# Patient Record
Sex: FEMALE | Race: WHITE | NOT HISPANIC OR LATINO | Employment: OTHER | URBAN - METROPOLITAN AREA
[De-identification: names, ages, dates, MRNs, and addresses within clinical notes are randomized per-mention and may not be internally consistent; named-entity substitution may affect disease eponyms.]

---

## 2020-10-08 ENCOUNTER — TELEPHONE (OUTPATIENT)
Dept: ORTHOPEDICS | Facility: CLINIC | Age: 65
End: 2020-10-08

## 2020-10-08 ENCOUNTER — PATIENT MESSAGE (OUTPATIENT)
Dept: ADMINISTRATIVE | Facility: OTHER | Age: 65
End: 2020-10-08

## 2020-10-08 DIAGNOSIS — M25.561 RIGHT KNEE PAIN, UNSPECIFIED CHRONICITY: Primary | ICD-10-CM

## 2020-10-08 NOTE — TELEPHONE ENCOUNTER
Spoke with pt. Informed pt she would need xrays prior to appt. Images ordered and scheduled. Pt verbalized understanding.

## 2020-10-08 NOTE — TELEPHONE ENCOUNTER
----- Message from Flavia Henderson sent at 10/8/2020  3:53 PM CDT -----  Contact: [phone]  Caller :   Heidy   teL:   [phone]   /  Returing your call.     Would like to do the Imaging at the [place] location.    Pls call to discuss.

## 2020-10-12 ENCOUNTER — OFFICE VISIT (OUTPATIENT)
Dept: ORTHOPEDICS | Facility: CLINIC | Age: 65
End: 2020-10-12
Payer: COMMERCIAL

## 2020-10-12 VITALS
DIASTOLIC BLOOD PRESSURE: 92 MMHG | HEIGHT: 65 IN | WEIGHT: 193.88 LBS | BODY MASS INDEX: 32.3 KG/M2 | SYSTOLIC BLOOD PRESSURE: 149 MMHG | HEART RATE: 85 BPM

## 2020-10-12 DIAGNOSIS — M17.11 PRIMARY OSTEOARTHRITIS OF RIGHT KNEE: Primary | ICD-10-CM

## 2020-10-12 PROCEDURE — 3008F BODY MASS INDEX DOCD: CPT | Mod: CPTII,S$GLB,, | Performed by: ORTHOPAEDIC SURGERY

## 2020-10-12 PROCEDURE — 3008F PR BODY MASS INDEX (BMI) DOCUMENTED: ICD-10-PCS | Mod: CPTII,S$GLB,, | Performed by: ORTHOPAEDIC SURGERY

## 2020-10-12 PROCEDURE — 3077F PR MOST RECENT SYSTOLIC BLOOD PRESSURE >= 140 MM HG: ICD-10-PCS | Mod: CPTII,S$GLB,, | Performed by: ORTHOPAEDIC SURGERY

## 2020-10-12 PROCEDURE — 99203 OFFICE O/P NEW LOW 30 MIN: CPT | Mod: S$GLB,,, | Performed by: ORTHOPAEDIC SURGERY

## 2020-10-12 PROCEDURE — 3080F PR MOST RECENT DIASTOLIC BLOOD PRESSURE >= 90 MM HG: ICD-10-PCS | Mod: CPTII,S$GLB,, | Performed by: ORTHOPAEDIC SURGERY

## 2020-10-12 PROCEDURE — 99203 PR OFFICE/OUTPT VISIT, NEW, LEVL III, 30-44 MIN: ICD-10-PCS | Mod: S$GLB,,, | Performed by: ORTHOPAEDIC SURGERY

## 2020-10-12 PROCEDURE — 99999 PR PBB SHADOW E&M-EST. PATIENT-LVL III: CPT | Mod: PBBFAC,,, | Performed by: ORTHOPAEDIC SURGERY

## 2020-10-12 PROCEDURE — 99999 PR PBB SHADOW E&M-EST. PATIENT-LVL III: ICD-10-PCS | Mod: PBBFAC,,, | Performed by: ORTHOPAEDIC SURGERY

## 2020-10-12 PROCEDURE — 3080F DIAST BP >= 90 MM HG: CPT | Mod: CPTII,S$GLB,, | Performed by: ORTHOPAEDIC SURGERY

## 2020-10-12 PROCEDURE — 3077F SYST BP >= 140 MM HG: CPT | Mod: CPTII,S$GLB,, | Performed by: ORTHOPAEDIC SURGERY

## 2020-10-12 RX ORDER — IBUPROFEN 200 MG
200 TABLET ORAL EVERY 6 HOURS PRN
COMMUNITY
End: 2020-10-19

## 2020-10-12 RX ORDER — DICLOFENAC SODIUM 75 MG/1
75 TABLET, DELAYED RELEASE ORAL 2 TIMES DAILY
Qty: 60 TABLET | Refills: 1 | Status: SHIPPED | OUTPATIENT
Start: 2020-10-12 | End: 2020-12-11

## 2020-10-12 RX ORDER — ACETAMINOPHEN 325 MG/1
325 TABLET ORAL EVERY 6 HOURS PRN
COMMUNITY
End: 2021-01-18

## 2020-10-12 NOTE — PROGRESS NOTES
Subjective:    Patient ID:  Heidy Polo is a 65 y.o. y.o. female who presents for initial visit for Pain of the Right Knee      64 yo female reports one year h/o right knee pain, insidious onset. Pain gradual progressive, localized to medial knee, sharp and aggravated with walking, stair climbing and rising from a chair. Notes associated swelling and night pain. Denies locking, giving way or constitutional sxs. Has been taking Tylenol and Motrin with some relief.           Past Medical History:   Diagnosis Date    Cancer     breast in 2001 -right     Hyperlipidemia     Hypertension         Past Surgical History:   Procedure Laterality Date    CHOLECYSTECTOMY      gastric sleeve   9/11    HERNIA REPAIR      MODIFIED RADICAL MASTECTOMY W/ AXILLARY LYMPH NODE DISSECTION  2001    right breast       Review of patient's allergies indicates:  No Known Allergies       Current Outpatient Medications:     acetaminophen (TYLENOL) 325 MG tablet, Take 325 mg by mouth every 6 (six) hours as needed for Pain., Disp: , Rfl:     ibuprofen (ADVIL,MOTRIN) 200 MG tablet, Take 200 mg by mouth every 6 (six) hours as needed for Pain., Disp: , Rfl:     ergocalciferol (ERGOCALCIFEROL) 50,000 unit Cap, Take 1 capsule (50,000 Units total) by mouth every 30 days. (Patient not taking: Reported on 10/12/2020), Disp: 10 capsule, Rfl: 12    levothyroxine (SYNTHROID) 50 MCG tablet, Take 1 tablet (50 mcg total) by mouth before breakfast. (Patient not taking: Reported on 10/12/2020), Disp: 20 tablet, Rfl: 7    metoprolol succinate (TOPROL-XL) 100 MG 24 hr tablet, Take 1 tablet (100 mg total) by mouth once daily. (Patient not taking: Reported on 10/12/2020), Disp: 90 tablet, Rfl: 1    metoprolol tartrate (LOPRESSOR) 25 MG tablet, Take 1 tablet (25 mg total) by mouth 2 (two) times daily., Disp: 60 tablet, Rfl: 5    torsemide (DEMADEX) 10 MG Tab, Take 1 tablet (10 mg total) by mouth once daily. Prn fluid (Patient not taking: Reported on  10/12/2020), Disp: 90 tablet, Rfl: 1    venlafaxine (EFFEXOR-XR) 150 MG Cp24, Take 1 capsule (150 mg total) by mouth once daily. (Patient not taking: Reported on 10/12/2020), Disp: 90 capsule, Rfl: 1    Social History     Socioeconomic History    Marital status:      Spouse name: Not on file    Number of children: Not on file    Years of education: Not on file    Highest education level: Not on file   Occupational History    Not on file   Social Needs    Financial resource strain: Not on file    Food insecurity     Worry: Not on file     Inability: Not on file    Transportation needs     Medical: Not on file     Non-medical: Not on file   Tobacco Use    Smoking status: Never Smoker   Substance and Sexual Activity    Alcohol use: No     Comment: rare    Drug use: No    Sexual activity: Yes     Partners: Male     Birth control/protection: None   Lifestyle    Physical activity     Days per week: Not on file     Minutes per session: Not on file    Stress: Not on file   Relationships    Social connections     Talks on phone: Not on file     Gets together: Not on file     Attends [employer] service: Not on file     Active member of club or organization: Not on file     Attends meetings of clubs or organizations: Not on file     Relationship status: Not on file   Other Topics Concern    Not on file   Social History Narrative    Not on file        Family History   Problem Relation Age of Onset    Cancer Sister         breast ca    Diabetes Brother     Hypertension Brother     Cancer Maternal Aunt         breast ca    Cancer Maternal Grandmother         ovarien ca    Heart disease Maternal Grandfather     Hypertension Brother         Review of Systems   Constitutional: Negative for chills and fever.   HENT: Negative for hearing loss.    Eyes: Negative for blurred vision.   Respiratory: Negative for shortness of breath.    Cardiovascular: Negative for chest pain.   Gastrointestinal: Negative for  "nausea and vomiting.   Genitourinary: Negative for dysuria.   Musculoskeletal: Negative for myalgias.   Skin: Negative for rash.   Neurological: Negative for speech change and loss of consciousness.   Endo/Heme/Allergies: Does not bruise/bleed easily.   Psychiatric/Behavioral: Positive for depression.        Objective:     BP (!) 149/92 (BP Location: Left arm, Patient Position: Sitting, BP Method: Small (Automatic))   Pulse 85   Ht 5' 5" (1.651 m)   Wt 87.9 kg (193 lb 14.3 oz)   BMI 32.27 kg/m²     Ortho Exam     64 yo female in NAD; alert, oriented x 3    BLE: N/V intact; no edema    Right hip: NT; FROM; negative Stinchfield    Right knee: varus deformity; trace effusion; PF crepitus; tender MJL/pes; ROM: 0-110 flexion; trace varus laxity    Imaging:     X-rays standing AP bilateral knee, lateral/sunrise right knee taken today are independently reviewed by me and show Kellgren Yariel grade 4 tricompartmental degenerative right, grade 4 bicompartmental left with varus deformity.       Assessment & Plan:      1. Primary osteoarthritis of right knee       1.  Findings, diagnosis, treatment options/risks/benefits were reviewed  2.  Voltaren 75 mg po bid (discontinue Motrin)  3.  PT  4.  Cane support left hand prn  5.  Maintain judicious activity modification/limitation  6.  Follow-up in 2 months    "

## 2020-10-19 ENCOUNTER — TELEPHONE (OUTPATIENT)
Dept: SURGERY | Facility: CLINIC | Age: 65
End: 2020-10-19

## 2020-10-19 ENCOUNTER — OFFICE VISIT (OUTPATIENT)
Dept: PRIMARY CARE CLINIC | Facility: CLINIC | Age: 65
End: 2020-10-19
Payer: COMMERCIAL

## 2020-10-19 ENCOUNTER — PATIENT MESSAGE (OUTPATIENT)
Dept: PRIMARY CARE CLINIC | Facility: CLINIC | Age: 65
End: 2020-10-19

## 2020-10-19 VITALS
HEIGHT: 65 IN | WEIGHT: 196.19 LBS | RESPIRATION RATE: 18 BRPM | OXYGEN SATURATION: 98 % | HEART RATE: 78 BPM | SYSTOLIC BLOOD PRESSURE: 138 MMHG | TEMPERATURE: 98 F | DIASTOLIC BLOOD PRESSURE: 86 MMHG | BODY MASS INDEX: 32.69 KG/M2

## 2020-10-19 DIAGNOSIS — R73.03 PREDIABETES: ICD-10-CM

## 2020-10-19 DIAGNOSIS — Z85.3 HISTORY OF RIGHT BREAST CANCER: ICD-10-CM

## 2020-10-19 DIAGNOSIS — E03.9 HYPOTHYROIDISM, UNSPECIFIED TYPE: ICD-10-CM

## 2020-10-19 DIAGNOSIS — Z90.11 H/O RIGHT MASTECTOMY: ICD-10-CM

## 2020-10-19 DIAGNOSIS — Z11.4 SCREENING FOR HIV (HUMAN IMMUNODEFICIENCY VIRUS): ICD-10-CM

## 2020-10-19 DIAGNOSIS — Z12.31 ENCOUNTER FOR SCREENING MAMMOGRAM FOR MALIGNANT NEOPLASM OF BREAST: ICD-10-CM

## 2020-10-19 DIAGNOSIS — Z12.4 CERVICAL CANCER SCREENING: ICD-10-CM

## 2020-10-19 DIAGNOSIS — E78.5 HYPERLIPIDEMIA, UNSPECIFIED HYPERLIPIDEMIA TYPE: ICD-10-CM

## 2020-10-19 DIAGNOSIS — Z11.59 NEED FOR HEPATITIS C SCREENING TEST: ICD-10-CM

## 2020-10-19 DIAGNOSIS — I10 ESSENTIAL HYPERTENSION: ICD-10-CM

## 2020-10-19 DIAGNOSIS — Z12.11 COLON CANCER SCREENING: ICD-10-CM

## 2020-10-19 DIAGNOSIS — Z12.11 SCREENING FOR COLON CANCER: Primary | ICD-10-CM

## 2020-10-19 DIAGNOSIS — Z00.00 ANNUAL PHYSICAL EXAM: Primary | ICD-10-CM

## 2020-10-19 DIAGNOSIS — Z23 NEED FOR VACCINATION: ICD-10-CM

## 2020-10-19 PROCEDURE — 3008F PR BODY MASS INDEX (BMI) DOCUMENTED: ICD-10-PCS | Mod: CPTII,S$GLB,, | Performed by: FAMILY MEDICINE

## 2020-10-19 PROCEDURE — 90472 IMMUNIZATION ADMIN EACH ADD: CPT | Mod: S$GLB,,, | Performed by: FAMILY MEDICINE

## 2020-10-19 PROCEDURE — 3079F PR MOST RECENT DIASTOLIC BLOOD PRESSURE 80-89 MM HG: ICD-10-PCS | Mod: CPTII,S$GLB,, | Performed by: FAMILY MEDICINE

## 2020-10-19 PROCEDURE — 3075F PR MOST RECENT SYSTOLIC BLOOD PRESS GE 130-139MM HG: ICD-10-PCS | Mod: CPTII,S$GLB,, | Performed by: FAMILY MEDICINE

## 2020-10-19 PROCEDURE — 99999 PR PBB SHADOW E&M-EST. PATIENT-LVL V: CPT | Mod: PBBFAC,,, | Performed by: FAMILY MEDICINE

## 2020-10-19 PROCEDURE — 3008F BODY MASS INDEX DOCD: CPT | Mod: CPTII,S$GLB,, | Performed by: FAMILY MEDICINE

## 2020-10-19 PROCEDURE — 90472 TD VACCINE GREATER THAN OR EQUAL TO 7YO PRESERVATIVE FREE IM: ICD-10-PCS | Mod: S$GLB,,, | Performed by: FAMILY MEDICINE

## 2020-10-19 PROCEDURE — 99999 PR PBB SHADOW E&M-EST. PATIENT-LVL V: ICD-10-PCS | Mod: PBBFAC,,, | Performed by: FAMILY MEDICINE

## 2020-10-19 PROCEDURE — 93005 EKG 12-LEAD: ICD-10-PCS | Mod: S$GLB,,, | Performed by: FAMILY MEDICINE

## 2020-10-19 PROCEDURE — 90471 FLU VACCINE (QUAD) GREATER THAN OR EQUAL TO 3YO PRESERVATIVE FREE IM: ICD-10-PCS | Mod: S$GLB,,, | Performed by: FAMILY MEDICINE

## 2020-10-19 PROCEDURE — 90471 IMMUNIZATION ADMIN: CPT | Mod: S$GLB,,, | Performed by: FAMILY MEDICINE

## 2020-10-19 PROCEDURE — 90714 TD VACC NO PRESV 7 YRS+ IM: CPT | Mod: S$GLB,,, | Performed by: FAMILY MEDICINE

## 2020-10-19 PROCEDURE — 99386 PR PREVENTIVE VISIT,NEW,40-64: ICD-10-PCS | Mod: 25,S$GLB,, | Performed by: FAMILY MEDICINE

## 2020-10-19 PROCEDURE — 93010 ELECTROCARDIOGRAM REPORT: CPT | Mod: S$GLB,,, | Performed by: INTERNAL MEDICINE

## 2020-10-19 PROCEDURE — 93005 ELECTROCARDIOGRAM TRACING: CPT | Mod: S$GLB,,, | Performed by: FAMILY MEDICINE

## 2020-10-19 PROCEDURE — 90686 IIV4 VACC NO PRSV 0.5 ML IM: CPT | Mod: S$GLB,,, | Performed by: FAMILY MEDICINE

## 2020-10-19 PROCEDURE — 93010 EKG 12-LEAD: ICD-10-PCS | Mod: S$GLB,,, | Performed by: INTERNAL MEDICINE

## 2020-10-19 PROCEDURE — 99386 PREV VISIT NEW AGE 40-64: CPT | Mod: 25,S$GLB,, | Performed by: FAMILY MEDICINE

## 2020-10-19 PROCEDURE — 3075F SYST BP GE 130 - 139MM HG: CPT | Mod: CPTII,S$GLB,, | Performed by: FAMILY MEDICINE

## 2020-10-19 PROCEDURE — 90714 TD VACCINE GREATER THAN OR EQUAL TO 7YO PRESERVATIVE FREE IM: ICD-10-PCS | Mod: S$GLB,,, | Performed by: FAMILY MEDICINE

## 2020-10-19 PROCEDURE — 90686 FLU VACCINE (QUAD) GREATER THAN OR EQUAL TO 3YO PRESERVATIVE FREE IM: ICD-10-PCS | Mod: S$GLB,,, | Performed by: FAMILY MEDICINE

## 2020-10-19 PROCEDURE — 3079F DIAST BP 80-89 MM HG: CPT | Mod: CPTII,S$GLB,, | Performed by: FAMILY MEDICINE

## 2020-10-19 RX ORDER — SODIUM CHLORIDE 0.9 % (FLUSH) 0.9 %
3 SYRINGE (ML) INJECTION
Status: CANCELLED | OUTPATIENT
Start: 2020-10-19

## 2020-10-19 RX ORDER — ZOSTER VACCINE RECOMBINANT, ADJUVANTED 50 MCG/0.5
0.5 KIT INTRAMUSCULAR ONCE
Qty: 1 EACH | Refills: 0 | Status: SHIPPED | OUTPATIENT
Start: 2020-10-19 | End: 2020-10-19

## 2020-10-19 RX ORDER — SODIUM CHLORIDE, SODIUM LACTATE, POTASSIUM CHLORIDE, CALCIUM CHLORIDE 600; 310; 30; 20 MG/100ML; MG/100ML; MG/100ML; MG/100ML
INJECTION, SOLUTION INTRAVENOUS CONTINUOUS
Status: CANCELLED | OUTPATIENT
Start: 2020-10-19

## 2020-10-19 RX ORDER — POLYETHYLENE GLYCOL 3350, SODIUM SULFATE ANHYDROUS, SODIUM BICARBONATE, SODIUM CHLORIDE, POTASSIUM CHLORIDE 236; 22.74; 6.74; 5.86; 2.97 G/4L; G/4L; G/4L; G/4L; G/4L
4 POWDER, FOR SOLUTION ORAL ONCE
Qty: 4000 ML | Refills: 0 | Status: SHIPPED | OUTPATIENT
Start: 2020-10-19 | End: 2020-10-19

## 2020-10-19 NOTE — TELEPHONE ENCOUNTER
Called patient in reference to a referral to Colorectal Surgery for colon cancer screening. Patient verbally consented to a Colonoscopy and requested to be scheduled for a Colonoscopy on 11/09/2020. Patient was advised a designated  is required on the day of the Colonoscopy to drive the patient home and the  must be at least. 18 years old.Colonoscopy Prep instructions were thoroughly explained and discussed with the patient. Patient acknowledges understanding Prep instructions. Patient was advised the Colonoscopy Prep instructions discussed and explained on the phone , are being mailed out to the patient's address on file. Patient's address on file was verified with the patient for accuracy of mailing. Patient's medications on file was reviewed with the patient for accuracy of information. Patient denies taking  any other medications other than those listed and verified on medication profile.Patient was explained the Colonoscopy will be performed here at [facility]. Patient was further explained the Pre-Op will call one day prior to the procedure to discuss Pre-Op instructions and what time to report for the Colonoscopy. The patient was given the opportunity to ask any questions about the Colonoscopy. No further issues were discussed.

## 2020-10-19 NOTE — PROGRESS NOTES
Verified pt ID using name and . NKDA. Administered flu and TD in left deltoid per physician order using aseptic technique. Aspirated and no blood return noted. Pt tolerated well with no adverse reactions noted.

## 2020-10-19 NOTE — PROGRESS NOTES
"Subjective:       Patient ID: Heidy Polo is a 65 y.o. female.    Chief Complaint: Establish Care and Flu Vaccine    Here to establish care.  Has not had primary care physician or routine checkup in several years.  History of hypertension and hypothyroidism, off meds for about 5 years.  Has gained about 20 lb over the last few months, and says her blood pressure has been creeping up, though highest readings have been in the 120s over 80s at home.  Does endorse more frequent headaches since her blood pressure has been a little higher.  No chest pain or shortness of breath.  History of breast cancer and right mastectomy in 2001, due for follow-up mammography of left breast.    Review of Systems   Constitutional: Positive for malaise/fatigue. Negative for chills, fever and unexpected weight change.   HENT: Negative for trouble swallowing.    Eyes: Positive for blurred vision. Negative for visual disturbance.   Respiratory: Negative for shortness of breath.    Cardiovascular: Negative for chest pain, palpitations, orthopnea and PND.   Gastrointestinal: Negative for blood in stool.   Genitourinary: Negative for difficulty urinating.   Musculoskeletal: Positive for arthralgias. Negative for neck pain.   Skin: Negative for rash and wound.   Allergic/Immunologic: Negative for immunocompromised state.   Neurological: Positive for headaches.   Hematological: Does not bruise/bleed easily.   Psychiatric/Behavioral: Negative for dysphoric mood.       Objective:      Vitals:    10/19/20 0854   BP: 138/86   BP Location: Left arm   Patient Position: Sitting   BP Method: Large (Manual)   Pulse: 78   Resp: 18   Temp: 97.8 °F (36.6 °C)   TempSrc: Oral   SpO2: 98%   Weight: 89 kg (196 lb 3.4 oz)   Height: 5' 5" (1.651 m)     Physical Exam  Vitals signs and nursing note reviewed.   Constitutional:       Appearance: She is well-developed.   HENT:      Head: Normocephalic and atraumatic.   Eyes:      Pupils: Pupils are equal, round, " and reactive to light.   Neck:      Musculoskeletal: Neck supple.      Vascular: No carotid bruit or JVD.   Cardiovascular:      Rate and Rhythm: Normal rate and regular rhythm.      Pulses:           Radial pulses are 2+ on the right side and 2+ on the left side.      Heart sounds: Normal heart sounds.   Pulmonary:      Effort: Pulmonary effort is normal.      Breath sounds: Normal breath sounds.   Abdominal:      General: Bowel sounds are normal. There is no distension.      Palpations: Abdomen is soft.      Tenderness: There is no abdominal tenderness.   Skin:     General: Skin is warm and dry.   Neurological:      Mental Status: She is alert and oriented to person, place, and time.   Psychiatric:         Behavior: Behavior normal.         Lab Results   Component Value Date    WBC 11.59 (H) 03/12/2012    HGB 13.4 03/12/2012    HCT 41.5 03/12/2012     03/12/2012    CHOL 181 04/10/2013    TRIG 96 04/10/2013    HDL 63 04/10/2013    ALT 23 04/10/2013    AST 21 04/10/2013     04/10/2013    K 4.6 04/10/2013     04/10/2013    CREATININE 0.6 04/10/2013    BUN 14 04/10/2013    CO2 25 04/10/2013    TSH 1.677 04/10/2013    HGBA1C 5.5 04/10/2013      Assessment:       1. Annual physical exam    2. Encounter for screening mammogram for malignant neoplasm of breast    3. Colon cancer screening    4. Cervical cancer screening    5. Essential hypertension    6. Hyperlipidemia, unspecified hyperlipidemia type    7. History of right breast cancer    8. Hypothyroidism, unspecified type    9. H/O right mastectomy    10. Prediabetes    11. Screening for HIV (human immunodeficiency virus)    12. Need for hepatitis C screening test    13. Need for vaccination        Plan:       Annual physical exam  -     EKG 12-lead  -     CBC auto differential; Future; Expected date: 10/19/2020  -     Comprehensive Metabolic Panel; Future; Expected date: 10/19/2020  -     Lipid Panel; Future; Expected date: 10/19/2020  -     TSH;  Future; Expected date: 10/19/2020  -     Hemoglobin A1C; Future; Expected date: 10/19/2020  -     Hepatitis C Antibody; Future; Expected date: 10/19/2020  -     HIV 1/2 Ag/Ab (4th Gen); Future; Expected date: 10/19/2020    Encounter for screening mammogram for malignant neoplasm of breast  -     Mammo Digital Screening Left; Future; Expected date: 10/19/2020    Colon cancer screening  -     Ambulatory referral/consult to Colorectal Surgery; Future; Expected date: 10/26/2020    Cervical cancer screening  -     Ambulatory referral/consult to Gynecology; Future; Expected date: 10/26/2020    Essential hypertension  -     EKG 12-lead  -     CBC auto differential; Future; Expected date: 10/19/2020  No treatment indicated currently, but continue to monitor at home.  Will check with patient in about a  Hyperlipidemia, unspecified hyperlipidemia type  -     Comprehensive Metabolic Panel; Future; Expected date: 10/19/2020  -     Lipid Panel; Future; Expected date: 10/19/2020    History of right breast cancer    Hypothyroidism, unspecified type  -     TSH; Future; Expected date: 10/19/2020    H/O right mastectomy    Prediabetes  -     Hemoglobin A1C; Future; Expected date: 10/19/2020    Screening for HIV (human immunodeficiency virus)  -     HIV 1/2 Ag/Ab (4th Gen); Future; Expected date: 10/19/2020    Need for hepatitis C screening test  -     Hepatitis C Antibody; Future; Expected date: 10/19/2020    Need for vaccination  -     Influenza - Quadrivalent (PF)  -     Td Vaccine (Adult) - Preservative Free  -     varicella-zoster gE-AS01B, PF, (SHINGRIX, PF,) 50 mcg/0.5 mL injection; Inject 0.5 mLs into the muscle once. for 1 dose  Dispense: 1 each; Refill: 0      Medication List with Changes/Refills   New Medications    VARICELLA-ZOSTER GE-AS01B, PF, (SHINGRIX, PF,) 50 MCG/0.5 ML INJECTION    Inject 0.5 mLs into the muscle once. for 1 dose   Current Medications    ACETAMINOPHEN (TYLENOL) 325 MG TABLET    Take 325 mg by mouth  every 6 (six) hours as needed for Pain.    DICLOFENAC (VOLTAREN) 75 MG EC TABLET    Take 1 tablet (75 mg total) by mouth 2 (two) times daily.   Discontinued Medications    ERGOCALCIFEROL (ERGOCALCIFEROL) 50,000 UNIT CAP    Take 1 capsule (50,000 Units total) by mouth every 30 days.    IBUPROFEN (ADVIL,MOTRIN) 200 MG TABLET    Take 200 mg by mouth every 6 (six) hours as needed for Pain.    LEVOTHYROXINE (SYNTHROID) 50 MCG TABLET    Take 1 tablet (50 mcg total) by mouth before breakfast.    METOPROLOL SUCCINATE (TOPROL-XL) 100 MG 24 HR TABLET    Take 1 tablet (100 mg total) by mouth once daily.    METOPROLOL TARTRATE (LOPRESSOR) 25 MG TABLET    Take 1 tablet (25 mg total) by mouth 2 (two) times daily.    TORSEMIDE (DEMADEX) 10 MG TAB    Take 1 tablet (10 mg total) by mouth once daily. Prn fluid    VENLAFAXINE (EFFEXOR-XR) 150 MG CP24    Take 1 capsule (150 mg total) by mouth once daily.

## 2020-10-20 ENCOUNTER — HOSPITAL ENCOUNTER (OUTPATIENT)
Dept: RADIOLOGY | Facility: OTHER | Age: 65
Discharge: HOME | End: 2020-10-20
Attending: FAMILY MEDICINE
Payer: COMMERCIAL

## 2020-10-20 DIAGNOSIS — Z12.31 ENCOUNTER FOR SCREENING MAMMOGRAM FOR MALIGNANT NEOPLASM OF BREAST: ICD-10-CM

## 2020-10-20 PROCEDURE — 77067 MAMMO DIGITAL SCREENING LEFT: ICD-10-PCS | Mod: 26,,, | Performed by: RADIOLOGY

## 2020-10-20 PROCEDURE — 77067 SCR MAMMO BI INCL CAD: CPT | Mod: TC

## 2020-10-20 PROCEDURE — 77067 SCR MAMMO BI INCL CAD: CPT | Mod: 26,,, | Performed by: RADIOLOGY

## 2020-11-05 ENCOUNTER — TELEPHONE (OUTPATIENT)
Dept: SURGERY | Facility: CLINIC | Age: 65
End: 2020-11-05

## 2020-11-18 ENCOUNTER — TELEPHONE (OUTPATIENT)
Dept: GASTROENTEROLOGY | Facility: CLINIC | Age: 65
End: 2020-11-18

## 2020-11-18 NOTE — TELEPHONE ENCOUNTER
----- Message from Adelaide Hernández MD sent at 11/18/2020 12:12 PM CST -----  Benign polyp, 10 year recall colonoscopy

## 2020-11-18 NOTE — TELEPHONE ENCOUNTER
----- Message from Nannette Quintero sent at 11/18/2020  3:15 PM CST -----  Regarding: call back  Contact: [phone]  Patient Returning Your Phone Call

## 2020-11-23 ENCOUNTER — OFFICE VISIT (OUTPATIENT)
Dept: OBSTETRICS AND GYNECOLOGY | Facility: CLINIC | Age: 65
End: 2020-11-23
Payer: COMMERCIAL

## 2020-11-23 VITALS
WEIGHT: 195.13 LBS | BODY MASS INDEX: 33.31 KG/M2 | SYSTOLIC BLOOD PRESSURE: 144 MMHG | DIASTOLIC BLOOD PRESSURE: 84 MMHG | HEIGHT: 64 IN

## 2020-11-23 DIAGNOSIS — Z01.419 WELL WOMAN EXAM WITH ROUTINE GYNECOLOGICAL EXAM: Primary | ICD-10-CM

## 2020-11-23 DIAGNOSIS — Z85.3 HISTORY OF BREAST CANCER: ICD-10-CM

## 2020-11-23 DIAGNOSIS — Z12.4 CERVICAL CANCER SCREENING: ICD-10-CM

## 2020-11-23 PROCEDURE — 3079F PR MOST RECENT DIASTOLIC BLOOD PRESSURE 80-89 MM HG: ICD-10-PCS | Mod: CPTII,S$GLB,, | Performed by: STUDENT IN AN ORGANIZED HEALTH CARE EDUCATION/TRAINING PROGRAM

## 2020-11-23 PROCEDURE — 99386 PREV VISIT NEW AGE 40-64: CPT | Mod: S$GLB,,, | Performed by: STUDENT IN AN ORGANIZED HEALTH CARE EDUCATION/TRAINING PROGRAM

## 2020-11-23 PROCEDURE — 1126F AMNT PAIN NOTED NONE PRSNT: CPT | Mod: S$GLB,,, | Performed by: STUDENT IN AN ORGANIZED HEALTH CARE EDUCATION/TRAINING PROGRAM

## 2020-11-23 PROCEDURE — 99999 PR PBB SHADOW E&M-EST. PATIENT-LVL III: ICD-10-PCS | Mod: PBBFAC,,, | Performed by: STUDENT IN AN ORGANIZED HEALTH CARE EDUCATION/TRAINING PROGRAM

## 2020-11-23 PROCEDURE — 3008F BODY MASS INDEX DOCD: CPT | Mod: CPTII,S$GLB,, | Performed by: STUDENT IN AN ORGANIZED HEALTH CARE EDUCATION/TRAINING PROGRAM

## 2020-11-23 PROCEDURE — 99386 PR PREVENTIVE VISIT,NEW,40-64: ICD-10-PCS | Mod: S$GLB,,, | Performed by: STUDENT IN AN ORGANIZED HEALTH CARE EDUCATION/TRAINING PROGRAM

## 2020-11-23 PROCEDURE — 87624 HPV HI-RISK TYP POOLED RSLT: CPT

## 2020-11-23 PROCEDURE — 3079F DIAST BP 80-89 MM HG: CPT | Mod: CPTII,S$GLB,, | Performed by: STUDENT IN AN ORGANIZED HEALTH CARE EDUCATION/TRAINING PROGRAM

## 2020-11-23 PROCEDURE — 99999 PR PBB SHADOW E&M-EST. PATIENT-LVL III: CPT | Mod: PBBFAC,,, | Performed by: STUDENT IN AN ORGANIZED HEALTH CARE EDUCATION/TRAINING PROGRAM

## 2020-11-23 PROCEDURE — 88175 CYTOPATH C/V AUTO FLUID REDO: CPT

## 2020-11-23 PROCEDURE — 3077F PR MOST RECENT SYSTOLIC BLOOD PRESSURE >= 140 MM HG: ICD-10-PCS | Mod: CPTII,S$GLB,, | Performed by: STUDENT IN AN ORGANIZED HEALTH CARE EDUCATION/TRAINING PROGRAM

## 2020-11-23 PROCEDURE — 3008F PR BODY MASS INDEX (BMI) DOCUMENTED: ICD-10-PCS | Mod: CPTII,S$GLB,, | Performed by: STUDENT IN AN ORGANIZED HEALTH CARE EDUCATION/TRAINING PROGRAM

## 2020-11-23 PROCEDURE — 1126F PR PAIN SEVERITY QUANTIFIED, NO PAIN PRESENT: ICD-10-PCS | Mod: S$GLB,,, | Performed by: STUDENT IN AN ORGANIZED HEALTH CARE EDUCATION/TRAINING PROGRAM

## 2020-11-23 PROCEDURE — 3077F SYST BP >= 140 MM HG: CPT | Mod: CPTII,S$GLB,, | Performed by: STUDENT IN AN ORGANIZED HEALTH CARE EDUCATION/TRAINING PROGRAM

## 2020-11-23 NOTE — LETTER
November 23, 2020      Chas Angela MD  [address]  [address]  [place] [address]           [facility] [address]  [place] [address]  Phone: [phone]  Fax: [phone]          Patient: Heidy Polo   MR Number: [id number]   YOB: 1955   Date of Visit: 11/23/2020       Dear Dr. Chas Angela:    Thank you for referring Heidy Polo to me for evaluation. Attached you will find relevant portions of my assessment and plan of care.    If you have questions, please do not hesitate to call me. I look forward to following Heidy Polo along with you.    Sincerely,    Yolie Flores MD    Enclosure  CC:  No Recipients    If you would like to receive this communication electronically, please contact [email or web address] or [phone] to request more information on [employer] access.    For providers and/or their staff who would like to refer a patient to [facility], please contact us through our one-stop-shop provider referral line, [facility], at [phone].    If you feel you have received this communication in error or would no longer like to receive these types of communications, please e-mail [email or web address]

## 2020-11-23 NOTE — PROGRESS NOTES
History & Physical  Gynecology      SUBJECTIVE:     Chief Complaint: Well Woman     History of Present Illness:  65 y.o. postmenopausal female  here for annual.  Otherwise no complaints.  Denies postmenopausal bleeding.  She is not sexually active.  Last Pap: ?  History of abnormal pap: No  Last MMG: 10/2020   Colon cancer screenin2020 , 10 yr interval  Denies FHx ovarian, uterine, colon cancer   Personal h/o breast cancer (40s, s/p mastectomy, chemoXRT), sister (40s) and maternal aunt with breast cancer   Unsure about genetic testing    Review of patient's allergies indicates:  No Known Allergies    Past Medical History:   Diagnosis Date    Cancer     breast in  -right     Depression 2012    Hyperlipidemia     Hypertension      Past Surgical History:   Procedure Laterality Date    CHOLECYSTECTOMY      COLONOSCOPY N/A 2020    Procedure: COLONOSCOPY;  Surgeon: Adelaide Hernández MD;  Location: [facility];  Service: Endoscopy;  Laterality: N/A;  with biopsy    gastric sleeve       HERNIA REPAIR      MODIFIED RADICAL MASTECTOMY W/ AXILLARY LYMPH NODE DISSECTION      right breast     OB History        3    Para   3    Term   3            AB        Living           SAB        TAB        Ectopic        Multiple        Live Births                   Family History   Problem Relation Age of Onset    Cancer Sister         breast ca    Breast cancer Sister     Diabetes Brother     Hypertension Brother     Cancer Maternal Aunt         breast ca    Breast cancer Maternal Aunt     Cancer Maternal Grandmother         ovarien ca    Heart disease Maternal Grandfather     Hypertension Brother      Social History     Tobacco Use    Smoking status: Never Smoker    Smokeless tobacco: Never Used   Substance Use Topics    Alcohol use: No     Comment: rare    Drug use: No       Current Outpatient Medications   Medication Sig    acetaminophen (TYLENOL) 325 MG tablet Take  325 mg by mouth every 6 (six) hours as needed for Pain.    diclofenac (VOLTAREN) 75 MG EC tablet Take 1 tablet (75 mg total) by mouth 2 (two) times daily.     No current facility-administered medications for this visit.          Review of Systems:  Review of Systems   Constitutional: Negative for appetite change, chills, fever and unexpected weight change.   HENT: Negative for nasal congestion.    Respiratory: Negative for shortness of breath.    Cardiovascular: Negative for chest pain and leg swelling.   Gastrointestinal: Negative for abdominal pain, constipation, diarrhea, nausea and vomiting.   Endocrine: Negative for diabetes.   Genitourinary: Negative for dysuria, menstrual problem, pelvic pain and vaginal discharge.   Musculoskeletal: Negative for myalgias.   Integumentary:  Negative for rash, breast mass, nipple discharge, breast skin changes and breast tenderness.   Neurological: Negative for headaches.   Psychiatric/Behavioral: Negative for depression. The patient is not nervous/anxious.    Breast: Negative for lump, mass, mastodynia, nipple discharge, skin changes and tenderness       OBJECTIVE:     Physical Exam:  Physical Exam  Exam conducted with a chaperone present.   Constitutional:       General: She is not in acute distress.     Appearance: Normal appearance. She is well-developed.   HENT:      Head: Normocephalic and atraumatic.   Eyes:      Conjunctiva/sclera: Conjunctivae normal.   Pulmonary:      Effort: Pulmonary effort is normal. No respiratory distress.   Chest:      Breasts:         Right: Absent.         Left: No inverted nipple, mass, nipple discharge, skin change or tenderness.   Abdominal:      General: There is no distension.      Palpations: Abdomen is soft. There is no mass.      Tenderness: There is no abdominal tenderness. There is no guarding or rebound.      Hernia: No hernia is present.   Genitourinary:     General: Normal vulva.      Pubic Area: No rash.       Labia:          Right: No rash, tenderness or lesion.         Left: No rash, tenderness or lesion.       Urethra: No prolapse, urethral pain, urethral swelling or urethral lesion.      Vagina: Normal. No vaginal discharge, tenderness or bleeding.      Cervix: No cervical motion tenderness, discharge, friability or lesion.      Uterus: Normal. Not enlarged and not tender.       Adnexa: Right adnexa normal and left adnexa normal.        Right: No mass, tenderness or fullness.          Left: No mass, tenderness or fullness.        Comments: +vulvovaginal atrophy  Musculoskeletal: Normal range of motion.         General: No tenderness.      Right lower leg: No edema.      Left lower leg: No edema.   Lymphadenopathy:      Upper Body:      Right upper body: No axillary adenopathy.      Left upper body: No axillary adenopathy.   Skin:     General: Skin is warm and dry.      Findings: No erythema.   Neurological:      Mental Status: She is alert and oriented to person, place, and time.   Psychiatric:         Mood and Affect: Mood normal.         Behavior: Behavior normal.           ASSESSMENT:       ICD-10-CM ICD-9-CM    1. Well woman exam with routine gynecological exam  Z01.419 V72.31    2. Cervical cancer screening  Z12.4 V76.2 Ambulatory referral/consult to Gynecology      Liquid-Based Pap Smear, Screening      HPV High Risk Genotypes, PCR   3. History of breast cancer  Z85.3 V10.3           Plan:      Heidy HUERTA was seen today for well woman.    Diagnoses and all orders for this visit:    Well woman exam with routine gynecological exam   Cervical co-test today   CBE wnl today. MMG up to date. Very good about home self breast exams.    DXA normal 2011   Cscope up to date    Cervical cancer screening  -     Ambulatory referral/consult to Gynecology  -     Liquid-Based Pap Smear, Screening  -     HPV High Risk Genotypes, PCR    History of breast cancer   Oncology records requested -- need to determine BRCA status given young age at  diagnosis and strong FHx      Orders Placed This Encounter   Procedures    HPV High Risk Genotypes, PCR       Follow up in about 1-2 year (around 11/23/2021) for annual, or sooner as needed.     Counseling time: 30 minutes, >50% face to face    Yolie Flores MD  Obstetrics & Gynecology   [facility]

## 2020-11-27 LAB
HPV HR 12 DNA SPEC QL NAA+PROBE: NEGATIVE
HPV16 AG SPEC QL: NEGATIVE
HPV18 DNA SPEC QL NAA+PROBE: NEGATIVE

## 2020-12-11 ENCOUNTER — TELEPHONE (OUTPATIENT)
Dept: ORTHOPEDICS | Facility: CLINIC | Age: 65
End: 2020-12-11

## 2020-12-11 ENCOUNTER — OFFICE VISIT (OUTPATIENT)
Dept: ORTHOPEDICS | Facility: CLINIC | Age: 65
End: 2020-12-11
Payer: COMMERCIAL

## 2020-12-11 ENCOUNTER — PATIENT MESSAGE (OUTPATIENT)
Dept: ADMINISTRATIVE | Facility: OTHER | Age: 65
End: 2020-12-11

## 2020-12-11 ENCOUNTER — PATIENT MESSAGE (OUTPATIENT)
Dept: OTHER | Facility: OTHER | Age: 65
End: 2020-12-11

## 2020-12-11 VITALS
HEIGHT: 64 IN | WEIGHT: 198.06 LBS | HEART RATE: 82 BPM | BODY MASS INDEX: 33.81 KG/M2 | DIASTOLIC BLOOD PRESSURE: 85 MMHG | SYSTOLIC BLOOD PRESSURE: 143 MMHG

## 2020-12-11 DIAGNOSIS — M17.11 PRIMARY OSTEOARTHRITIS OF RIGHT KNEE: Primary | ICD-10-CM

## 2020-12-11 PROCEDURE — 3077F SYST BP >= 140 MM HG: CPT | Mod: CPTII,S$GLB,, | Performed by: ORTHOPAEDIC SURGERY

## 2020-12-11 PROCEDURE — 99999 PR PBB SHADOW E&M-EST. PATIENT-LVL III: CPT | Mod: PBBFAC,,, | Performed by: ORTHOPAEDIC SURGERY

## 2020-12-11 PROCEDURE — 1125F AMNT PAIN NOTED PAIN PRSNT: CPT | Mod: S$GLB,,, | Performed by: ORTHOPAEDIC SURGERY

## 2020-12-11 PROCEDURE — 99213 OFFICE O/P EST LOW 20 MIN: CPT | Mod: S$GLB,,, | Performed by: ORTHOPAEDIC SURGERY

## 2020-12-11 PROCEDURE — 3079F DIAST BP 80-89 MM HG: CPT | Mod: CPTII,S$GLB,, | Performed by: ORTHOPAEDIC SURGERY

## 2020-12-11 PROCEDURE — 3008F BODY MASS INDEX DOCD: CPT | Mod: CPTII,S$GLB,, | Performed by: ORTHOPAEDIC SURGERY

## 2020-12-11 PROCEDURE — 99999 PR PBB SHADOW E&M-EST. PATIENT-LVL III: ICD-10-PCS | Mod: PBBFAC,,, | Performed by: ORTHOPAEDIC SURGERY

## 2020-12-11 PROCEDURE — 3079F PR MOST RECENT DIASTOLIC BLOOD PRESSURE 80-89 MM HG: ICD-10-PCS | Mod: CPTII,S$GLB,, | Performed by: ORTHOPAEDIC SURGERY

## 2020-12-11 PROCEDURE — 3008F PR BODY MASS INDEX (BMI) DOCUMENTED: ICD-10-PCS | Mod: CPTII,S$GLB,, | Performed by: ORTHOPAEDIC SURGERY

## 2020-12-11 PROCEDURE — 1125F PR PAIN SEVERITY QUANTIFIED, PAIN PRESENT: ICD-10-PCS | Mod: S$GLB,,, | Performed by: ORTHOPAEDIC SURGERY

## 2020-12-11 PROCEDURE — 99213 PR OFFICE/OUTPT VISIT, EST, LEVL III, 20-29 MIN: ICD-10-PCS | Mod: S$GLB,,, | Performed by: ORTHOPAEDIC SURGERY

## 2020-12-11 PROCEDURE — 3077F PR MOST RECENT SYSTOLIC BLOOD PRESSURE >= 140 MM HG: ICD-10-PCS | Mod: CPTII,S$GLB,, | Performed by: ORTHOPAEDIC SURGERY

## 2020-12-11 NOTE — TELEPHONE ENCOUNTER
Spoke with pt regarding [employer] and medical clearance needed for surgery. All questions answered. Pt verbalized understanding.

## 2020-12-11 NOTE — TELEPHONE ENCOUNTER
----- Message from Leslie Suazo sent at 12/11/2020 10:33 AM CST -----  Regarding: Returning call  Contact: Pt @ [phone]  Pt stating she is returning Heidi's call    Call back: [phone]

## 2020-12-11 NOTE — PROGRESS NOTES
"Subjective:    Patient ID:  Heidy Polo is a 65 y.o. y.o. female who presents for f/u visit for Pain and Swelling of the Right Knee      Patient returns for follow-up right knee pain. Reports persistent symptoms. Notes progressive limitation in ADLs with function-limiting pain at short distances greater than 3 months. Has attended PT and was recently discharged to [facility].          Objective:     BP (!) 143/85 (BP Location: Left arm, Patient Position: Sitting, BP Method: Large (Automatic))   Pulse 82   Ht 5' 4" (1.626 m)   Wt 89.8 kg (198 lb 1.3 oz)   BMI 34.00 kg/m²     Ortho Exam     64 yo female in NAD; alert, oriented x 3; normal mood and affect    Head: atraumatic  Eyes: EOM are normal. Right eye exhibits no discharge. Left eye exhibits no discharge  Cardiovascular: normal rate    Pulmonary/Chest: effort normal; no respiratory distress  Abdominal: soft    BLE: N/V intact; no edema     Right hip: NT; FROM; negative Stinchfield     Right knee: varus deformity; trace effusion; PF crepitus; tender MJL/pes; ROM: 0-110 flexion; trace varus laxity      Assessment & Plan:     1. Primary osteoarthritis of right knee        1.  Treatment options again reviewed  2.  Patient elects to proceed with elective right TKR. Surgical risks discussed including but no limited to infection, damage to nerves/blood vessels/tendons/ligaments, DVT/PE, wound healing problems, bone fracture, component fracture/dislocation and/or loosening, partial and/or incomplete sx relief, re-operation and death. Due to the current coronavirus pandemic and despite implementation all of the recommended precautions, the additional risk of magda Covid-19 in the post-operative period was explained. Patient voiced understanding of these and wishes to proceed with the recommended surgery.    Patient was informed and understands the risks of surgery are greater for patients with a current condition or history of heart disease, obesity, clotting " disorders, recurrent infections, steroid use, current or past smoking, and factors such as sedentary lifestyle and noncompliance with medications, therapy or follow-up. The degree of the increased risk is hard to estimate with any degree of precision.  3. Surgery tentatively scheduled for 1/26/2021 pending pre-operative medical clearance  4. Patient to attend [facility] pre-op TJR class

## 2020-12-15 RX ORDER — DICLOFENAC SODIUM 75 MG/1
75 TABLET, DELAYED RELEASE ORAL 2 TIMES DAILY
Qty: 60 TABLET | Refills: 1 | Status: SHIPPED | OUTPATIENT
Start: 2020-12-15 | End: 2021-01-18

## 2021-01-04 ENCOUNTER — PATIENT MESSAGE (OUTPATIENT)
Dept: ADMINISTRATIVE | Facility: HOSPITAL | Age: 66
End: 2021-01-04

## 2021-01-04 ENCOUNTER — PATIENT MESSAGE (OUTPATIENT)
Dept: ORTHOPEDICS | Facility: CLINIC | Age: 66
End: 2021-01-04

## 2021-01-04 ENCOUNTER — PATIENT MESSAGE (OUTPATIENT)
Dept: PRIMARY CARE CLINIC | Facility: CLINIC | Age: 66
End: 2021-01-04

## 2021-01-05 ENCOUNTER — PATIENT MESSAGE (OUTPATIENT)
Dept: PRIMARY CARE CLINIC | Facility: CLINIC | Age: 66
End: 2021-01-05

## 2021-01-07 ENCOUNTER — OFFICE VISIT (OUTPATIENT)
Dept: PRIMARY CARE CLINIC | Facility: CLINIC | Age: 66
End: 2021-01-07
Payer: COMMERCIAL

## 2021-01-07 VITALS
BODY MASS INDEX: 33.61 KG/M2 | RESPIRATION RATE: 16 BRPM | SYSTOLIC BLOOD PRESSURE: 130 MMHG | OXYGEN SATURATION: 95 % | HEART RATE: 76 BPM | DIASTOLIC BLOOD PRESSURE: 76 MMHG | HEIGHT: 64 IN | TEMPERATURE: 98 F | WEIGHT: 196.88 LBS

## 2021-01-07 DIAGNOSIS — M17.11 PRIMARY OSTEOARTHRITIS OF RIGHT KNEE: ICD-10-CM

## 2021-01-07 DIAGNOSIS — Z01.818 PREOPERATIVE EXAMINATION: Primary | ICD-10-CM

## 2021-01-07 PROCEDURE — 3008F PR BODY MASS INDEX (BMI) DOCUMENTED: ICD-10-PCS | Mod: CPTII,S$GLB,, | Performed by: FAMILY MEDICINE

## 2021-01-07 PROCEDURE — 3075F PR MOST RECENT SYSTOLIC BLOOD PRESS GE 130-139MM HG: ICD-10-PCS | Mod: CPTII,S$GLB,, | Performed by: FAMILY MEDICINE

## 2021-01-07 PROCEDURE — 3075F SYST BP GE 130 - 139MM HG: CPT | Mod: CPTII,S$GLB,, | Performed by: FAMILY MEDICINE

## 2021-01-07 PROCEDURE — 1125F AMNT PAIN NOTED PAIN PRSNT: CPT | Mod: S$GLB,,, | Performed by: FAMILY MEDICINE

## 2021-01-07 PROCEDURE — 99999 PR PBB SHADOW E&M-EST. PATIENT-LVL IV: ICD-10-PCS | Mod: PBBFAC,,, | Performed by: FAMILY MEDICINE

## 2021-01-07 PROCEDURE — 1125F PR PAIN SEVERITY QUANTIFIED, PAIN PRESENT: ICD-10-PCS | Mod: S$GLB,,, | Performed by: FAMILY MEDICINE

## 2021-01-07 PROCEDURE — 3078F PR MOST RECENT DIASTOLIC BLOOD PRESSURE < 80 MM HG: ICD-10-PCS | Mod: CPTII,S$GLB,, | Performed by: FAMILY MEDICINE

## 2021-01-07 PROCEDURE — 3078F DIAST BP <80 MM HG: CPT | Mod: CPTII,S$GLB,, | Performed by: FAMILY MEDICINE

## 2021-01-07 PROCEDURE — 3008F BODY MASS INDEX DOCD: CPT | Mod: CPTII,S$GLB,, | Performed by: FAMILY MEDICINE

## 2021-01-07 PROCEDURE — 99999 PR PBB SHADOW E&M-EST. PATIENT-LVL IV: CPT | Mod: PBBFAC,,, | Performed by: FAMILY MEDICINE

## 2021-01-07 PROCEDURE — 99213 OFFICE O/P EST LOW 20 MIN: CPT | Mod: S$GLB,,, | Performed by: FAMILY MEDICINE

## 2021-01-07 PROCEDURE — 99213 PR OFFICE/OUTPT VISIT, EST, LEVL III, 20-29 MIN: ICD-10-PCS | Mod: S$GLB,,, | Performed by: FAMILY MEDICINE

## 2021-01-08 LAB
FINAL PATHOLOGIC DIAGNOSIS: NORMAL
Lab: NORMAL

## 2021-01-15 DIAGNOSIS — M17.11 PRIMARY OSTEOARTHRITIS OF RIGHT KNEE: Primary | ICD-10-CM

## 2021-01-15 DIAGNOSIS — M17.11 OSTEOARTHRITIS OF RIGHT KNEE: ICD-10-CM

## 2021-01-18 RX ORDER — ACETAMINOPHEN 500 MG
1000 TABLET ORAL EVERY 6 HOURS
Status: CANCELLED | OUTPATIENT
Start: 2021-01-18 | End: 2021-01-20

## 2021-01-18 RX ORDER — MIDAZOLAM HYDROCHLORIDE 1 MG/ML
1 INJECTION INTRAMUSCULAR; INTRAVENOUS EVERY 5 MIN PRN
Status: CANCELLED | OUTPATIENT
Start: 2021-01-18

## 2021-01-18 RX ORDER — CELECOXIB 100 MG/1
400 CAPSULE ORAL
Status: CANCELLED | OUTPATIENT
Start: 2021-01-18

## 2021-01-18 RX ORDER — POLYETHYLENE GLYCOL 3350 17 G/17G
17 POWDER, FOR SOLUTION ORAL DAILY
Status: CANCELLED | OUTPATIENT
Start: 2021-01-18

## 2021-01-18 RX ORDER — CELECOXIB 100 MG/1
200 CAPSULE ORAL DAILY
Status: CANCELLED | OUTPATIENT
Start: 2021-01-18

## 2021-01-18 RX ORDER — BISACODYL 10 MG
10 SUPPOSITORY, RECTAL RECTAL EVERY 12 HOURS PRN
Status: CANCELLED | OUTPATIENT
Start: 2021-01-18

## 2021-01-18 RX ORDER — ROPIVACAINE HYDROCHLORIDE 2 MG/ML
8 INJECTION, SOLUTION EPIDURAL; INFILTRATION; PERINEURAL CONTINUOUS
Status: CANCELLED | OUTPATIENT
Start: 2021-01-18

## 2021-01-18 RX ORDER — AMOXICILLIN 250 MG
1 CAPSULE ORAL 2 TIMES DAILY
Status: CANCELLED | OUTPATIENT
Start: 2021-01-18

## 2021-01-18 RX ORDER — FENTANYL CITRATE 50 UG/ML
25 INJECTION, SOLUTION INTRAMUSCULAR; INTRAVENOUS EVERY 5 MIN PRN
Status: CANCELLED | OUTPATIENT
Start: 2021-01-18

## 2021-01-18 RX ORDER — LIDOCAINE HYDROCHLORIDE 10 MG/ML
1 INJECTION, SOLUTION EPIDURAL; INFILTRATION; INTRACAUDAL; PERINEURAL
Status: CANCELLED | OUTPATIENT
Start: 2021-01-18

## 2021-01-18 RX ORDER — OXYCODONE HYDROCHLORIDE 5 MG/1
10 TABLET ORAL
Status: CANCELLED | OUTPATIENT
Start: 2021-01-18

## 2021-01-18 RX ORDER — PREGABALIN 25 MG/1
75 CAPSULE ORAL
Status: CANCELLED | OUTPATIENT
Start: 2021-01-18

## 2021-01-18 RX ORDER — SODIUM CHLORIDE 0.9 % (FLUSH) 0.9 %
10 SYRINGE (ML) INJECTION
Status: CANCELLED | OUTPATIENT
Start: 2021-01-18

## 2021-01-18 RX ORDER — MUPIROCIN 20 MG/G
1 OINTMENT TOPICAL
Status: CANCELLED | OUTPATIENT
Start: 2021-01-18

## 2021-01-18 RX ORDER — SODIUM CHLORIDE 9 MG/ML
INJECTION, SOLUTION INTRAVENOUS CONTINUOUS
Status: CANCELLED | OUTPATIENT
Start: 2021-01-18 | End: 2021-01-19

## 2021-01-18 RX ORDER — NALOXONE HCL 0.4 MG/ML
0.02 VIAL (ML) INJECTION
Status: CANCELLED | OUTPATIENT
Start: 2021-01-18

## 2021-01-18 RX ORDER — TALC
6 POWDER (GRAM) TOPICAL NIGHTLY PRN
Status: CANCELLED | OUTPATIENT
Start: 2021-01-18

## 2021-01-18 RX ORDER — MUPIROCIN 20 MG/G
1 OINTMENT TOPICAL 2 TIMES DAILY
Status: CANCELLED | OUTPATIENT
Start: 2021-01-18 | End: 2021-01-23

## 2021-01-18 RX ORDER — ASPIRIN 81 MG/1
81 TABLET ORAL 2 TIMES DAILY
Status: CANCELLED | OUTPATIENT
Start: 2021-01-18

## 2021-01-18 RX ORDER — OXYCODONE HYDROCHLORIDE 5 MG/1
5 TABLET ORAL
Status: CANCELLED | OUTPATIENT
Start: 2021-01-18

## 2021-01-18 RX ORDER — PREGABALIN 25 MG/1
75 CAPSULE ORAL NIGHTLY
Status: CANCELLED | OUTPATIENT
Start: 2021-01-18

## 2021-01-18 RX ORDER — ONDANSETRON 2 MG/ML
4 INJECTION INTRAMUSCULAR; INTRAVENOUS EVERY 8 HOURS PRN
Status: CANCELLED | OUTPATIENT
Start: 2021-01-18

## 2021-01-18 RX ORDER — FAMOTIDINE 20 MG/1
20 TABLET, FILM COATED ORAL 2 TIMES DAILY
Status: CANCELLED | OUTPATIENT
Start: 2021-01-18

## 2021-01-18 RX ORDER — ACETAMINOPHEN 500 MG
1000 TABLET ORAL
Status: CANCELLED | OUTPATIENT
Start: 2021-01-18

## 2021-01-18 RX ORDER — SODIUM CHLORIDE 9 MG/ML
INJECTION, SOLUTION INTRAVENOUS
Status: CANCELLED | OUTPATIENT
Start: 2021-01-18

## 2021-01-18 RX ORDER — MORPHINE SULFATE 10 MG/ML
2 INJECTION, SOLUTION INTRAMUSCULAR; INTRAVENOUS
Status: CANCELLED | OUTPATIENT
Start: 2021-01-18

## 2021-01-27 ENCOUNTER — TELEPHONE (OUTPATIENT)
Dept: ORTHOPEDICS | Facility: CLINIC | Age: 66
End: 2021-01-27

## 2021-01-27 DIAGNOSIS — Z01.818 PRE-OP TESTING: Primary | ICD-10-CM

## 2021-02-09 PROBLEM — M17.11 OSTEOARTHRITIS OF RIGHT KNEE: Status: ACTIVE | Noted: 2021-02-09

## 2021-02-10 DIAGNOSIS — Z96.651 S/P TOTAL KNEE ARTHROPLASTY, RIGHT: Primary | ICD-10-CM

## 2021-02-10 PROBLEM — M17.11 OSTEOARTHRITIS OF RIGHT KNEE: Status: RESOLVED | Noted: 2021-02-09 | Resolved: 2021-02-10

## 2021-02-10 RX ORDER — HYDROCODONE BITARTRATE AND ACETAMINOPHEN 5; 325 MG/1; MG/1
1 TABLET ORAL EVERY 6 HOURS PRN
Qty: 30 TABLET | Refills: 0 | Status: SHIPPED | OUTPATIENT
Start: 2021-02-10 | End: 2021-05-28 | Stop reason: CLARIF

## 2021-02-11 ENCOUNTER — TELEPHONE (OUTPATIENT)
Dept: ORTHOPEDICS | Facility: CLINIC | Age: 66
End: 2021-02-11

## 2021-02-11 PROBLEM — R29.898 WEAKNESS OF RIGHT LOWER EXTREMITY: Status: ACTIVE | Noted: 2021-02-11

## 2021-02-11 PROBLEM — M25.661 DECREASED RANGE OF MOTION (ROM) OF RIGHT KNEE: Status: ACTIVE | Noted: 2021-02-11

## 2021-02-22 ENCOUNTER — OFFICE VISIT (OUTPATIENT)
Dept: ORTHOPEDICS | Facility: CLINIC | Age: 66
End: 2021-02-22
Payer: COMMERCIAL

## 2021-02-22 VITALS
SYSTOLIC BLOOD PRESSURE: 115 MMHG | WEIGHT: 191.81 LBS | BODY MASS INDEX: 32.74 KG/M2 | HEART RATE: 106 BPM | TEMPERATURE: 97 F | HEIGHT: 64 IN | DIASTOLIC BLOOD PRESSURE: 74 MMHG

## 2021-02-22 DIAGNOSIS — Z96.651 S/P TOTAL KNEE ARTHROPLASTY, RIGHT: Primary | ICD-10-CM

## 2021-02-22 PROCEDURE — 99024 POSTOP FOLLOW-UP VISIT: CPT | Mod: S$GLB,,, | Performed by: ORTHOPAEDIC SURGERY

## 2021-02-22 PROCEDURE — 3008F PR BODY MASS INDEX (BMI) DOCUMENTED: ICD-10-PCS | Mod: CPTII,S$GLB,, | Performed by: ORTHOPAEDIC SURGERY

## 2021-02-22 PROCEDURE — 99999 PR PBB SHADOW E&M-EST. PATIENT-LVL III: CPT | Mod: PBBFAC,,, | Performed by: ORTHOPAEDIC SURGERY

## 2021-02-22 PROCEDURE — 1125F PR PAIN SEVERITY QUANTIFIED, PAIN PRESENT: ICD-10-PCS | Mod: S$GLB,,, | Performed by: ORTHOPAEDIC SURGERY

## 2021-02-22 PROCEDURE — 99024 PR POST-OP FOLLOW-UP VISIT: ICD-10-PCS | Mod: S$GLB,,, | Performed by: ORTHOPAEDIC SURGERY

## 2021-02-22 PROCEDURE — 1125F AMNT PAIN NOTED PAIN PRSNT: CPT | Mod: S$GLB,,, | Performed by: ORTHOPAEDIC SURGERY

## 2021-02-22 PROCEDURE — 3008F BODY MASS INDEX DOCD: CPT | Mod: CPTII,S$GLB,, | Performed by: ORTHOPAEDIC SURGERY

## 2021-02-22 PROCEDURE — 99999 PR PBB SHADOW E&M-EST. PATIENT-LVL III: ICD-10-PCS | Mod: PBBFAC,,, | Performed by: ORTHOPAEDIC SURGERY

## 2021-02-22 RX ORDER — CELECOXIB 100 MG/1
100 CAPSULE ORAL 2 TIMES DAILY
Qty: 60 CAPSULE | Refills: 1 | Status: SHIPPED | OUTPATIENT
Start: 2021-02-22 | End: 2021-04-07

## 2021-02-28 ENCOUNTER — PATIENT MESSAGE (OUTPATIENT)
Dept: PRIMARY CARE CLINIC | Facility: CLINIC | Age: 66
End: 2021-02-28

## 2021-03-19 PROBLEM — M25.661 DECREASED RANGE OF MOTION (ROM) OF RIGHT KNEE: Status: RESOLVED | Noted: 2021-02-11 | Resolved: 2021-03-19

## 2021-03-19 PROBLEM — R29.898 WEAKNESS OF RIGHT LOWER EXTREMITY: Status: RESOLVED | Noted: 2021-02-11 | Resolved: 2021-03-19

## 2021-04-07 ENCOUNTER — OFFICE VISIT (OUTPATIENT)
Dept: ORTHOPEDICS | Facility: CLINIC | Age: 66
End: 2021-04-07
Payer: COMMERCIAL

## 2021-04-07 VITALS
HEIGHT: 64 IN | SYSTOLIC BLOOD PRESSURE: 135 MMHG | BODY MASS INDEX: 31.81 KG/M2 | WEIGHT: 186.31 LBS | DIASTOLIC BLOOD PRESSURE: 74 MMHG | HEART RATE: 75 BPM | RESPIRATION RATE: 16 BRPM

## 2021-04-07 DIAGNOSIS — Z96.651 S/P TOTAL KNEE ARTHROPLASTY, RIGHT: Primary | ICD-10-CM

## 2021-04-07 PROCEDURE — 3008F PR BODY MASS INDEX (BMI) DOCUMENTED: ICD-10-PCS | Mod: CPTII,S$GLB,, | Performed by: ORTHOPAEDIC SURGERY

## 2021-04-07 PROCEDURE — 99024 PR POST-OP FOLLOW-UP VISIT: ICD-10-PCS | Mod: S$GLB,,, | Performed by: ORTHOPAEDIC SURGERY

## 2021-04-07 PROCEDURE — 3008F BODY MASS INDEX DOCD: CPT | Mod: CPTII,S$GLB,, | Performed by: ORTHOPAEDIC SURGERY

## 2021-04-07 PROCEDURE — 99999 PR PBB SHADOW E&M-EST. PATIENT-LVL III: ICD-10-PCS | Mod: PBBFAC,,, | Performed by: ORTHOPAEDIC SURGERY

## 2021-04-07 PROCEDURE — 99999 PR PBB SHADOW E&M-EST. PATIENT-LVL III: CPT | Mod: PBBFAC,,, | Performed by: ORTHOPAEDIC SURGERY

## 2021-04-07 PROCEDURE — 99024 POSTOP FOLLOW-UP VISIT: CPT | Mod: S$GLB,,, | Performed by: ORTHOPAEDIC SURGERY

## 2021-04-07 PROCEDURE — 1125F AMNT PAIN NOTED PAIN PRSNT: CPT | Mod: S$GLB,,, | Performed by: ORTHOPAEDIC SURGERY

## 2021-04-07 PROCEDURE — 1125F PR PAIN SEVERITY QUANTIFIED, PAIN PRESENT: ICD-10-PCS | Mod: S$GLB,,, | Performed by: ORTHOPAEDIC SURGERY

## 2021-04-07 RX ORDER — ACETAMINOPHEN 325 MG/1
325 TABLET ORAL EVERY 6 HOURS PRN
COMMUNITY
End: 2021-09-10

## 2021-04-07 RX ORDER — DICLOFENAC SODIUM 75 MG/1
75 TABLET, DELAYED RELEASE ORAL 2 TIMES DAILY
Qty: 60 TABLET | Refills: 1 | Status: SHIPPED | OUTPATIENT
Start: 2021-04-07 | End: 2021-05-28 | Stop reason: CLARIF

## 2021-04-07 RX ORDER — DICLOFENAC SODIUM 75 MG/1
TABLET, DELAYED RELEASE ORAL
COMMUNITY
Start: 2021-01-21 | End: 2021-05-28 | Stop reason: CLARIF

## 2021-05-12 ENCOUNTER — OFFICE VISIT (OUTPATIENT)
Dept: PODIATRY | Facility: CLINIC | Age: 66
End: 2021-05-12
Payer: COMMERCIAL

## 2021-05-12 VITALS
HEART RATE: 79 BPM | SYSTOLIC BLOOD PRESSURE: 122 MMHG | BODY MASS INDEX: 31.38 KG/M2 | WEIGHT: 183.81 LBS | DIASTOLIC BLOOD PRESSURE: 70 MMHG | HEIGHT: 64 IN

## 2021-05-12 DIAGNOSIS — M20.42 HAMMER TOE OF SECOND TOE OF LEFT FOOT: ICD-10-CM

## 2021-05-12 DIAGNOSIS — S93.125A DISLOCATION OF METATARSOPHALANGEAL JOINT OF LESSER TOE OF LEFT FOOT, INITIAL ENCOUNTER: ICD-10-CM

## 2021-05-12 DIAGNOSIS — M77.42 METATARSALGIA, LEFT FOOT: Primary | ICD-10-CM

## 2021-05-12 DIAGNOSIS — Z47.89 AFTERCARE FOLLOWING SURGERY OF THE MUSCULOSKELETAL SYSTEM: ICD-10-CM

## 2021-05-12 PROCEDURE — 1125F PR PAIN SEVERITY QUANTIFIED, PAIN PRESENT: ICD-10-PCS | Mod: S$GLB,,, | Performed by: PODIATRIST

## 2021-05-12 PROCEDURE — 99203 OFFICE O/P NEW LOW 30 MIN: CPT | Mod: S$GLB,,, | Performed by: PODIATRIST

## 2021-05-12 PROCEDURE — 3008F BODY MASS INDEX DOCD: CPT | Mod: CPTII,S$GLB,, | Performed by: PODIATRIST

## 2021-05-12 PROCEDURE — 99999 PR PBB SHADOW E&M-EST. PATIENT-LVL IV: ICD-10-PCS | Mod: PBBFAC,,, | Performed by: PODIATRIST

## 2021-05-12 PROCEDURE — 1125F AMNT PAIN NOTED PAIN PRSNT: CPT | Mod: S$GLB,,, | Performed by: PODIATRIST

## 2021-05-12 PROCEDURE — 3008F PR BODY MASS INDEX (BMI) DOCUMENTED: ICD-10-PCS | Mod: CPTII,S$GLB,, | Performed by: PODIATRIST

## 2021-05-12 PROCEDURE — 99203 PR OFFICE/OUTPT VISIT, NEW, LEVL III, 30-44 MIN: ICD-10-PCS | Mod: S$GLB,,, | Performed by: PODIATRIST

## 2021-05-12 PROCEDURE — 99999 PR PBB SHADOW E&M-EST. PATIENT-LVL IV: CPT | Mod: PBBFAC,,, | Performed by: PODIATRIST

## 2021-05-12 RX ORDER — KETOROLAC TROMETHAMINE 30 MG/ML
30 INJECTION, SOLUTION INTRAMUSCULAR; INTRAVENOUS ONCE
Status: DISCONTINUED | OUTPATIENT
Start: 2021-06-04 | End: 2021-05-28 | Stop reason: CLARIF

## 2021-05-20 ENCOUNTER — PATIENT MESSAGE (OUTPATIENT)
Dept: ADMINISTRATIVE | Facility: OTHER | Age: 66
End: 2021-05-20

## 2021-06-02 ENCOUNTER — TELEPHONE (OUTPATIENT)
Dept: PRIMARY CARE CLINIC | Facility: CLINIC | Age: 66
End: 2021-06-02

## 2021-06-02 ENCOUNTER — TELEPHONE (OUTPATIENT)
Dept: PODIATRY | Facility: CLINIC | Age: 66
End: 2021-06-02

## 2021-06-03 ENCOUNTER — OFFICE VISIT (OUTPATIENT)
Dept: PRIMARY CARE CLINIC | Facility: CLINIC | Age: 66
End: 2021-06-03
Payer: COMMERCIAL

## 2021-06-03 VITALS
WEIGHT: 183.88 LBS | OXYGEN SATURATION: 97 % | TEMPERATURE: 98 F | SYSTOLIC BLOOD PRESSURE: 132 MMHG | HEART RATE: 82 BPM | HEIGHT: 64 IN | DIASTOLIC BLOOD PRESSURE: 88 MMHG | BODY MASS INDEX: 31.39 KG/M2

## 2021-06-03 DIAGNOSIS — M20.42 HAMMER TOE OF SECOND TOE OF LEFT FOOT: ICD-10-CM

## 2021-06-03 DIAGNOSIS — I10 ESSENTIAL HYPERTENSION: ICD-10-CM

## 2021-06-03 DIAGNOSIS — Z96.651 S/P TOTAL KNEE ARTHROPLASTY, RIGHT: ICD-10-CM

## 2021-06-03 DIAGNOSIS — Z01.818 PREOP EXAMINATION: Primary | ICD-10-CM

## 2021-06-03 PROCEDURE — 1126F AMNT PAIN NOTED NONE PRSNT: CPT | Mod: S$GLB,,, | Performed by: STUDENT IN AN ORGANIZED HEALTH CARE EDUCATION/TRAINING PROGRAM

## 2021-06-03 PROCEDURE — 3008F PR BODY MASS INDEX (BMI) DOCUMENTED: ICD-10-PCS | Mod: CPTII,S$GLB,, | Performed by: STUDENT IN AN ORGANIZED HEALTH CARE EDUCATION/TRAINING PROGRAM

## 2021-06-03 PROCEDURE — 99999 PR PBB SHADOW E&M-EST. PATIENT-LVL IV: ICD-10-PCS | Mod: PBBFAC,,, | Performed by: STUDENT IN AN ORGANIZED HEALTH CARE EDUCATION/TRAINING PROGRAM

## 2021-06-03 PROCEDURE — 99214 PR OFFICE/OUTPT VISIT, EST, LEVL IV, 30-39 MIN: ICD-10-PCS | Mod: S$GLB,,, | Performed by: STUDENT IN AN ORGANIZED HEALTH CARE EDUCATION/TRAINING PROGRAM

## 2021-06-03 PROCEDURE — 3008F BODY MASS INDEX DOCD: CPT | Mod: CPTII,S$GLB,, | Performed by: STUDENT IN AN ORGANIZED HEALTH CARE EDUCATION/TRAINING PROGRAM

## 2021-06-03 PROCEDURE — 1126F PR PAIN SEVERITY QUANTIFIED, NO PAIN PRESENT: ICD-10-PCS | Mod: S$GLB,,, | Performed by: STUDENT IN AN ORGANIZED HEALTH CARE EDUCATION/TRAINING PROGRAM

## 2021-06-03 PROCEDURE — 99999 PR PBB SHADOW E&M-EST. PATIENT-LVL IV: CPT | Mod: PBBFAC,,, | Performed by: STUDENT IN AN ORGANIZED HEALTH CARE EDUCATION/TRAINING PROGRAM

## 2021-06-03 PROCEDURE — 99214 OFFICE O/P EST MOD 30 MIN: CPT | Mod: S$GLB,,, | Performed by: STUDENT IN AN ORGANIZED HEALTH CARE EDUCATION/TRAINING PROGRAM

## 2021-06-04 ENCOUNTER — TELEPHONE (OUTPATIENT)
Dept: PRIMARY CARE CLINIC | Facility: CLINIC | Age: 66
End: 2021-06-04

## 2021-06-14 ENCOUNTER — OFFICE VISIT (OUTPATIENT)
Dept: PODIATRY | Facility: CLINIC | Age: 66
End: 2021-06-14
Payer: COMMERCIAL

## 2021-06-14 VITALS
SYSTOLIC BLOOD PRESSURE: 138 MMHG | HEIGHT: 64 IN | DIASTOLIC BLOOD PRESSURE: 79 MMHG | BODY MASS INDEX: 31.33 KG/M2 | WEIGHT: 183.5 LBS | HEART RATE: 81 BPM

## 2021-06-14 DIAGNOSIS — M20.42 HAMMER TOE OF SECOND TOE OF LEFT FOOT: Primary | ICD-10-CM

## 2021-06-14 PROCEDURE — 99024 POSTOP FOLLOW-UP VISIT: CPT | Mod: S$GLB,,, | Performed by: PODIATRIST

## 2021-06-14 PROCEDURE — 1126F PR PAIN SEVERITY QUANTIFIED, NO PAIN PRESENT: ICD-10-PCS | Mod: S$GLB,,, | Performed by: PODIATRIST

## 2021-06-14 PROCEDURE — 99999 PR PBB SHADOW E&M-EST. PATIENT-LVL III: CPT | Mod: PBBFAC,,, | Performed by: PODIATRIST

## 2021-06-14 PROCEDURE — 1126F AMNT PAIN NOTED NONE PRSNT: CPT | Mod: S$GLB,,, | Performed by: PODIATRIST

## 2021-06-14 PROCEDURE — 99999 PR PBB SHADOW E&M-EST. PATIENT-LVL III: ICD-10-PCS | Mod: PBBFAC,,, | Performed by: PODIATRIST

## 2021-06-14 PROCEDURE — 99024 PR POST-OP FOLLOW-UP VISIT: ICD-10-PCS | Mod: S$GLB,,, | Performed by: PODIATRIST

## 2021-06-14 PROCEDURE — 3008F PR BODY MASS INDEX (BMI) DOCUMENTED: ICD-10-PCS | Mod: CPTII,S$GLB,, | Performed by: PODIATRIST

## 2021-06-14 PROCEDURE — 3008F BODY MASS INDEX DOCD: CPT | Mod: CPTII,S$GLB,, | Performed by: PODIATRIST

## 2021-06-22 ENCOUNTER — PATIENT MESSAGE (OUTPATIENT)
Dept: PODIATRY | Facility: CLINIC | Age: 66
End: 2021-06-22

## 2021-06-22 ENCOUNTER — TELEPHONE (OUTPATIENT)
Dept: PODIATRY | Facility: CLINIC | Age: 66
End: 2021-06-22

## 2021-06-24 ENCOUNTER — OFFICE VISIT (OUTPATIENT)
Dept: PODIATRY | Facility: CLINIC | Age: 66
End: 2021-06-24
Payer: COMMERCIAL

## 2021-06-24 VITALS
DIASTOLIC BLOOD PRESSURE: 81 MMHG | WEIGHT: 182.38 LBS | BODY MASS INDEX: 31.14 KG/M2 | HEART RATE: 72 BPM | HEIGHT: 64 IN | RESPIRATION RATE: 18 BRPM | SYSTOLIC BLOOD PRESSURE: 127 MMHG

## 2021-06-24 DIAGNOSIS — Z48.02 VISIT FOR SUTURE REMOVAL: ICD-10-CM

## 2021-06-24 DIAGNOSIS — Z09 POSTOP CHECK: Primary | ICD-10-CM

## 2021-06-24 PROCEDURE — 3008F BODY MASS INDEX DOCD: CPT | Mod: CPTII,S$GLB,, | Performed by: PODIATRIST

## 2021-06-24 PROCEDURE — 1126F PR PAIN SEVERITY QUANTIFIED, NO PAIN PRESENT: ICD-10-PCS | Mod: S$GLB,,, | Performed by: PODIATRIST

## 2021-06-24 PROCEDURE — 1126F AMNT PAIN NOTED NONE PRSNT: CPT | Mod: S$GLB,,, | Performed by: PODIATRIST

## 2021-06-24 PROCEDURE — 3008F PR BODY MASS INDEX (BMI) DOCUMENTED: ICD-10-PCS | Mod: CPTII,S$GLB,, | Performed by: PODIATRIST

## 2021-06-24 PROCEDURE — 99024 PR POST-OP FOLLOW-UP VISIT: ICD-10-PCS | Mod: S$GLB,,, | Performed by: PODIATRIST

## 2021-06-24 PROCEDURE — 99024 POSTOP FOLLOW-UP VISIT: CPT | Mod: S$GLB,,, | Performed by: PODIATRIST

## 2021-06-24 PROCEDURE — 99999 PR PBB SHADOW E&M-EST. PATIENT-LVL III: ICD-10-PCS | Mod: PBBFAC,,, | Performed by: PODIATRIST

## 2021-06-24 PROCEDURE — 99999 PR PBB SHADOW E&M-EST. PATIENT-LVL III: CPT | Mod: PBBFAC,,, | Performed by: PODIATRIST

## 2021-07-14 ENCOUNTER — OFFICE VISIT (OUTPATIENT)
Dept: PODIATRY | Facility: CLINIC | Age: 66
End: 2021-07-14
Payer: COMMERCIAL

## 2021-07-14 VITALS
BODY MASS INDEX: 30.75 KG/M2 | SYSTOLIC BLOOD PRESSURE: 130 MMHG | DIASTOLIC BLOOD PRESSURE: 82 MMHG | WEIGHT: 180.13 LBS | HEIGHT: 64 IN | HEART RATE: 73 BPM

## 2021-07-14 DIAGNOSIS — K66.0 POSTOPERATIVE ADHESIONS: ICD-10-CM

## 2021-07-14 DIAGNOSIS — M20.42 HAMMER TOE OF SECOND TOE OF LEFT FOOT: Primary | ICD-10-CM

## 2021-07-14 PROCEDURE — 99999 PR PBB SHADOW E&M-EST. PATIENT-LVL III: ICD-10-PCS | Mod: PBBFAC,,, | Performed by: PODIATRIST

## 2021-07-14 PROCEDURE — 99024 PR POST-OP FOLLOW-UP VISIT: ICD-10-PCS | Mod: S$GLB,,, | Performed by: PODIATRIST

## 2021-07-14 PROCEDURE — 3008F BODY MASS INDEX DOCD: CPT | Mod: CPTII,S$GLB,, | Performed by: PODIATRIST

## 2021-07-14 PROCEDURE — 1101F PT FALLS ASSESS-DOCD LE1/YR: CPT | Mod: CPTII,S$GLB,, | Performed by: PODIATRIST

## 2021-07-14 PROCEDURE — 1126F AMNT PAIN NOTED NONE PRSNT: CPT | Mod: S$GLB,,, | Performed by: PODIATRIST

## 2021-07-14 PROCEDURE — 99024 POSTOP FOLLOW-UP VISIT: CPT | Mod: S$GLB,,, | Performed by: PODIATRIST

## 2021-07-14 PROCEDURE — 1126F PR PAIN SEVERITY QUANTIFIED, NO PAIN PRESENT: ICD-10-PCS | Mod: S$GLB,,, | Performed by: PODIATRIST

## 2021-07-14 PROCEDURE — 1101F PR PT FALLS ASSESS DOC 0-1 FALLS W/OUT INJ PAST YR: ICD-10-PCS | Mod: CPTII,S$GLB,, | Performed by: PODIATRIST

## 2021-07-14 PROCEDURE — 3288F PR FALLS RISK ASSESSMENT DOCUMENTED: ICD-10-PCS | Mod: CPTII,S$GLB,, | Performed by: PODIATRIST

## 2021-07-14 PROCEDURE — 3288F FALL RISK ASSESSMENT DOCD: CPT | Mod: CPTII,S$GLB,, | Performed by: PODIATRIST

## 2021-07-14 PROCEDURE — 99999 PR PBB SHADOW E&M-EST. PATIENT-LVL III: CPT | Mod: PBBFAC,,, | Performed by: PODIATRIST

## 2021-07-14 PROCEDURE — 3008F PR BODY MASS INDEX (BMI) DOCUMENTED: ICD-10-PCS | Mod: CPTII,S$GLB,, | Performed by: PODIATRIST

## 2021-07-29 ENCOUNTER — PATIENT MESSAGE (OUTPATIENT)
Dept: ADMINISTRATIVE | Facility: HOSPITAL | Age: 66
End: 2021-07-29

## 2021-07-29 ENCOUNTER — TELEPHONE (OUTPATIENT)
Dept: ADMINISTRATIVE | Facility: HOSPITAL | Age: 66
End: 2021-07-29

## 2021-08-02 DIAGNOSIS — I10 ESSENTIAL HYPERTENSION: ICD-10-CM

## 2021-08-02 RX ORDER — CHLORTHALIDONE 25 MG/1
25 TABLET ORAL DAILY
Qty: 30 TABLET | Refills: 5 | OUTPATIENT
Start: 2021-08-02 | End: 2022-01-29

## 2021-08-26 ENCOUNTER — PATIENT MESSAGE (OUTPATIENT)
Dept: PRIMARY CARE CLINIC | Facility: CLINIC | Age: 66
End: 2021-08-26

## 2021-08-26 DIAGNOSIS — I10 ESSENTIAL HYPERTENSION: ICD-10-CM

## 2021-08-26 RX ORDER — TELMISARTAN 40 MG/1
40 TABLET ORAL DAILY
Qty: 90 TABLET | Refills: 1 | Status: SHIPPED | OUTPATIENT
Start: 2021-08-26 | End: 2022-03-31 | Stop reason: SDUPTHER

## 2021-09-08 ENCOUNTER — OFFICE VISIT (OUTPATIENT)
Dept: PODIATRY | Facility: CLINIC | Age: 66
End: 2021-09-08
Payer: MEDICARE

## 2021-09-08 VITALS
HEART RATE: 75 BPM | DIASTOLIC BLOOD PRESSURE: 73 MMHG | WEIGHT: 183.31 LBS | BODY MASS INDEX: 31.3 KG/M2 | HEIGHT: 64 IN | TEMPERATURE: 98 F | SYSTOLIC BLOOD PRESSURE: 123 MMHG

## 2021-09-08 DIAGNOSIS — K66.0 POSTOPERATIVE ADHESIONS: Primary | ICD-10-CM

## 2021-09-08 DIAGNOSIS — M79.89 SWELLING OF TOE OF LEFT FOOT: ICD-10-CM

## 2021-09-08 PROCEDURE — 99999 PR PBB SHADOW E&M-EST. PATIENT-LVL III: CPT | Mod: PBBFAC,,, | Performed by: PODIATRIST

## 2021-09-08 PROCEDURE — 99999 PR PBB SHADOW E&M-EST. PATIENT-LVL III: ICD-10-PCS | Mod: PBBFAC,,, | Performed by: PODIATRIST

## 2021-09-08 PROCEDURE — 99212 PR OFFICE/OUTPT VISIT, EST, LEVL II, 10-19 MIN: ICD-10-PCS | Mod: S$GLB,,, | Performed by: PODIATRIST

## 2021-09-08 PROCEDURE — 99213 OFFICE O/P EST LOW 20 MIN: CPT | Mod: PBBFAC,PN | Performed by: PODIATRIST

## 2021-09-08 PROCEDURE — 99212 OFFICE O/P EST SF 10 MIN: CPT | Mod: S$GLB,,, | Performed by: PODIATRIST

## 2021-10-25 ENCOUNTER — OFFICE VISIT (OUTPATIENT)
Dept: PODIATRY | Facility: CLINIC | Age: 66
End: 2021-10-25
Payer: MEDICARE

## 2021-10-25 ENCOUNTER — PATIENT MESSAGE (OUTPATIENT)
Dept: PRIMARY CARE CLINIC | Facility: CLINIC | Age: 66
End: 2021-10-25
Payer: MEDICARE

## 2021-10-25 VITALS
WEIGHT: 189 LBS | HEART RATE: 90 BPM | DIASTOLIC BLOOD PRESSURE: 80 MMHG | BODY MASS INDEX: 32.44 KG/M2 | SYSTOLIC BLOOD PRESSURE: 122 MMHG

## 2021-10-25 DIAGNOSIS — Z79.2 PROPHYLACTIC ANTIBIOTIC: ICD-10-CM

## 2021-10-25 DIAGNOSIS — M20.42 ACQUIRED HAMMERTOE OF LEFT FOOT: ICD-10-CM

## 2021-10-25 DIAGNOSIS — S93.335D: ICD-10-CM

## 2021-10-25 DIAGNOSIS — M24.575 CONTRACTURE OF JOINT OF FOOT, LEFT: Primary | ICD-10-CM

## 2021-10-25 DIAGNOSIS — M20.5X2 ACQUIRED ADDUCTOVARUS ROTATION OF TOE OF LEFT FOOT: ICD-10-CM

## 2021-10-25 DIAGNOSIS — Z12.31 ENCOUNTER FOR SCREENING MAMMOGRAM FOR BREAST CANCER: Primary | ICD-10-CM

## 2021-10-25 PROCEDURE — 3008F PR BODY MASS INDEX (BMI) DOCUMENTED: ICD-10-PCS | Mod: CPTII,S$GLB,, | Performed by: PODIATRIST

## 2021-10-25 PROCEDURE — 3079F PR MOST RECENT DIASTOLIC BLOOD PRESSURE 80-89 MM HG: ICD-10-PCS | Mod: CPTII,S$GLB,, | Performed by: PODIATRIST

## 2021-10-25 PROCEDURE — 28270 PR CAPSULOTOMY MT-P JT,FOOT,EACH: ICD-10-PCS | Mod: T1,S$GLB,, | Performed by: PODIATRIST

## 2021-10-25 PROCEDURE — 99999 PR PBB SHADOW E&M-EST. PATIENT-LVL III: ICD-10-PCS | Mod: PBBFAC,,, | Performed by: PODIATRIST

## 2021-10-25 PROCEDURE — 1160F RVW MEDS BY RX/DR IN RCRD: CPT | Mod: CPTII,S$GLB,, | Performed by: PODIATRIST

## 2021-10-25 PROCEDURE — 99213 PR OFFICE/OUTPT VISIT, EST, LEVL III, 20-29 MIN: ICD-10-PCS | Mod: 57,S$GLB,, | Performed by: PODIATRIST

## 2021-10-25 PROCEDURE — 3074F PR MOST RECENT SYSTOLIC BLOOD PRESSURE < 130 MM HG: ICD-10-PCS | Mod: CPTII,S$GLB,, | Performed by: PODIATRIST

## 2021-10-25 PROCEDURE — 3079F DIAST BP 80-89 MM HG: CPT | Mod: CPTII,S$GLB,, | Performed by: PODIATRIST

## 2021-10-25 PROCEDURE — 1160F PR REVIEW ALL MEDS BY PRESCRIBER/CLIN PHARMACIST DOCUMENTED: ICD-10-PCS | Mod: CPTII,S$GLB,, | Performed by: PODIATRIST

## 2021-10-25 PROCEDURE — 4010F PR ACE/ARB THEARPY RXD/TAKEN: ICD-10-PCS | Mod: CPTII,S$GLB,, | Performed by: PODIATRIST

## 2021-10-25 PROCEDURE — 28270 RELEASE OF FOOT CONTRACTURE: CPT | Mod: T1,S$GLB,, | Performed by: PODIATRIST

## 2021-10-25 PROCEDURE — 3008F BODY MASS INDEX DOCD: CPT | Mod: CPTII,S$GLB,, | Performed by: PODIATRIST

## 2021-10-25 PROCEDURE — 99213 OFFICE O/P EST LOW 20 MIN: CPT | Mod: 57,S$GLB,, | Performed by: PODIATRIST

## 2021-10-25 PROCEDURE — 1159F MED LIST DOCD IN RCRD: CPT | Mod: CPTII,S$GLB,, | Performed by: PODIATRIST

## 2021-10-25 PROCEDURE — 1159F PR MEDICATION LIST DOCUMENTED IN MEDICAL RECORD: ICD-10-PCS | Mod: CPTII,S$GLB,, | Performed by: PODIATRIST

## 2021-10-25 PROCEDURE — 99999 PR PBB SHADOW E&M-EST. PATIENT-LVL III: CPT | Mod: PBBFAC,,, | Performed by: PODIATRIST

## 2021-10-25 PROCEDURE — 3074F SYST BP LT 130 MM HG: CPT | Mod: CPTII,S$GLB,, | Performed by: PODIATRIST

## 2021-10-25 PROCEDURE — 4010F ACE/ARB THERAPY RXD/TAKEN: CPT | Mod: CPTII,S$GLB,, | Performed by: PODIATRIST

## 2021-10-25 RX ORDER — CEPHALEXIN 500 MG/1
1000 CAPSULE ORAL 2 TIMES DAILY
Qty: 6 CAPSULE | Refills: 0 | Status: SHIPPED | OUTPATIENT
Start: 2021-10-25 | End: 2021-11-22

## 2021-11-03 ENCOUNTER — OFFICE VISIT (OUTPATIENT)
Dept: PODIATRY | Facility: CLINIC | Age: 66
End: 2021-11-03
Payer: MEDICARE

## 2021-11-03 VITALS
HEART RATE: 83 BPM | BODY MASS INDEX: 32.44 KG/M2 | DIASTOLIC BLOOD PRESSURE: 91 MMHG | SYSTOLIC BLOOD PRESSURE: 155 MMHG | WEIGHT: 189 LBS

## 2021-11-03 DIAGNOSIS — M24.575 CONTRACTURE OF JOINT OF FOOT, LEFT: Primary | ICD-10-CM

## 2021-11-03 DIAGNOSIS — M20.42 ACQUIRED HAMMERTOE OF LEFT FOOT: ICD-10-CM

## 2021-11-03 DIAGNOSIS — S93.125D DISLOCATION OF METATARSOPHALANGEAL JOINT OF LEFT LESSER TOE(S), SUBSEQUENT ENCOUNTER: ICD-10-CM

## 2021-11-03 DIAGNOSIS — Z48.02 VISIT FOR SUTURE REMOVAL: ICD-10-CM

## 2021-11-03 DIAGNOSIS — M20.5X2 ACQUIRED ADDUCTOVARUS ROTATION OF TOE OF LEFT FOOT: ICD-10-CM

## 2021-11-03 PROCEDURE — 4010F ACE/ARB THERAPY RXD/TAKEN: CPT | Mod: CPTII,S$GLB,, | Performed by: PODIATRIST

## 2021-11-03 PROCEDURE — 99024 POSTOP FOLLOW-UP VISIT: CPT | Mod: S$GLB,,, | Performed by: PODIATRIST

## 2021-11-03 PROCEDURE — 3080F DIAST BP >= 90 MM HG: CPT | Mod: CPTII,S$GLB,, | Performed by: PODIATRIST

## 2021-11-03 PROCEDURE — 99999 PR PBB SHADOW E&M-EST. PATIENT-LVL III: CPT | Mod: PBBFAC,,, | Performed by: PODIATRIST

## 2021-11-03 PROCEDURE — 1159F MED LIST DOCD IN RCRD: CPT | Mod: CPTII,S$GLB,, | Performed by: PODIATRIST

## 2021-11-03 PROCEDURE — 99213 PR OFFICE/OUTPT VISIT, EST, LEVL III, 20-29 MIN: ICD-10-PCS | Mod: 25,S$GLB,, | Performed by: PODIATRIST

## 2021-11-03 PROCEDURE — 3008F PR BODY MASS INDEX (BMI) DOCUMENTED: ICD-10-PCS | Mod: CPTII,S$GLB,, | Performed by: PODIATRIST

## 2021-11-03 PROCEDURE — 99024 PR POST-OP FOLLOW-UP VISIT: ICD-10-PCS | Mod: S$GLB,,, | Performed by: PODIATRIST

## 2021-11-03 PROCEDURE — 3077F SYST BP >= 140 MM HG: CPT | Mod: CPTII,S$GLB,, | Performed by: PODIATRIST

## 2021-11-03 PROCEDURE — 4010F PR ACE/ARB THEARPY RXD/TAKEN: ICD-10-PCS | Mod: CPTII,S$GLB,, | Performed by: PODIATRIST

## 2021-11-03 PROCEDURE — 99213 OFFICE O/P EST LOW 20 MIN: CPT | Mod: 25,S$GLB,, | Performed by: PODIATRIST

## 2021-11-03 PROCEDURE — 3077F PR MOST RECENT SYSTOLIC BLOOD PRESSURE >= 140 MM HG: ICD-10-PCS | Mod: CPTII,S$GLB,, | Performed by: PODIATRIST

## 2021-11-03 PROCEDURE — 1159F PR MEDICATION LIST DOCUMENTED IN MEDICAL RECORD: ICD-10-PCS | Mod: CPTII,S$GLB,, | Performed by: PODIATRIST

## 2021-11-03 PROCEDURE — 99999 PR PBB SHADOW E&M-EST. PATIENT-LVL III: ICD-10-PCS | Mod: PBBFAC,,, | Performed by: PODIATRIST

## 2021-11-03 PROCEDURE — 3008F BODY MASS INDEX DOCD: CPT | Mod: CPTII,S$GLB,, | Performed by: PODIATRIST

## 2021-11-03 PROCEDURE — 3080F PR MOST RECENT DIASTOLIC BLOOD PRESSURE >= 90 MM HG: ICD-10-PCS | Mod: CPTII,S$GLB,, | Performed by: PODIATRIST

## 2021-11-05 ENCOUNTER — HOSPITAL ENCOUNTER (OUTPATIENT)
Dept: RADIOLOGY | Facility: OTHER | Age: 66
Discharge: HOME | End: 2021-11-05
Attending: FAMILY MEDICINE
Payer: MEDICARE

## 2021-11-05 DIAGNOSIS — Z12.31 ENCOUNTER FOR SCREENING MAMMOGRAM FOR BREAST CANCER: ICD-10-CM

## 2021-11-05 PROCEDURE — 77067 SCR MAMMO BI INCL CAD: CPT | Mod: TC

## 2021-11-05 PROCEDURE — 77067 SCR MAMMO BI INCL CAD: CPT | Mod: 26,,, | Performed by: RADIOLOGY

## 2021-11-05 PROCEDURE — 77063 MAMMO DIGITAL SCREENING LEFT WITH TOMO: ICD-10-PCS | Mod: 26,,, | Performed by: RADIOLOGY

## 2021-11-05 PROCEDURE — 77063 BREAST TOMOSYNTHESIS BI: CPT | Mod: 26,,, | Performed by: RADIOLOGY

## 2021-11-05 PROCEDURE — 77067 MAMMO DIGITAL SCREENING LEFT WITH TOMO: ICD-10-PCS | Mod: 26,,, | Performed by: RADIOLOGY

## 2021-11-09 RX ORDER — SODIUM CHLORIDE 0.9 % (FLUSH) 0.9 %
10 SYRINGE (ML) INJECTION
Status: DISCONTINUED | OUTPATIENT
Start: 2022-01-07 | End: 2021-11-22

## 2021-11-09 RX ORDER — CEFAZOLIN SODIUM 1 G/50ML
1 SOLUTION INTRAVENOUS
Status: CANCELLED | OUTPATIENT
Start: 2022-01-07

## 2021-11-09 RX ORDER — KETOROLAC TROMETHAMINE 15 MG/ML
15 INJECTION, SOLUTION INTRAMUSCULAR; INTRAVENOUS ONCE
Status: DISCONTINUED | OUTPATIENT
Start: 2022-01-07 | End: 2021-11-22

## 2021-11-22 ENCOUNTER — OFFICE VISIT (OUTPATIENT)
Dept: PRIMARY CARE CLINIC | Facility: CLINIC | Age: 66
End: 2021-11-22
Payer: MEDICARE

## 2021-11-22 VITALS
SYSTOLIC BLOOD PRESSURE: 146 MMHG | HEART RATE: 70 BPM | HEIGHT: 64 IN | BODY MASS INDEX: 32.28 KG/M2 | WEIGHT: 189.06 LBS | RESPIRATION RATE: 18 BRPM | DIASTOLIC BLOOD PRESSURE: 92 MMHG | OXYGEN SATURATION: 99 %

## 2021-11-22 DIAGNOSIS — I10 ESSENTIAL HYPERTENSION: ICD-10-CM

## 2021-11-22 DIAGNOSIS — Z00.00 ANNUAL PHYSICAL EXAM: Primary | ICD-10-CM

## 2021-11-22 DIAGNOSIS — Z78.0 ASYMPTOMATIC MENOPAUSE: ICD-10-CM

## 2021-11-22 DIAGNOSIS — R73.03 PREDIABETES: ICD-10-CM

## 2021-11-22 DIAGNOSIS — Z01.818 PREOPERATIVE EXAMINATION: ICD-10-CM

## 2021-11-22 DIAGNOSIS — E78.2 MIXED HYPERLIPIDEMIA: ICD-10-CM

## 2021-11-22 DIAGNOSIS — Z23 NEED FOR VACCINATION: ICD-10-CM

## 2021-11-22 DIAGNOSIS — E03.9 HYPOTHYROIDISM, UNSPECIFIED TYPE: ICD-10-CM

## 2021-11-22 PROCEDURE — 93005 ELECTROCARDIOGRAM TRACING: CPT | Mod: S$GLB,,, | Performed by: FAMILY MEDICINE

## 2021-11-22 PROCEDURE — 93010 ELECTROCARDIOGRAM REPORT: CPT | Mod: S$GLB,,, | Performed by: INTERNAL MEDICINE

## 2021-11-22 PROCEDURE — 99999 PR PBB SHADOW E&M-EST. PATIENT-LVL III: ICD-10-PCS | Mod: PBBFAC,,, | Performed by: FAMILY MEDICINE

## 2021-11-22 PROCEDURE — 90694 FLU VACCINE - QUADRIVALENT - ADJUVANTED: ICD-10-PCS | Mod: S$GLB,,, | Performed by: FAMILY MEDICINE

## 2021-11-22 PROCEDURE — 90732 PPSV23 VACC 2 YRS+ SUBQ/IM: CPT | Mod: S$GLB,,, | Performed by: FAMILY MEDICINE

## 2021-11-22 PROCEDURE — 99999 PR PBB SHADOW E&M-EST. PATIENT-LVL III: CPT | Mod: PBBFAC,,, | Performed by: FAMILY MEDICINE

## 2021-11-22 PROCEDURE — 90694 VACC AIIV4 NO PRSRV 0.5ML IM: CPT | Mod: S$GLB,,, | Performed by: FAMILY MEDICINE

## 2021-11-22 PROCEDURE — 99214 PR OFFICE/OUTPT VISIT, EST, LEVL IV, 30-39 MIN: ICD-10-PCS | Mod: S$GLB,,, | Performed by: FAMILY MEDICINE

## 2021-11-22 PROCEDURE — 90732 PNEUMOCOCCAL POLYSACCHARIDE VACCINE 23-VALENT =>2YO SQ IM: ICD-10-PCS | Mod: S$GLB,,, | Performed by: FAMILY MEDICINE

## 2021-11-22 PROCEDURE — 93005 EKG 12-LEAD: ICD-10-PCS | Mod: S$GLB,,, | Performed by: FAMILY MEDICINE

## 2021-11-22 PROCEDURE — 99214 OFFICE O/P EST MOD 30 MIN: CPT | Mod: S$GLB,,, | Performed by: FAMILY MEDICINE

## 2021-11-22 PROCEDURE — G0008 PNEUMOCOCCAL POLYSACCHARIDE VACCINE 23-VALENT =>2YO SQ IM: ICD-10-PCS | Mod: S$GLB,,, | Performed by: FAMILY MEDICINE

## 2021-11-22 PROCEDURE — G0009 ADMIN PNEUMOCOCCAL VACCINE: HCPCS | Mod: S$GLB,,, | Performed by: FAMILY MEDICINE

## 2021-11-22 PROCEDURE — 4010F PR ACE/ARB THEARPY RXD/TAKEN: ICD-10-PCS | Mod: CPTII,S$GLB,, | Performed by: FAMILY MEDICINE

## 2021-11-22 PROCEDURE — 4010F ACE/ARB THERAPY RXD/TAKEN: CPT | Mod: CPTII,S$GLB,, | Performed by: FAMILY MEDICINE

## 2021-11-22 PROCEDURE — 93010 EKG 12-LEAD: ICD-10-PCS | Mod: S$GLB,,, | Performed by: INTERNAL MEDICINE

## 2021-11-22 PROCEDURE — G0008 ADMIN INFLUENZA VIRUS VAC: HCPCS | Mod: S$GLB,,, | Performed by: FAMILY MEDICINE

## 2021-11-22 PROCEDURE — G0009 FLU VACCINE - QUADRIVALENT - ADJUVANTED: ICD-10-PCS | Mod: S$GLB,,, | Performed by: FAMILY MEDICINE

## 2021-11-22 RX ORDER — ZOSTER VACCINE RECOMBINANT, ADJUVANTED 50 MCG/0.5
0.5 KIT INTRAMUSCULAR ONCE
Qty: 1 EACH | Refills: 1 | Status: SHIPPED | OUTPATIENT
Start: 2021-11-22 | End: 2021-11-22

## 2021-11-22 RX ORDER — METOPROLOL SUCCINATE 25 MG/1
25 TABLET, EXTENDED RELEASE ORAL DAILY
Qty: 30 TABLET | Refills: 0 | Status: SHIPPED | OUTPATIENT
Start: 2021-11-22 | End: 2021-12-09 | Stop reason: SDUPTHER

## 2021-12-09 ENCOUNTER — CLINICAL SUPPORT (OUTPATIENT)
Dept: PRIMARY CARE CLINIC | Facility: CLINIC | Age: 66
End: 2021-12-09
Payer: MEDICARE

## 2021-12-09 VITALS — SYSTOLIC BLOOD PRESSURE: 136 MMHG | OXYGEN SATURATION: 97 % | HEART RATE: 76 BPM | DIASTOLIC BLOOD PRESSURE: 84 MMHG

## 2021-12-09 DIAGNOSIS — I10 ESSENTIAL HYPERTENSION: ICD-10-CM

## 2021-12-09 PROCEDURE — 99999 PR PBB SHADOW E&M-EST. PATIENT-LVL II: ICD-10-PCS | Mod: PBBFAC,,,

## 2021-12-09 PROCEDURE — 99999 PR PBB SHADOW E&M-EST. PATIENT-LVL II: CPT | Mod: PBBFAC,,,

## 2021-12-09 RX ORDER — METOPROLOL SUCCINATE 25 MG/1
25 TABLET, EXTENDED RELEASE ORAL DAILY
Qty: 90 TABLET | Refills: 3 | Status: SHIPPED | OUTPATIENT
Start: 2021-12-09 | End: 2022-10-31

## 2022-01-04 ENCOUNTER — PATIENT MESSAGE (OUTPATIENT)
Dept: ADMINISTRATIVE | Facility: OTHER | Age: 67
End: 2022-01-04
Payer: MEDICARE

## 2022-01-08 ENCOUNTER — PATIENT MESSAGE (OUTPATIENT)
Dept: PODIATRY | Facility: CLINIC | Age: 67
End: 2022-01-08
Payer: MEDICARE

## 2022-01-11 DIAGNOSIS — Z09 POSTOPERATIVE FOLLOW-UP: Primary | ICD-10-CM

## 2022-01-12 ENCOUNTER — OFFICE VISIT (OUTPATIENT)
Dept: PODIATRY | Facility: CLINIC | Age: 67
End: 2022-01-12
Payer: MEDICARE

## 2022-01-12 VITALS
DIASTOLIC BLOOD PRESSURE: 77 MMHG | BODY MASS INDEX: 32.44 KG/M2 | SYSTOLIC BLOOD PRESSURE: 125 MMHG | HEART RATE: 74 BPM | WEIGHT: 189 LBS

## 2022-01-12 DIAGNOSIS — Z09 POSTOP CHECK: ICD-10-CM

## 2022-01-12 DIAGNOSIS — M20.42 HAMMERTOE OF LEFT FOOT: Primary | ICD-10-CM

## 2022-01-12 PROCEDURE — 3074F SYST BP LT 130 MM HG: CPT | Mod: CPTII,S$GLB,, | Performed by: PODIATRIST

## 2022-01-12 PROCEDURE — 99999 PR PBB SHADOW E&M-EST. PATIENT-LVL III: CPT | Mod: PBBFAC,,, | Performed by: PODIATRIST

## 2022-01-12 PROCEDURE — 99024 POSTOP FOLLOW-UP VISIT: CPT | Mod: S$GLB,,, | Performed by: PODIATRIST

## 2022-01-12 PROCEDURE — 1126F AMNT PAIN NOTED NONE PRSNT: CPT | Mod: CPTII,S$GLB,, | Performed by: PODIATRIST

## 2022-01-12 PROCEDURE — 3078F PR MOST RECENT DIASTOLIC BLOOD PRESSURE < 80 MM HG: ICD-10-PCS | Mod: CPTII,S$GLB,, | Performed by: PODIATRIST

## 2022-01-12 PROCEDURE — 3008F BODY MASS INDEX DOCD: CPT | Mod: CPTII,S$GLB,, | Performed by: PODIATRIST

## 2022-01-12 PROCEDURE — 3078F DIAST BP <80 MM HG: CPT | Mod: CPTII,S$GLB,, | Performed by: PODIATRIST

## 2022-01-12 PROCEDURE — 1160F PR REVIEW ALL MEDS BY PRESCRIBER/CLIN PHARMACIST DOCUMENTED: ICD-10-PCS | Mod: CPTII,S$GLB,, | Performed by: PODIATRIST

## 2022-01-12 PROCEDURE — 1159F PR MEDICATION LIST DOCUMENTED IN MEDICAL RECORD: ICD-10-PCS | Mod: CPTII,S$GLB,, | Performed by: PODIATRIST

## 2022-01-12 PROCEDURE — 99999 PR PBB SHADOW E&M-EST. PATIENT-LVL III: ICD-10-PCS | Mod: PBBFAC,,, | Performed by: PODIATRIST

## 2022-01-12 PROCEDURE — 99024 PR POST-OP FOLLOW-UP VISIT: ICD-10-PCS | Mod: S$GLB,,, | Performed by: PODIATRIST

## 2022-01-12 PROCEDURE — 3008F PR BODY MASS INDEX (BMI) DOCUMENTED: ICD-10-PCS | Mod: CPTII,S$GLB,, | Performed by: PODIATRIST

## 2022-01-12 PROCEDURE — 3074F PR MOST RECENT SYSTOLIC BLOOD PRESSURE < 130 MM HG: ICD-10-PCS | Mod: CPTII,S$GLB,, | Performed by: PODIATRIST

## 2022-01-12 PROCEDURE — 1160F RVW MEDS BY RX/DR IN RCRD: CPT | Mod: CPTII,S$GLB,, | Performed by: PODIATRIST

## 2022-01-12 PROCEDURE — 1159F MED LIST DOCD IN RCRD: CPT | Mod: CPTII,S$GLB,, | Performed by: PODIATRIST

## 2022-01-12 PROCEDURE — 1126F PR PAIN SEVERITY QUANTIFIED, NO PAIN PRESENT: ICD-10-PCS | Mod: CPTII,S$GLB,, | Performed by: PODIATRIST

## 2022-01-12 NOTE — PROGRESS NOTES
Subjective:       Heidy Polo presents to the clinic 5 days postop hammertoe surgery L 2-5. DOS 1/7/22. Pain is controlled with current analgesics. Medications being used: prescription NSAID's including ibuprofen (Motrin) and narcotic analgesics including hydrocodone/acetaminophen (Lorcet, Lortab, Norco, Vicodin). States 1st 2-3 days were the worst but eased up w/ adjustment of her meds as well as ice & elevation; now barely having pain.      Objective:      /77 (BP Location: Left arm, Patient Position: Sitting)   Pulse 74   Wt 85.7 kg (189 lb)   BMI 32.44 kg/m²     General:  alert, appears stated age, cooperative and no distress       Incision:   healing well, no drainage, no erythema, no dehiscence, incision well approximated. Small blister dorsal proximal phalanx 2nd toe, easily decompressed.        X-Ray Toe 2 or More Views Left  Narrative: EXAMINATION:  XR TOE 2 OR MORE VIEWS LEFT    CLINICAL HISTORY:  Encounter for follow-up examination after completed treatment for conditions other than malignant neoplasm    TECHNIQUE:  Three views of the left toes were performed    COMPARISON:  06/24/2021, 06/11/2021, 05/12/2021    FINDINGS:  In the interval, the patient has undergone correction hammertoe surgery at the 2nd, 3rd, 4th, and 5th PIP.  There are fixation pins through the 2nd-4th distal rays.  There is a mild valgus angulation at the 2nd PIP.    Postsurgical changes of the left pinky toe reportedly related to correction arthroplasty    No stress fractures identified.  Impression: Postoperative changes as above.  Please see details and medical chart.  No evidence of perihardware lucency or stress fractures.    Electronically signed by: Krupa Jordan  Date:    01/12/2022  Time:    13:19  I independently reviewed the Xray images. Correction of hammertoes PIPJ 2-5 as expected, w/ intact Kwires 2-4.    Assessment:      Doing well postoperatively.    Problem List Items Addressed This Visit    None     Visit  Diagnoses     Hammertoe of left foot    -  Primary    Postop check            Plan:      1. Dressing change performed - to be kept CDI  2. Continue any current medications prn.  3. Pt is to increase activities as tolerated in orthowedge shoe.  4. Follow up: 2-3 weeks for suture removal.

## 2022-01-25 ENCOUNTER — OFFICE VISIT (OUTPATIENT)
Dept: PODIATRY | Facility: CLINIC | Age: 67
End: 2022-01-25
Payer: MEDICARE

## 2022-01-25 VITALS
SYSTOLIC BLOOD PRESSURE: 124 MMHG | DIASTOLIC BLOOD PRESSURE: 77 MMHG | HEART RATE: 88 BPM | BODY MASS INDEX: 32.44 KG/M2 | WEIGHT: 189 LBS

## 2022-01-25 DIAGNOSIS — Z48.02 VISIT FOR SUTURE REMOVAL: Primary | ICD-10-CM

## 2022-01-25 PROCEDURE — 99999 PR PBB SHADOW E&M-EST. PATIENT-LVL III: ICD-10-PCS | Mod: PBBFAC,,, | Performed by: PODIATRIST

## 2022-01-25 PROCEDURE — 3078F PR MOST RECENT DIASTOLIC BLOOD PRESSURE < 80 MM HG: ICD-10-PCS | Mod: CPTII,S$GLB,, | Performed by: PODIATRIST

## 2022-01-25 PROCEDURE — 99024 POSTOP FOLLOW-UP VISIT: CPT | Mod: S$GLB,,, | Performed by: PODIATRIST

## 2022-01-25 PROCEDURE — 3074F PR MOST RECENT SYSTOLIC BLOOD PRESSURE < 130 MM HG: ICD-10-PCS | Mod: CPTII,S$GLB,, | Performed by: PODIATRIST

## 2022-01-25 PROCEDURE — 99999 PR PBB SHADOW E&M-EST. PATIENT-LVL III: CPT | Mod: PBBFAC,,, | Performed by: PODIATRIST

## 2022-01-25 PROCEDURE — 3008F PR BODY MASS INDEX (BMI) DOCUMENTED: ICD-10-PCS | Mod: CPTII,S$GLB,, | Performed by: PODIATRIST

## 2022-01-25 PROCEDURE — 3008F BODY MASS INDEX DOCD: CPT | Mod: CPTII,S$GLB,, | Performed by: PODIATRIST

## 2022-01-25 PROCEDURE — 1126F AMNT PAIN NOTED NONE PRSNT: CPT | Mod: CPTII,S$GLB,, | Performed by: PODIATRIST

## 2022-01-25 PROCEDURE — 99024 PR POST-OP FOLLOW-UP VISIT: ICD-10-PCS | Mod: S$GLB,,, | Performed by: PODIATRIST

## 2022-01-25 PROCEDURE — 3078F DIAST BP <80 MM HG: CPT | Mod: CPTII,S$GLB,, | Performed by: PODIATRIST

## 2022-01-25 PROCEDURE — 3074F SYST BP LT 130 MM HG: CPT | Mod: CPTII,S$GLB,, | Performed by: PODIATRIST

## 2022-01-25 PROCEDURE — 1159F PR MEDICATION LIST DOCUMENTED IN MEDICAL RECORD: ICD-10-PCS | Mod: CPTII,S$GLB,, | Performed by: PODIATRIST

## 2022-01-25 PROCEDURE — 1126F PR PAIN SEVERITY QUANTIFIED, NO PAIN PRESENT: ICD-10-PCS | Mod: CPTII,S$GLB,, | Performed by: PODIATRIST

## 2022-01-25 PROCEDURE — 1159F MED LIST DOCD IN RCRD: CPT | Mod: CPTII,S$GLB,, | Performed by: PODIATRIST

## 2022-01-25 NOTE — PROGRESS NOTES
Subjective:       Heidy Polo presents postop hammertoe surgery L 2-5. DOS 1/7/22. POD 18days. The patient is not having any pain. Mostly states that 'stitches' starting to itch.    Past Medical History:   Diagnosis Date    Cancer     breast in 2001 -right     Depression 7/16/2012    History of breast cancer     Hyperlipidemia     Hypertension     Hypothyroid      Patient Active Problem List   Diagnosis    Essential hypertension    Hypothyroid    Status following gastric banding surgery for weight loss    History of right breast cancer    Mixed hyperlipidemia    H/O right mastectomy    Prediabetes    Primary osteoarthritis of right knee    S/P total knee arthroplasty, right    Metatarsalgia, left foot    Dislocation of metatarsophalangeal joint of lesser toe of left foot    Hammer toe of second toe of left foot    Aftercare following surgery of the musculoskeletal system   PCP: Chas Angela MD     Objective:      /77 (BP Location: Left arm, Patient Position: Sitting)   Pulse 88   Wt 85.7 kg (189 lb)   BMI 32.44 kg/m²     General:  alert, appears stated age, cooperative and no distress       Incision:   healing well, no drainage, no erythema, no dehiscence, incision well approximated. Dried blood dorsal proximal phalanx 2nd toe, decompressed blister.     Assessment:      Doing well postoperatively.    Problem List Items Addressed This Visit    None     Visit Diagnoses     Visit for suture removal    -  Primary        Plan:     Suture removal.  To continue to keep CDI.  Dressing reapplied w/ pin guard.  F/U 10-14days for pin removal & post pin removal Xrays, sooner prn

## 2022-02-07 ENCOUNTER — OFFICE VISIT (OUTPATIENT)
Dept: PODIATRY | Facility: CLINIC | Age: 67
End: 2022-02-07
Payer: MEDICARE

## 2022-02-07 VITALS
HEART RATE: 86 BPM | BODY MASS INDEX: 33.13 KG/M2 | SYSTOLIC BLOOD PRESSURE: 140 MMHG | DIASTOLIC BLOOD PRESSURE: 79 MMHG | WEIGHT: 193 LBS

## 2022-02-07 DIAGNOSIS — Z09 POSTOPERATIVE FOLLOW-UP: Primary | ICD-10-CM

## 2022-02-07 DIAGNOSIS — M20.42 HAMMERTOE OF LEFT FOOT: ICD-10-CM

## 2022-02-07 PROCEDURE — 99024 PR POST-OP FOLLOW-UP VISIT: ICD-10-PCS | Mod: S$GLB,,, | Performed by: PODIATRIST

## 2022-02-07 PROCEDURE — 3008F BODY MASS INDEX DOCD: CPT | Mod: CPTII,S$GLB,, | Performed by: PODIATRIST

## 2022-02-07 PROCEDURE — 99999 PR PBB SHADOW E&M-EST. PATIENT-LVL III: ICD-10-PCS | Mod: PBBFAC,,, | Performed by: PODIATRIST

## 2022-02-07 PROCEDURE — 1126F PR PAIN SEVERITY QUANTIFIED, NO PAIN PRESENT: ICD-10-PCS | Mod: CPTII,S$GLB,, | Performed by: PODIATRIST

## 2022-02-07 PROCEDURE — 3008F PR BODY MASS INDEX (BMI) DOCUMENTED: ICD-10-PCS | Mod: CPTII,S$GLB,, | Performed by: PODIATRIST

## 2022-02-07 PROCEDURE — 3078F PR MOST RECENT DIASTOLIC BLOOD PRESSURE < 80 MM HG: ICD-10-PCS | Mod: CPTII,S$GLB,, | Performed by: PODIATRIST

## 2022-02-07 PROCEDURE — 3077F SYST BP >= 140 MM HG: CPT | Mod: CPTII,S$GLB,, | Performed by: PODIATRIST

## 2022-02-07 PROCEDURE — 3077F PR MOST RECENT SYSTOLIC BLOOD PRESSURE >= 140 MM HG: ICD-10-PCS | Mod: CPTII,S$GLB,, | Performed by: PODIATRIST

## 2022-02-07 PROCEDURE — 1159F PR MEDICATION LIST DOCUMENTED IN MEDICAL RECORD: ICD-10-PCS | Mod: CPTII,S$GLB,, | Performed by: PODIATRIST

## 2022-02-07 PROCEDURE — 99024 POSTOP FOLLOW-UP VISIT: CPT | Mod: S$GLB,,, | Performed by: PODIATRIST

## 2022-02-07 PROCEDURE — 99999 PR PBB SHADOW E&M-EST. PATIENT-LVL III: CPT | Mod: PBBFAC,,, | Performed by: PODIATRIST

## 2022-02-07 PROCEDURE — 1159F MED LIST DOCD IN RCRD: CPT | Mod: CPTII,S$GLB,, | Performed by: PODIATRIST

## 2022-02-07 PROCEDURE — 3078F DIAST BP <80 MM HG: CPT | Mod: CPTII,S$GLB,, | Performed by: PODIATRIST

## 2022-02-07 PROCEDURE — 1126F AMNT PAIN NOTED NONE PRSNT: CPT | Mod: CPTII,S$GLB,, | Performed by: PODIATRIST

## 2022-02-07 NOTE — PROGRESS NOTES
Subjective:       Heidy Polo presents postop hammertoe surgery L 2-5. DOS 1/7/22. The patient is not having any pain.  Did thinks she jammed her foot in despite the buttress incorporated into the dressing.  Small of discomfort to the 5th toe left foot.     Past Medical History:   Diagnosis Date    Cancer     breast in 2001 -right     Depression 7/16/2012    History of breast cancer     Hyperlipidemia     Hypertension     Hypothyroid      Patient Active Problem List   Diagnosis    Essential hypertension    Hypothyroid    Status following gastric banding surgery for weight loss    History of right breast cancer    Mixed hyperlipidemia    H/O right mastectomy    Prediabetes    Primary osteoarthritis of right knee    S/P total knee arthroplasty, right    Metatarsalgia, left foot    Dislocation of metatarsophalangeal joint of lesser toe of left foot    Hammer toe of second toe of left foot    Aftercare following surgery of the musculoskeletal system   PCP: Chas Angela MD     Objective:      BP (!) 140/79 (BP Location: Left arm, Patient Position: Sitting)   Pulse 86   Wt 87.5 kg (193 lb)   BMI 33.13 kg/m²     General:  alert, appears stated age, cooperative and no distress    K-wire is intact to 2nd through 4th toes with slight loosening/pull of the 4th toe pin while the 3rd K-wire has pushed in all the way flushed to the distal digital tuft.  Second K-wire is without migration or rotation.  All toes maintained in good alignment and reduction of deformity noted with minimal edema, primarily located to the dorsal base of the toes/forefoot.   Incision:   healed well by primary intention; dried blood along healed incision line as well as decompressed blister 2nd toe easily removed with no underlying dehiscence.     X-Ray Toe 2 or More Views Left  Narrative: EXAMINATION:  XR TOE 2 OR MORE VIEWS LEFT    CLINICAL HISTORY:  Encounter for follow-up examination after completed treatment for conditions  other than malignant neoplasm    TECHNIQUE:  Three views of the left toes were performed    COMPARISON:  01/12/2022    FINDINGS:  There are surgical changes with resection of the distal aspect of the proximal phalanges of the 2nd, 3rd, 4th, and 5th digits.  Since the prior examination the metallic pins have been removed.  Alignment does not appear significantly changed.  No detrimental interval change noted.  Soft tissues are grossly unremarkable.  Impression: Postsurgical changes with interval removal of fixation pins.  No detrimental interval change noted.    Electronically signed by: Flaco Milan MD  Date:    02/07/2022  Time:    11:27  I independently reviewed x-ray images.  Corrected digits 2 through 5 maintaining in reduced position with removal of K-wire 2 through 4.    Assessment:      Doing well postoperatively.    Problem List Items Addressed This Visit    None     Visit Diagnoses     Postoperative follow-up    -  Primary    Relevant Orders    X-Ray Toe 2 or More Views Left (Completed)    POST-SURGICAL BOOT/SHOE FOR HOME USE    Hammertoe of left foot            Plan:     Eschar/dry skin debrided dorsal incision line left 2nd through 5th toes.  K-wires removed and Band-Aids applied.  To maintain CDI for 3 days prior to being able to get in a shower.  Post pin removal x-rays taken.  F/U 3 weeks for final postop check, sooner prn

## 2022-03-08 ENCOUNTER — OFFICE VISIT (OUTPATIENT)
Dept: PODIATRY | Facility: CLINIC | Age: 67
End: 2022-03-08
Payer: MEDICARE

## 2022-03-08 VITALS
DIASTOLIC BLOOD PRESSURE: 83 MMHG | WEIGHT: 193 LBS | SYSTOLIC BLOOD PRESSURE: 140 MMHG | HEART RATE: 75 BPM | BODY MASS INDEX: 33.13 KG/M2

## 2022-03-08 DIAGNOSIS — M20.42 ACQUIRED HAMMERTOE OF LEFT FOOT: Primary | ICD-10-CM

## 2022-03-08 DIAGNOSIS — Z09 POSTOPERATIVE FOLLOW-UP: ICD-10-CM

## 2022-03-08 PROCEDURE — 1159F MED LIST DOCD IN RCRD: CPT | Mod: CPTII,S$GLB,, | Performed by: PODIATRIST

## 2022-03-08 PROCEDURE — 1159F PR MEDICATION LIST DOCUMENTED IN MEDICAL RECORD: ICD-10-PCS | Mod: CPTII,S$GLB,, | Performed by: PODIATRIST

## 2022-03-08 PROCEDURE — 1126F AMNT PAIN NOTED NONE PRSNT: CPT | Mod: CPTII,S$GLB,, | Performed by: PODIATRIST

## 2022-03-08 PROCEDURE — 3008F PR BODY MASS INDEX (BMI) DOCUMENTED: ICD-10-PCS | Mod: CPTII,S$GLB,, | Performed by: PODIATRIST

## 2022-03-08 PROCEDURE — 3077F SYST BP >= 140 MM HG: CPT | Mod: CPTII,S$GLB,, | Performed by: PODIATRIST

## 2022-03-08 PROCEDURE — 3008F BODY MASS INDEX DOCD: CPT | Mod: CPTII,S$GLB,, | Performed by: PODIATRIST

## 2022-03-08 PROCEDURE — 3077F PR MOST RECENT SYSTOLIC BLOOD PRESSURE >= 140 MM HG: ICD-10-PCS | Mod: CPTII,S$GLB,, | Performed by: PODIATRIST

## 2022-03-08 PROCEDURE — 99999 PR PBB SHADOW E&M-EST. PATIENT-LVL III: ICD-10-PCS | Mod: PBBFAC,,, | Performed by: PODIATRIST

## 2022-03-08 PROCEDURE — 99999 PR PBB SHADOW E&M-EST. PATIENT-LVL III: CPT | Mod: PBBFAC,,, | Performed by: PODIATRIST

## 2022-03-08 PROCEDURE — 1126F PR PAIN SEVERITY QUANTIFIED, NO PAIN PRESENT: ICD-10-PCS | Mod: CPTII,S$GLB,, | Performed by: PODIATRIST

## 2022-03-08 PROCEDURE — 99024 PR POST-OP FOLLOW-UP VISIT: ICD-10-PCS | Mod: S$GLB,,, | Performed by: PODIATRIST

## 2022-03-08 PROCEDURE — 3079F PR MOST RECENT DIASTOLIC BLOOD PRESSURE 80-89 MM HG: ICD-10-PCS | Mod: CPTII,S$GLB,, | Performed by: PODIATRIST

## 2022-03-08 PROCEDURE — 3079F DIAST BP 80-89 MM HG: CPT | Mod: CPTII,S$GLB,, | Performed by: PODIATRIST

## 2022-03-08 PROCEDURE — 99024 POSTOP FOLLOW-UP VISIT: CPT | Mod: S$GLB,,, | Performed by: PODIATRIST

## 2022-03-15 NOTE — PROGRESS NOTES
Subjective:       Heidy Polo presents postop hammertoe surgery L 2-5. DOS 1/7/22. The patient is not having any pain.but notes some swelling  Did jam her toes/foot last month before Kwires removed & still occasional sore. In surgical shoe still. Going to [place] in 2 wks.     Past Medical History:   Diagnosis Date    Cancer     breast in 2001 -right     Depression 7/16/2012    History of breast cancer     Hyperlipidemia     Hypertension     Hypothyroid      Patient Active Problem List   Diagnosis    Essential hypertension    Hypothyroid    Status following gastric banding surgery for weight loss    History of right breast cancer    Mixed hyperlipidemia    H/O right mastectomy    Prediabetes    Primary osteoarthritis of right knee    S/P total knee arthroplasty, right    Metatarsalgia, left foot    Dislocation of metatarsophalangeal joint of lesser toe of left foot    Hammer toe of second toe of left foot    Aftercare following surgery of the musculoskeletal system   PCP: Chas Angela MD     Objective:      BP (!) 140/83 (BP Location: Left arm, Patient Position: Sitting)   Pulse 75   Wt 87.5 kg (193 lb)   BMI 33.13 kg/m²     General:  alert, appears stated age, cooperative and no distress    All toes maintained in good alignment and reduction of deformity; minimal edema dorsal base of the toes & PIPJ.   Incision:   healed well by primary intention     Assessment:      Doing well postoperatively.    Problem List Items Addressed This Visit    None     Visit Diagnoses     Acquired hammertoe of left foot    -  Primary    Postoperative follow-up        Relevant Orders    CREST PADS FOR HOME USE        Plan:     Dispensed gel tubesleeves for toes as well as small crest pad. Advised on aggressive cross-fiber massage plantar proximal phalanges & PIPJs.    R/A 1 month for possible final postop, sooner prn.

## 2022-03-31 ENCOUNTER — PATIENT MESSAGE (OUTPATIENT)
Dept: PRIMARY CARE CLINIC | Facility: CLINIC | Age: 67
End: 2022-03-31
Payer: MEDICARE

## 2022-03-31 DIAGNOSIS — I10 ESSENTIAL HYPERTENSION: ICD-10-CM

## 2022-03-31 RX ORDER — TELMISARTAN 40 MG/1
40 TABLET ORAL DAILY
Qty: 90 TABLET | Refills: 1 | Status: SHIPPED | OUTPATIENT
Start: 2022-03-31 | End: 2022-08-13

## 2022-03-31 RX ORDER — TELMISARTAN 40 MG/1
40 TABLET ORAL DAILY
Qty: 7 TABLET | Refills: 0 | Status: SHIPPED | OUTPATIENT
Start: 2022-03-31 | End: 2022-04-07

## 2022-03-31 NOTE — TELEPHONE ENCOUNTER
No new care gaps identified.  Powered by [employer]. Reference number: [id number].   3/31/2022 12:45:19 PM CDT

## 2022-04-04 ENCOUNTER — OFFICE VISIT (OUTPATIENT)
Dept: PODIATRY | Facility: CLINIC | Age: 67
End: 2022-04-04
Payer: MEDICARE

## 2022-04-04 VITALS
DIASTOLIC BLOOD PRESSURE: 81 MMHG | HEART RATE: 77 BPM | HEIGHT: 64 IN | BODY MASS INDEX: 33.03 KG/M2 | SYSTOLIC BLOOD PRESSURE: 141 MMHG | WEIGHT: 193.5 LBS

## 2022-04-04 DIAGNOSIS — M20.42 ACQUIRED HAMMERTOE OF LEFT FOOT: Primary | ICD-10-CM

## 2022-04-04 DIAGNOSIS — Z09 POSTOP CHECK: ICD-10-CM

## 2022-04-04 PROCEDURE — 3008F PR BODY MASS INDEX (BMI) DOCUMENTED: ICD-10-PCS | Mod: CPTII,S$GLB,, | Performed by: PODIATRIST

## 2022-04-04 PROCEDURE — 4010F PR ACE/ARB THEARPY RXD/TAKEN: ICD-10-PCS | Mod: CPTII,S$GLB,, | Performed by: PODIATRIST

## 2022-04-04 PROCEDURE — 3077F PR MOST RECENT SYSTOLIC BLOOD PRESSURE >= 140 MM HG: ICD-10-PCS | Mod: CPTII,S$GLB,, | Performed by: PODIATRIST

## 2022-04-04 PROCEDURE — 1159F MED LIST DOCD IN RCRD: CPT | Mod: CPTII,S$GLB,, | Performed by: PODIATRIST

## 2022-04-04 PROCEDURE — 99024 POSTOP FOLLOW-UP VISIT: CPT | Mod: S$GLB,,, | Performed by: PODIATRIST

## 2022-04-04 PROCEDURE — 1126F PR PAIN SEVERITY QUANTIFIED, NO PAIN PRESENT: ICD-10-PCS | Mod: CPTII,S$GLB,, | Performed by: PODIATRIST

## 2022-04-04 PROCEDURE — 1101F PR PT FALLS ASSESS DOC 0-1 FALLS W/OUT INJ PAST YR: ICD-10-PCS | Mod: CPTII,S$GLB,, | Performed by: PODIATRIST

## 2022-04-04 PROCEDURE — 1159F PR MEDICATION LIST DOCUMENTED IN MEDICAL RECORD: ICD-10-PCS | Mod: CPTII,S$GLB,, | Performed by: PODIATRIST

## 2022-04-04 PROCEDURE — 3288F PR FALLS RISK ASSESSMENT DOCUMENTED: ICD-10-PCS | Mod: CPTII,S$GLB,, | Performed by: PODIATRIST

## 2022-04-04 PROCEDURE — 99024 PR POST-OP FOLLOW-UP VISIT: ICD-10-PCS | Mod: S$GLB,,, | Performed by: PODIATRIST

## 2022-04-04 PROCEDURE — 3079F DIAST BP 80-89 MM HG: CPT | Mod: CPTII,S$GLB,, | Performed by: PODIATRIST

## 2022-04-04 PROCEDURE — 1126F AMNT PAIN NOTED NONE PRSNT: CPT | Mod: CPTII,S$GLB,, | Performed by: PODIATRIST

## 2022-04-04 PROCEDURE — 3288F FALL RISK ASSESSMENT DOCD: CPT | Mod: CPTII,S$GLB,, | Performed by: PODIATRIST

## 2022-04-04 PROCEDURE — 99999 PR PBB SHADOW E&M-EST. PATIENT-LVL III: ICD-10-PCS | Mod: PBBFAC,,, | Performed by: PODIATRIST

## 2022-04-04 PROCEDURE — 1101F PT FALLS ASSESS-DOCD LE1/YR: CPT | Mod: CPTII,S$GLB,, | Performed by: PODIATRIST

## 2022-04-04 PROCEDURE — 3079F PR MOST RECENT DIASTOLIC BLOOD PRESSURE 80-89 MM HG: ICD-10-PCS | Mod: CPTII,S$GLB,, | Performed by: PODIATRIST

## 2022-04-04 PROCEDURE — 3008F BODY MASS INDEX DOCD: CPT | Mod: CPTII,S$GLB,, | Performed by: PODIATRIST

## 2022-04-04 PROCEDURE — 4010F ACE/ARB THERAPY RXD/TAKEN: CPT | Mod: CPTII,S$GLB,, | Performed by: PODIATRIST

## 2022-04-04 PROCEDURE — 3077F SYST BP >= 140 MM HG: CPT | Mod: CPTII,S$GLB,, | Performed by: PODIATRIST

## 2022-04-04 PROCEDURE — 99999 PR PBB SHADOW E&M-EST. PATIENT-LVL III: CPT | Mod: PBBFAC,,, | Performed by: PODIATRIST

## 2022-04-04 NOTE — PROGRESS NOTES
"Subjective:       Heidy Polo presents PO HT surgery L 2-5. DOS 1/7/22. The patient is not having any pain. Noticed swelling has decreased.   Past Medical History:   Diagnosis Date    Cancer     breast in 2001 -right     Depression 7/16/2012    History of breast cancer     Hyperlipidemia     Hypertension     Hypothyroid      Patient Active Problem List   Diagnosis    Essential hypertension    Hypothyroid    Status following gastric banding surgery for weight loss    History of right breast cancer    Mixed hyperlipidemia    H/O right mastectomy    Prediabetes    Primary osteoarthritis of right knee    S/P total knee arthroplasty, right    Metatarsalgia, left foot    Dislocation of metatarsophalangeal joint of lesser toe of left foot    Hammer toe of second toe of left foot    Aftercare following surgery of the musculoskeletal system   PCP: Chas Angela MD     Objective:      BP (!) 141/81 (BP Location: Left arm, Patient Position: Sitting, BP Method: X-Large (Automatic))   Pulse 77   Ht 5' 4" (1.626 m)   Wt 87.8 kg (193 lb 8 oz)   BMI 33.21 kg/m²     General:  alert, appears stated age, cooperative and no distress    All toes maintained in good alignment and reduction of deformity; minimal edema dorsal base of the toes & PIPJ.   Incision:   healed well by primary intention. Residual firmness to PIPJ consistent w/ PO adhesions 2 & 4 L     Assessment:      Doing well postoperatively.    Problem List Items Addressed This Visit    None     Visit Diagnoses     Acquired hammertoe of left foot    -  Primary    Postop check            Plan:     Dispensed gel tubesleeves for toes. Advised on continued aggressive cross-fiber massage PIPJs.    R/A 6 wks. prn.    "

## 2022-05-05 ENCOUNTER — PATIENT MESSAGE (OUTPATIENT)
Dept: PRIMARY CARE CLINIC | Facility: CLINIC | Age: 67
End: 2022-05-05
Payer: MEDICARE

## 2022-05-16 ENCOUNTER — OFFICE VISIT (OUTPATIENT)
Dept: PODIATRY | Facility: CLINIC | Age: 67
End: 2022-05-16
Payer: MEDICARE

## 2022-05-16 ENCOUNTER — PATIENT MESSAGE (OUTPATIENT)
Dept: PODIATRY | Facility: CLINIC | Age: 67
End: 2022-05-16

## 2022-05-16 VITALS
DIASTOLIC BLOOD PRESSURE: 78 MMHG | BODY MASS INDEX: 33.3 KG/M2 | HEART RATE: 71 BPM | SYSTOLIC BLOOD PRESSURE: 136 MMHG | WEIGHT: 194 LBS

## 2022-05-16 DIAGNOSIS — M20.42 HAMMERTOE OF LEFT FOOT: ICD-10-CM

## 2022-05-16 DIAGNOSIS — R73.03 PREDIABETES: Primary | ICD-10-CM

## 2022-05-16 DIAGNOSIS — Z98.890 S/P FOOT SURGERY, LEFT: ICD-10-CM

## 2022-05-16 PROCEDURE — 4010F ACE/ARB THERAPY RXD/TAKEN: CPT | Mod: CPTII,S$GLB,, | Performed by: PODIATRIST

## 2022-05-16 PROCEDURE — 3075F PR MOST RECENT SYSTOLIC BLOOD PRESS GE 130-139MM HG: ICD-10-PCS | Mod: CPTII,S$GLB,, | Performed by: PODIATRIST

## 2022-05-16 PROCEDURE — 99213 OFFICE O/P EST LOW 20 MIN: CPT | Mod: S$GLB,,, | Performed by: PODIATRIST

## 2022-05-16 PROCEDURE — 99999 PR PBB SHADOW E&M-EST. PATIENT-LVL II: CPT | Mod: PBBFAC,,, | Performed by: PODIATRIST

## 2022-05-16 PROCEDURE — 4010F PR ACE/ARB THEARPY RXD/TAKEN: ICD-10-PCS | Mod: CPTII,S$GLB,, | Performed by: PODIATRIST

## 2022-05-16 PROCEDURE — 99213 PR OFFICE/OUTPT VISIT, EST, LEVL III, 20-29 MIN: ICD-10-PCS | Mod: S$GLB,,, | Performed by: PODIATRIST

## 2022-05-16 PROCEDURE — 3008F BODY MASS INDEX DOCD: CPT | Mod: CPTII,S$GLB,, | Performed by: PODIATRIST

## 2022-05-16 PROCEDURE — 1159F MED LIST DOCD IN RCRD: CPT | Mod: CPTII,S$GLB,, | Performed by: PODIATRIST

## 2022-05-16 PROCEDURE — 3078F DIAST BP <80 MM HG: CPT | Mod: CPTII,S$GLB,, | Performed by: PODIATRIST

## 2022-05-16 PROCEDURE — 1159F PR MEDICATION LIST DOCUMENTED IN MEDICAL RECORD: ICD-10-PCS | Mod: CPTII,S$GLB,, | Performed by: PODIATRIST

## 2022-05-16 PROCEDURE — 3008F PR BODY MASS INDEX (BMI) DOCUMENTED: ICD-10-PCS | Mod: CPTII,S$GLB,, | Performed by: PODIATRIST

## 2022-05-16 PROCEDURE — 3075F SYST BP GE 130 - 139MM HG: CPT | Mod: CPTII,S$GLB,, | Performed by: PODIATRIST

## 2022-05-16 PROCEDURE — 1126F PR PAIN SEVERITY QUANTIFIED, NO PAIN PRESENT: ICD-10-PCS | Mod: CPTII,S$GLB,, | Performed by: PODIATRIST

## 2022-05-16 PROCEDURE — 3078F PR MOST RECENT DIASTOLIC BLOOD PRESSURE < 80 MM HG: ICD-10-PCS | Mod: CPTII,S$GLB,, | Performed by: PODIATRIST

## 2022-05-16 PROCEDURE — 1126F AMNT PAIN NOTED NONE PRSNT: CPT | Mod: CPTII,S$GLB,, | Performed by: PODIATRIST

## 2022-05-16 PROCEDURE — 99999 PR PBB SHADOW E&M-EST. PATIENT-LVL II: ICD-10-PCS | Mod: PBBFAC,,, | Performed by: PODIATRIST

## 2022-05-16 NOTE — PROGRESS NOTES
Subjective:       Heidy Polo presents for PO HT surgery L 2-5. DOS 1/7/22. The patient is not having any pain. Noticed swelling has decreased. Residual fullness to 3rd toe & medial 2nd toe, but less than last visit. Unable to tolerate tubesleeves.    Past Medical History:   Diagnosis Date    Cancer     breast in 2001 -right     Depression 7/16/2012    History of breast cancer     Hyperlipidemia     Hypertension     Hypothyroid      Patient Active Problem List   Diagnosis    Essential hypertension    Hypothyroid    Status following gastric banding surgery for weight loss    History of right breast cancer    Mixed hyperlipidemia    H/O right mastectomy    Prediabetes    Primary osteoarthritis of right knee    S/P total knee arthroplasty, right    Metatarsalgia, left foot    Dislocation of metatarsophalangeal joint of lesser toe of left foot    Hammer toe of second toe of left foot    Aftercare following surgery of the musculoskeletal system     PCP: Chas Angela MD  DOLV 5/5/22     Objective:      /78 (BP Location: Left arm, Patient Position: Sitting)   Pulse 71   Wt 88 kg (194 lb)   BMI 33.30 kg/m²     General:  alert, appears stated age, cooperative and no distress    All toes maintained w/ reduction of deformity; no edema dorsal base of the toes & PIPJ.   Incision:   healed well by primary intention, fine lined and non raised 2-5 L. Residual firmness consistent w/ PO adhesions 2nd medial PIPJ, w/ slight dorsal position due to valgus hallux & residual enlargement of PIPJ 3rd circumferentially. 4th toe back to normal as is 5th     Assessment:      Doing well postoperatively.    Problem List Items Addressed This Visit        Endocrine    Prediabetes - Primary      Other Visit Diagnoses     S/P foot surgery, left        Relevant Orders    CREST PADS FOR HOME USE    Hammertoe of left foot        Relevant Orders    CREST PADS FOR HOME USE        Problem List Items Addressed This Visit         Endocrine    Prediabetes - Primary      Other Visit Diagnoses     S/P foot surgery, left        Relevant Orders    CREST PADS FOR HOME USE    Hammertoe of left foot        Relevant Orders    CREST PADS FOR HOME USE        Plan:     Advised on continued aggressive cross-fiber massage PIPJs 2, 3 prn.    Crest pad dispensed - patient finds this comfortable    R/A prn.

## 2022-08-13 DIAGNOSIS — I10 ESSENTIAL HYPERTENSION: ICD-10-CM

## 2022-08-13 RX ORDER — TELMISARTAN 40 MG/1
TABLET ORAL
Qty: 90 TABLET | Refills: 1 | Status: SHIPPED | OUTPATIENT
Start: 2022-08-13 | End: 2022-11-04 | Stop reason: SDUPTHER

## 2022-08-13 NOTE — TELEPHONE ENCOUNTER
No new care gaps identified.  [facility] Embedded Care Gaps. Reference number: [id number]. 8/13/2022   6:06:15 AM CDT

## 2022-08-13 NOTE — TELEPHONE ENCOUNTER
Refill Decision Note   Heidy Polo  is requesting a refill authorization.  Brief Assessment and Rationale for Refill:  Approve     Medication Therapy Plan:       Medication Reconciliation Completed: No   Comments:     No Care Gaps recommended.     Note composed:8:46 AM 08/13/2022

## 2022-11-04 ENCOUNTER — OFFICE VISIT (OUTPATIENT)
Dept: PRIMARY CARE CLINIC | Facility: CLINIC | Age: 67
End: 2022-11-04
Payer: MEDICARE

## 2022-11-04 VITALS
SYSTOLIC BLOOD PRESSURE: 124 MMHG | HEART RATE: 78 BPM | OXYGEN SATURATION: 98 % | TEMPERATURE: 98 F | HEIGHT: 64 IN | DIASTOLIC BLOOD PRESSURE: 76 MMHG | WEIGHT: 174.69 LBS | BODY MASS INDEX: 29.82 KG/M2 | RESPIRATION RATE: 18 BRPM

## 2022-11-04 DIAGNOSIS — Z86.39 HISTORY OF HYPOTHYROIDISM: ICD-10-CM

## 2022-11-04 DIAGNOSIS — Z23 NEED FOR VACCINATION: ICD-10-CM

## 2022-11-04 DIAGNOSIS — R73.03 PREDIABETES: ICD-10-CM

## 2022-11-04 DIAGNOSIS — I10 ESSENTIAL HYPERTENSION: Primary | ICD-10-CM

## 2022-11-04 DIAGNOSIS — Z12.31 BREAST CANCER SCREENING BY MAMMOGRAM: ICD-10-CM

## 2022-11-04 DIAGNOSIS — E78.2 MIXED HYPERLIPIDEMIA: ICD-10-CM

## 2022-11-04 PROBLEM — S93.125A DISLOCATION OF METATARSOPHALANGEAL JOINT OF LESSER TOE OF LEFT FOOT: Status: RESOLVED | Noted: 2021-05-12 | Resolved: 2022-11-04

## 2022-11-04 PROCEDURE — 3078F DIAST BP <80 MM HG: CPT | Mod: CPTII,S$GLB,, | Performed by: FAMILY MEDICINE

## 2022-11-04 PROCEDURE — 1160F PR REVIEW ALL MEDS BY PRESCRIBER/CLIN PHARMACIST DOCUMENTED: ICD-10-PCS | Mod: CPTII,S$GLB,, | Performed by: FAMILY MEDICINE

## 2022-11-04 PROCEDURE — 1160F RVW MEDS BY RX/DR IN RCRD: CPT | Mod: CPTII,S$GLB,, | Performed by: FAMILY MEDICINE

## 2022-11-04 PROCEDURE — 90694 FLU VACCINE - QUADRIVALENT - ADJUVANTED: ICD-10-PCS | Mod: S$GLB,,, | Performed by: FAMILY MEDICINE

## 2022-11-04 PROCEDURE — 1126F PR PAIN SEVERITY QUANTIFIED, NO PAIN PRESENT: ICD-10-PCS | Mod: CPTII,S$GLB,, | Performed by: FAMILY MEDICINE

## 2022-11-04 PROCEDURE — 3288F FALL RISK ASSESSMENT DOCD: CPT | Mod: CPTII,S$GLB,, | Performed by: FAMILY MEDICINE

## 2022-11-04 PROCEDURE — 1126F AMNT PAIN NOTED NONE PRSNT: CPT | Mod: CPTII,S$GLB,, | Performed by: FAMILY MEDICINE

## 2022-11-04 PROCEDURE — G0009 ADMIN PNEUMOCOCCAL VACCINE: HCPCS | Mod: S$GLB,,, | Performed by: FAMILY MEDICINE

## 2022-11-04 PROCEDURE — 99214 OFFICE O/P EST MOD 30 MIN: CPT | Mod: 25,S$GLB,, | Performed by: FAMILY MEDICINE

## 2022-11-04 PROCEDURE — G0008 ADMIN INFLUENZA VIRUS VAC: HCPCS | Mod: S$GLB,,, | Performed by: FAMILY MEDICINE

## 2022-11-04 PROCEDURE — 90694 VACC AIIV4 NO PRSRV 0.5ML IM: CPT | Mod: S$GLB,,, | Performed by: FAMILY MEDICINE

## 2022-11-04 PROCEDURE — 1159F MED LIST DOCD IN RCRD: CPT | Mod: CPTII,S$GLB,, | Performed by: FAMILY MEDICINE

## 2022-11-04 PROCEDURE — 1101F PT FALLS ASSESS-DOCD LE1/YR: CPT | Mod: CPTII,S$GLB,, | Performed by: FAMILY MEDICINE

## 2022-11-04 PROCEDURE — 4010F PR ACE/ARB THEARPY RXD/TAKEN: ICD-10-PCS | Mod: CPTII,S$GLB,, | Performed by: FAMILY MEDICINE

## 2022-11-04 PROCEDURE — 4010F ACE/ARB THERAPY RXD/TAKEN: CPT | Mod: CPTII,S$GLB,, | Performed by: FAMILY MEDICINE

## 2022-11-04 PROCEDURE — 3008F PR BODY MASS INDEX (BMI) DOCUMENTED: ICD-10-PCS | Mod: CPTII,S$GLB,, | Performed by: FAMILY MEDICINE

## 2022-11-04 PROCEDURE — 3288F PR FALLS RISK ASSESSMENT DOCUMENTED: ICD-10-PCS | Mod: CPTII,S$GLB,, | Performed by: FAMILY MEDICINE

## 2022-11-04 PROCEDURE — 99999 PR PBB SHADOW E&M-EST. PATIENT-LVL IV: CPT | Mod: PBBFAC,,, | Performed by: FAMILY MEDICINE

## 2022-11-04 PROCEDURE — 1101F PR PT FALLS ASSESS DOC 0-1 FALLS W/OUT INJ PAST YR: ICD-10-PCS | Mod: CPTII,S$GLB,, | Performed by: FAMILY MEDICINE

## 2022-11-04 PROCEDURE — 3008F BODY MASS INDEX DOCD: CPT | Mod: CPTII,S$GLB,, | Performed by: FAMILY MEDICINE

## 2022-11-04 PROCEDURE — 99214 PR OFFICE/OUTPT VISIT, EST, LEVL IV, 30-39 MIN: ICD-10-PCS | Mod: 25,S$GLB,, | Performed by: FAMILY MEDICINE

## 2022-11-04 PROCEDURE — 1159F PR MEDICATION LIST DOCUMENTED IN MEDICAL RECORD: ICD-10-PCS | Mod: CPTII,S$GLB,, | Performed by: FAMILY MEDICINE

## 2022-11-04 PROCEDURE — 99999 PR PBB SHADOW E&M-EST. PATIENT-LVL IV: ICD-10-PCS | Mod: PBBFAC,,, | Performed by: FAMILY MEDICINE

## 2022-11-04 PROCEDURE — 3078F PR MOST RECENT DIASTOLIC BLOOD PRESSURE < 80 MM HG: ICD-10-PCS | Mod: CPTII,S$GLB,, | Performed by: FAMILY MEDICINE

## 2022-11-04 PROCEDURE — 90670 PCV13 VACCINE IM: CPT | Mod: S$GLB,,, | Performed by: FAMILY MEDICINE

## 2022-11-04 PROCEDURE — 3074F PR MOST RECENT SYSTOLIC BLOOD PRESSURE < 130 MM HG: ICD-10-PCS | Mod: CPTII,S$GLB,, | Performed by: FAMILY MEDICINE

## 2022-11-04 PROCEDURE — G0008 FLU VACCINE - QUADRIVALENT - ADJUVANTED: ICD-10-PCS | Mod: S$GLB,,, | Performed by: FAMILY MEDICINE

## 2022-11-04 PROCEDURE — G0009 PNEUMOCOCCAL CONJUGATE VACCINE 13-VALENT LESS THAN 5YO & GREATER THAN: ICD-10-PCS | Mod: S$GLB,,, | Performed by: FAMILY MEDICINE

## 2022-11-04 PROCEDURE — 3074F SYST BP LT 130 MM HG: CPT | Mod: CPTII,S$GLB,, | Performed by: FAMILY MEDICINE

## 2022-11-04 PROCEDURE — 90670 PNEUMOCOCCAL CONJUGATE VACCINE 13-VALENT LESS THAN 5YO & GREATER THAN: ICD-10-PCS | Mod: S$GLB,,, | Performed by: FAMILY MEDICINE

## 2022-11-04 RX ORDER — METOPROLOL SUCCINATE 25 MG/1
25 TABLET, EXTENDED RELEASE ORAL DAILY
Qty: 90 TABLET | Refills: 4 | Status: SHIPPED | OUTPATIENT
Start: 2022-11-04 | End: 2023-11-22 | Stop reason: SDUPTHER

## 2022-11-04 RX ORDER — ZOSTER VACCINE RECOMBINANT, ADJUVANTED 50 MCG/0.5
0.5 KIT INTRAMUSCULAR ONCE
Qty: 1 EACH | Refills: 1 | Status: SHIPPED | OUTPATIENT
Start: 2022-11-04 | End: 2022-11-04

## 2022-11-04 RX ORDER — CHLORTHALIDONE 25 MG/1
25 TABLET ORAL DAILY
Qty: 90 TABLET | Refills: 4 | Status: SHIPPED | OUTPATIENT
Start: 2022-11-04 | End: 2023-11-22 | Stop reason: SDUPTHER

## 2022-11-04 RX ORDER — TELMISARTAN 40 MG/1
40 TABLET ORAL DAILY
Qty: 90 TABLET | Refills: 4 | Status: SHIPPED | OUTPATIENT
Start: 2022-11-04 | End: 2023-11-22 | Stop reason: SDUPTHER

## 2022-11-04 NOTE — PROGRESS NOTES
"Subjective:       Patient ID: Heidy Polo is a 67 y.o. female.    Chief Complaint: Annual Exam    Here for regular checkup.  No recent illness or injury.  No specific complaints or concerns.  Compliant with all medications.    Review of Systems   Constitutional:  Negative for activity change and unexpected weight change.   HENT:  Negative for hearing loss, rhinorrhea and trouble swallowing.    Eyes:  Negative for discharge and visual disturbance.   Respiratory:  Negative for chest tightness and wheezing.    Cardiovascular:  Negative for chest pain and palpitations.   Gastrointestinal:  Negative for blood in stool, constipation, diarrhea and vomiting.   Endocrine: Negative for polydipsia and polyuria.   Genitourinary:  Negative for difficulty urinating, dysuria, hematuria and menstrual problem.   Musculoskeletal:  Negative for arthralgias, joint swelling and neck pain.   Skin:  Negative for rash.   Allergic/Immunologic: Negative for immunocompromised state.   Neurological:  Negative for weakness and headaches.   Hematological:  Does not bruise/bleed easily.   Psychiatric/Behavioral:  Negative for confusion and dysphoric mood.      Objective:      Vitals:    11/04/22 0945   BP: 124/76   BP Location: Right arm   Patient Position: Sitting   BP Method: Medium (Manual)   Pulse: 78   Resp: 18   Temp: 97.7 °F (36.5 °C)   TempSrc: Temporal   SpO2: 98%   Weight: 79.2 kg (174 lb 11.4 oz)   Height: 5' 4" (1.626 m)     Physical Exam  Vitals and nursing note reviewed.   Constitutional:       General: She is not in acute distress.     Appearance: Normal appearance. She is well-developed.   HENT:      Head: Normocephalic and atraumatic.   Neck:      Vascular: No carotid bruit.   Cardiovascular:      Rate and Rhythm: Normal rate and regular rhythm.      Heart sounds: Normal heart sounds.   Pulmonary:      Effort: Pulmonary effort is normal.      Breath sounds: Normal breath sounds.   Abdominal:      General: There is no " distension.      Tenderness: There is no abdominal tenderness.   Musculoskeletal:      Right lower leg: No edema.      Left lower leg: No edema.   Skin:     General: Skin is warm and dry.   Neurological:      Mental Status: She is alert and oriented to person, place, and time.   Psychiatric:         Mood and Affect: Mood normal.         Behavior: Behavior normal.       Lab Results   Component Value Date    WBC 5.28 11/22/2021    HGB 11.9 (L) 11/22/2021    HCT 37.2 11/22/2021     11/22/2021    CHOL 180 11/22/2021    TRIG 90 11/22/2021    HDL 61 11/22/2021    ALT 14 11/22/2021    AST 13 11/22/2021     11/22/2021    K 4.6 11/22/2021     11/22/2021    CREATININE 0.9 11/22/2021    BUN 23 11/22/2021    CO2 27 11/22/2021    TSH 2.841 11/22/2021    HGBA1C 5.7 (H) 11/22/2021      Assessment:       1. Essential hypertension    2. Breast cancer screening by mammogram    3. Need for vaccination    4. Prediabetes    5. History of hypothyroidism    6. Mixed hyperlipidemia          Plan:       Essential hypertension  -     telmisartan (MICARDIS) 40 MG Tab; Take 1 tablet (40 mg total) by mouth once daily.  Dispense: 90 tablet; Refill: 4  -     metoprolol succinate (TOPROL-XL) 25 MG 24 hr tablet; Take 1 tablet (25 mg total) by mouth once daily.  Dispense: 90 tablet; Refill: 4  -     chlorthalidone (HYGROTEN) 25 MG Tab; Take 1 tablet (25 mg total) by mouth once daily.  Dispense: 90 tablet; Refill: 4  -     CBC Auto Differential; Future; Expected date: 11/04/2022  Well controlled  Breast cancer screening by mammogram  -     Cancel: Mammo Digital Screening Bilat w/ Mohinder; Future; Expected date: 11/04/2022  -     Mammo Digital Screening Left with Mohinder; Future; Expected date: 11/04/2022  Status post right mastectomy for breast cancer  Need for vaccination  -     Pneumococcal Conjugate Vaccine (13 Valent) (IM)  -     varicella-zoster gE-AS01B, PF, (SHINGRIX, PF,) 50 mcg/0.5 mL injection; Inject 0.5 mLs into the muscle  once. for 1 dose  Dispense: 1 each; Refill: 1    Prediabetes  -     Hemoglobin A1C; Future; Expected date: 11/04/2022  Low carb diet, exercise  History of hypothyroidism  -     TSH; Future; Expected date: 11/04/2022    Mixed hyperlipidemia  -     Comprehensive Metabolic Panel; Future; Expected date: 11/04/2022  -     Lipid Panel; Future; Expected date: 11/04/2022    Other orders  -     Influenza (FLUAD) - Quadrivalent (Adjuvanted) *Preferred* (65+) (PF)      Medication List with Changes/Refills   New Medications    VARICELLA-ZOSTER GE-AS01B, PF, (SHINGRIX, PF,) 50 MCG/0.5 ML INJECTION    Inject 0.5 mLs into the muscle once. for 1 dose   Current Medications    ASPIRIN (ECOTRIN) 81 MG EC TABLET    Take 1 tablet (81 mg total) by mouth 2 (two) times daily.    BLOOD PRESSURE MONITOR ([employer] EASE) LG ARM SIZE (OP)    by Other route as needed for High Blood Pressure.   Changed and/or Refilled Medications    Modified Medication Previous Medication    CHLORTHALIDONE (HYGROTEN) 25 MG TAB chlorthalidone (HYGROTEN) 25 MG Tab       Take 1 tablet (25 mg total) by mouth once daily.    TAKE 1 TABLET BY MOUTH ONCE DAILY    METOPROLOL SUCCINATE (TOPROL-XL) 25 MG 24 HR TABLET metoprolol succinate (TOPROL-XL) 25 MG 24 hr tablet       Take 1 tablet (25 mg total) by mouth once daily.    TAKE 1 TABLET BY MOUTH ONCE DAILY    TELMISARTAN (MICARDIS) 40 MG TAB telmisartan (MICARDIS) 40 MG Tab       Take 1 tablet (40 mg total) by mouth once daily.    TAKE 1 TABLET BY MOUTH ONCE DAILY FOR BLOOD PRESSURE   Discontinued Medications    HYDROCODONE-ACETAMINOPHEN (NORCO) 5-325 MG PER TABLET    Take 1 tablet by mouth every 6 (six) hours as needed for Pain.

## 2022-11-04 NOTE — PROGRESS NOTES
Verified pt by name and . NKDA. Per physician orders pt was administered Prevanr 13 IM and Influenza Vaccine Adjuvanted IM to left deltoid using aseptic technique. Pt tolerated well. No adverse effects or pain reported. MD notified.

## 2022-12-07 ENCOUNTER — HOSPITAL ENCOUNTER (OUTPATIENT)
Dept: RADIOLOGY | Facility: OTHER | Age: 67
Discharge: HOME | End: 2022-12-07
Attending: FAMILY MEDICINE
Payer: MEDICARE

## 2022-12-07 DIAGNOSIS — Z12.31 BREAST CANCER SCREENING BY MAMMOGRAM: ICD-10-CM

## 2022-12-07 PROCEDURE — 77063 BREAST TOMOSYNTHESIS BI: CPT | Mod: 26,,, | Performed by: RADIOLOGY

## 2022-12-07 PROCEDURE — 77067 SCR MAMMO BI INCL CAD: CPT | Mod: 26,,, | Performed by: RADIOLOGY

## 2022-12-07 PROCEDURE — 77063 MAMMO DIGITAL SCREENING LEFT WITH TOMO: ICD-10-PCS | Mod: 26,,, | Performed by: RADIOLOGY

## 2022-12-07 PROCEDURE — 77067 MAMMO DIGITAL SCREENING LEFT WITH TOMO: ICD-10-PCS | Mod: 26,,, | Performed by: RADIOLOGY

## 2022-12-07 PROCEDURE — 77063 BREAST TOMOSYNTHESIS BI: CPT | Mod: TC

## 2022-12-27 ENCOUNTER — TELEPHONE (OUTPATIENT)
Dept: PRIMARY CARE CLINIC | Facility: CLINIC | Age: 67
End: 2022-12-27
Payer: MEDICARE

## 2022-12-27 NOTE — TELEPHONE ENCOUNTER
----- Message from Josey Gu sent at 12/27/2022  8:45 AM CST -----  Contact: [phone]  Patient is returning a phone call.  Who left a message for the patient: Bhargavi  Does patient know what this is regarding:  yes  Would you like a call back, or a response through your [facility] portal?:   call back   Comments:

## 2023-04-10 ENCOUNTER — PES CALL (OUTPATIENT)
Dept: ADMINISTRATIVE | Facility: CLINIC | Age: 68
End: 2023-04-10
Payer: MEDICARE

## 2023-06-27 ENCOUNTER — PES CALL (OUTPATIENT)
Dept: ADMINISTRATIVE | Facility: CLINIC | Age: 68
End: 2023-06-27
Payer: MEDICARE

## 2023-11-22 ENCOUNTER — OFFICE VISIT (OUTPATIENT)
Dept: PRIMARY CARE CLINIC | Facility: CLINIC | Age: 68
End: 2023-11-22
Payer: MEDICARE

## 2023-11-22 VITALS
TEMPERATURE: 97 F | RESPIRATION RATE: 18 BRPM | HEART RATE: 76 BPM | DIASTOLIC BLOOD PRESSURE: 76 MMHG | BODY MASS INDEX: 28.04 KG/M2 | HEIGHT: 64 IN | SYSTOLIC BLOOD PRESSURE: 126 MMHG | OXYGEN SATURATION: 96 % | WEIGHT: 164.25 LBS

## 2023-11-22 DIAGNOSIS — Z12.31 ENCOUNTER FOR SCREENING MAMMOGRAM FOR BREAST CANCER: ICD-10-CM

## 2023-11-22 DIAGNOSIS — Z23 NEED FOR VACCINATION: ICD-10-CM

## 2023-11-22 DIAGNOSIS — R73.03 PREDIABETES: ICD-10-CM

## 2023-11-22 DIAGNOSIS — E78.2 MIXED HYPERLIPIDEMIA: ICD-10-CM

## 2023-11-22 DIAGNOSIS — Z86.39 HISTORY OF HYPOTHYROIDISM: ICD-10-CM

## 2023-11-22 DIAGNOSIS — I10 ESSENTIAL HYPERTENSION: Primary | ICD-10-CM

## 2023-11-22 PROCEDURE — 1101F PT FALLS ASSESS-DOCD LE1/YR: CPT | Mod: CPTII,S$GLB,, | Performed by: FAMILY MEDICINE

## 2023-11-22 PROCEDURE — 1126F PR PAIN SEVERITY QUANTIFIED, NO PAIN PRESENT: ICD-10-PCS | Mod: CPTII,S$GLB,, | Performed by: FAMILY MEDICINE

## 2023-11-22 PROCEDURE — 90694 VACC AIIV4 NO PRSRV 0.5ML IM: CPT | Mod: S$GLB,,, | Performed by: FAMILY MEDICINE

## 2023-11-22 PROCEDURE — 3008F BODY MASS INDEX DOCD: CPT | Mod: CPTII,S$GLB,, | Performed by: FAMILY MEDICINE

## 2023-11-22 PROCEDURE — 1160F PR REVIEW ALL MEDS BY PRESCRIBER/CLIN PHARMACIST DOCUMENTED: ICD-10-PCS | Mod: CPTII,S$GLB,, | Performed by: FAMILY MEDICINE

## 2023-11-22 PROCEDURE — 99999 PR PBB SHADOW E&M-EST. PATIENT-LVL III: CPT | Mod: PBBFAC,,, | Performed by: FAMILY MEDICINE

## 2023-11-22 PROCEDURE — 4010F PR ACE/ARB THEARPY RXD/TAKEN: ICD-10-PCS | Mod: CPTII,S$GLB,, | Performed by: FAMILY MEDICINE

## 2023-11-22 PROCEDURE — 4010F ACE/ARB THERAPY RXD/TAKEN: CPT | Mod: CPTII,S$GLB,, | Performed by: FAMILY MEDICINE

## 2023-11-22 PROCEDURE — 90694 FLU VACCINE - QUADRIVALENT - ADJUVANTED: ICD-10-PCS | Mod: S$GLB,,, | Performed by: FAMILY MEDICINE

## 2023-11-22 PROCEDURE — 3078F PR MOST RECENT DIASTOLIC BLOOD PRESSURE < 80 MM HG: ICD-10-PCS | Mod: CPTII,S$GLB,, | Performed by: FAMILY MEDICINE

## 2023-11-22 PROCEDURE — 3288F FALL RISK ASSESSMENT DOCD: CPT | Mod: CPTII,S$GLB,, | Performed by: FAMILY MEDICINE

## 2023-11-22 PROCEDURE — 3008F PR BODY MASS INDEX (BMI) DOCUMENTED: ICD-10-PCS | Mod: CPTII,S$GLB,, | Performed by: FAMILY MEDICINE

## 2023-11-22 PROCEDURE — 1101F PR PT FALLS ASSESS DOC 0-1 FALLS W/OUT INJ PAST YR: ICD-10-PCS | Mod: CPTII,S$GLB,, | Performed by: FAMILY MEDICINE

## 2023-11-22 PROCEDURE — 3078F DIAST BP <80 MM HG: CPT | Mod: CPTII,S$GLB,, | Performed by: FAMILY MEDICINE

## 2023-11-22 PROCEDURE — 3074F PR MOST RECENT SYSTOLIC BLOOD PRESSURE < 130 MM HG: ICD-10-PCS | Mod: CPTII,S$GLB,, | Performed by: FAMILY MEDICINE

## 2023-11-22 PROCEDURE — G0008 ADMIN INFLUENZA VIRUS VAC: HCPCS | Mod: S$GLB,,, | Performed by: FAMILY MEDICINE

## 2023-11-22 PROCEDURE — 3074F SYST BP LT 130 MM HG: CPT | Mod: CPTII,S$GLB,, | Performed by: FAMILY MEDICINE

## 2023-11-22 PROCEDURE — 99214 OFFICE O/P EST MOD 30 MIN: CPT | Mod: S$GLB,,, | Performed by: FAMILY MEDICINE

## 2023-11-22 PROCEDURE — 1159F MED LIST DOCD IN RCRD: CPT | Mod: CPTII,S$GLB,, | Performed by: FAMILY MEDICINE

## 2023-11-22 PROCEDURE — G0008 FLU VACCINE - QUADRIVALENT - ADJUVANTED: ICD-10-PCS | Mod: S$GLB,,, | Performed by: FAMILY MEDICINE

## 2023-11-22 PROCEDURE — 1126F AMNT PAIN NOTED NONE PRSNT: CPT | Mod: CPTII,S$GLB,, | Performed by: FAMILY MEDICINE

## 2023-11-22 PROCEDURE — 99214 PR OFFICE/OUTPT VISIT, EST, LEVL IV, 30-39 MIN: ICD-10-PCS | Mod: S$GLB,,, | Performed by: FAMILY MEDICINE

## 2023-11-22 PROCEDURE — 99999 PR PBB SHADOW E&M-EST. PATIENT-LVL III: ICD-10-PCS | Mod: PBBFAC,,, | Performed by: FAMILY MEDICINE

## 2023-11-22 PROCEDURE — 1160F RVW MEDS BY RX/DR IN RCRD: CPT | Mod: CPTII,S$GLB,, | Performed by: FAMILY MEDICINE

## 2023-11-22 PROCEDURE — 1159F PR MEDICATION LIST DOCUMENTED IN MEDICAL RECORD: ICD-10-PCS | Mod: CPTII,S$GLB,, | Performed by: FAMILY MEDICINE

## 2023-11-22 PROCEDURE — 3288F PR FALLS RISK ASSESSMENT DOCUMENTED: ICD-10-PCS | Mod: CPTII,S$GLB,, | Performed by: FAMILY MEDICINE

## 2023-11-22 RX ORDER — METOPROLOL SUCCINATE 25 MG/1
25 TABLET, EXTENDED RELEASE ORAL DAILY
Qty: 90 TABLET | Refills: 4 | Status: SHIPPED | OUTPATIENT
Start: 2023-11-22 | End: 2023-11-22 | Stop reason: SDUPTHER

## 2023-11-22 RX ORDER — CHLORTHALIDONE 25 MG/1
25 TABLET ORAL DAILY
Qty: 90 TABLET | Refills: 4 | Status: SHIPPED | OUTPATIENT
Start: 2023-11-22 | End: 2023-11-22 | Stop reason: SDUPTHER

## 2023-11-22 RX ORDER — CHLORTHALIDONE 25 MG/1
25 TABLET ORAL DAILY
Qty: 90 TABLET | Refills: 4 | Status: SHIPPED | OUTPATIENT
Start: 2023-11-22

## 2023-11-22 RX ORDER — ZOSTER VACCINE RECOMBINANT, ADJUVANTED 50 MCG/0.5
0.5 KIT INTRAMUSCULAR ONCE
Qty: 1 EACH | Refills: 1 | Status: SHIPPED | OUTPATIENT
Start: 2023-11-22 | End: 2023-11-22

## 2023-11-22 RX ORDER — METOPROLOL SUCCINATE 25 MG/1
25 TABLET, EXTENDED RELEASE ORAL DAILY
Qty: 90 TABLET | Refills: 4 | Status: SHIPPED | OUTPATIENT
Start: 2023-11-22

## 2023-11-22 RX ORDER — TELMISARTAN 40 MG/1
40 TABLET ORAL DAILY
Qty: 90 TABLET | Refills: 4 | Status: SHIPPED | OUTPATIENT
Start: 2023-11-22 | End: 2023-11-22 | Stop reason: SDUPTHER

## 2023-11-22 RX ORDER — TELMISARTAN 40 MG/1
40 TABLET ORAL DAILY
Qty: 90 TABLET | Refills: 4 | Status: SHIPPED | OUTPATIENT
Start: 2023-11-22

## 2023-11-22 NOTE — PROGRESS NOTES
"Subjective:       Patient ID: Heidy Polo is a 68 y.o. female.    Chief Complaint: Annual Exam    Heidy Polo is a 68 y.o. female seen today for a routine checkup. The patient has no specific complaints or concerns at this time.  No recent illness or injury.  Compliant with all prescribed medications without adverse side effects.       Review of Systems   Constitutional:  Negative for chills, fatigue and fever.   HENT:  Negative for congestion.    Eyes:  Negative for visual disturbance.   Respiratory:  Negative for cough and shortness of breath.    Cardiovascular:  Negative for chest pain.   Gastrointestinal:  Negative for abdominal pain, nausea and vomiting.   Genitourinary:  Negative for difficulty urinating.   Musculoskeletal:  Negative for arthralgias.   Skin:  Negative for rash.   Allergic/Immunologic: Negative for immunocompromised state.   Neurological:  Negative for dizziness.   Hematological:  Does not bruise/bleed easily.   Psychiatric/Behavioral:  Negative for sleep disturbance.        Objective:      Vitals:    11/22/23 1102   BP: 126/76   BP Location: Left arm   Patient Position: Sitting   BP Method: Medium (Manual)   Pulse: 76   Resp: 18   Temp: 97 °F (36.1 °C)   TempSrc: Temporal   SpO2: 96%   Weight: 74.5 kg (164 lb 3.9 oz)   Height: 5' 4" (1.626 m)     BP Readings from Last 5 Encounters:   11/22/23 126/76   11/04/22 124/76   05/16/22 136/78   04/04/22 (!) 141/81   03/08/22 (!) 140/83     Wt Readings from Last 5 Encounters:   11/22/23 74.5 kg (164 lb 3.9 oz)   11/04/22 79.2 kg (174 lb 11.4 oz)   05/16/22 88 kg (194 lb)   04/04/22 87.8 kg (193 lb 8 oz)   03/08/22 87.5 kg (193 lb)     Physical Exam  Vitals and nursing note reviewed.   Constitutional:       General: She is not in acute distress.     Appearance: Normal appearance. She is well-developed.   HENT:      Head: Normocephalic and atraumatic.   Cardiovascular:      Rate and Rhythm: Normal rate and regular rhythm.      Heart sounds: " Normal heart sounds.   Pulmonary:      Effort: Pulmonary effort is normal.      Breath sounds: Normal breath sounds.   Musculoskeletal:      Right lower leg: No edema.      Left lower leg: No edema.   Skin:     General: Skin is warm and dry.   Neurological:      Mental Status: She is alert and oriented to person, place, and time.   Psychiatric:         Mood and Affect: Mood normal.         Behavior: Behavior normal.         Lab Results   Component Value Date    WBC 5.33 11/04/2022    HGB 11.6 (L) 11/04/2022    HCT 34.7 (L) 11/04/2022     11/04/2022    CHOL 179 11/04/2022    TRIG 112 11/04/2022    HDL 49 11/04/2022    ALT 9 (L) 11/04/2022    AST 12 11/04/2022     11/04/2022    K 4.3 11/04/2022     11/04/2022    CREATININE 0.9 11/04/2022    BUN 29 (H) 11/04/2022    CO2 27 11/04/2022    TSH 2.492 11/04/2022    HGBA1C 5.2 11/04/2022      Assessment:       1. Essential hypertension    2. Need for vaccination    3. Encounter for screening mammogram for breast cancer    4. Mixed hyperlipidemia    5. Prediabetes    6. History of hypothyroidism        Plan:       Essential hypertension  -     telmisartan (MICARDIS) 40 MG Tab; Take 1 tablet (40 mg total) by mouth once daily.  Dispense: 90 tablet; Refill: 4  -     metoprolol succinate (TOPROL-XL) 25 MG 24 hr tablet; Take 1 tablet (25 mg total) by mouth once daily.  Dispense: 90 tablet; Refill: 4  -     chlorthalidone (HYGROTEN) 25 MG Tab; Take 1 tablet (25 mg total) by mouth once daily.  Dispense: 90 tablet; Refill: 4  Well controlled  Need for vaccination  -     Influenza (FLUAD) - Quadrivalent (Adjuvanted) *Preferred* (65+) (PF)  -     varicella-zoster gE-AS01B, PF, (SHINGRIX, PF,) 50 mcg/0.5 mL injection; Inject 0.5 mLs into the muscle once. for 1 dose  Dispense: 1 each; Refill: 1    Encounter for screening mammogram for breast cancer  -     Mammo Digital Screening Left with Mohinder; Future; Expected date: 12/07/2023    Mixed hyperlipidemia  Labs today,  adjust regimen if needed  Prediabetes  -     TSH; Future; Expected date: 11/22/2023    History of hypothyroidism  -     Hemoglobin A1C; Future; Expected date: 11/22/2023      Medication List with Changes/Refills   New Medications    VARICELLA-ZOSTER GE-AS01B, PF, (SHINGRIX, PF,) 50 MCG/0.5 ML INJECTION    Inject 0.5 mLs into the muscle once. for 1 dose   Current Medications    ASPIRIN (ECOTRIN) 81 MG EC TABLET    Take 1 tablet (81 mg total) by mouth 2 (two) times daily.    BLOOD PRESSURE MONITOR ([employer] EASE) LG ARM SIZE (OP)    by Other route as needed for High Blood Pressure.   Changed and/or Refilled Medications    Modified Medication Previous Medication    CHLORTHALIDONE (HYGROTEN) 25 MG TAB chlorthalidone (HYGROTEN) 25 MG Tab       Take 1 tablet (25 mg total) by mouth once daily.    Take 1 tablet (25 mg total) by mouth once daily.    METOPROLOL SUCCINATE (TOPROL-XL) 25 MG 24 HR TABLET metoprolol succinate (TOPROL-XL) 25 MG 24 hr tablet       Take 1 tablet (25 mg total) by mouth once daily.    Take 1 tablet (25 mg total) by mouth once daily.    TELMISARTAN (MICARDIS) 40 MG TAB telmisartan (MICARDIS) 40 MG Tab       Take 1 tablet (40 mg total) by mouth once daily.    Take 1 tablet (40 mg total) by mouth once daily.

## 2023-12-08 ENCOUNTER — HOSPITAL ENCOUNTER (OUTPATIENT)
Dept: RADIOLOGY | Facility: HOSPITAL | Age: 68
Discharge: HOME | End: 2023-12-08
Attending: FAMILY MEDICINE
Payer: MEDICARE

## 2023-12-08 DIAGNOSIS — Z12.31 ENCOUNTER FOR SCREENING MAMMOGRAM FOR BREAST CANCER: ICD-10-CM

## 2023-12-08 PROCEDURE — 77067 SCR MAMMO BI INCL CAD: CPT | Mod: TC,52

## 2023-12-08 PROCEDURE — 77067 MAMMO DIGITAL SCREENING LEFT WITH TOMO: ICD-10-PCS | Mod: 26,52,, | Performed by: RADIOLOGY

## 2023-12-08 PROCEDURE — 77067 SCR MAMMO BI INCL CAD: CPT | Mod: 26,52,, | Performed by: RADIOLOGY

## 2023-12-08 PROCEDURE — 77063 BREAST TOMOSYNTHESIS BI: CPT | Mod: 26,52,, | Performed by: RADIOLOGY

## 2023-12-08 PROCEDURE — 77063 MAMMO DIGITAL SCREENING LEFT WITH TOMO: ICD-10-PCS | Mod: 26,52,, | Performed by: RADIOLOGY

## 2024-04-25 ENCOUNTER — ANESTHESIA EVENT (OUTPATIENT)
Dept: SURGERY | Facility: HOSPITAL | Age: 69
DRG: 220 | End: 2024-04-25
Payer: MEDICARE

## 2024-04-25 ENCOUNTER — HOSPITAL ENCOUNTER (INPATIENT)
Facility: HOSPITAL | Age: 69
LOS: 6 days | Discharge: HOME | DRG: 220 | End: 2024-05-01
Attending: THORACIC SURGERY (CARDIOTHORACIC VASCULAR SURGERY) | Admitting: THORACIC SURGERY (CARDIOTHORACIC VASCULAR SURGERY)
Payer: MEDICARE

## 2024-04-25 DIAGNOSIS — I71.00 AORTIC DISSECTION: ICD-10-CM

## 2024-04-25 DIAGNOSIS — I71.00 DISSECTING ANEURYSM OF AORTA: ICD-10-CM

## 2024-04-25 DIAGNOSIS — E78.2 MIXED HYPERLIPIDEMIA: ICD-10-CM

## 2024-04-25 DIAGNOSIS — R73.9 TRANSIENT HYPERGLYCEMIA POST PROCEDURE: ICD-10-CM

## 2024-04-25 DIAGNOSIS — I71.00 DISSECTION OF AORTA: ICD-10-CM

## 2024-04-25 DIAGNOSIS — I71.00 DISSECTION OF AORTA, UNSPECIFIED PORTION OF AORTA: Primary | ICD-10-CM

## 2024-04-25 LAB
ABO + RH BLD: NORMAL
ALBUMIN SERPL BCP-MCNC: 3.7 G/DL (ref 3.5–5.2)
ALP SERPL-CCNC: 66 U/L (ref 55–135)
ALT SERPL W/O P-5'-P-CCNC: 10 U/L (ref 10–44)
ANION GAP SERPL CALC-SCNC: 11 MMOL/L (ref 8–16)
APTT PPP: 24.8 SEC (ref 21–32)
AST SERPL-CCNC: 11 U/L (ref 10–40)
BASOPHILS # BLD AUTO: 0.05 K/UL (ref 0–0.2)
BASOPHILS NFR BLD: 0.7 % (ref 0–1.9)
BILIRUB SERPL-MCNC: 0.8 MG/DL (ref 0.1–1)
BLD GP AB SCN CELLS X3 SERPL QL: NORMAL
BUN SERPL-MCNC: 25 MG/DL (ref 8–23)
CALCIUM SERPL-MCNC: 9.7 MG/DL (ref 8.7–10.5)
CHLORIDE SERPL-SCNC: 102 MMOL/L (ref 95–110)
CO2 SERPL-SCNC: 25 MMOL/L (ref 23–29)
CREAT SERPL-MCNC: 0.8 MG/DL (ref 0.5–1.4)
DIFFERENTIAL METHOD BLD: ABNORMAL
EOSINOPHIL # BLD AUTO: 0.1 K/UL (ref 0–0.5)
EOSINOPHIL NFR BLD: 0.7 % (ref 0–8)
ERYTHROCYTE [DISTWIDTH] IN BLOOD BY AUTOMATED COUNT: 12.5 % (ref 11.5–14.5)
EST. GFR  (NO RACE VARIABLE): >60 ML/MIN/1.73 M^2
FIBRINOGEN PPP-MCNC: 260 MG/DL (ref 182–400)
GLUCOSE SERPL-MCNC: 78 MG/DL (ref 70–110)
HCT VFR BLD AUTO: 32.8 % (ref 37–48.5)
HGB BLD-MCNC: 11.1 G/DL (ref 12–16)
IMM GRANULOCYTES # BLD AUTO: 0.02 K/UL (ref 0–0.04)
IMM GRANULOCYTES NFR BLD AUTO: 0.3 % (ref 0–0.5)
INR PPP: 1 (ref 0.8–1.2)
LYMPHOCYTES # BLD AUTO: 1.3 K/UL (ref 1–4.8)
LYMPHOCYTES NFR BLD: 16.4 % (ref 18–48)
MAGNESIUM SERPL-MCNC: 1.8 MG/DL (ref 1.6–2.6)
MCH RBC QN AUTO: 31.1 PG (ref 27–31)
MCHC RBC AUTO-ENTMCNC: 33.8 G/DL (ref 32–36)
MCV RBC AUTO: 92 FL (ref 82–98)
MONOCYTES # BLD AUTO: 0.4 K/UL (ref 0.3–1)
MONOCYTES NFR BLD: 5.7 % (ref 4–15)
NEUTROPHILS # BLD AUTO: 5.8 K/UL (ref 1.8–7.7)
NEUTROPHILS NFR BLD: 76.2 % (ref 38–73)
NRBC BLD-RTO: 0 /100 WBC
PHOSPHATE SERPL-MCNC: 2.8 MG/DL (ref 2.7–4.5)
PLATELET # BLD AUTO: 281 K/UL (ref 150–450)
PMV BLD AUTO: 10.3 FL (ref 9.2–12.9)
POCT GLUCOSE: 80 MG/DL (ref 70–110)
POTASSIUM SERPL-SCNC: 3.5 MMOL/L (ref 3.5–5.1)
PROT SERPL-MCNC: 6.3 G/DL (ref 6–8.4)
PROTHROMBIN TIME: 10.5 SEC (ref 9–12.5)
RBC # BLD AUTO: 3.57 M/UL (ref 4–5.4)
SODIUM SERPL-SCNC: 138 MMOL/L (ref 136–145)
SPECIMEN OUTDATE: NORMAL
WBC # BLD AUTO: 7.61 K/UL (ref 3.9–12.7)

## 2024-04-25 PROCEDURE — 99223 1ST HOSP IP/OBS HIGH 75: CPT | Mod: 25,GC,, | Performed by: ANESTHESIOLOGY

## 2024-04-25 PROCEDURE — 86901 BLOOD TYPING SEROLOGIC RH(D): CPT | Performed by: THORACIC SURGERY (CARDIOTHORACIC VASCULAR SURGERY)

## 2024-04-25 PROCEDURE — 94761 N-INVAS EAR/PLS OXIMETRY MLT: CPT

## 2024-04-25 PROCEDURE — 84100 ASSAY OF PHOSPHORUS: CPT

## 2024-04-25 PROCEDURE — 83735 ASSAY OF MAGNESIUM: CPT

## 2024-04-25 PROCEDURE — 80053 COMPREHEN METABOLIC PANEL: CPT

## 2024-04-25 PROCEDURE — 36415 COLL VENOUS BLD VENIPUNCTURE: CPT | Performed by: THORACIC SURGERY (CARDIOTHORACIC VASCULAR SURGERY)

## 2024-04-25 PROCEDURE — 20000000 HC ICU ROOM

## 2024-04-25 PROCEDURE — 36620 INSERTION CATHETER ARTERY: CPT

## 2024-04-25 PROCEDURE — 85384 FIBRINOGEN ACTIVITY: CPT

## 2024-04-25 PROCEDURE — 85730 THROMBOPLASTIN TIME PARTIAL: CPT

## 2024-04-25 PROCEDURE — 25000003 PHARM REV CODE 250: Performed by: THORACIC SURGERY (CARDIOTHORACIC VASCULAR SURGERY)

## 2024-04-25 PROCEDURE — 03HC33Z INSERTION OF INFUSION DEVICE INTO LEFT RADIAL ARTERY, PERCUTANEOUS APPROACH: ICD-10-PCS | Performed by: ANESTHESIOLOGY

## 2024-04-25 PROCEDURE — 25000003 PHARM REV CODE 250

## 2024-04-25 PROCEDURE — 85025 COMPLETE CBC W/AUTO DIFF WBC: CPT

## 2024-04-25 PROCEDURE — 03HB33Z INSERTION OF INFUSION DEVICE INTO RIGHT RADIAL ARTERY, PERCUTANEOUS APPROACH: ICD-10-PCS | Performed by: ANESTHESIOLOGY

## 2024-04-25 PROCEDURE — 63600175 PHARM REV CODE 636 W HCPCS

## 2024-04-25 PROCEDURE — 36620 INSERTION CATHETER ARTERY: CPT | Mod: ,,, | Performed by: ANESTHESIOLOGY

## 2024-04-25 PROCEDURE — 86920 COMPATIBILITY TEST SPIN: CPT

## 2024-04-25 PROCEDURE — 85610 PROTHROMBIN TIME: CPT

## 2024-04-25 RX ORDER — OXYCODONE HYDROCHLORIDE 5 MG/1
5 TABLET ORAL EVERY 4 HOURS PRN
Status: DISCONTINUED | OUTPATIENT
Start: 2024-04-25 | End: 2024-04-25

## 2024-04-25 RX ORDER — POTASSIUM CHLORIDE 7.45 MG/ML
40 INJECTION INTRAVENOUS
Status: DISCONTINUED | OUTPATIENT
Start: 2024-04-25 | End: 2024-04-26

## 2024-04-25 RX ORDER — OXYCODONE HYDROCHLORIDE 10 MG/1
10 TABLET ORAL EVERY 4 HOURS PRN
Status: DISCONTINUED | OUTPATIENT
Start: 2024-04-25 | End: 2024-04-26

## 2024-04-25 RX ORDER — CALCIUM GLUCONATE 20 MG/ML
1 INJECTION, SOLUTION INTRAVENOUS
Status: DISCONTINUED | OUTPATIENT
Start: 2024-04-25 | End: 2024-04-26

## 2024-04-25 RX ORDER — SODIUM CHLORIDE 9 MG/ML
INJECTION, SOLUTION INTRAVENOUS
Status: DISCONTINUED | OUTPATIENT
Start: 2024-04-25 | End: 2024-04-26

## 2024-04-25 RX ORDER — ESMOLOL HYDROCHLORIDE 20 MG/ML
0-300 INJECTION, SOLUTION INTRAVENOUS CONTINUOUS
Status: DISCONTINUED | OUTPATIENT
Start: 2024-04-25 | End: 2024-04-26

## 2024-04-25 RX ORDER — SODIUM CHLORIDE 0.9 % (FLUSH) 0.9 %
10 SYRINGE (ML) INJECTION
Status: DISCONTINUED | OUTPATIENT
Start: 2024-04-25 | End: 2024-04-26

## 2024-04-25 RX ORDER — CALCIUM GLUCONATE 20 MG/ML
2 INJECTION, SOLUTION INTRAVENOUS
Status: DISCONTINUED | OUTPATIENT
Start: 2024-04-25 | End: 2024-04-26

## 2024-04-25 RX ORDER — POTASSIUM CHLORIDE 7.45 MG/ML
20 INJECTION INTRAVENOUS
Status: DISCONTINUED | OUTPATIENT
Start: 2024-04-25 | End: 2024-04-26

## 2024-04-25 RX ORDER — ACETAMINOPHEN 500 MG
1000 TABLET ORAL EVERY 8 HOURS PRN
Status: DISCONTINUED | OUTPATIENT
Start: 2024-04-25 | End: 2024-04-26

## 2024-04-25 RX ORDER — HYDROCODONE BITARTRATE AND ACETAMINOPHEN 500; 5 MG/1; MG/1
TABLET ORAL
Status: DISCONTINUED | OUTPATIENT
Start: 2024-04-25 | End: 2024-04-26

## 2024-04-25 RX ORDER — POTASSIUM CHLORIDE 7.45 MG/ML
60 INJECTION INTRAVENOUS
Status: DISCONTINUED | OUTPATIENT
Start: 2024-04-25 | End: 2024-04-26

## 2024-04-25 RX ORDER — SODIUM CHLORIDE, SODIUM LACTATE, POTASSIUM CHLORIDE, CALCIUM CHLORIDE 600; 310; 30; 20 MG/100ML; MG/100ML; MG/100ML; MG/100ML
INJECTION, SOLUTION INTRAVENOUS CONTINUOUS
Status: DISCONTINUED | OUTPATIENT
Start: 2024-04-25 | End: 2024-04-26

## 2024-04-25 RX ORDER — OXYCODONE HYDROCHLORIDE 5 MG/1
5 TABLET ORAL EVERY 4 HOURS PRN
Status: DISCONTINUED | OUTPATIENT
Start: 2024-04-25 | End: 2024-04-26

## 2024-04-25 RX ORDER — LIDOCAINE HYDROCHLORIDE 10 MG/ML
10 INJECTION, SOLUTION EPIDURAL; INFILTRATION; INTRACAUDAL; PERINEURAL ONCE
Status: COMPLETED | OUTPATIENT
Start: 2024-04-25 | End: 2024-04-25

## 2024-04-25 RX ORDER — NICARDIPINE HYDROCHLORIDE 0.2 MG/ML
0-15 INJECTION INTRAVENOUS CONTINUOUS
Status: DISCONTINUED | OUTPATIENT
Start: 2024-04-25 | End: 2024-04-26

## 2024-04-25 RX ORDER — MUPIROCIN 20 MG/G
OINTMENT TOPICAL 2 TIMES DAILY
Status: DISCONTINUED | OUTPATIENT
Start: 2024-04-25 | End: 2024-04-26

## 2024-04-25 RX ORDER — MAGNESIUM SULFATE HEPTAHYDRATE 40 MG/ML
2 INJECTION, SOLUTION INTRAVENOUS
Status: DISCONTINUED | OUTPATIENT
Start: 2024-04-25 | End: 2024-04-26

## 2024-04-25 RX ORDER — MAGNESIUM SULFATE HEPTAHYDRATE 40 MG/ML
4 INJECTION, SOLUTION INTRAVENOUS
Status: DISCONTINUED | OUTPATIENT
Start: 2024-04-25 | End: 2024-04-26

## 2024-04-25 RX ORDER — CALCIUM GLUCONATE 20 MG/ML
3 INJECTION, SOLUTION INTRAVENOUS
Status: DISCONTINUED | OUTPATIENT
Start: 2024-04-25 | End: 2024-04-26

## 2024-04-25 RX ORDER — HYDROMORPHONE HYDROCHLORIDE 1 MG/ML
0.5 INJECTION, SOLUTION INTRAMUSCULAR; INTRAVENOUS; SUBCUTANEOUS EVERY 6 HOURS PRN
Status: DISCONTINUED | OUTPATIENT
Start: 2024-04-25 | End: 2024-04-26

## 2024-04-25 RX ADMIN — ESMOLOL HYDROCHLORIDE 50 MCG/KG/MIN: 20 INJECTION INTRAVENOUS at 04:04

## 2024-04-25 RX ADMIN — NICARDIPINE HYDROCHLORIDE 2.5 MG/HR: 0.2 INJECTION, SOLUTION INTRAVENOUS at 04:04

## 2024-04-25 RX ADMIN — ACETAMINOPHEN 1000 MG: 500 TABLET ORAL at 10:04

## 2024-04-25 RX ADMIN — ESMOLOL HYDROCHLORIDE 150 MCG/KG/MIN: 20 INJECTION INTRAVENOUS at 11:04

## 2024-04-25 RX ADMIN — SODIUM CHLORIDE: 9 INJECTION, SOLUTION INTRAVENOUS at 05:04

## 2024-04-25 RX ADMIN — MUPIROCIN: 20 OINTMENT TOPICAL at 09:04

## 2024-04-25 RX ADMIN — LIDOCAINE HYDROCHLORIDE 100 MG: 10 SOLUTION INTRAVENOUS at 04:04

## 2024-04-25 RX ADMIN — MAGNESIUM SULFATE HEPTAHYDRATE 2 G: 40 INJECTION, SOLUTION INTRAVENOUS at 05:04

## 2024-04-25 RX ADMIN — POTASSIUM CHLORIDE 20 MEQ: 7.46 INJECTION, SOLUTION INTRAVENOUS at 05:04

## 2024-04-25 RX ADMIN — SODIUM CHLORIDE, POTASSIUM CHLORIDE, SODIUM LACTATE AND CALCIUM CHLORIDE: 600; 310; 30; 20 INJECTION, SOLUTION INTRAVENOUS at 04:04

## 2024-04-25 RX ADMIN — ESMOLOL HYDROCHLORIDE 200 MCG/KG/MIN: 20 INJECTION INTRAVENOUS at 07:04

## 2024-04-25 NOTE — ASSESSMENT & PLAN NOTE
Neuro/Psych:     - Sedation: none     - Pain:     - Scheduled Tylenol 1000mg Q8H   - PRN Oxycodone and dilaudid                Cardiac:     - Plan for OR on 4/26 for ascending aorta replacement with Dr. Ochoa     - BP Goal: MAP>60, HR<60, SBP <110    - Inotropes/Pressors: None     - Anti-HTNs: Cardene gtt     - Rhythm: NSR    - Beta Blocker: Esmolol gtt     - Statin: will start when appropriate         Pulmonary:     - Goal SpO2 >92%    - Stable on RA     - ABGs PRN         Renal:    - Trend BUN/Cr     - Record strict Is/Os    Recent Labs   Lab 04/25/24  1154 04/25/24  1530   BUN 26* 25*   CREATININE 0.9 0.8         FEN / GI:     - Daily CMP, PRN K/Mag/Phos per protocol     - Replace electrolytes as needed    - Nutrition: NPO, to OR tomorrow     - Bowel Regimen: Miralax, docusate        ID:     - Afebrile     - WBC stable     - Abx: start perioperative cefazolin 2g Q8H x 5 doses post op    Recent Labs   Lab 04/25/24  1154 04/25/24  1530   WBC 5.30 7.61         Heme/Onc:     - Hgb 11.1 pre-operatively    - CBC daily    Recent Labs   Lab 04/25/24  1154 04/25/24  1530   HGB 11.7* 11.1*    281   APTT  --  24.8   INR  --  1.0         Endocrine:     - CTS Goal -140    - HgbA1c: pending     - MDSSI         PPx:   Feeding: NPO, to OR tomorrow  Analgesia/Sedation: see above   Thromboembolic Prevention: SCDs  HOB >30: Yes  Stress Ulcer: N/A  Glucose Control: MDSSI      Lines/Drains/Airway:     PIV x 2   PICC:    Right radial adelina    Left radial adelina          Dispo/Code Status/Palliative:     - Continue SICU Care    - Full Code

## 2024-04-25 NOTE — PROCEDURES
"Heidy Polo is a 68 y.o. female patient.    Temp: 97.7 °F (36.5 °C) (24 1445)  Pulse: 70 (24 1600)  Resp: 12 (24 1600)  BP: (!) 129/59 (24 1648)  SpO2: 100 % (24 1600)  Weight: 74.5 kg (164 lb 3.9 oz) (24 1529)       Arterial Line    Date/Time: 2024 4:50 PM  Location procedure was performed: [facility] CRITICAL CARE SURGERY    Performed by: Alex Sebastian MD  Authorized by: Mt Garibay MD  Consent Done: Yes  Consent: Written consent obtained.  Risks and benefits: risks, benefits and alternatives were discussed  Consent given by: patient  Patient understanding: patient states understanding of the procedure being performed  Patient consent: the patient's understanding of the procedure matches consent given  Procedure consent: procedure consent matches procedure scheduled  Relevant documents: relevant documents present and verified  Patient identity confirmed:  and MRN  Time out: Immediately prior to procedure a "time out" was called to verify the correct patient, procedure, equipment, support staff and site/side marked as required.  Preparation: Patient was prepped and draped in the usual sterile fashion.  Indications: hemodynamic monitoring  Location: right radial  Anesthesia: local infiltration    Anesthesia:  Local Anesthetic: lidocaine 1% without epinephrine    Patient sedated: no  Sunny's test normal: no  Needle gauge: 20  Number of attempts: 1  Complications: No  Specimens: No  Implants: No  Post-procedure: line sutured and dressing applied  Patient tolerance: Patient tolerated the procedure well with no immediate complications          2024    "

## 2024-04-25 NOTE — NURSING
Nurses Note -- 4 Eyes      4/25/2024   4:12 PM      Skin assessed during: Admit      [x] No Altered Skin Integrity Present    [x]Prevention Measures Documented      [] Yes- Altered Skin Integrity Present or Discovered   [] LDA Added if Not in Epic (Describe Wound)   [] New Altered Skin Integrity was Present on Admit and Documented in LDA   [] Wound Image Taken    Wound Care Consulted? No    Attending Nurse:  Tari Solorzano RN/Staff Member:  Eva

## 2024-04-25 NOTE — HPI
Heidy Polo is a 67yo female with pmhx of breast cancer, hld, htn, hypothyroidism, and depression who presented to [facility] ED with complaint of left-sided chest pain that started less than an hour prior to arrival. CTA at that time demonstrated adal type A aortic dissection with dissection flap extending from the aortic root up to the level of the distal arch. Associated aneurysmal dilatation measuring up to 5 cm in maximum dimension. She was transferred to [facility] for higher level of care and admitted to the SICU for close hemodynamic monitoring.

## 2024-04-25 NOTE — H&P
[facility] - Surgical Intensive [facility]  Critical Care - Surgery  History & Physical    Patient Name: Heidy Polo  MRN: [id number]  Admission Date: 4/25/2024  Code Status: Full Code  Attending Physician: Aquilino Ochoa MD   Primary Care Provider: Chas Angela MD   Principal Problem: Dissection of aorta    Subjective:     HPI:  Heidy Polo is a 69yo female with pmhx of breast cancer, hld, htn, hypothyroidism, and depression who presented to [facility] ED with complaint of left-sided chest pain that started less than an hour prior to arrival. CTA at that time demonstrated adal type A aortic dissection with dissection flap extending from the aortic root up to the level of the distal arch. Associated aneurysmal dilatation measuring up to 5 cm in maximum dimension. She was transferred to [facility] for higher level of care and admitted to the SICU for close hemodynamic monitoring.        Hospital/ICU Course:  No notes on file         Past Medical History:   Diagnosis Date    Breast cancer 2001    right    Cancer     breast in 2001 -right     Depression 07/16/2012    History of breast cancer     Hyperlipidemia     Hypertension     Hypothyroid        Past Surgical History:   Procedure Laterality Date    CAPSULOTOMY OF JOINT Left 06/04/2021    Procedure: CAPSULOTOMY, JOINT 2nd MPJ;  Surgeon: Marnie Mckeon DPM;  Location: [facility] OR;  Service: Podiatry;  Laterality: Left;    CAPSULOTOMY OF JOINT Left 01/07/2022    3rd and 4th    CAPSULOTOMY OF JOINT Left 01/07/2022    Procedure: 2,3,4,5;  Surgeon: Marnie Mckeon DPM;  Location: [facility] OR;  Service: Podiatry;  Laterality: Left;    CHOLECYSTECTOMY      COLONOSCOPY N/A 11/09/2020    Procedure: COLONOSCOPY;  Surgeon: Adelaide Hernández MD;  Location: [facility] ENDO;  Service: Endoscopy;  Laterality: N/A;  with biopsy    CORRECTION OF HAMMER TOE Left 06/04/2021    Procedure: CORRECTION, HAMMER TOE 2nd with K-wire stabilization;  Surgeon: Marnie Mckeon DPM;  Location: [facility] OR;  Service: Podiatry;   Laterality: Left;  COVID VACCINATION CONFIRMED    CORRECTION OF HAMMER TOE Left 01/07/2022    3rd, 4th and 5th    CORRECTION OF HAMMER TOE Left 01/07/2022    Procedure: CORRECTION, HAMMER TOE 2,3,4,5;  Surgeon: Marnie Mckeon DPM;  Location: [facility] OR;  Service: Podiatry;  Laterality: Left;    HERNIA REPAIR      LAPAROSCOPIC SLEEVE GASTRECTOMY      MODIFIED RADICAL MASTECTOMY W/ AXILLARY LYMPH NODE DISSECTION Right 2001    TOTAL KNEE ARTHROPLASTY Right 02/09/2021    Procedure: ARTHROPLASTY, KNEE, TOTAL;  Surgeon: Ilya Murphy MD;  Location: [facility] OR;  Service: Orthopedics;  Laterality: Right;       Review of patient's allergies indicates:  No Known Allergies    Family History       Problem Relation (Age of Onset)    Breast cancer Sister, Maternal Aunt    Cancer Sister, Maternal Aunt, Maternal Grandmother    Diabetes Brother    Heart disease Maternal Grandfather    Hypertension Brother, Brother          Tobacco Use    Smoking status: Never    Smokeless tobacco: Never   Substance and Sexual Activity    Alcohol use: No     Comment: rare    Drug use: No    Sexual activity: Yes     Partners: Male     Birth control/protection: None      Review of Systems   All other systems reviewed and are negative.    Objective:     Vital Signs (Most Recent):  Temp: 97.7 °F (36.5 °C) (04/25/24 1445)  Pulse: 70 (04/25/24 1600)  Resp: 12 (04/25/24 1600)  BP: (!) 129/59 (04/25/24 1648)  SpO2: 100 % (04/25/24 1600) Vital Signs (24h Range):  Temp:  [97.7 °F (36.5 °C)-98.1 °F (36.7 °C)] 97.7 °F (36.5 °C)  Pulse:  [57-80] 70  Resp:  [12-20] 12  SpO2:  [98 %-100 %] 100 %  BP: (101-148)/(55-79) 129/59     Weight: 74.5 kg (164 lb 3.9 oz)  Body mass index is 28.19 kg/m².    No intake or output data in the 24 hours ending 04/25/24 1656       Physical Exam  Vitals and nursing note reviewed.   Constitutional:       General: She is not in acute distress.  HENT:      Head: Normocephalic and atraumatic.      Nose: Nose normal.      Mouth/Throat:       Pharynx: Oropharynx is clear.   Eyes:      Extraocular Movements: Extraocular movements intact.      Conjunctiva/sclera: Conjunctivae normal.   Cardiovascular:      Rate and Rhythm: Normal rate and regular rhythm.      Pulses: Normal pulses.      Heart sounds: Normal heart sounds.      Comments: Bilateral radial alines   Pulmonary:      Effort: Pulmonary effort is normal.   Abdominal:      General: Abdomen is flat.      Palpations: Abdomen is soft.   Musculoskeletal:         General: Normal range of motion.      Cervical back: Normal range of motion.   Skin:     General: Skin is warm.   Neurological:      General: No focal deficit present.      Mental Status: She is alert and oriented to person, place, and time.   Psychiatric:         Mood and Affect: Mood normal.            Vents:       Lines/Drains/Airways       Arterial Line  Duration             Arterial Line 04/25/24 1600 Right Radial <1 day    Arterial Line 04/25/24 1624 Left Radial <1 day              Peripheral Intravenous Line  Duration                  Peripheral IV - Single Lumen 04/25/24 1611 22 G Left Forearm <1 day         Peripheral IV - Single Lumen 04/25/24 1612 20 G Left Forearm <1 day         Peripheral IV - Single Lumen 04/25/24 1652 18 G;1 3/4 in Anterior;Left Upper Arm <1 day         Peripheral IV - Single Lumen 04/25/24 1652 20 G;1 3/4 in Anterior;Left;Proximal Forearm <1 day                    Significant Labs:    CBC/Anemia Profile:  Recent Labs   Lab 04/25/24  1154 04/25/24  1530   WBC 5.30 7.61   HGB 11.7* 11.1*   HCT 34.4* 32.8*    281   MCV 92 92   RDW 12.4 12.5        Chemistries:  Recent Labs   Lab 04/25/24  1154 04/25/24  1530    138   K 3.5 3.5    102   CO2 27 25   BUN 26* 25*   CREATININE 0.9 0.8   CALCIUM 9.4 9.7   ALBUMIN 3.9 3.7   PROT 6.8 6.3   BILITOT 0.8 0.8   ALKPHOS 67 66   ALT 14 10   AST 13 11   MG  --  1.8   PHOS  --  2.8       All pertinent labs within the past 24 hours have been  reviewed.    Significant Imaging: I have reviewed all pertinent imaging results/findings within the past 24 hours.  Assessment/Plan:     Cardiac/Vascular  * Dissection of aorta    Neuro/Psych:     - Sedation: none     - Pain:     - Scheduled Tylenol 1000mg Q8H   - PRN Oxycodone and dilaudid                Cardiac:     - Plan for OR on 4/26 for ascending aorta replacement with Dr. Ochoa     - BP Goal: MAP>60, HR<60, SBP <110    - Inotropes/Pressors: None     - Anti-HTNs: Cardene gtt     - Rhythm: NSR    - Beta Blocker: Esmolol gtt     - Statin: will start when appropriate         Pulmonary:     - Goal SpO2 >92%    - Stable on RA     - ABGs PRN         Renal:    - Trend BUN/Cr     - Record strict Is/Os    Recent Labs   Lab 04/25/24  1154 04/25/24  1530   BUN 26* 25*   CREATININE 0.9 0.8         FEN / GI:     - Daily CMP, PRN K/Mag/Phos per protocol     - Replace electrolytes as needed    - Nutrition: NPO, to OR tomorrow     - Bowel Regimen: Miralax, docusate        ID:     - Afebrile     - WBC stable     - Abx: start perioperative cefazolin 2g Q8H x 5 doses post op    Recent Labs   Lab 04/25/24  1154 04/25/24  1530   WBC 5.30 7.61         Heme/Onc:     - Hgb 11.1 pre-operatively    - CBC daily    Recent Labs   Lab 04/25/24  1154 04/25/24  1530   HGB 11.7* 11.1*    281   APTT  --  24.8   INR  --  1.0         Endocrine:     - CTS Goal -140    - HgbA1c: pending     - MDSSI         PPx:   Feeding: NPO, to OR tomorrow  Analgesia/Sedation: see above   Thromboembolic Prevention: SCDs  HOB >30: Yes  Stress Ulcer: N/A  Glucose Control: MDSSI      Lines/Drains/Airway:     PIV x 2   PICC:    Right radial adelina    Left radial adelina          Dispo/Code Status/Palliative:     - Continue SICU Care    - Full Code        Critical care was time spent personally by me on the following activities: development of treatment plan with patient or surrogate and bedside caregivers, discussions with consultants, evaluation of  patient's response to treatment, examination of patient, ordering and performing treatments and interventions, ordering and review of laboratory studies, ordering and review of radiographic studies, pulse oximetry, re-evaluation of patient's condition.  This critical care time did not overlap with that of any other provider or involve time for any procedures.     Alex Sebastian MD  Critical Care - Surgery  [facility] - [facility]

## 2024-04-25 NOTE — PROCEDURES
"Heidy Polo is a 68 y.o. female patient.    Temp: 97.7 °F (36.5 °C) (24 1445)  Pulse: 70 (24 1600)  Resp: 12 (24 1600)  BP: (!) 129/59 (24 1648)  SpO2: 100 % (24 1600)  Weight: 74.5 kg (164 lb 3.9 oz) (24 1529)       Arterial Line    Date/Time: 2024 4:51 PM  Location procedure was performed: [facility] CRITICAL CARE SURGERY    Performed by: Alex Sebastian MD  Authorized by: Mt Garibay MD  Consent Done: Yes  Consent: Written consent obtained.  Risks and benefits: risks, benefits and alternatives were discussed  Consent given by: patient  Patient understanding: patient states understanding of the procedure being performed  Patient consent: the patient's understanding of the procedure matches consent given  Procedure consent: procedure consent matches procedure scheduled  Patient identity confirmed:  and MRN  Time out: Immediately prior to procedure a "time out" was called to verify the correct patient, procedure, equipment, support staff and site/side marked as required.  Preparation: Patient was prepped and draped in the usual sterile fashion.  Indications: hemodynamic monitoring  Location: left radial  Anesthesia: local infiltration    Anesthesia:  Local Anesthetic: lidocaine 1% without epinephrine    Patient sedated: no  Needle gauge: 20  Number of attempts: 2  Complications: No  Specimens: No  Implants: No  Post-procedure: line sutured and dressing applied  Post-procedure CMS: normal          2024    "

## 2024-04-25 NOTE — ANESTHESIA PREPROCEDURE EVALUATION
[facility]  Anesthesia Pre-Operative Evaluation     Patient Name: Heidy Polo  YOB: 1955  MRN: [id number]  [facility]: [id number]      Code Status: Full Code   Date of Procedure: 4/26/2024  Anesthesia: General Procedure: Procedure(s) (LRB):  REPLACEMENT, AORTA, ASCENDING (N/A)  Pre-Operative Diagnosis: Dissecting aneurysm of aorta [I71.00]  Proceduralist: Surgeons and Role:     * Aquilino Ochoa MD - Primary          SUBJECTIVE:     Pre-operative evaluation for Procedure(s) (LRB):  REPLACEMENT, AORTA, ASCENDING (N/A)     04/25/2024    Heidy Polo is a 68 y.o. female who  has a past medical history of Breast cancer (2001), Depression (07/16/2012), Hyperlipidemia, Hypertension, and Hypothyroid.     HPI    Heidy Polo is a 69yo female with pmhx of breast cancer, hld, htn, hypothyroidism, and depression who presented to [facility] ED with complaint of left-sided chest pain that started less than an hour prior to arrival. CTA at that time demonstrated adal type A aortic dissection with dissection flap extending from the aortic root up to the level of the distal arch. Associated aneurysmal dilatation measuring up to 5 cm in maximum dimension. She was transferred to [facility] for higher level of care and admitted to the SICU for close hemodynamic monitoring.     Patient now presents for the above procedure(s).       No current facility-administered medications for this encounter.      ________________________________________  No results found for this or any previous visit.    ________________________________________    Prev airway: None documented.      LDA:       Peripheral IV - Single Lumen 04/25/24 1611 22 G Left Forearm (Active)   Number of days: 0            Peripheral IV - Single Lumen 04/25/24 1612 20 G Left Forearm (Active)   Number of days: 0            Peripheral IV - Single Lumen 04/25/24 1652 18 G;1 3/4 in Anterior;Left Upper Arm (Active)   Site  Assessment Clean;Dry;Intact;No redness;No swelling 04/25/24 1653   Extremity Assessment Distal to IV No abnormal discoloration 04/25/24 1653   Line Status Blood return noted;Flushed;Saline locked 04/25/24 1653   Dressing Status Clean;Dry;Intact 04/25/24 1653   Dressing Intervention First dressing 04/25/24 1653   Site Change Due 04/29/24 04/25/24 1653   Number of days: 0            Peripheral IV - Single Lumen 04/25/24 1652 20 G;1 3/4 in Anterior;Left;Proximal Forearm (Active)   Site Assessment Clean;Dry;Intact;No redness;No swelling 04/25/24 1653   Extremity Assessment Distal to IV No abnormal discoloration 04/25/24 1653   Line Status Blood return noted;Flushed;Saline locked 04/25/24 1653   Dressing Status Dry;Clean;Intact 04/25/24 1653   Dressing Intervention First dressing 04/25/24 1653   Site Change Due 04/29/24 04/25/24 1653   Number of days: 0       Arterial Line 04/25/24 1600 Right Radial (Active)   $ Arterial Line Charges (Upon Insertion) Bedside Insertion Performed 04/25/24 1612   Site Assessment Clean;Dry;Intact 04/25/24 1612   Line Status Pulsatile blood flow 04/25/24 1612   Art Line Waveform Appropriate 04/25/24 1612   Arterial Line Interventions Leveled;Zeroed and calibrated;Connections checked and tightened;Flushed per protocol 04/25/24 1612   Dressing Type Transparent (Tegaderm) 04/25/24 1612   Dressing Intervention First dressing 04/25/24 1612   Number of days: 0       Arterial Line 04/25/24 1624 Left Radial (Active)   $ Arterial Line Charges (Upon Insertion) Bedside Insertion Performed 04/25/24 1612   Site Assessment Clean;Dry;Intact 04/25/24 1612   Line Status Pulsatile blood flow 04/25/24 1612   Art Line Waveform Appropriate 04/25/24 1612   Arterial Line Interventions Leveled;Zeroed and calibrated;Connections checked and tightened;Flushed per protocol 04/25/24 1612   Dressing Type Transparent (Tegaderm) 04/25/24 1612   Dressing Intervention First dressing 04/25/24 1612   Number of days: 0        Drips: None documented.      Patient Active Problem List   Diagnosis    Essential hypertension    History of hypothyroidism    Status following gastric banding surgery for weight loss    History of right breast cancer    Mixed hyperlipidemia    H/O right mastectomy    Prediabetes    Primary osteoarthritis of right knee    S/P total knee arthroplasty, right    Metatarsalgia, left foot    Hammer toe of second toe of left foot    Aftercare following surgery of the musculoskeletal system    Dissection of aorta       Review of patient's allergies indicates:  No Known Allergies    Current Inpatient Medications:       Current Facility-Administered Medications on File Prior to Encounter   Medication Dose Route Frequency Provider Last Rate Last Admin    0.9%  NaCl infusion (for blood administration)   Intravenous Q24H PRN Alex Sebastian MD        0.9%  NaCl infusion (for blood administration)   Intravenous Q24H PRN Alex Sebastian MD        0.9%  NaCl infusion   Intravenous PRN Aquilino Ochoa MD        calcium gluconate 1 g in NS IVPB (premixed)  1 g Intravenous PRN Alex Sebastian MD        calcium gluconate 1 g in NS IVPB (premixed)  2 g Intravenous PRN Alex Sebastian MD        calcium gluconate 1 g in NS IVPB (premixed)  3 g Intravenous PRN Alex Sebastian MD        esmolol 2000 mg in sodium chloride 0.9% 100 mL (20 mg/mL)  0-300 mcg/kg/min Intravenous Continuous Tashia Flanagan MD 11.2 mL/hr at 04/25/24 1649 50 mcg/kg/min at 04/25/24 1649    HYDROmorphone injection 0.5 mg  0.5 mg Intravenous Q6H PRN Alex Sebastian MD        lactated ringers infusion   Intravenous Continuous Tashia Flanagan  mL/hr at 04/25/24 1648 New Bag at 04/25/24 1648    magnesium sulfate 2g in water 50mL IVPB (premix)  2 g Intravenous PRN Alex Sebastian MD        magnesium sulfate 2g in water 50mL IVPB (premix)  4 g Intravenous PRN Alex Sebastian MD        mupirocin 2 % ointment   Nasal BID Aquilino Ochoa MD         niCARdipine 40 mg/200 mL (0.2 mg/mL) infusion  0-15 mg/hr Intravenous Continuous Tashia Flanagan MD 12.5 mL/hr at 04/25/24 1630 2.5 mg/hr at 04/25/24 1630    oxyCODONE immediate release tablet 5 mg  5 mg Oral Q4H PRN Alex Sebastian MD        oxyCODONE immediate release tablet Tab 10 mg  10 mg Oral Q4H PRN Alex Sebastian MD        potassium chloride 10 mEq in 100 mL IVPB  20 mEq Intravenous PRN Alex Sebastian MD        And    potassium chloride 10 mEq in 100 mL IVPB  40 mEq Intravenous PRN Alex Sebastian MD        And    potassium chloride 10 mEq in 100 mL IVPB  60 mEq Intravenous PRN Alex Sebastian MD        sodium chloride 0.9% flush 10 mL  10 mL Intravenous PRN Tashia Flanagan MD         Current Outpatient Medications on File Prior to Encounter   Medication Sig Dispense Refill    aspirin (ECOTRIN) 81 MG EC tablet Take 1 tablet (81 mg total) by mouth 2 (two) times daily. 84 tablet 0    blood pressure monitor ([employer] EASE) LG arm size (OP) by Other route as needed for High Blood Pressure. 1 each 0    chlorthalidone (HYGROTEN) 25 MG Tab Take 1 tablet (25 mg total) by mouth once daily. 90 tablet 4    metoprolol succinate (TOPROL-XL) 25 MG 24 hr tablet Take 1 tablet (25 mg total) by mouth once daily. 90 tablet 4    telmisartan (MICARDIS) 40 MG Tab Take 1 tablet (40 mg total) by mouth once daily. 90 tablet 4       Past Surgical History:   Procedure Laterality Date    CAPSULOTOMY OF JOINT Left 06/04/2021    Procedure: CAPSULOTOMY, JOINT 2nd MPJ;  Surgeon: Marnie Mckeon DPM;  Location: [facility] OR;  Service: Podiatry;  Laterality: Left;    CAPSULOTOMY OF JOINT Left 01/07/2022    3rd and 4th    CAPSULOTOMY OF JOINT Left 01/07/2022    Procedure: 2,3,4,5;  Surgeon: Marnie Mckeon DPM;  Location: [facility] OR;  Service: Podiatry;  Laterality: Left;    CHOLECYSTECTOMY      COLONOSCOPY N/A 11/09/2020    Procedure: COLONOSCOPY;  Surgeon: Adelaide Hernández MD;  Location: [facility];  Service: Endoscopy;  Laterality: N/A;  with  biopsy    CORRECTION OF HAMMER TOE Left 06/04/2021    Procedure: CORRECTION, HAMMER TOE 2nd with K-wire stabilization;  Surgeon: Marnie Mckeon DPM;  Location: [facility] OR;  Service: Podiatry;  Laterality: Left;  COVID VACCINATION CONFIRMED    CORRECTION OF HAMMER TOE Left 01/07/2022    3rd, 4th and 5th    CORRECTION OF HAMMER TOE Left 01/07/2022    Procedure: CORRECTION, HAMMER TOE 2,3,4,5;  Surgeon: Marnie Mckeon DPM;  Location: [facility] OR;  Service: Podiatry;  Laterality: Left;    HERNIA REPAIR      LAPAROSCOPIC SLEEVE GASTRECTOMY      MODIFIED RADICAL MASTECTOMY W/ AXILLARY LYMPH NODE DISSECTION Right 2001    TOTAL KNEE ARTHROPLASTY Right 02/09/2021    Procedure: ARTHROPLASTY, KNEE, TOTAL;  Surgeon: Ilya Murphy MD;  Location: [facility] OR;  Service: Orthopedics;  Laterality: Right;       Social History:  Tobacco Use: Low Risk  (4/25/2024)    Patient History     Smoking Tobacco Use: Never     Smokeless Tobacco Use: Never     Passive Exposure: Not on file       Alcohol Use: Not on file       OBJECTIVE:     Vital Signs Range:  BMI Readings from Last 1 Encounters:   04/25/24 28.57 kg/m²       Temp:  [36.5 °C (97.7 °F)-36.7 °C (98.1 °F)]   Pulse:  [57-80]   Resp:  [12-20]   BP: (101-148)/(55-79)   SpO2:  [98 %-100 %]   Arterial Line BP: (123)/(55)        Significant Labs:        Component Value Date/Time    WBC 7.61 04/25/2024 1530    HGB 11.1 (L) 04/25/2024 1530    HCT 32.8 (L) 04/25/2024 1530     04/25/2024 1530     04/25/2024 1530    K 3.5 04/25/2024 1530     04/25/2024 1530    CO2 25 04/25/2024 1530    GLU 78 04/25/2024 1530    BUN 25 (H) 04/25/2024 1530    CREATININE 0.8 04/25/2024 1530    MG 1.8 04/25/2024 1530    PHOS 2.8 04/25/2024 1530    CALCIUM 9.7 04/25/2024 1530    ALBUMIN 3.7 04/25/2024 1530    PROT 6.3 04/25/2024 1530    ALKPHOS 66 04/25/2024 1530    BILITOT 0.8 04/25/2024 1530    AST 11 04/25/2024 1530    ALT 10 04/25/2024 1530    INR 1.0 04/25/2024 1530    HGBA1C 4.8 11/22/2023 1157         Please see Results Review for additional labs.     Diagnostic Studies: No relevant studies.    EKG:   Results for orders placed or performed during the hospital encounter of 04/25/24   EKG 12-lead    Collection Time: 04/25/24 11:42 AM   Result Value Ref Range    QRS Duration 82 ms    [facility] QTC Calculation 466 ms    Narrative    Test Reason : R07.9,    Vent. Rate : 070 BPM     Atrial Rate : 070 BPM     P-R Int : 166 ms          QRS Dur : 082 ms      QT Int : 432 ms       P-R-T Axes : 057 056 071 degrees     QTc Int : 466 ms    Normal sinus rhythm  Normal ECG  When compared with ECG of 22-NOV-2021 13:27,  No significant change was found    Referred By:             Confirmed By:        ECHO:  See subjective, if available.      ASSESSMENT/PLAN:           Pre-op Assessment    I have reviewed the Patient Summary Reports.    I have reviewed the NPO Status.   I have reviewed the Medications.     Review of Systems  Anesthesia Hx:  No problems with previous Anesthesia   Neg history of prior surgery.          Denies Family Hx of Anesthesia complications.     Cardiovascular:     Hypertension                                  Hypertension         Musculoskeletal:  Arthritis        Arthritis          Neurological:      Arthritis                           Endocrine:   Hypothyroidism       Hypothyroidism          Psych:  Psychiatric History                  Physical Exam  General: Well nourished, Cooperative, Alert and Oriented    Airway:  Mallampati: I   Mouth Opening: Normal  TM Distance: Normal  Tongue: Normal  Neck ROM: Normal ROM    Dental:  Intact    Chest/Lungs:  Clear to auscultation, Normal Respiratory Rate    Heart:  Rate: Normal  Rhythm: Regular Rhythm  Sounds: Normal        Anesthesia Plan  Type of Anesthesia, risks & benefits discussed:    Anesthesia Type: Gen ETT  Intra-op Monitoring Plan: Standard ASA Monitors, Art Line, Central Line, GLADIS, PA and CO  Post Op Pain Control Plan: multimodal analgesia and IV/PO Opioids  PRN  Induction:  IV  Airway Plan: Direct and Video, Post-Induction  Informed Consent: Informed consent signed with the Patient and all parties understand the risks and agree with anesthesia plan.  All questions answered.   ASA Score: 4  Day of Surgery Review of History & Physical: H&P Update referred to the surgeon/provider.I have interviewed and examined the patient. I have reviewed the patient's H&P dated:     Ready For Surgery From Anesthesia Perspective.     .

## 2024-04-25 NOTE — SUBJECTIVE & OBJECTIVE
Past Medical History:   Diagnosis Date    Breast cancer 2001    right    Cancer     breast in 2001 -right     Depression 07/16/2012    History of breast cancer     Hyperlipidemia     Hypertension     Hypothyroid        Past Surgical History:   Procedure Laterality Date    CAPSULOTOMY OF JOINT Left 06/04/2021    Procedure: CAPSULOTOMY, JOINT 2nd MPJ;  Surgeon: Marnie Mckeon DPM;  Location: [facility] OR;  Service: Podiatry;  Laterality: Left;    CAPSULOTOMY OF JOINT Left 01/07/2022    3rd and 4th    CAPSULOTOMY OF JOINT Left 01/07/2022    Procedure: 2,3,4,5;  Surgeon: Marnie Mckeon DPM;  Location: [facility] OR;  Service: Podiatry;  Laterality: Left;    CHOLECYSTECTOMY      COLONOSCOPY N/A 11/09/2020    Procedure: COLONOSCOPY;  Surgeon: Adelaide Hernández MD;  Location: [facility];  Service: Endoscopy;  Laterality: N/A;  with biopsy    CORRECTION OF HAMMER TOE Left 06/04/2021    Procedure: CORRECTION, HAMMER TOE 2nd with K-wire stabilization;  Surgeon: Marnie Mckeon DPM;  Location: [facility] OR;  Service: Podiatry;  Laterality: Left;  COVID VACCINATION CONFIRMED    CORRECTION OF HAMMER TOE Left 01/07/2022    3rd, 4th and 5th    CORRECTION OF HAMMER TOE Left 01/07/2022    Procedure: CORRECTION, HAMMER TOE 2,3,4,5;  Surgeon: Marnie Mckeon DPM;  Location: [facility] OR;  Service: Podiatry;  Laterality: Left;    HERNIA REPAIR      LAPAROSCOPIC SLEEVE GASTRECTOMY      MODIFIED RADICAL MASTECTOMY W/ AXILLARY LYMPH NODE DISSECTION Right 2001    TOTAL KNEE ARTHROPLASTY Right 02/09/2021    Procedure: ARTHROPLASTY, KNEE, TOTAL;  Surgeon: Ilya Murphy MD;  Location: [facility] OR;  Service: Orthopedics;  Laterality: Right;       Review of patient's allergies indicates:  No Known Allergies    Family History       Problem Relation (Age of Onset)    Breast cancer Sister, Maternal Aunt    Cancer Sister, Maternal Aunt, Maternal Grandmother    Diabetes Brother    Heart disease Maternal Grandfather    Hypertension Brother, Brother          Tobacco Use    Smoking  status: Never    Smokeless tobacco: Never   Substance and Sexual Activity    Alcohol use: No     Comment: rare    Drug use: No    Sexual activity: Yes     Partners: Male     Birth control/protection: None      Review of Systems   All other systems reviewed and are negative.    Objective:     Vital Signs (Most Recent):  Temp: 97.7 °F (36.5 °C) (04/25/24 1445)  Pulse: 70 (04/25/24 1600)  Resp: 12 (04/25/24 1600)  BP: (!) 129/59 (04/25/24 1648)  SpO2: 100 % (04/25/24 1600) Vital Signs (24h Range):  Temp:  [97.7 °F (36.5 °C)-98.1 °F (36.7 °C)] 97.7 °F (36.5 °C)  Pulse:  [57-80] 70  Resp:  [12-20] 12  SpO2:  [98 %-100 %] 100 %  BP: (101-148)/(55-79) 129/59     Weight: 74.5 kg (164 lb 3.9 oz)  Body mass index is 28.19 kg/m².    No intake or output data in the 24 hours ending 04/25/24 1656       Physical Exam  Vitals and nursing note reviewed.   Constitutional:       General: She is not in acute distress.  HENT:      Head: Normocephalic and atraumatic.      Nose: Nose normal.      Mouth/Throat:      Pharynx: Oropharynx is clear.   Eyes:      Extraocular Movements: Extraocular movements intact.      Conjunctiva/sclera: Conjunctivae normal.   Cardiovascular:      Rate and Rhythm: Normal rate and regular rhythm.      Pulses: Normal pulses.      Heart sounds: Normal heart sounds.      Comments: Bilateral radial alines   Pulmonary:      Effort: Pulmonary effort is normal.   Abdominal:      General: Abdomen is flat.      Palpations: Abdomen is soft.   Musculoskeletal:         General: Normal range of motion.      Cervical back: Normal range of motion.   Skin:     General: Skin is warm.   Neurological:      General: No focal deficit present.      Mental Status: She is alert and oriented to person, place, and time.   Psychiatric:         Mood and Affect: Mood normal.            Vents:       Lines/Drains/Airways       Arterial Line  Duration             Arterial Line 04/25/24 1600 Right Radial <1 day    Arterial Line 04/25/24 1624  Left Radial <1 day              Peripheral Intravenous Line  Duration                  Peripheral IV - Single Lumen 04/25/24 1611 22 G Left Forearm <1 day         Peripheral IV - Single Lumen 04/25/24 1612 20 G Left Forearm <1 day         Peripheral IV - Single Lumen 04/25/24 1652 18 G;1 3/4 in Anterior;Left Upper Arm <1 day         Peripheral IV - Single Lumen 04/25/24 1652 20 G;1 3/4 in Anterior;Left;Proximal Forearm <1 day                    Significant Labs:    CBC/Anemia Profile:  Recent Labs   Lab 04/25/24  1154 04/25/24  1530   WBC 5.30 7.61   HGB 11.7* 11.1*   HCT 34.4* 32.8*    281   MCV 92 92   RDW 12.4 12.5        Chemistries:  Recent Labs   Lab 04/25/24  1154 04/25/24  1530    138   K 3.5 3.5    102   CO2 27 25   BUN 26* 25*   CREATININE 0.9 0.8   CALCIUM 9.4 9.7   ALBUMIN 3.9 3.7   PROT 6.8 6.3   BILITOT 0.8 0.8   ALKPHOS 67 66   ALT 14 10   AST 13 11   MG  --  1.8   PHOS  --  2.8       All pertinent labs within the past 24 hours have been reviewed.    Significant Imaging: I have reviewed all pertinent imaging results/findings within the past 24 hours.

## 2024-04-25 NOTE — CONSULTS
NIAS placed 18g 1 3/4 PIV in CHRIS using u/s guidance.    NIAS placed 20g 1 3/4 PIV in LFA using u/s guidance.

## 2024-04-25 NOTE — H&P
[facility] - Surgical Intensive Care  History & Physical  Cardiothoracic Surgery    SUBJECTIVE:     Chief Complaint/Reason for Admission: Chest pain    History of Present Illness:  Heidy Polo is a 68 y.o. female with a pertinent history of hypertension, hyperlipidemia, and right breast cancer s/p modified radical mastectomy and adjuvant chemoradiation therapy who presented to [place] ED this morning with acute onset sharp chest pain with radiation to the left jaw. She reports associated dizziness and headache, no shortness of breath. Reports compliance with her home antihypertensives. Was not found to be in a hypertensive crisis but CTA imaging demonstrated an intramural hematoma involving the ascending aorta through the distal arch. She was subsequently transferred to [facility] for ongoing care. Upon evaluation in the surgical ICU, she is hemodynamically stable with SBP 140s and currently pain free. No prior sternotomies, non-smoker.    Current Facility-Administered Medications   Medication Dose Route Frequency Provider Last Rate Last Admin    esmolol 2000 mg in sodium chloride 0.9% 100 mL (20 mg/mL)  0-300 mcg/kg/min Intravenous Continuous Tashia Flanagan MD        lactated ringers infusion   Intravenous Continuous Tashia Flanagan MD        mupirocin 2 % ointment   Nasal BID Aquilino Ochoa MD        niCARdipine 40 mg/200 mL (0.2 mg/mL) infusion  0-15 mg/hr Intravenous Continuous Tashia Flanagan MD 12.5 mL/hr at 04/25/24 1630 2.5 mg/hr at 04/25/24 1630    sodium chloride 0.9% flush 10 mL  10 mL Intravenous PRN Tashia Flanagan MD           Review of patient's allergies indicates:  No Known Allergies    Past Medical History:   Diagnosis Date    Breast cancer 2001    right    Cancer     breast in 2001 -right     Depression 07/16/2012    History of breast cancer     Hyperlipidemia     Hypertension     Hypothyroid      Past Surgical History:   Procedure Laterality Date    CAPSULOTOMY OF JOINT Left 06/04/2021     Procedure: CAPSULOTOMY, JOINT 2nd MPJ;  Surgeon: Marnie Mckeon DPM;  Location: [facility] OR;  Service: Podiatry;  Laterality: Left;    CAPSULOTOMY OF JOINT Left 01/07/2022    3rd and 4th    CAPSULOTOMY OF JOINT Left 01/07/2022    Procedure: 2,3,4,5;  Surgeon: Marnie Mckeon DPM;  Location: [facility] OR;  Service: Podiatry;  Laterality: Left;    CHOLECYSTECTOMY      COLONOSCOPY N/A 11/09/2020    Procedure: COLONOSCOPY;  Surgeon: Adelaide Hernández MD;  Location: [facility] ENDO;  Service: Endoscopy;  Laterality: N/A;  with biopsy    CORRECTION OF HAMMER TOE Left 06/04/2021    Procedure: CORRECTION, HAMMER TOE 2nd with K-wire stabilization;  Surgeon: Marnie Mckeon DPM;  Location: [facility] OR;  Service: Podiatry;  Laterality: Left;  COVID VACCINATION CONFIRMED    CORRECTION OF HAMMER TOE Left 01/07/2022    3rd, 4th and 5th    CORRECTION OF HAMMER TOE Left 01/07/2022    Procedure: CORRECTION, HAMMER TOE 2,3,4,5;  Surgeon: Marnie Mckeon DPM;  Location: [facility] OR;  Service: Podiatry;  Laterality: Left;    HERNIA REPAIR      LAPAROSCOPIC SLEEVE GASTRECTOMY      MODIFIED RADICAL MASTECTOMY W/ AXILLARY LYMPH NODE DISSECTION Right 2001    TOTAL KNEE ARTHROPLASTY Right 02/09/2021    Procedure: ARTHROPLASTY, KNEE, TOTAL;  Surgeon: Ilya Murphy MD;  Location: [facility] OR;  Service: Orthopedics;  Laterality: Right;       Social History     Tobacco Use    Smoking status: Never    Smokeless tobacco: Never   Substance Use Topics    Alcohol use: No     Comment: rare    Drug use: No        Review of Systems:  Constitutional: no fever or chills  Eyes: no visual changes  Respiratory: no cough or shortness of breath  Cardiovascular: no chest pain or palpitations  Gastrointestinal: no nausea or vomiting  Hematologic/Lymphatic: no easy bruising or lymphadenopathy  Musculoskeletal: no arthralgias or myalgias  Neurological: no seizures or tremors  Behavioral/Psych: no auditory or visual hallucinations    OBJECTIVE:     Vital Signs (Most Recent):  Temp: 97.7 °F (36.5 °C)  (04/25/24 1445)  Pulse: 70 (04/25/24 1600)  Resp: 12 (04/25/24 1600)  BP: (!) 148/70 (04/25/24 1629)  SpO2: 100 % (04/25/24 1600)    Admission: Weight: 74.5 kg (164 lb 3.9 oz) (04/25/24 1529)   Most Recent: Weight: 74.5 kg (164 lb 3.9 oz) (04/25/24 1529)    Physical Exam:  General: no distress  Head: normocephalic  Neck: supple, symmetrical, trachea midline  Lungs:  normal respiratory effort  Heart: regular rate and rhythm  Abdomen: soft, non-tender, non-distended   Extremities: warm, well perfused and no cyanosis or edema, or clubbing  Skin: Skin color, texture, turgor normal. No rashes or lesions  Neurologic: Alert and oriented. Thought content appropriate    Laboratory:  I have reviewed all pertinent lab results within the past 24 hours.    Diagnostic Results:  CT: Reviewed      ASSESSMENT/PLAN:     Heidy Polo is a 68 y.o. female with a pertinent history of hypertension, hyperlipidemia, and right breast cancer s/p modified radical mastectomy and adjuvant chemoradiation therapy who presents with a type A intramural hematoma. Currently pain free and hemodynamically stable.    Admit to SICU for anti-impulse therapy. Will proceed to the OR tomorrow morning for replacement of the ascending aorta, possible hemiarch, and all other indicated procedures.    We discussed the risks and benefits of the proposed procedure, including but not limited to death, stroke, respiratory complications, renal complications including potential need for dialysis, arrythmia and potential need for permanent pacemaker, bleeding, and infection. Questions were answered. Patient voiced understanding and consent was obtained to proceed.      Mushtaq Laird DO  Cardiothoracic Surgery Fellow

## 2024-04-25 NOTE — EICU
Intervention Initiated From:  Bedside    Cheko intervened regarding:  Time-Out      eLerted for arterial line placement. Time out done. Left and right a-lines placed without incident. Pt tolerated well. LDAs added.    *Bedside procedure charge entered*

## 2024-04-26 ENCOUNTER — ANESTHESIA (OUTPATIENT)
Dept: SURGERY | Facility: HOSPITAL | Age: 69
DRG: 220 | End: 2024-04-26
Payer: MEDICARE

## 2024-04-26 PROBLEM — R73.9 TRANSIENT HYPERGLYCEMIA POST PROCEDURE: Status: ACTIVE | Noted: 2024-04-26

## 2024-04-26 LAB
ALBUMIN SERPL BCP-MCNC: 2.8 G/DL (ref 3.5–5.2)
ALBUMIN SERPL BCP-MCNC: 3.4 G/DL (ref 3.5–5.2)
ALBUMIN SERPL BCP-MCNC: 3.6 G/DL (ref 3.5–5.2)
ALBUMIN SERPL BCP-MCNC: 3.8 G/DL (ref 3.5–5.2)
ALLENS TEST: ABNORMAL
ALP SERPL-CCNC: 127 U/L (ref 55–135)
ALP SERPL-CCNC: 145 U/L (ref 55–135)
ALP SERPL-CCNC: 145 U/L (ref 55–135)
ALP SERPL-CCNC: 71 U/L (ref 55–135)
ALT SERPL W/O P-5'-P-CCNC: 356 U/L (ref 10–44)
ALT SERPL W/O P-5'-P-CCNC: 408 U/L (ref 10–44)
ALT SERPL W/O P-5'-P-CCNC: 422 U/L (ref 10–44)
ALT SERPL W/O P-5'-P-CCNC: 9 U/L (ref 10–44)
ANION GAP SERPL CALC-SCNC: 14 MMOL/L (ref 8–16)
ANION GAP SERPL CALC-SCNC: 15 MMOL/L (ref 8–16)
ANION GAP SERPL CALC-SCNC: 17 MMOL/L (ref 8–16)
ANION GAP SERPL CALC-SCNC: 9 MMOL/L (ref 8–16)
APTT PPP: 23.2 SEC (ref 21–32)
APTT PPP: 24.3 SEC (ref 21–32)
APTT PPP: 25.1 SEC (ref 21–32)
AST SERPL-CCNC: 1011 U/L (ref 10–40)
AST SERPL-CCNC: 12 U/L (ref 10–40)
AST SERPL-CCNC: 872 U/L (ref 10–40)
AST SERPL-CCNC: 995 U/L (ref 10–40)
BASOPHILS # BLD AUTO: 0.04 K/UL (ref 0–0.2)
BASOPHILS NFR BLD: 0.5 % (ref 0–1.9)
BILIRUB SERPL-MCNC: 1.2 MG/DL (ref 0.1–1)
BILIRUB SERPL-MCNC: 2.8 MG/DL (ref 0.1–1)
BILIRUB SERPL-MCNC: 3.4 MG/DL (ref 0.1–1)
BILIRUB SERPL-MCNC: 3.7 MG/DL (ref 0.1–1)
BLD PROD TYP BPU: NORMAL
BLOOD UNIT EXPIRATION DATE: NORMAL
BLOOD UNIT TYPE CODE: 5100
BLOOD UNIT TYPE CODE: 600
BLOOD UNIT TYPE CODE: 6200
BLOOD UNIT TYPE CODE: 7300
BLOOD UNIT TYPE: NORMAL
BUN SERPL-MCNC: 15 MG/DL (ref 8–23)
BUN SERPL-MCNC: 16 MG/DL (ref 8–23)
BUN SERPL-MCNC: 17 MG/DL (ref 8–23)
BUN SERPL-MCNC: 21 MG/DL (ref 8–23)
CA-I BLDV-SCNC: 1.2 MMOL/L (ref 1.06–1.42)
CALCIUM SERPL-MCNC: 8.8 MG/DL (ref 8.7–10.5)
CALCIUM SERPL-MCNC: 9.4 MG/DL (ref 8.7–10.5)
CALCIUM SERPL-MCNC: 9.4 MG/DL (ref 8.7–10.5)
CALCIUM SERPL-MCNC: 9.8 MG/DL (ref 8.7–10.5)
CHLORIDE SERPL-SCNC: 103 MMOL/L (ref 95–110)
CHLORIDE SERPL-SCNC: 104 MMOL/L (ref 95–110)
CHLORIDE SERPL-SCNC: 105 MMOL/L (ref 95–110)
CHLORIDE SERPL-SCNC: 105 MMOL/L (ref 95–110)
CO2 SERPL-SCNC: 21 MMOL/L (ref 23–29)
CO2 SERPL-SCNC: 22 MMOL/L (ref 23–29)
CO2 SERPL-SCNC: 22 MMOL/L (ref 23–29)
CO2 SERPL-SCNC: 23 MMOL/L (ref 23–29)
CODING SYSTEM: NORMAL
CREAT SERPL-MCNC: 0.8 MG/DL (ref 0.5–1.4)
CREAT SERPL-MCNC: 0.8 MG/DL (ref 0.5–1.4)
CREAT SERPL-MCNC: 0.9 MG/DL (ref 0.5–1.4)
CREAT SERPL-MCNC: 1 MG/DL (ref 0.5–1.4)
CROSSMATCH INTERPRETATION: NORMAL
DELSYS: ABNORMAL
DIFFERENTIAL METHOD BLD: ABNORMAL
DISPENSE STATUS: NORMAL
EOSINOPHIL # BLD AUTO: 0 K/UL (ref 0–0.5)
EOSINOPHIL NFR BLD: 0.4 % (ref 0–8)
ERYTHROCYTE [DISTWIDTH] IN BLOOD BY AUTOMATED COUNT: 12.4 % (ref 11.5–14.5)
ERYTHROCYTE [DISTWIDTH] IN BLOOD BY AUTOMATED COUNT: 13.4 % (ref 11.5–14.5)
ERYTHROCYTE [DISTWIDTH] IN BLOOD BY AUTOMATED COUNT: 13.8 % (ref 11.5–14.5)
ERYTHROCYTE [SEDIMENTATION RATE] IN BLOOD BY WESTERGREN METHOD: 20 MM/H
EST. GFR  (NO RACE VARIABLE): >60 ML/MIN/1.73 M^2
FIBRINOGEN PPP-MCNC: 277 MG/DL (ref 182–400)
FIBRINOGEN PPP-MCNC: 297 MG/DL (ref 182–400)
FIBRINOGEN PPP-MCNC: 360 MG/DL (ref 182–400)
FIO2: 100
FIO2: 100
FIO2: 40
FIO2: 60
FIO2: 60
GLUCOSE SERPL-MCNC: 100 MG/DL (ref 70–110)
GLUCOSE SERPL-MCNC: 132 MG/DL (ref 70–110)
GLUCOSE SERPL-MCNC: 137 MG/DL (ref 70–110)
GLUCOSE SERPL-MCNC: 147 MG/DL (ref 70–110)
GLUCOSE SERPL-MCNC: 148 MG/DL (ref 70–110)
GLUCOSE SERPL-MCNC: 157 MG/DL (ref 70–110)
GLUCOSE SERPL-MCNC: 184 MG/DL (ref 70–110)
GLUCOSE SERPL-MCNC: 240 MG/DL (ref 70–110)
GLUCOSE SERPL-MCNC: 243 MG/DL (ref 70–110)
GLUCOSE SERPL-MCNC: 263 MG/DL (ref 70–110)
GLUCOSE SERPL-MCNC: 265 MG/DL (ref 70–110)
GLUCOSE SERPL-MCNC: 266 MG/DL (ref 70–110)
GLUCOSE SERPL-MCNC: 305 MG/DL (ref 70–110)
GLUCOSE SERPL-MCNC: 93 MG/DL (ref 70–110)
HCO3 UR-SCNC: 21.8 MMOL/L (ref 24–28)
HCO3 UR-SCNC: 22 MMOL/L (ref 24–28)
HCO3 UR-SCNC: 22.3 MMOL/L (ref 24–28)
HCO3 UR-SCNC: 22.5 MMOL/L (ref 24–28)
HCO3 UR-SCNC: 23.6 MMOL/L (ref 24–28)
HCO3 UR-SCNC: 23.7 MMOL/L (ref 24–28)
HCO3 UR-SCNC: 24.2 MMOL/L (ref 24–28)
HCO3 UR-SCNC: 24.2 MMOL/L (ref 24–28)
HCO3 UR-SCNC: 24.5 MMOL/L (ref 24–28)
HCO3 UR-SCNC: 24.5 MMOL/L (ref 24–28)
HCO3 UR-SCNC: 24.6 MMOL/L (ref 24–28)
HCO3 UR-SCNC: 24.6 MMOL/L (ref 24–28)
HCO3 UR-SCNC: 24.7 MMOL/L (ref 24–28)
HCO3 UR-SCNC: 24.9 MMOL/L (ref 24–28)
HCO3 UR-SCNC: 25.2 MMOL/L (ref 24–28)
HCO3 UR-SCNC: 26.8 MMOL/L (ref 24–28)
HCT VFR BLD AUTO: 27.5 % (ref 37–48.5)
HCT VFR BLD AUTO: 29.3 % (ref 37–48.5)
HCT VFR BLD AUTO: 29.7 % (ref 37–48.5)
HCT VFR BLD CALC: 17 %PCV (ref 36–54)
HCT VFR BLD CALC: 19 %PCV (ref 36–54)
HCT VFR BLD CALC: 20 %PCV (ref 36–54)
HCT VFR BLD CALC: 21 %PCV (ref 36–54)
HCT VFR BLD CALC: 22 %PCV (ref 36–54)
HCT VFR BLD CALC: 22 %PCV (ref 36–54)
HCT VFR BLD CALC: 23 %PCV (ref 36–54)
HCT VFR BLD CALC: 25 %PCV (ref 36–54)
HCT VFR BLD CALC: 26 %PCV (ref 36–54)
HCT VFR BLD CALC: 27 %PCV (ref 36–54)
HCT VFR BLD CALC: 27 %PCV (ref 36–54)
HCT VFR BLD CALC: 28 %PCV (ref 36–54)
HGB BLD-MCNC: 10.1 G/DL (ref 12–16)
HGB BLD-MCNC: 10.5 G/DL (ref 12–16)
HGB BLD-MCNC: 9.8 G/DL (ref 12–16)
IMM GRANULOCYTES # BLD AUTO: 0.01 K/UL (ref 0–0.04)
IMM GRANULOCYTES NFR BLD AUTO: 0.1 % (ref 0–0.5)
INR PPP: 0.9 (ref 0.8–1.2)
INR PPP: 1 (ref 0.8–1.2)
INR PPP: 1 (ref 0.8–1.2)
INR PPP: 1.2 (ref 0.8–1.2)
LDH SERPL L TO P-CCNC: 0.4 MMOL/L (ref 0.36–1.25)
LDH SERPL L TO P-CCNC: 0.52 MMOL/L (ref 0.36–1.25)
LDH SERPL L TO P-CCNC: 1.79 MMOL/L (ref 0.36–1.25)
LDH SERPL L TO P-CCNC: 2.25 MMOL/L (ref 0.36–1.25)
LDH SERPL L TO P-CCNC: 2.87 MMOL/L (ref 0.36–1.25)
LDH SERPL L TO P-CCNC: 3.62 MMOL/L (ref 0.36–1.25)
LDH SERPL L TO P-CCNC: 5.1 MMOL/L (ref 0.36–1.25)
LDH SERPL L TO P-CCNC: 5.55 MMOL/L (ref 0.36–1.25)
LDH SERPL L TO P-CCNC: 6.1 MMOL/L (ref 0.36–1.25)
LDH SERPL L TO P-CCNC: 7.17 MMOL/L (ref 0.36–1.25)
LYMPHOCYTES # BLD AUTO: 1 K/UL (ref 1–4.8)
LYMPHOCYTES NFR BLD: 13.6 % (ref 18–48)
MAGNESIUM SERPL-MCNC: 2.1 MG/DL (ref 1.6–2.6)
MAGNESIUM SERPL-MCNC: 2.1 MG/DL (ref 1.6–2.6)
MAGNESIUM SERPL-MCNC: 2.3 MG/DL (ref 1.6–2.6)
MCH RBC QN AUTO: 29.7 PG (ref 27–31)
MCH RBC QN AUTO: 30.7 PG (ref 27–31)
MCH RBC QN AUTO: 31.6 PG (ref 27–31)
MCHC RBC AUTO-ENTMCNC: 34.5 G/DL (ref 32–36)
MCHC RBC AUTO-ENTMCNC: 35.4 G/DL (ref 32–36)
MCHC RBC AUTO-ENTMCNC: 35.6 G/DL (ref 32–36)
MCV RBC AUTO: 86 FL (ref 82–98)
MCV RBC AUTO: 86 FL (ref 82–98)
MCV RBC AUTO: 90 FL (ref 82–98)
MODE: ABNORMAL
MONOCYTES # BLD AUTO: 0.6 K/UL (ref 0.3–1)
MONOCYTES NFR BLD: 7.9 % (ref 4–15)
NEUTROPHILS # BLD AUTO: 5.7 K/UL (ref 1.8–7.7)
NEUTROPHILS NFR BLD: 77.5 % (ref 38–73)
NRBC BLD-RTO: 0 /100 WBC
NUM UNITS TRANS FFP: NORMAL
NUM UNITS TRANS PACKED RBC: NORMAL
PCO2 BLDA: 33.1 MMHG (ref 35–45)
PCO2 BLDA: 33.4 MMHG (ref 35–45)
PCO2 BLDA: 34 MMHG (ref 35–45)
PCO2 BLDA: 35.6 MMHG (ref 35–45)
PCO2 BLDA: 37 MMHG (ref 35–45)
PCO2 BLDA: 38.2 MMHG (ref 35–45)
PCO2 BLDA: 38.2 MMHG (ref 35–45)
PCO2 BLDA: 38.7 MMHG (ref 35–45)
PCO2 BLDA: 38.8 MMHG (ref 35–45)
PCO2 BLDA: 39 MMHG (ref 35–45)
PCO2 BLDA: 39.7 MMHG (ref 35–45)
PCO2 BLDA: 40.7 MMHG (ref 35–45)
PCO2 BLDA: 41.1 MMHG (ref 35–45)
PCO2 BLDA: 50.3 MMHG (ref 35–45)
PCO2 BLDA: 53.4 MMHG (ref 35–45)
PCO2 BLDA: 53.4 MMHG (ref 35–45)
PEEP: 5
PH SMN: 7.25 [PH] (ref 7.35–7.45)
PH SMN: 7.29 [PH] (ref 7.35–7.45)
PH SMN: 7.31 [PH] (ref 7.35–7.45)
PH SMN: 7.37 [PH] (ref 7.35–7.45)
PH SMN: 7.37 [PH] (ref 7.35–7.45)
PH SMN: 7.38 [PH] (ref 7.35–7.45)
PH SMN: 7.38 [PH] (ref 7.35–7.45)
PH SMN: 7.41 [PH] (ref 7.35–7.45)
PH SMN: 7.42 [PH] (ref 7.35–7.45)
PH SMN: 7.42 [PH] (ref 7.35–7.45)
PH SMN: 7.43 [PH] (ref 7.35–7.45)
PH SMN: 7.43 [PH] (ref 7.35–7.45)
PH SMN: 7.45 [PH] (ref 7.35–7.45)
PH SMN: 7.46 [PH] (ref 7.35–7.45)
PHOSPHATE SERPL-MCNC: 2 MG/DL (ref 2.7–4.5)
PHOSPHATE SERPL-MCNC: 2.8 MG/DL (ref 2.7–4.5)
PHOSPHATE SERPL-MCNC: 3.4 MG/DL (ref 2.7–4.5)
PLATELET # BLD AUTO: 116 K/UL (ref 150–450)
PLATELET # BLD AUTO: 143 K/UL (ref 150–450)
PLATELET # BLD AUTO: 211 K/UL (ref 150–450)
PMV BLD AUTO: 10.8 FL (ref 9.2–12.9)
PMV BLD AUTO: 10.8 FL (ref 9.2–12.9)
PMV BLD AUTO: 9.8 FL (ref 9.2–12.9)
PO2 BLDA: 124 MMHG (ref 80–100)
PO2 BLDA: 134 MMHG (ref 80–100)
PO2 BLDA: 146 MMHG (ref 80–100)
PO2 BLDA: 146 MMHG (ref 80–100)
PO2 BLDA: 148 MMHG (ref 80–100)
PO2 BLDA: 153 MMHG (ref 80–100)
PO2 BLDA: 159 MMHG (ref 80–100)
PO2 BLDA: 163 MMHG (ref 80–100)
PO2 BLDA: 214 MMHG (ref 80–100)
PO2 BLDA: 238 MMHG (ref 80–100)
PO2 BLDA: 297 MMHG (ref 80–100)
PO2 BLDA: 317 MMHG (ref 80–100)
PO2 BLDA: 439 MMHG (ref 80–100)
PO2 BLDA: 481 MMHG (ref 80–100)
PO2 BLDA: 52 MMHG (ref 80–100)
PO2 BLDA: 57 MMHG (ref 40–60)
POC ACTIVATED CLOTTING TIME K: 141 SEC (ref 74–137)
POC BE: -1 MMOL/L
POC BE: -2 MMOL/L
POC BE: -2 MMOL/L
POC BE: -3 MMOL/L
POC BE: 0 MMOL/L
POC BE: 1 MMOL/L
POC IONIZED CALCIUM: 0.89 MMOL/L (ref 1.06–1.42)
POC IONIZED CALCIUM: 0.9 MMOL/L (ref 1.06–1.42)
POC IONIZED CALCIUM: 0.91 MMOL/L (ref 1.06–1.42)
POC IONIZED CALCIUM: 0.95 MMOL/L (ref 1.06–1.42)
POC IONIZED CALCIUM: 0.95 MMOL/L (ref 1.06–1.42)
POC IONIZED CALCIUM: 0.98 MMOL/L (ref 1.06–1.42)
POC IONIZED CALCIUM: 0.99 MMOL/L (ref 1.06–1.42)
POC IONIZED CALCIUM: 1.04 MMOL/L (ref 1.06–1.42)
POC IONIZED CALCIUM: 1.13 MMOL/L (ref 1.06–1.42)
POC IONIZED CALCIUM: 1.14 MMOL/L (ref 1.06–1.42)
POC IONIZED CALCIUM: 1.21 MMOL/L (ref 1.06–1.42)
POC IONIZED CALCIUM: 1.24 MMOL/L (ref 1.06–1.42)
POC IONIZED CALCIUM: 1.26 MMOL/L (ref 1.06–1.42)
POC IONIZED CALCIUM: 1.27 MMOL/L (ref 1.06–1.42)
POC IONIZED CALCIUM: 1.27 MMOL/L (ref 1.06–1.42)
POC IONIZED CALCIUM: 1.29 MMOL/L (ref 1.06–1.42)
POC PCO2 TEMP: 33.3 MMHG
POC PCO2 TEMP: 34.2 MMHG
POC PCO2 TEMP: 39.3 MMHG
POC PH TEMP: 7.39
POC PH TEMP: 7.41
POC PH TEMP: 7.46
POC PO2 TEMP: 110 MMHG
POC PO2 TEMP: 265 MMHG
POC PO2 TEMP: 294 MMHG
POC SATURATED O2: 100 % (ref 95–100)
POC SATURATED O2: 82 % (ref 95–100)
POC SATURATED O2: 90 % (ref 95–100)
POC SATURATED O2: 99 % (ref 95–100)
POC TCO2: 23 MMOL/L (ref 23–27)
POC TCO2: 24 MMOL/L (ref 23–27)
POC TCO2: 25 MMOL/L (ref 23–27)
POC TCO2: 26 MMOL/L (ref 23–27)
POC TCO2: 26 MMOL/L (ref 24–29)
POC TCO2: 28 MMOL/L (ref 23–27)
POC TEMPERATURE: ABNORMAL
POCT GLUCOSE: 128 MG/DL (ref 70–110)
POCT GLUCOSE: 131 MG/DL (ref 70–110)
POCT GLUCOSE: 165 MG/DL (ref 70–110)
POCT GLUCOSE: 168 MG/DL (ref 70–110)
POCT GLUCOSE: 182 MG/DL (ref 70–110)
POCT GLUCOSE: 184 MG/DL (ref 70–110)
POCT GLUCOSE: 189 MG/DL (ref 70–110)
POCT GLUCOSE: 224 MG/DL (ref 70–110)
POCT GLUCOSE: 269 MG/DL (ref 70–110)
POTASSIUM BLD-SCNC: 2.6 MMOL/L (ref 3.5–5.1)
POTASSIUM BLD-SCNC: 2.7 MMOL/L (ref 3.5–5.1)
POTASSIUM BLD-SCNC: 2.8 MMOL/L (ref 3.5–5.1)
POTASSIUM BLD-SCNC: 3 MMOL/L (ref 3.5–5.1)
POTASSIUM BLD-SCNC: 3 MMOL/L (ref 3.5–5.1)
POTASSIUM BLD-SCNC: 3.1 MMOL/L (ref 3.5–5.1)
POTASSIUM BLD-SCNC: 3.2 MMOL/L (ref 3.5–5.1)
POTASSIUM BLD-SCNC: 3.2 MMOL/L (ref 3.5–5.1)
POTASSIUM BLD-SCNC: 3.3 MMOL/L (ref 3.5–5.1)
POTASSIUM BLD-SCNC: 3.3 MMOL/L (ref 3.5–5.1)
POTASSIUM BLD-SCNC: 3.6 MMOL/L (ref 3.5–5.1)
POTASSIUM BLD-SCNC: 3.6 MMOL/L (ref 3.5–5.1)
POTASSIUM BLD-SCNC: 3.7 MMOL/L (ref 3.5–5.1)
POTASSIUM BLD-SCNC: 3.8 MMOL/L (ref 3.5–5.1)
POTASSIUM BLD-SCNC: 3.8 MMOL/L (ref 3.5–5.1)
POTASSIUM BLD-SCNC: 4.6 MMOL/L (ref 3.5–5.1)
POTASSIUM SERPL-SCNC: 3.1 MMOL/L (ref 3.5–5.1)
POTASSIUM SERPL-SCNC: 3.1 MMOL/L (ref 3.5–5.1)
POTASSIUM SERPL-SCNC: 3.3 MMOL/L (ref 3.5–5.1)
POTASSIUM SERPL-SCNC: 3.6 MMOL/L (ref 3.5–5.1)
PROT SERPL-MCNC: 5.3 G/DL (ref 6–8.4)
PROT SERPL-MCNC: 5.8 G/DL (ref 6–8.4)
PROT SERPL-MCNC: 5.9 G/DL (ref 6–8.4)
PROT SERPL-MCNC: 6 G/DL (ref 6–8.4)
PROTHROMBIN TIME: 10.4 SEC (ref 9–12.5)
PROTHROMBIN TIME: 11.3 SEC (ref 9–12.5)
PROTHROMBIN TIME: 11.4 SEC (ref 9–12.5)
PROTHROMBIN TIME: 13 SEC (ref 9–12.5)
RBC # BLD AUTO: 3.19 M/UL (ref 4–5.4)
RBC # BLD AUTO: 3.32 M/UL (ref 4–5.4)
RBC # BLD AUTO: 3.4 M/UL (ref 4–5.4)
SAMPLE: ABNORMAL
SAMPLE: NORMAL
SAMPLE: NORMAL
SITE: ABNORMAL
SODIUM BLD-SCNC: 135 MMOL/L (ref 136–145)
SODIUM BLD-SCNC: 135 MMOL/L (ref 136–145)
SODIUM BLD-SCNC: 136 MMOL/L (ref 136–145)
SODIUM BLD-SCNC: 137 MMOL/L (ref 136–145)
SODIUM BLD-SCNC: 138 MMOL/L (ref 136–145)
SODIUM BLD-SCNC: 140 MMOL/L (ref 136–145)
SODIUM BLD-SCNC: 140 MMOL/L (ref 136–145)
SODIUM BLD-SCNC: 141 MMOL/L (ref 136–145)
SODIUM BLD-SCNC: 142 MMOL/L (ref 136–145)
SODIUM SERPL-SCNC: 137 MMOL/L (ref 136–145)
SODIUM SERPL-SCNC: 139 MMOL/L (ref 136–145)
SODIUM SERPL-SCNC: 141 MMOL/L (ref 136–145)
SODIUM SERPL-SCNC: 143 MMOL/L (ref 136–145)
SP02: 100
SP02: 100
SP02: 99
SP02: 99
UNIT NUMBER: NORMAL
VT: 400
WBC # BLD AUTO: 7.3 K/UL (ref 3.9–12.7)
WBC # BLD AUTO: 8.17 K/UL (ref 3.9–12.7)
WBC # BLD AUTO: 8.31 K/UL (ref 3.9–12.7)

## 2024-04-26 PROCEDURE — 27201423 OPTIME MED/SURG SUP & DEVICES STERILE SUPPLY: Performed by: THORACIC SURGERY (CARDIOTHORACIC VASCULAR SURGERY)

## 2024-04-26 PROCEDURE — 5A1221Z PERFORMANCE OF CARDIAC OUTPUT, CONTINUOUS: ICD-10-PCS | Performed by: THORACIC SURGERY (CARDIOTHORACIC VASCULAR SURGERY)

## 2024-04-26 PROCEDURE — 25000003 PHARM REV CODE 250: Performed by: STUDENT IN AN ORGANIZED HEALTH CARE EDUCATION/TRAINING PROGRAM

## 2024-04-26 PROCEDURE — P9035 PLATELET PHERES LEUKOREDUCED: HCPCS | Performed by: THORACIC SURGERY (CARDIOTHORACIC VASCULAR SURGERY)

## 2024-04-26 PROCEDURE — 82800 BLOOD PH: CPT

## 2024-04-26 PROCEDURE — D9220A PRA ANESTHESIA: Mod: ,,, | Performed by: ANESTHESIOLOGY

## 2024-04-26 PROCEDURE — P9012 CRYOPRECIPITATE EACH UNIT: HCPCS | Performed by: THORACIC SURGERY (CARDIOTHORACIC VASCULAR SURGERY)

## 2024-04-26 PROCEDURE — 82803 BLOOD GASES ANY COMBINATION: CPT

## 2024-04-26 PROCEDURE — 27100088 HC CELL SAVER

## 2024-04-26 PROCEDURE — 85610 PROTHROMBIN TIME: CPT | Mod: 91

## 2024-04-26 PROCEDURE — 27000191 HC C-V MONITORING

## 2024-04-26 PROCEDURE — 85520 HEPARIN ASSAY: CPT

## 2024-04-26 PROCEDURE — 37799 UNLISTED PX VASCULAR SURGERY: CPT

## 2024-04-26 PROCEDURE — 27202608 HC CANNULA, MISC

## 2024-04-26 PROCEDURE — 84132 ASSAY OF SERUM POTASSIUM: CPT

## 2024-04-26 PROCEDURE — 63600531 PHARM REV CODE 636 NO ALT 250 W HCPCS: Mod: JZ,JG

## 2024-04-26 PROCEDURE — 63600175 PHARM REV CODE 636 W HCPCS

## 2024-04-26 PROCEDURE — 33858 AS-AORT GRF F/AORTIC DSJ: CPT | Mod: ,,, | Performed by: THORACIC SURGERY (CARDIOTHORACIC VASCULAR SURGERY)

## 2024-04-26 PROCEDURE — P9017 PLASMA 1 DONOR FRZ W/IN 8 HR: HCPCS | Performed by: THORACIC SURGERY (CARDIOTHORACIC VASCULAR SURGERY)

## 2024-04-26 PROCEDURE — 27200953 HC CARDIOPLEGIA SYSTEM

## 2024-04-26 PROCEDURE — 76937 US GUIDE VASCULAR ACCESS: CPT | Mod: 26,,, | Performed by: ANESTHESIOLOGY

## 2024-04-26 PROCEDURE — 88305 TISSUE EXAM BY PATHOLOGIST: CPT | Performed by: PATHOLOGY

## 2024-04-26 PROCEDURE — 30233R1 TRANSFUSION OF NONAUTOLOGOUS PLATELETS INTO PERIPHERAL VEIN, PERCUTANEOUS APPROACH: ICD-10-PCS | Performed by: THORACIC SURGERY (CARDIOTHORACIC VASCULAR SURGERY)

## 2024-04-26 PROCEDURE — 94761 N-INVAS EAR/PLS OXIMETRY MLT: CPT | Mod: XB

## 2024-04-26 PROCEDURE — 03HB33Z INSERTION OF INFUSION DEVICE INTO RIGHT RADIAL ARTERY, PERCUTANEOUS APPROACH: ICD-10-PCS | Performed by: THORACIC SURGERY (CARDIOTHORACIC VASCULAR SURGERY)

## 2024-04-26 PROCEDURE — 27100171 HC OXYGEN HIGH FLOW UP TO 24 HOURS

## 2024-04-26 PROCEDURE — 36556 INSERT NON-TUNNEL CV CATH: CPT | Mod: 59,,, | Performed by: ANESTHESIOLOGY

## 2024-04-26 PROCEDURE — 27800903 OPTIME MED/SURG SUP & DEVICES OTHER IMPLANTS: Performed by: THORACIC SURGERY (CARDIOTHORACIC VASCULAR SURGERY)

## 2024-04-26 PROCEDURE — P9016 RBC LEUKOCYTES REDUCED: HCPCS | Performed by: THORACIC SURGERY (CARDIOTHORACIC VASCULAR SURGERY)

## 2024-04-26 PROCEDURE — 27202828 HC DILATOR KIT, VASCULAR

## 2024-04-26 PROCEDURE — 83605 ASSAY OF LACTIC ACID: CPT

## 2024-04-26 PROCEDURE — 27201004 HC FEMORAL BYPASS CANNULA

## 2024-04-26 PROCEDURE — 36000713 HC OR TIME LEV V EA ADD 15 MIN: Performed by: THORACIC SURGERY (CARDIOTHORACIC VASCULAR SURGERY)

## 2024-04-26 PROCEDURE — 80053 COMPREHEN METABOLIC PANEL: CPT | Mod: 91

## 2024-04-26 PROCEDURE — 25000003 PHARM REV CODE 250

## 2024-04-26 PROCEDURE — 02RX0JZ REPLACEMENT OF THORACIC AORTA, ASCENDING/ARCH WITH SYNTHETIC SUBSTITUTE, OPEN APPROACH: ICD-10-PCS | Performed by: THORACIC SURGERY (CARDIOTHORACIC VASCULAR SURGERY)

## 2024-04-26 PROCEDURE — 27100026 HC SHUNT SENSOR, TERUMO

## 2024-04-26 PROCEDURE — 82330 ASSAY OF CALCIUM: CPT

## 2024-04-26 PROCEDURE — 80053 COMPREHEN METABOLIC PANEL: CPT | Mod: 91 | Performed by: STUDENT IN AN ORGANIZED HEALTH CARE EDUCATION/TRAINING PROGRAM

## 2024-04-26 PROCEDURE — 36000712 HC OR TIME LEV V 1ST 15 MIN: Performed by: THORACIC SURGERY (CARDIOTHORACIC VASCULAR SURGERY)

## 2024-04-26 PROCEDURE — 20000000 HC ICU ROOM

## 2024-04-26 PROCEDURE — 04HY02Z INSERTION OF MONITORING DEVICE INTO LOWER ARTERY, OPEN APPROACH: ICD-10-PCS | Performed by: THORACIC SURGERY (CARDIOTHORACIC VASCULAR SURGERY)

## 2024-04-26 PROCEDURE — C1768 GRAFT, VASCULAR: HCPCS | Performed by: THORACIC SURGERY (CARDIOTHORACIC VASCULAR SURGERY)

## 2024-04-26 PROCEDURE — 27000445 HC TEMPORARY PACEMAKER LEADS

## 2024-04-26 PROCEDURE — 04QK0ZZ REPAIR RIGHT FEMORAL ARTERY, OPEN APPROACH: ICD-10-PCS | Performed by: THORACIC SURGERY (CARDIOTHORACIC VASCULAR SURGERY)

## 2024-04-26 PROCEDURE — 33866 AORTIC HEMIARCH GRAFT: CPT | Mod: ,,, | Performed by: THORACIC SURGERY (CARDIOTHORACIC VASCULAR SURGERY)

## 2024-04-26 PROCEDURE — 86920 COMPATIBILITY TEST SPIN: CPT | Performed by: THORACIC SURGERY (CARDIOTHORACIC VASCULAR SURGERY)

## 2024-04-26 PROCEDURE — 99900035 HC TECH TIME PER 15 MIN (STAT)

## 2024-04-26 PROCEDURE — 36592 COLLECT BLOOD FROM PICC: CPT

## 2024-04-26 PROCEDURE — 86965 POOLING BLOOD PLATELETS: CPT | Performed by: THORACIC SURGERY (CARDIOTHORACIC VASCULAR SURGERY)

## 2024-04-26 PROCEDURE — 99900026 HC AIRWAY MAINTENANCE (STAT)

## 2024-04-26 PROCEDURE — 27201037 HC PRESSURE MONITORING SET UP

## 2024-04-26 PROCEDURE — 85610 PROTHROMBIN TIME: CPT | Mod: 91 | Performed by: STUDENT IN AN ORGANIZED HEALTH CARE EDUCATION/TRAINING PROGRAM

## 2024-04-26 PROCEDURE — 85027 COMPLETE CBC AUTOMATED: CPT | Mod: 91

## 2024-04-26 PROCEDURE — 84100 ASSAY OF PHOSPHORUS: CPT | Mod: 91

## 2024-04-26 PROCEDURE — 30233K1 TRANSFUSION OF NONAUTOLOGOUS FROZEN PLASMA INTO PERIPHERAL VEIN, PERCUTANEOUS APPROACH: ICD-10-PCS | Performed by: THORACIC SURGERY (CARDIOTHORACIC VASCULAR SURGERY)

## 2024-04-26 PROCEDURE — 85610 PROTHROMBIN TIME: CPT

## 2024-04-26 PROCEDURE — 63600175 PHARM REV CODE 636 W HCPCS: Mod: JZ,JG

## 2024-04-26 PROCEDURE — 63600175 PHARM REV CODE 636 W HCPCS: Performed by: STUDENT IN AN ORGANIZED HEALTH CARE EDUCATION/TRAINING PROGRAM

## 2024-04-26 PROCEDURE — C1729 CATH, DRAINAGE: HCPCS | Performed by: THORACIC SURGERY (CARDIOTHORACIC VASCULAR SURGERY)

## 2024-04-26 PROCEDURE — 27000175 HC ADULT BYPASS PUMP

## 2024-04-26 PROCEDURE — 34714 OPN FEM ART EXPOS CNDT CRTJ: CPT | Mod: RT,,, | Performed by: THORACIC SURGERY (CARDIOTHORACIC VASCULAR SURGERY)

## 2024-04-26 PROCEDURE — 63600175 PHARM REV CODE 636 W HCPCS: Mod: JZ,JG | Performed by: THORACIC SURGERY (CARDIOTHORACIC VASCULAR SURGERY)

## 2024-04-26 PROCEDURE — P9045 ALBUMIN (HUMAN), 5%, 250 ML: HCPCS | Mod: JZ,JG

## 2024-04-26 PROCEDURE — 36620 INSERTION CATHETER ARTERY: CPT

## 2024-04-26 PROCEDURE — 80053 COMPREHEN METABOLIC PANEL: CPT

## 2024-04-26 PROCEDURE — 99223 1ST HOSP IP/OBS HIGH 75: CPT | Mod: ,,, | Performed by: NURSE PRACTITIONER

## 2024-04-26 PROCEDURE — 85730 THROMBOPLASTIN TIME PARTIAL: CPT | Mod: 91 | Performed by: STUDENT IN AN ORGANIZED HEALTH CARE EDUCATION/TRAINING PROGRAM

## 2024-04-26 PROCEDURE — 85027 COMPLETE CBC AUTOMATED: CPT | Performed by: STUDENT IN AN ORGANIZED HEALTH CARE EDUCATION/TRAINING PROGRAM

## 2024-04-26 PROCEDURE — 84295 ASSAY OF SERUM SODIUM: CPT

## 2024-04-26 PROCEDURE — 83735 ASSAY OF MAGNESIUM: CPT | Mod: 91

## 2024-04-26 PROCEDURE — 85384 FIBRINOGEN ACTIVITY: CPT | Mod: 91 | Performed by: STUDENT IN AN ORGANIZED HEALTH CARE EDUCATION/TRAINING PROGRAM

## 2024-04-26 PROCEDURE — P9017 PLASMA 1 DONOR FRZ W/IN 8 HR: HCPCS

## 2024-04-26 PROCEDURE — 30233N1 TRANSFUSION OF NONAUTOLOGOUS RED BLOOD CELLS INTO PERIPHERAL VEIN, PERCUTANEOUS APPROACH: ICD-10-PCS | Performed by: THORACIC SURGERY (CARDIOTHORACIC VASCULAR SURGERY)

## 2024-04-26 PROCEDURE — 88305 TISSUE EXAM BY PATHOLOGIST: CPT | Mod: 26,,, | Performed by: PATHOLOGY

## 2024-04-26 PROCEDURE — 80053 COMPREHEN METABOLIC PANEL: CPT | Mod: 91 | Performed by: THORACIC SURGERY (CARDIOTHORACIC VASCULAR SURGERY)

## 2024-04-26 PROCEDURE — P9016 RBC LEUKOCYTES REDUCED: HCPCS

## 2024-04-26 PROCEDURE — 36415 COLL VENOUS BLD VENIPUNCTURE: CPT | Performed by: THORACIC SURGERY (CARDIOTHORACIC VASCULAR SURGERY)

## 2024-04-26 PROCEDURE — 99499 UNLISTED E&M SERVICE: CPT | Mod: GC,,, | Performed by: ANESTHESIOLOGY

## 2024-04-26 PROCEDURE — 82330 ASSAY OF CALCIUM: CPT | Performed by: STUDENT IN AN ORGANIZED HEALTH CARE EDUCATION/TRAINING PROGRAM

## 2024-04-26 PROCEDURE — 85610 PROTHROMBIN TIME: CPT | Mod: 91 | Performed by: THORACIC SURGERY (CARDIOTHORACIC VASCULAR SURGERY)

## 2024-04-26 PROCEDURE — 85730 THROMBOPLASTIN TIME PARTIAL: CPT | Mod: 91 | Performed by: THORACIC SURGERY (CARDIOTHORACIC VASCULAR SURGERY)

## 2024-04-26 PROCEDURE — 85025 COMPLETE CBC W/AUTO DIFF WBC: CPT

## 2024-04-26 PROCEDURE — 37000008 HC ANESTHESIA 1ST 15 MINUTES: Performed by: THORACIC SURGERY (CARDIOTHORACIC VASCULAR SURGERY)

## 2024-04-26 PROCEDURE — 94002 VENT MGMT INPAT INIT DAY: CPT

## 2024-04-26 PROCEDURE — P9045 ALBUMIN (HUMAN), 5%, 250 ML: HCPCS | Mod: JZ,JG | Performed by: THORACIC SURGERY (CARDIOTHORACIC VASCULAR SURGERY)

## 2024-04-26 PROCEDURE — 85730 THROMBOPLASTIN TIME PARTIAL: CPT

## 2024-04-26 PROCEDURE — 83735 ASSAY OF MAGNESIUM: CPT

## 2024-04-26 PROCEDURE — 85384 FIBRINOGEN ACTIVITY: CPT

## 2024-04-26 PROCEDURE — 84100 ASSAY OF PHOSPHORUS: CPT

## 2024-04-26 PROCEDURE — 27201673 HC ANCILLARY CANNULA

## 2024-04-26 PROCEDURE — 84100 ASSAY OF PHOSPHORUS: CPT | Mod: 91 | Performed by: STUDENT IN AN ORGANIZED HEALTH CARE EDUCATION/TRAINING PROGRAM

## 2024-04-26 PROCEDURE — 27000513 HC SENSOR FLOTRAC

## 2024-04-26 PROCEDURE — 37000009 HC ANESTHESIA EA ADD 15 MINS: Performed by: THORACIC SURGERY (CARDIOTHORACIC VASCULAR SURGERY)

## 2024-04-26 PROCEDURE — 85014 HEMATOCRIT: CPT

## 2024-04-26 PROCEDURE — 85384 FIBRINOGEN ACTIVITY: CPT | Mod: 91 | Performed by: THORACIC SURGERY (CARDIOTHORACIC VASCULAR SURGERY)

## 2024-04-26 PROCEDURE — 83735 ASSAY OF MAGNESIUM: CPT | Mod: 91 | Performed by: STUDENT IN AN ORGANIZED HEALTH CARE EDUCATION/TRAINING PROGRAM

## 2024-04-26 DEVICE — PUTTY HEMASORB BONE RESORBABLE: Type: IMPLANTABLE DEVICE | Site: CHEST | Status: FUNCTIONAL

## 2024-04-26 DEVICE — FELT THIN 4INX4IN 5EA/BX: Type: IMPLANTABLE DEVICE | Site: CHEST | Status: FUNCTIONAL

## 2024-04-26 RX ORDER — POTASSIUM CHLORIDE 14.9 MG/ML
INJECTION INTRAVENOUS CONTINUOUS PRN
Status: DISCONTINUED | OUTPATIENT
Start: 2024-04-26 | End: 2024-04-26

## 2024-04-26 RX ORDER — FUROSEMIDE 10 MG/ML
INJECTION INTRAMUSCULAR; INTRAVENOUS
Status: DISCONTINUED | OUTPATIENT
Start: 2024-04-26 | End: 2024-04-26

## 2024-04-26 RX ORDER — PHENYLEPHRINE HYDROCHLORIDE 10 MG/ML
INJECTION INTRAVENOUS
Status: DISCONTINUED | OUTPATIENT
Start: 2024-04-26 | End: 2024-04-26

## 2024-04-26 RX ORDER — ONDANSETRON HYDROCHLORIDE 2 MG/ML
4 INJECTION, SOLUTION INTRAVENOUS EVERY 6 HOURS PRN
Status: DISCONTINUED | OUTPATIENT
Start: 2024-04-26 | End: 2024-05-01 | Stop reason: HOSPADM

## 2024-04-26 RX ORDER — DOCUSATE SODIUM 100 MG/1
100 CAPSULE, LIQUID FILLED ORAL 2 TIMES DAILY
Status: DISCONTINUED | OUTPATIENT
Start: 2024-04-26 | End: 2024-05-01 | Stop reason: HOSPADM

## 2024-04-26 RX ORDER — MIDAZOLAM HYDROCHLORIDE 1 MG/ML
INJECTION INTRAMUSCULAR; INTRAVENOUS
Status: DISCONTINUED | OUTPATIENT
Start: 2024-04-26 | End: 2024-04-26

## 2024-04-26 RX ORDER — PROPOFOL 10 MG/ML
0-50 INJECTION, EMULSION INTRAVENOUS CONTINUOUS
Status: DISCONTINUED | OUTPATIENT
Start: 2024-04-26 | End: 2024-04-27

## 2024-04-26 RX ORDER — DOBUTAMINE HYDROCHLORIDE 400 MG/100ML
INJECTION INTRAVENOUS
Status: COMPLETED
Start: 2024-04-26 | End: 2024-04-26

## 2024-04-26 RX ORDER — ACETAMINOPHEN 160 MG/5ML
1000 SOLUTION ORAL EVERY 8 HOURS
Status: DISCONTINUED | OUTPATIENT
Start: 2024-04-26 | End: 2024-04-27

## 2024-04-26 RX ORDER — POLYETHYLENE GLYCOL 3350 17 G/17G
17 POWDER, FOR SOLUTION ORAL DAILY
Status: DISCONTINUED | OUTPATIENT
Start: 2024-04-26 | End: 2024-04-29

## 2024-04-26 RX ORDER — CALCIUM GLUCONATE 20 MG/ML
2 INJECTION, SOLUTION INTRAVENOUS
Status: DISCONTINUED | OUTPATIENT
Start: 2024-04-26 | End: 2024-05-01

## 2024-04-26 RX ORDER — ALBUMIN HUMAN 50 G/1000ML
SOLUTION INTRAVENOUS
Status: COMPLETED
Start: 2024-04-26 | End: 2024-04-26

## 2024-04-26 RX ORDER — LIDOCAINE HYDROCHLORIDE 20 MG/ML
INJECTION, SOLUTION EPIDURAL; INFILTRATION; INTRACAUDAL; PERINEURAL
Status: DISCONTINUED | OUTPATIENT
Start: 2024-04-26 | End: 2024-04-26

## 2024-04-26 RX ORDER — NICARDIPINE HYDROCHLORIDE 0.2 MG/ML
0-15 INJECTION INTRAVENOUS CONTINUOUS
Status: DISCONTINUED | OUTPATIENT
Start: 2024-04-26 | End: 2024-04-29

## 2024-04-26 RX ORDER — ALBUMIN HUMAN 50 G/1000ML
25 SOLUTION INTRAVENOUS ONCE
Status: COMPLETED | OUTPATIENT
Start: 2024-04-26 | End: 2024-04-27

## 2024-04-26 RX ORDER — PROPOFOL 10 MG/ML
VIAL (ML) INTRAVENOUS
Status: DISCONTINUED | OUTPATIENT
Start: 2024-04-26 | End: 2024-04-26

## 2024-04-26 RX ORDER — NITROGLYCERIN 5 MG/ML
INJECTION, SOLUTION INTRAVENOUS
Status: DISCONTINUED | OUTPATIENT
Start: 2024-04-26 | End: 2024-04-26

## 2024-04-26 RX ORDER — ALBUMIN HUMAN 50 G/1000ML
25 SOLUTION INTRAVENOUS ONCE
Status: COMPLETED | OUTPATIENT
Start: 2024-04-26 | End: 2024-04-26

## 2024-04-26 RX ORDER — NICARDIPINE HYDROCHLORIDE 0.2 MG/ML
INJECTION INTRAVENOUS CONTINUOUS PRN
Status: DISCONTINUED | OUTPATIENT
Start: 2024-04-26 | End: 2024-04-26

## 2024-04-26 RX ORDER — CALCIUM GLUCONATE 20 MG/ML
3 INJECTION, SOLUTION INTRAVENOUS
Status: DISCONTINUED | OUTPATIENT
Start: 2024-04-26 | End: 2024-05-01

## 2024-04-26 RX ORDER — OXYCODONE HYDROCHLORIDE 10 MG/1
10 TABLET ORAL EVERY 4 HOURS PRN
Status: DISCONTINUED | OUTPATIENT
Start: 2024-04-26 | End: 2024-05-01 | Stop reason: HOSPADM

## 2024-04-26 RX ORDER — TRANEXAMIC ACID 100 MG/ML
INJECTION, SOLUTION INTRAVENOUS CONTINUOUS PRN
Status: DISCONTINUED | OUTPATIENT
Start: 2024-04-26 | End: 2024-04-26

## 2024-04-26 RX ORDER — PROTAMINE SULFATE 10 MG/ML
INJECTION, SOLUTION INTRAVENOUS
Status: DISCONTINUED | OUTPATIENT
Start: 2024-04-26 | End: 2024-04-26

## 2024-04-26 RX ORDER — TRANEXAMIC ACID 100 MG/ML
INJECTION, SOLUTION INTRAVENOUS
Status: DISCONTINUED | OUTPATIENT
Start: 2024-04-26 | End: 2024-04-26

## 2024-04-26 RX ORDER — MUPIROCIN 20 MG/G
OINTMENT TOPICAL 2 TIMES DAILY
Status: COMPLETED | OUTPATIENT
Start: 2024-04-26 | End: 2024-04-28

## 2024-04-26 RX ORDER — FAMOTIDINE 10 MG/ML
20 INJECTION INTRAVENOUS 2 TIMES DAILY
Status: DISCONTINUED | OUTPATIENT
Start: 2024-04-26 | End: 2024-04-29

## 2024-04-26 RX ORDER — POTASSIUM CHLORIDE 14.9 MG/ML
20 INJECTION INTRAVENOUS
Status: DISCONTINUED | OUTPATIENT
Start: 2024-04-26 | End: 2024-04-30

## 2024-04-26 RX ORDER — HYDROMORPHONE HYDROCHLORIDE 1 MG/ML
0.1 INJECTION, SOLUTION INTRAMUSCULAR; INTRAVENOUS; SUBCUTANEOUS ONCE
Status: DISCONTINUED | OUTPATIENT
Start: 2024-04-26 | End: 2024-04-29

## 2024-04-26 RX ORDER — FENTANYL CITRATE 50 UG/ML
INJECTION, SOLUTION INTRAMUSCULAR; INTRAVENOUS
Status: DISCONTINUED | OUTPATIENT
Start: 2024-04-26 | End: 2024-04-26

## 2024-04-26 RX ORDER — METOCLOPRAMIDE HYDROCHLORIDE 5 MG/ML
5 INJECTION INTRAMUSCULAR; INTRAVENOUS EVERY 6 HOURS PRN
Status: DISCONTINUED | OUTPATIENT
Start: 2024-04-26 | End: 2024-05-01 | Stop reason: HOSPADM

## 2024-04-26 RX ORDER — DOBUTAMINE HYDROCHLORIDE 400 MG/100ML
2.5 INJECTION INTRAVENOUS CONTINUOUS
Status: DISCONTINUED | OUTPATIENT
Start: 2024-04-26 | End: 2024-04-29

## 2024-04-26 RX ORDER — ROCURONIUM BROMIDE 10 MG/ML
INJECTION, SOLUTION INTRAVENOUS
Status: DISCONTINUED | OUTPATIENT
Start: 2024-04-26 | End: 2024-04-26

## 2024-04-26 RX ORDER — CALCIUM GLUCONATE 20 MG/ML
1 INJECTION, SOLUTION INTRAVENOUS
Status: DISCONTINUED | OUTPATIENT
Start: 2024-04-26 | End: 2024-05-01

## 2024-04-26 RX ORDER — CEFAZOLIN SODIUM 1 G/3ML
INJECTION, POWDER, FOR SOLUTION INTRAMUSCULAR; INTRAVENOUS
Status: DISCONTINUED | OUTPATIENT
Start: 2024-04-26 | End: 2024-04-26

## 2024-04-26 RX ORDER — OXYCODONE HYDROCHLORIDE 5 MG/1
5 TABLET ORAL EVERY 4 HOURS PRN
Status: DISCONTINUED | OUTPATIENT
Start: 2024-04-26 | End: 2024-05-01 | Stop reason: HOSPADM

## 2024-04-26 RX ORDER — METHYLPREDNISOLONE SODIUM SUCCINATE 1 G/16ML
INJECTION INTRAMUSCULAR; INTRAVENOUS
Status: DISCONTINUED | OUTPATIENT
Start: 2024-04-26 | End: 2024-04-26

## 2024-04-26 RX ORDER — HEPARIN SODIUM 1000 [USP'U]/ML
INJECTION, SOLUTION INTRAVENOUS; SUBCUTANEOUS
Status: DISCONTINUED | OUTPATIENT
Start: 2024-04-26 | End: 2024-04-26

## 2024-04-26 RX ORDER — POTASSIUM CHLORIDE 29.8 MG/ML
40 INJECTION INTRAVENOUS
Status: DISCONTINUED | OUTPATIENT
Start: 2024-04-26 | End: 2024-04-30

## 2024-04-26 RX ORDER — LORAZEPAM 2 MG/ML
1 INJECTION INTRAMUSCULAR ONCE
Status: COMPLETED | OUTPATIENT
Start: 2024-04-26 | End: 2024-04-26

## 2024-04-26 RX ORDER — MAGNESIUM SULFATE HEPTAHYDRATE 40 MG/ML
4 INJECTION, SOLUTION INTRAVENOUS
Status: DISCONTINUED | OUTPATIENT
Start: 2024-04-26 | End: 2024-04-30

## 2024-04-26 RX ORDER — ONDANSETRON 2 MG/ML
INJECTION INTRAMUSCULAR; INTRAVENOUS
Status: DISCONTINUED | OUTPATIENT
Start: 2024-04-26 | End: 2024-04-26

## 2024-04-26 RX ORDER — POTASSIUM CHLORIDE 14.9 MG/ML
60 INJECTION INTRAVENOUS
Status: DISCONTINUED | OUTPATIENT
Start: 2024-04-26 | End: 2024-04-30

## 2024-04-26 RX ORDER — MAGNESIUM SULFATE HEPTAHYDRATE 40 MG/ML
2 INJECTION, SOLUTION INTRAVENOUS
Status: DISCONTINUED | OUTPATIENT
Start: 2024-04-26 | End: 2024-04-30

## 2024-04-26 RX ADMIN — ALBUMIN (HUMAN) 25 G: 12.5 SOLUTION INTRAVENOUS at 06:04

## 2024-04-26 RX ADMIN — NITROGLYCERIN 50 MCG: 5 INJECTION, SOLUTION INTRAVENOUS at 01:04

## 2024-04-26 RX ADMIN — PHENYLEPHRINE HYDROCHLORIDE 200 MCG: 10 INJECTION INTRAVENOUS at 09:04

## 2024-04-26 RX ADMIN — PHENYLEPHRINE HYDROCHLORIDE 100 MCG: 10 INJECTION INTRAVENOUS at 08:04

## 2024-04-26 RX ADMIN — PHENYLEPHRINE HYDROCHLORIDE 200 MCG: 10 INJECTION INTRAVENOUS at 10:04

## 2024-04-26 RX ADMIN — PROPOFOL 50 MG: 10 INJECTION, EMULSION INTRAVENOUS at 10:04

## 2024-04-26 RX ADMIN — TRANEXAMIC ACID 750 MG: 100 INJECTION INTRAVENOUS at 09:04

## 2024-04-26 RX ADMIN — PROTAMINE SULFATE 215 MG: 10 INJECTION, SOLUTION INTRAVENOUS at 01:04

## 2024-04-26 RX ADMIN — CALCIUM CHLORIDE 1 G: 100 INJECTION, SOLUTION INTRAVENOUS at 02:04

## 2024-04-26 RX ADMIN — FUROSEMIDE 20 MG: 10 INJECTION, SOLUTION INTRAMUSCULAR; INTRAVENOUS at 02:04

## 2024-04-26 RX ADMIN — HEPARIN SODIUM 10000 UNITS: 1000 INJECTION, SOLUTION INTRAVENOUS; SUBCUTANEOUS at 10:04

## 2024-04-26 RX ADMIN — POTASSIUM CHLORIDE 20 MEQ: 7.46 INJECTION, SOLUTION INTRAVENOUS at 04:04

## 2024-04-26 RX ADMIN — NICARDIPINE HYDROCHLORIDE 2 MG/HR: 0.2 INJECTION, SOLUTION INTRAVENOUS at 03:04

## 2024-04-26 RX ADMIN — PHENYLEPHRINE HYDROCHLORIDE 100 MCG: 10 INJECTION INTRAVENOUS at 09:04

## 2024-04-26 RX ADMIN — NICARDIPINE HYDROCHLORIDE 5 MG/HR: 0.2 INJECTION, SOLUTION INTRAVENOUS at 08:04

## 2024-04-26 RX ADMIN — ALBUMIN (HUMAN) 25 G: 12.5 SOLUTION INTRAVENOUS at 07:04

## 2024-04-26 RX ADMIN — ROCURONIUM BROMIDE 20 MG: 10 INJECTION, SOLUTION INTRAVENOUS at 09:04

## 2024-04-26 RX ADMIN — VASOPRESSIN 0.02 UNITS/MIN: 20 INJECTION INTRAVENOUS at 12:04

## 2024-04-26 RX ADMIN — HYDROMORPHONE HYDROCHLORIDE 0.5 MG: 0.5 INJECTION, SOLUTION INTRAMUSCULAR; INTRAVENOUS; SUBCUTANEOUS at 05:04

## 2024-04-26 RX ADMIN — PROPOFOL 20 MG: 10 INJECTION, EMULSION INTRAVENOUS at 11:04

## 2024-04-26 RX ADMIN — PHENYLEPHRINE HYDROCHLORIDE 100 MCG: 10 INJECTION INTRAVENOUS at 07:04

## 2024-04-26 RX ADMIN — ROCURONIUM BROMIDE 80 MG: 10 INJECTION, SOLUTION INTRAVENOUS at 07:04

## 2024-04-26 RX ADMIN — ALBUMIN (HUMAN) 25 G: 12.5 SOLUTION INTRAVENOUS at 05:04

## 2024-04-26 RX ADMIN — PROPOFOL 130 MG: 10 INJECTION, EMULSION INTRAVENOUS at 07:04

## 2024-04-26 RX ADMIN — FUROSEMIDE 10 MG: 10 INJECTION, SOLUTION INTRAMUSCULAR; INTRAVENOUS at 01:04

## 2024-04-26 RX ADMIN — SODIUM CHLORIDE, SODIUM GLUCONATE, SODIUM ACETATE, POTASSIUM CHLORIDE, MAGNESIUM CHLORIDE, SODIUM PHOSPHATE, DIBASIC, AND POTASSIUM PHOSPHATE: .53; .5; .37; .037; .03; .012; .00082 INJECTION, SOLUTION INTRAVENOUS at 07:04

## 2024-04-26 RX ADMIN — PROTAMINE SULFATE 5 MG: 10 INJECTION, SOLUTION INTRAVENOUS at 01:04

## 2024-04-26 RX ADMIN — DOBUTAMINE HYDROCHLORIDE 2.5 MCG/KG/MIN: 400 INJECTION INTRAVENOUS at 06:04

## 2024-04-26 RX ADMIN — PROPOFOL 40 MCG/KG/MIN: 10 INJECTION, EMULSION INTRAVENOUS at 09:04

## 2024-04-26 RX ADMIN — FAMOTIDINE 20 MG: 10 INJECTION, SOLUTION INTRAVENOUS at 08:04

## 2024-04-26 RX ADMIN — INSULIN HUMAN 5 UNITS/KG/HR: 1 INJECTION, SOLUTION INTRAVENOUS at 02:04

## 2024-04-26 RX ADMIN — MUPIROCIN: 20 OINTMENT TOPICAL at 08:04

## 2024-04-26 RX ADMIN — FENTANYL CITRATE 100 MCG: 50 INJECTION, SOLUTION INTRAMUSCULAR; INTRAVENOUS at 07:04

## 2024-04-26 RX ADMIN — TRANEXAMIC ACID 300 MG: 100 INJECTION INTRAVENOUS at 07:04

## 2024-04-26 RX ADMIN — METHYLPREDNISOLONE SODIUM SUCCINATE 500 MG: 1 INJECTION, POWDER, FOR SOLUTION INTRAMUSCULAR; INTRAVENOUS at 10:04

## 2024-04-26 RX ADMIN — METHADONE HYDROCHLORIDE 15 MG: 10 INJECTION, SOLUTION INTRAMUSCULAR; INTRAVENOUS; SUBCUTANEOUS at 08:04

## 2024-04-26 RX ADMIN — PHENYLEPHRINE HYDROCHLORIDE 400 MCG: 10 INJECTION INTRAVENOUS at 09:04

## 2024-04-26 RX ADMIN — ACETAMINOPHEN 1001.6 MG: 650 SOLUTION ORAL at 10:04

## 2024-04-26 RX ADMIN — CALCIUM CHLORIDE 0.5 G: 100 INJECTION, SOLUTION INTRAVENOUS at 02:04

## 2024-04-26 RX ADMIN — CALCIUM CHLORIDE 1 G: 100 INJECTION, SOLUTION INTRAVENOUS at 01:04

## 2024-04-26 RX ADMIN — POTASSIUM CHLORIDE: 14.9 INJECTION INTRAVENOUS at 02:04

## 2024-04-26 RX ADMIN — EPINEPHRINE 0.04 MCG/KG/MIN: 1 INJECTION INTRAMUSCULAR; INTRAVENOUS; SUBCUTANEOUS at 01:04

## 2024-04-26 RX ADMIN — MIDAZOLAM 2 MG: 1 INJECTION INTRAMUSCULAR; INTRAVENOUS at 07:04

## 2024-04-26 RX ADMIN — ALBUMIN HUMAN 25 G: 50 SOLUTION INTRAVENOUS at 05:04

## 2024-04-26 RX ADMIN — HEPARIN SODIUM 23000 UNITS: 1000 INJECTION, SOLUTION INTRAVENOUS; SUBCUTANEOUS at 09:04

## 2024-04-26 RX ADMIN — PROPOFOL 40 MCG/KG/MIN: 10 INJECTION, EMULSION INTRAVENOUS at 08:04

## 2024-04-26 RX ADMIN — Medication 1707 UNITS: at 03:04

## 2024-04-26 RX ADMIN — NOREPINEPHRINE BITARTRATE 0.02 MCG/KG/MIN: 1 INJECTION, SOLUTION, CONCENTRATE INTRAVENOUS at 07:04

## 2024-04-26 RX ADMIN — LORAZEPAM 1 MG: 2 INJECTION INTRAMUSCULAR; INTRAVENOUS at 06:04

## 2024-04-26 RX ADMIN — TRANEXAMIC ACID 1 MG/KG/HR: 100 INJECTION INTRAVENOUS at 09:04

## 2024-04-26 RX ADMIN — LIDOCAINE HYDROCHLORIDE 100 MG: 20 INJECTION, SOLUTION EPIDURAL; INFILTRATION; INTRACAUDAL; PERINEURAL at 07:04

## 2024-04-26 RX ADMIN — POTASSIUM CHLORIDE 40 MEQ: 29.8 INJECTION, SOLUTION INTRAVENOUS at 05:04

## 2024-04-26 RX ADMIN — CEFAZOLIN 2 G: 330 INJECTION, POWDER, FOR SOLUTION INTRAMUSCULAR; INTRAVENOUS at 12:04

## 2024-04-26 RX ADMIN — ALBUMIN (HUMAN) 25 G: 12.5 SOLUTION INTRAVENOUS at 10:04

## 2024-04-26 RX ADMIN — ROCURONIUM BROMIDE 20 MG: 10 INJECTION, SOLUTION INTRAVENOUS at 12:04

## 2024-04-26 RX ADMIN — CEFAZOLIN 2 G: 330 INJECTION, POWDER, FOR SOLUTION INTRAMUSCULAR; INTRAVENOUS at 09:04

## 2024-04-26 RX ADMIN — CEFAZOLIN 2 G: 2 INJECTION, POWDER, FOR SOLUTION INTRAMUSCULAR; INTRAVENOUS at 08:04

## 2024-04-26 NOTE — NURSING
Pt complaints of chest pain, left leg pain, and shortness of breath. Pt appearing diaphoretic and shaking, CTS team called to bedside. Dilaudid given x1 w/ relief of pain. Approximately 10 minutes later, pt complained of abdominal pain, pt describes the pain as a burning sensation, team called to bedside again. One time order of Lorazepam given. Continued complaints of left, CTS team aware. Pt awaiting OR at this time.

## 2024-04-26 NOTE — HPI
Reason for Consult: Management of Hyperglycemia     Surgical Procedure and Date: 4/26/24  REPLACEMENT, AORTA, ASCENDING (N/A)  HEMIARCH REPLACEMENT        Lab Results   Component Value Date    HGBA1C 4.8 11/22/2023         HPI:   Patient is a 68 y.o. female with a diagnosis of Breast cancer (2001), Depression (07/16/2012), Hyperlipidemia, Hypertension, and Hypothyroid who presented to [facility] ED with complaint of left-sided chest pain that started less than an hour prior to arrival. CTA at that time demonstrated adal type A aortic dissection with dissection flap extending from the aortic root up to the level of the distal arch. Associated aneurysmal dilatation measuring up to 5 cm in maximum dimension. She was transferred to [facility] for higher level of care and admitted to the SICU for close hemodynamic monitoring. Patient now presents for the above procedure(s). Endocrinology consulted for management of hyperglycemia.

## 2024-04-26 NOTE — PROGRESS NOTES
Pt transported to SICU [id number] with EMS, portable telemetry  on RA . Pt connected to ICU monitor. Charge RN, Dr. Sebastian, Dr. Flanagan with SICU team called to bedside for patient arrival. Report received from  Christin VELASQUEZ from [facility] . New orders received and implemented. Esmolol, cardene and LR started for goal of SBP < 120 and HR < 60.    Dr. Ochoa, Dr. Dow, Dr. Flanagan, Dr. Sebastian and Dr. Rosenbaum at bedside to assess patient. Questions and concerns was addressed regarding surgery tomorrow and consent was obtained with no further questions from patient and patient's family.     Pt assessed, immediate needs met. Family brought to bedside, updated on the patient's current condition and PoC for remainder of shift. Family also given ICU Welcome packet and educated on visiting hours. All questions answered, emotional support provided.

## 2024-04-26 NOTE — SUBJECTIVE & OBJECTIVE
Interval HPI:   Overnight events: Admitted to SICU. Day of Surgery. BG above goal ranges on IV intensive insulin protocol. Received Solu Medrol 500 mg this morning. Diet NPO    Eating:   NPO  Nausea: No  Hypoglycemia and intervention: No  Fever: No  TPN and/or TF: No  If yes, type of TF/TPN and rate: n/a    PMH, PSH, FH, SH reviewed     ROS:  Unable to obtain due to: Sedation,Intubation,Altered mental status,Critical illness,Reviewed ROS from note dated   4/25/24 by Mushtaq Laird DO     Review of Systems    Current Medications and/or Treatments Impacting Glycemic Control  Immunotherapy:    Immunosuppressants       None          Steroids:   Hormones (From admission, onward)      None          Pressors:    Autonomic Drugs (From admission, onward)      Start     Stop Route Frequency Ordered    04/26/24 1630  EPINEPHrine 5 mg in dextrose 5% 250 mL infusion (premix)        Question Answer Comment   Begin at (in mcg/kg/min): 0.02    Titrate by: (in mcg/kg/min) 0.02    Titrate interval: (in minutes) 5    Titrate to maintain: (SBP or MAP or Cardiac Index) MAP    Greater than: (in mmHg) 60    Maximum dose: (in mcg/kg/min) 0.2        -- IV Continuous 04/26/24 1529          Hyperglycemia/Diabetes Medications:   Antihyperglycemics (From admission, onward)      Start     Stop Route Frequency Ordered    04/26/24 1630  insulin regular in 0.9 % NaCl 100 unit/100 mL (1 unit/mL) infusion        Question Answer Comment   Insulin rate changes (DO NOT MODIFY ANSWER) \\[email or web address]\epic\Images\Pharmacy\InsulinInfusions\CTS INSULIN [id number].pdf    Enter initial dose (Units/hr): 1        -- IV Continuous 04/26/24 1529             PHYSICAL EXAMINATION:  Vitals:    04/26/24 0701   BP:    Pulse: 89   Resp: 12   Temp: 98.3 °F (36.8 °C)     Body mass index is 28.57 kg/m².     Physical Exam   Constitutional: Well developed, NAD.  ENT: External ears no masses with nose patent  Neck: Supple; trachea midline  Cardiovascular: Normal heart sounds, no  LE edema. DP +2 bilaterally.  Lungs: Normal effort; lungs anterior bilaterally clear to auscultation.  Intubated on a ventilator.  Abdomen: Soft, no masses, no hernias.  Hypoactive BS noted.  MS: No clubbing or cyanosis of nails noted; unable to assess gait.  Skin: No rashes, lesions, or ulcers; no nodules.  Mid-sternal incision with telfa island dressing, CDI.  CT x 2.  Psychiatric: ANI  Neurological: ANI  Foot: Nails in good condition, no amputations noted

## 2024-04-26 NOTE — NURSING
SICU PLAN OF CARE    Dx: Dissection of aorta    Goals of Care:   Map >60  Sys <120  HR <60    Vital Signs (last 12 hours):   Temp:  [97.8 °F (36.6 °C)]   Pulse:  [66-79]   Resp:  [10-31]   SpO2:  [98 %-100 %]   Arterial Line BP: ()/(43-73)      Neuro: AAOx4, Follows commands , and Moves all extremities spontaneously     Cardiac: NSR    Respiratory: Room Air    Gtts: MIVF, Esmolol, Cardene    Urine Output: Voids Spontaneously  100 cc mL/shift measured; approx 500 unmeasured    Diet: NPO      Labs/Accuchecks: Daily labs    Skin: Patient turned q2h, bony prominences protected, and mattress inflated/working correctly.   René Score: 23. If René Score is 16 or less, complete 4EYES note each shift.    Shift Events:  Potassium replaced as ordered. Dilaudid given for pain control x1. Pt prepped and ready for the OR. Gtts titrated to maintain goals. Unmeasured urine x2, approx 500cc. See flowsheet for further assessment/details.  Family updated on current condition/plan of care, questions answered, and emotional support provided.  MD updated on current condition, vitals, labs, and gtts.

## 2024-04-26 NOTE — TRANSFER OF CARE
"Anesthesia Transfer of Care Note    Patient: Heidy Polo    Procedure(s) Performed: Procedure(s) (LRB):  REPLACEMENT, AORTA, ASCENDING (N/A)  APPLICATION, WOUND VAC (N/A)    Patient location: ICU    Anesthesia Type: general    Transport from OR: Transported from OR intubated on 100% O2  with adequate ventilation controlled by transport ventilator    Post pain: adequate analgesia    Post assessment: no apparent anesthetic complications    Post vital signs: stable    Level of consciousness: awake and alert    Nausea/Vomiting: no nausea/vomiting    Complications: none    Transfer of care protocol was followed      Last vitals: Visit Vitals  BP (!) 129/59   Pulse 76   Temp 36.8 °C (98.3 °F) (Oral)   Resp 20   Ht 5' 4" (1.626 m)   Wt 75.5 kg (166 lb 7.2 oz)   SpO2 100%   BMI 28.57 kg/m²     "

## 2024-04-26 NOTE — PLAN OF CARE
CTS ICU care update    Patient returns to the ICU s/p ascending aorta and hemiarch replacement under 30 min DHCA w/ Dr. Ochoa.     On admission, she is intubated, sedated with propofol, and in stable condition. Not requiring inotropic or vasopressor requirements upon admission. Requiring Cardene at 2 for MAP 60-80. Central access includes a RIJ CVC, LIJ trialysis, arterial access includes a right and left radial arterial lines.      Intraoperatively, they received 1.7L of crystalloid, 4 PRBCs, 6 FFP, 2 platelets, and 2 cryoprecipitate. Urine output intraoperatively was 1.6Lcc. received 30 lasix.      Immediate post-operative plans include hemodynamic stabilization and weaning of cardiac and respiratory support. Plan to wean vasoactive support as tolerated, and wean ventilator support with goal of extubation in the morning, and closely monitor fluid status with strict Is/Os and continued fluid resuscitation as needed.     - MAP 60-80  - Epi 0.01  - Cardene PRN  - No asa tonight  - monitor  and UOP  - q1 ABG + lactate, can space out as stabilized  - 500cc albumin now for lactate 3.62    Tashia Flanagan MD  General Surgery, PGY-2

## 2024-04-26 NOTE — CONSULTS
[facility] - Surgical Intensive Care  Endocrinology  Diabetes Consult Note    Consult Requested by: Aquilino Ochoa MD   Reason for admit: Dissection of aorta    HISTORY OF PRESENT ILLNESS:  Reason for Consult: Management of Hyperglycemia     Surgical Procedure and Date: 4/26/24  REPLACEMENT, AORTA, ASCENDING (N/A)  HEMIARCH REPLACEMENT        Lab Results   Component Value Date    HGBA1C 4.8 11/22/2023         HPI:   Patient is a 68 y.o. female with a diagnosis of Breast cancer (2001), Depression (07/16/2012), Hyperlipidemia, Hypertension, and Hypothyroid who presented to [facility] ED with complaint of left-sided chest pain that started less than an hour prior to arrival. CTA at that time demonstrated adal type A aortic dissection with dissection flap extending from the aortic root up to the level of the distal arch. Associated aneurysmal dilatation measuring up to 5 cm in maximum dimension. She was transferred to [facility] for higher level of care and admitted to the SICU for close hemodynamic monitoring. Patient now presents for the above procedure(s). Endocrinology consulted for management of hyperglycemia.        Interval HPI:   Overnight events: Admitted to SICU. Day of Surgery. BG above goal ranges on IV intensive insulin protocol. Received Solu Medrol 500 mg this morning. Diet NPO    Eating:   NPO  Nausea: No  Hypoglycemia and intervention: No  Fever: No  TPN and/or TF: No  If yes, type of TF/TPN and rate: n/a    PMH, PSH, FH, SH reviewed     ROS:  Unable to obtain due to: Sedation,Intubation,Altered mental status,Critical illness,Reviewed ROS from note dated   4/25/24 by Mushtaq Laird DO     Review of Systems    Current Medications and/or Treatments Impacting Glycemic Control  Immunotherapy:    Immunosuppressants       None          Steroids:   Hormones (From admission, onward)      None          Pressors:    Autonomic Drugs (From admission, onward)      Start     Stop Route Frequency Ordered    04/26/24 1630   EPINEPHrine 5 mg in dextrose 5% 250 mL infusion (premix)        Question Answer Comment   Begin at (in mcg/kg/min): 0.02    Titrate by: (in mcg/kg/min) 0.02    Titrate interval: (in minutes) 5    Titrate to maintain: (SBP or MAP or Cardiac Index) MAP    Greater than: (in mmHg) 60    Maximum dose: (in mcg/kg/min) 0.2        -- IV Continuous 04/26/24 1529          Hyperglycemia/Diabetes Medications:   Antihyperglycemics (From admission, onward)      Start     Stop Route Frequency Ordered    04/26/24 1630  insulin regular in 0.9 % NaCl 100 unit/100 mL (1 unit/mL) infusion        Question Answer Comment   Insulin rate changes (DO NOT MODIFY ANSWER) \\[email or web address]\epic\Images\Pharmacy\InsulinInfusions\CTS INSULIN [id number].pdf    Enter initial dose (Units/hr): 1        -- IV Continuous 04/26/24 1529             PHYSICAL EXAMINATION:  Vitals:    04/26/24 0701   BP:    Pulse: 89   Resp: 12   Temp: 98.3 °F (36.8 °C)     Body mass index is 28.57 kg/m².     Physical Exam   Constitutional: Well developed, NAD.  ENT: External ears no masses with nose patent  Neck: Supple; trachea midline  Cardiovascular: Normal heart sounds, no LE edema. DP +2 bilaterally.  Lungs: Normal effort; lungs anterior bilaterally clear to auscultation.  Intubated on a ventilator.  Abdomen: Soft, no masses, no hernias.  Hypoactive BS noted.  MS: No clubbing or cyanosis of nails noted; unable to assess gait.  Skin: No rashes, lesions, or ulcers; no nodules.  Mid-sternal incision with telfa island dressing, CDI.  CT x 2.  Psychiatric: ANI  Neurological: ANI  Foot: Nails in good condition, no amputations noted      Labs Reviewed and Include   Recent Labs   Lab 04/26/24  1614   *   CALCIUM 9.8   ALBUMIN 2.8*   PROT 5.3*      K 3.1*   CO2 22*      BUN 16   CREATININE 0.8   ALKPHOS 145*   *   AST 1,011*   BILITOT 2.8*     Lab Results   Component Value Date    WBC 8.31 04/26/2024    HGB 10.1 (L) 04/26/2024    HCT 28 (L) 04/26/2024    MCV  "86 04/26/2024     (L) 04/26/2024     Recent Labs   Lab 04/25/24  1154   TSH 2.684     Lab Results   Component Value Date    HGBA1C 4.8 11/22/2023       Nutritional status:   Body mass index is 28.57 kg/m².  Lab Results   Component Value Date    ALBUMIN 2.8 (L) 04/26/2024    ALBUMIN 3.4 (L) 04/26/2024    ALBUMIN 3.7 04/25/2024     No results found for: "PREALBUMIN"    Estimated Creatinine Clearance: 67.9 mL/min (based on SCr of 0.8 mg/dL).    Accu-Checks  Recent Labs     04/25/24  1153 04/25/24  1551 04/26/24  1644 04/26/24  1731   POCTGLUCOSE 107 80 269* 224*        ASSESSMENT and PLAN    Cardiac/Vascular  * Dissection of aorta  Managed per primary team  Optimize BG control        Mixed hyperlipidemia  May increase insulin resistance.         Endocrine  Transient hyperglycemia post procedure  BG goal 110-140    Continue IV insulin infusion protocol  Requires intensive BG monitoring while on protocol (q hourly)     ** Please call Endocrine for any BG related issues **  ** Please notify Endocrine for any change and/or advance in diet**    Discharge planning: OLIVIER Javed NP  Endocrinology  [facility] - Surgical Intensive Care  "

## 2024-04-26 NOTE — ANESTHESIA PROCEDURE NOTES
MAC Introducer    Diagnosis: Aortic Dissection  Patient location during procedure: done in OR    Staffing  Authorizing Provider: JESSICA Angela MD  Performing Provider: Chris Graff MD    Staffing  Anesthesiologist: JESSICA Angela MD  Resident/CRNA: Chris Graff MD  Performed by: Chris Graff MD  Authorized by: JESSICA Angela MD    Anesthesiologist was present at the time of the procedure.  Preanesthetic Checklist  Completed: patient identified, IV checked, site marked, risks and benefits discussed, surgical consent, monitors and equipment checked, pre-op evaluation, timeout performed and anesthesia consent given  Indication   Indication: vascular access, med administration     Anesthesia   general anesthesia    Central Line   Skin Prep: skin prepped with ChloraPrep, skin prep agent completely dried prior to procedure  Sterile Barriers Followed: Yes    All five maximal barriers used- gloves, gown, cap, mask, and large sterile sheet    hand hygiene performed prior to central venous catheter insertion  Location: right internal jugular.   Catheter type: double lumen  Catheter Size: 9 Fr  Ultrasound: vascular probe with ultrasound   Vessel Caliber: small, patent, compressibility poor  Needle advanced into vessel with real time Ultrasound guidance.  Guidewire confirmed in vessel.  Image recorded and saved.  sterile gel and probe cover used in ultrasound-guided central venous catheter insertion   Manometry: Venous cannualation confirmed by visual estimation of blood vessel pressure using manometry.  Insertion Attempts: 1   Securement:line sutured, chlorhexidine patch, sterile dressing applied and blood return through all ports    Post-Procedure    Adverse Events:none      Guidewire Guidewire removed intact.

## 2024-04-26 NOTE — ASSESSMENT & PLAN NOTE
BG goal 110-140    Continue IV insulin infusion protocol  Requires intensive BG monitoring while on protocol (q hourly)     ** Please call Endocrine for any BG related issues **  ** Please notify Endocrine for any change and/or advance in diet**    Discharge planning: OLIVIER

## 2024-04-26 NOTE — SUBJECTIVE & OBJECTIVE
Interval History/Significant Events: NAEO. VSS. On esmolol gtt @ 75. Given ativan x1 dose for increased anxiety this am. To OR today for ascending aorta replacement.    Follow-up For: Procedure(s) (LRB):  REPLACEMENT, AORTA, ASCENDING (N/A)    Post-Operative Day: Day of Surgery    Objective:     Vital Signs (Most Recent):  Temp: 97.8 °F (36.6 °C) (04/26/24 0300)  Pulse: 77 (04/26/24 0630)  Resp: 18 (04/26/24 0630)  BP: (!) 129/59 (04/25/24 1648)  SpO2: 100 % (04/26/24 0630) Vital Signs (24h Range):  Temp:  [97.7 °F (36.5 °C)-98.1 °F (36.7 °C)] 97.8 °F (36.6 °C)  Pulse:  [57-84] 77  Resp:  [10-35] 18  SpO2:  [97 %-100 %] 100 %  BP: (101-148)/(55-79) 129/59  Arterial Line BP: ()/() 104/55     Weight: 75.5 kg (166 lb 7.2 oz)  Body mass index is 28.57 kg/m².      Intake/Output Summary (Last 24 hours) at 4/26/2024 0642  Last data filed at 4/26/2024 0500  Gross per 24 hour   Intake 1983.26 ml   Output 500 ml   Net 1483.26 ml          Physical Exam  Vitals and nursing note reviewed.   Constitutional:       General: She is not in acute distress.  HENT:      Head: Normocephalic and atraumatic.      Nose: Nose normal.      Mouth/Throat:      Pharynx: Oropharynx is clear.   Eyes:      Extraocular Movements: Extraocular movements intact.      Conjunctiva/sclera: Conjunctivae normal.   Cardiovascular:      Rate and Rhythm: Normal rate and regular rhythm.      Pulses: Normal pulses.      Heart sounds: Normal heart sounds.      Comments: Bilateral radial alines   Pulmonary:      Effort: Pulmonary effort is normal.   Abdominal:      General: Abdomen is flat.      Palpations: Abdomen is soft.   Musculoskeletal:         General: Normal range of motion.      Cervical back: Normal range of motion.   Skin:     General: Skin is warm.   Neurological:      General: No focal deficit present.      Mental Status: She is alert and oriented to person, place, and time.   Psychiatric:         Mood and Affect: Mood normal.             Vents:       Lines/Drains/Airways       Drain  Duration             Female External Urinary Catheter w/ Suction 04/25/24 1545 <1 day              Arterial Line  Duration             Arterial Line 04/25/24 1600 Right Radial <1 day    Arterial Line 04/25/24 1624 Left Radial <1 day              Peripheral Intravenous Line  Duration                  Peripheral IV - Single Lumen 04/25/24 1611 22 G Left Forearm <1 day         Peripheral IV - Single Lumen 04/25/24 1612 20 G Left Antecubital <1 day         Peripheral IV - Single Lumen 04/25/24 1652 18 G;1 3/4 in Anterior;Left Upper Arm <1 day         Peripheral IV - Single Lumen 04/25/24 1652 20 G;1 3/4 in Anterior;Left;Proximal Forearm <1 day                    Significant Labs:    CBC/Anemia Profile:  Recent Labs   Lab 04/25/24  1154 04/25/24  1530 04/26/24  0315   WBC 5.30 7.61 7.30   HGB 11.7* 11.1* 10.5*   HCT 34.4* 32.8* 29.7*    281 211   MCV 92 92 90   RDW 12.4 12.5 12.4        Chemistries:  Recent Labs   Lab 04/25/24  1154 04/25/24  1530 04/26/24  0315    138 137   K 3.5 3.5 3.6    102 105   CO2 27 25 23   BUN 26* 25* 21   CREATININE 0.9 0.8 0.8   CALCIUM 9.4 9.7 8.8   ALBUMIN 3.9 3.7 3.4*   PROT 6.8 6.3 5.9*   BILITOT 0.8 0.8 1.2*   ALKPHOS 67 66 71   ALT 14 10 9*   AST 13 11 12   MG  --  1.8 2.1   PHOS  --  2.8 2.8       All pertinent labs within the past 24 hours have been reviewed.    Significant Imaging:  I have reviewed all pertinent imaging results/findings within the past 24 hours.

## 2024-04-26 NOTE — PROGRESS NOTES
Autotransfusion/Rapid Infusion Record:      04/26/2024  Autotransfusionist:  Erwin Newberry    Surgeons and Role:     * Aquilino Ochoa MD - Primary     * Mushtaq Laird DO - Fellow  Anesthesiologist:  JESSICA Angela MD    Past Medical History:   Diagnosis Date    Breast cancer 2001    right    Cancer     breast in 2001 -right     Depression 07/16/2012    History of breast cancer     Hyperlipidemia     Hypertension     Hypothyroid        Procedure(s) (LRB):  REPLACEMENT, AORTA, ASCENDING (N/A)  APPLICATION, WOUND VAC (N/A)     3:23 PM    Equipment:    Cell Saver     R.I.S.  : [employer] Model: CATSmart or CATSplus : [place]   Model: [id number]     Serial number: [id number]   Serial number: N/a   Disposable lot #: [id number]   Disposable lot #: N/a     Were extra cardiotomies used for cell saver?  NO   if yes, #:  N/a    Solutions:  Anticoagulant: ACD-A   Expiration date: 02/25 Volume used: 700   Wash solution: 0.9% NaCl   Expiration date: 03/26 Volume used: 5129     Cell saver checklist  Time completed:           [x]   Circuit assembled correctly     [x]   Cell saver powered and operational     [x]   Vacuum connected, functional, adjust to max -150mmHg     [x]   Anticoagulant drip rate adjusted     [x]   Transfer bag properly labeled with patient name, expiration time, volume,       anticoagulant, OR number, and initials     [x]   Cell saver disinfected after use (completed at end of case)       Cell Saver volumes:    Total volume processed:     3637 mL     Total volume pRBCs recovered     861 mL     Volume pRBCs infused     412 mL         RIS checklist   Time completed:  []   RIS circuit assembled correctly     []   RIS power and operational     []   RIS disinfected after use (completed at end of case)       RIS volumes:    Total volume infused:    (see anesthesia record for blood       product information)   N/a mL       Additional comments:

## 2024-04-26 NOTE — ANESTHESIA PROCEDURE NOTES
Intraoperative GLADIS    Diagnosis: Aortic Aneurysm  Patient location during procedure: OR  Surgery related to: Aortic Hematoma  Exam type: Baseline    Staffing  Performed: anesthesiologist     Anesthesiologist: JESSICA Angela MD        Anesthesiologist Present  Yes      Setup & Induction  Patient preparation: bite block inserted  Probe Insertion: easy  Exam: completeDoppler Echo: 2D, color flow mapping, pulse wave Doppler and continuous wave Doppler.  Exam     Left Heart  Left Atruim: normal (men 3.0-4.0; women 2.7-3.8) and normal    Left Ventricle: 3.76 cm, normal (men 4.2-5.9; women 3.8-5.2)  LV Wall Thickness (posterior wall):0.9 cm, normal (men 0.6-1.0 cm; women 0.6-0.9 cm)    LVAD:no  Estimated Ejection Fraction: > 55% normal  Regional Wall Abnormalities: no RWMA        Left Ventricle Diastolic Function    S Velocity: 72.4, normal  D Velocity: 36.2 cm/s  AR Velocity: 33.2 cm/s  AR Duration: 129 m/s    Right Heart  Right Ventricle: dilated  Right Ventricle Function: normal  Right Atria:  normal    Intra Atrial Septum  PFO: no shunt by color flow doppler  no IAS aneurysm  no lipomatous hypertrophy  no Atrial Septal Defect (Asd)    Right Ventricle  Size: dilated, Free Wall Thickness: RVH >/= 0.5cm    Aortic Valve:  Stenosis: none.  Morphology: trileaflet    Regurgitation: mild (2+) aortic regurgitation      Mitral Valve:   Morphology:normal  Jet Description: none    Tricuspid Valve:  Morphology: normal  Regurgitation: mild    Pulmonic Valve:  Morphology:normal  Regurgitation(color flow): none    Great Vessels  Ascending Aorta Diameter: 4.18 cm ascending aorta dissection   Ascending Aorta Atherosclerosis: 2=mild dz (<3mm)  Aortic Arch Atherosclerosis: 2=mild dz (<3mm)  IABP: no  Descending Aorta Atherosclerosis: 2=mild dz (<3mm)  Aorta    Descending aorta IABP: no    Effusions pericardial    SummaryFindings discussed with surgeon.    Other Findings   Baseline Exam:   - Normal Biventricular function, LVH/RVH    - Trace/Mild AI, central jet   - Trace/Mild MR   - Mild TR   - Trace PI   - Intramural Hematoma aortic flap / No entry or exit points seen.  Flap extended from the   - Aortic Aneurysm   - Small pericardial effusion   -

## 2024-04-26 NOTE — ASSESSMENT & PLAN NOTE
Neuro/Psych:     - Sedation: none     - Pain:     - Scheduled Tylenol 1000mg Q8H   - PRN Oxycodone and dilaudid                Cardiac:     - Plan for OR on 4/26 for ascending aorta replacement with Dr. Ochoa     - BP Goal: MAP>60, HR<60, SBP <110    - Inotropes/Pressors: None     - Anti-HTNs: Cardene gtt     - Rhythm: NSR    - Beta Blocker: Esmolol gtt     - Statin: will start when appropriate         Pulmonary:     - Goal SpO2 >92%    - Stable on RA     - ABGs PRN         Renal:    - Trend BUN/Cr     - Record strict Is/Os    Recent Labs   Lab 04/25/24  1154 04/25/24  1530 04/26/24  0315   BUN 26* 25* 21   CREATININE 0.9 0.8 0.8         FEN / GI:     - Daily CMP, PRN K/Mag/Phos per protocol     - Replace electrolytes as needed    - Nutrition: NPO    - Bowel Regimen: Miralax, docusate        ID:     - Afebrile     - WBC stable     - Abx: start perioperative cefazolin 2g Q8H x 5 doses post op    Recent Labs   Lab 04/25/24  1154 04/25/24  1530 04/26/24  0315   WBC 5.30 7.61 7.30         Heme/Onc:     - Hgb 11.1 pre-operatively    - CBC daily    Recent Labs   Lab 04/25/24  1154 04/25/24  1530 04/26/24  0315   HGB 11.7* 11.1* 10.5*    281 211   APTT  --  24.8 23.2   INR  --  1.0 0.9         Endocrine:     - CTS Goal -140    - HgbA1c: 4.8     - MDSSI         PPx:   Feeding: NPO  Analgesia/Sedation: see above   Thromboembolic Prevention: SCDs  HOB >30: Yes  Stress Ulcer: N/A  Glucose Control: MDSSI      Lines/Drains/Airway:     PIV x 2   PICC:    Right radial adelina    Left radial adelina          Dispo/Code Status/Palliative:     - Continue SICU Care    - Full Code

## 2024-04-26 NOTE — ANESTHESIA PROCEDURE NOTES
Intubation    Date/Time: 4/26/2024 7:37 AM    Performed by: Chris Graff MD  Authorized by: JESSICA Angela MD    Intubation:     Induction:  Intravenous    Intubated:  Postinduction    Mask Ventilation:  Easy with oral airway    Attempts:  1    Attempted By:  Resident anesthesiologist    Method of Intubation:  Video laryngoscopy    Blade:  [place] 3    Laryngeal View Grade: Grade I - full view of cords      Difficult Airway Encountered?: No      Complications:  None    Airway Device:  Oral endotracheal tube    Airway Device Size:  7.0    Style/Cuff Inflation:  Cuffed (inflated to minimal occlusive pressure)    Inflation Amount (mL):  8    Tube secured:  22    Secured at:  The teeth    Placement Verified By:  Capnometry    Complicating Factors:  None    Findings Post-Intubation:  BS equal bilateral and atraumatic/condition of teeth unchanged

## 2024-04-26 NOTE — OP NOTE
DATE OF PROCEDURE:  4/26/2024     PREOPERATIVE DIAGNOSES:  1. Acute type A aortic dissection.  2. Uncontrolled hypertension.  3. Mild aortic insufficiency.  4. Intramural hematoma     POSTOPERATIVE DIAGNOSES: Same       OPERATIONS:  1. Emergent repair of type A aortic dissection under hypothermic circulatory arrest.  2. Replacement of ascending and hemiarch using a 32 size Gelweave graft with a side-arm.  3. Cutdown of the right femoral artery and femoral vein with Initiation of cardiopulmonary bypass through the right femoral artery.  4. Repair of right femoral artery.  5. Chest closure with Prevena wound VAC 60 x 5 cm due to poor quality of the tissues with increased risk for wound complications and sternal dehiscence.     STAFF SURGEON:  Aquilino Ochoa M.D.     FIRST ASSISTANT:  Mushtaq Laird     SECOND ASSISTANT:  Mary Jo Reyes.     ANESTHESIA:  GETA.     ESTIMATED BLOOD LOSS:  400 mL     KEY FINDINGS OF THE OPERATION:  1. Large intramural hematoma of the ascending aorta  2. Presence of a dissection flap which terminated above the sinotubular junction and distally extended up to the takeoff of the innominate artery.  3. Diffuse coagulopathy requiring multiple blood products as well as Kcentra     INDICATION OF OPERATION:  This is a 68-year-old lady who was transferred for intramural hematoma/aortic dissection.  Patient has a David aortic dissection as intramural hematoma are considered in the similar category.  Patient was hemodynamically stable overnight and blood pressure control was carried out.  Extensive discussions had been carried with the family who wanted to proceed with the surgical repair in the morning.  An informed consent was obtained.       OPERATION:    The patient was brought to the Operating Room and placed in a supine position.  After induction of anesthesia, area was prepped and draped in the usual sterile fashion.  A right groin incision was made, which was carried down to the femoral  artery.  Femoral artery was found to be free of any dissection.  Pursestring sutures were placed.  Once that was done, a midline incision was made on the sternum.  It was carried all the way down to the sternum.  Median sternotomy was then performed.  Sternal edges were cauterized.  Chest retractor was placed.  Pericardium was then opened up.  No hemorrhage was noted in the pericardium.  However, an extensively large ascending aortic IMH was noted  No touch technique was utilized.  Systemic heparinization was done.  ACT greater than 450 was obtained.  Cannulation stitches in the right atrial appendage was placed.  The arterial cannulation in the right femoral artery was already done before.  Using Seldinger technique, an 20 Slovenian cannula was introduced into the right femoral artery.  On GLADIS examination, the guidewire was noted in the true lumen.  The femoral artery cannula was inserted and secured and connected to cardiopulmonary bypass.  The venous cannula was placed in the right atrial appendage and the patient was placed on full cardiopulmonary bypass.  CO2 was used to flood the operative field.  Active cooling was initiated.  LV vent was placed through the right superior pulmonary vein. The patient started fibrillating once a temperature of 28 degrees Celsius was noted; however, the heart remained completely empty.  At 26 degrees, retrograde cardioplegia catheter was utilized to give cardioplegia and aorta was transected after placing of cross-clamp.  Antegrade cardioplegia was also administered through handheld cardioplegia catheter in the right and left main coronary artery.  A cardiac standstill in diastole was obtained.  Once that was done circulatory arrest was initiated.  SV transected the aorta large hematoma was present transection of the aorta was carried all the way down to the sinotubular junction and distally the cross-clamp was removed the extent of the resection included up to the takeoff of the  innominate artery.  .    A portion of felt was used to obliterate the dissection flap in the arch and using a 4-0 Prolene stitch, a continuous running anastomosis was performed to sandwich the felt layer.  Once that was done, the size of the aorta was measured and was found to be a good match for a 32 graft.  The 32 Gelweave graft was brought to the field and soaked in water and the anastomosis was initiated using a 4-0 Prolene stitch.  Once the anastomosis was completed, the graft was trimmed to appropriate length and the arterial cannula was secured through the side arm of the aortic graft.  However, before initiating the cardiopulmonary bypass through the graft, cardiopulmonary bypass was reinitiated through the right femoral artery to de-air the system.  Efflux of all the debris and air was done.  Once that was done, clamp was placed on the ascending aortic graft and patient was disconnected from cardiopulmonary bypass from the femoral artery and reconnected to the ascending aorta and active rewarming was initiated.  Once that was done, the cannula in he right femoral artery was removed and primary repair of the femoral artery was carried out.  Once that was achieved, attention was directed towards the aortic root.  All the excessive aneurysmal and dissected tissue was removed right all the way down to the sinotubular junction.  No dissection flap was noted into the sinuses or extending into the coronary artery.  There was prolapse of the noncoronary cusp and as a result we carried out aortic valve repair by recess pending the noncoronary cusp you I utilizing 4-0 pledgetted Prolene suture.  The valve was tested and no prolapse was noted.  Every 20 minutes,handheld cardioplegia catheter was used to direct cardioplegia through the right and the left coronary artery.  The aortic root was sized to be a 32 size graft that was brought to the field and using a 3-0 Prolene stitch, a continuous running anastomosis was  performed.  Once the anastomoses were performed and  tested for hemostasis, both the distal and the proximal grafts were then bevelled significantly and using a 4-0 Prolene, [id number] anastomosis was initiated.  Once the anastomosis was completely done, root vent was placed on the ascending aorta and the clamp was removed.  At that time, the patient's temperature was 32 degrees and instant cardiac activity was noted, however, heart rate was significantly low.  With continued rewarming and once the temperature was 36.5, at that time normal sinus activity was noted with no intracardiac air present.  Root vent was being used in the newly constructed ascending aorta for de-airing purposes.  Once that was achieved, gradually ventilation was initiated and the patient was then weaned off from cardiopulmonary bypass.  The patient  from cardiopulmonary bypass without any problems.  No wall motion abnormality was noted.  Epinephrine was at 0.04, trivial aortic insufficiency was noted.  GLADIS examination showed at this stage of repair, there was trivial aortic insufficiency with excellent cardiac function.  The patient  from cardiopulmonary bypass without any concerns.  Test dose of protamine was given followed by full dose of protamine to reverse the effects of systemic heparin.  Midway dose, all the various catheters and cannulas were removed.  Various blood products were given to achieve good hemostasis.  Once good hemostasis was ensured, two drainage tubes were placed and brought through separate skin incision.  Sternum was then approximated with #6 stainless steel wires after closing the pericardium over the newly constructed ascending aorta and hemiarch.  Sternum was approximated with #6 stainless steel wires.  Skin and subcutaneous tissues were closed in multiple layers.  Sterile dressing was applied.  The right femoral cutdown was then primarily repaired and closed in multiple layers and a sterile  dressing applied.   Good cardiac function and excellent urine output was noted at the end of the case.  Due to patient's increased risk for sternal wound complications secondary to poor quality of the tissues we decided to place a Prevena wound VAC. A 60 x 5 cm Prevena wound VAC was applied with excellent seal.  Patient tolerated procedure well and was taken back to the intensive care unit in a stable condition.    Medicare Attestation:  I was present for all parts of the operation and Dr. Shannan andujar acted as my 1st assistant.

## 2024-04-26 NOTE — ANESTHESIA PROCEDURE NOTES
Alphacurve Trialysis    Diagnosis: Aortic Dissection  Patient location during procedure: done in OR    Staffing  Authorizing Provider: JESSICA Angela MD  Performing Provider: Chris Graff MD    Staffing  Performed: resident/CRNA   Anesthesiologist: JESSICA Angela MD  Resident/CRNA: Chris Graff MD  Performed by: Chris Garff MD  Authorized by: JESSICA Angela MD    Anesthesiologist was present at the time of the procedure.  Preanesthetic Checklist  Completed: patient identified, IV checked, site marked, risks and benefits discussed, surgical consent, monitors and equipment checked, pre-op evaluation, timeout performed and anesthesia consent given  Indication   Indication: hemodialysis, vascular access     Anesthesia   general anesthesia    Central Line   Skin Prep: skin prepped with ChloraPrep, skin prep agent completely dried prior to procedure  Sterile Barriers Followed: Yes    All five maximal barriers used- gloves, gown, cap, mask, and large sterile sheet    hand hygiene performed prior to central venous catheter insertion  Location: left internal jugular.   Catheter type: triple lumen  Catheter Size: 12 Fr  Ultrasound: vascular probe with ultrasound   Vessel Caliber: small, patent, compressibility normal  Needle advanced into vessel with real time Ultrasound guidance.  Guidewire confirmed in vessel.  Image recorded and saved.  sterile gel and probe cover used in ultrasound-guided central venous catheter insertion   Manometry: Venous cannualation confirmed by visual estimation of blood vessel pressure using manometry.  Insertion Attempts: 1   Securement:line sutured, chlorhexidine patch, sterile dressing applied and blood return through all ports    Post-Procedure    Adverse Events:none      Guidewire Guidewire removed intact.

## 2024-04-26 NOTE — PLAN OF CARE
SICU PLAN OF CARE    Dx: Dissection of aorta    Goals of Care: SBP < 120, HR < 60    Vital Signs (last 12 hours):   Temp:  [97.7 °F (36.5 °C)-98.1 °F (36.7 °C)]   Pulse:  [57-83]   Resp:  [10-21]   BP: (101-148)/(55-79)   SpO2:  [97 %-100 %]   Arterial Line BP: (100-123)/(54-58)      Neuro: AAOx4, Follows commands , and Moves all extremities spontaneously     Cardiac: NSR in 70s    Respiratory: Room Air    Gtts: MIVF, Esmolol, and Cardene    Urine Output: Voids Spontaneously  450 mL/shift    Diet: NPO      Labs/Accuchecks: Daily AM-labs    Skin:  All current LDAs is present, no new skin breakdown is noted.  Patient turned independently, bony prominences protected, and mattress inflated/working correctly. René score: 18. 4 eyes note added for admission.      Shift Events:  Admitted to SICU [address]1979 from OSH for dissection of aorta. Dr. Ochoa and Dr. Laird explained and addressed concerns as well as questions and obtained consent for surgery tomorrow morning, no further questions from patient and patient's family. Dr. Macias explained and addressed concerns as well as questions and obtained consent for anesthesia. Right and Left radial A-line is inserted to monitor BP more accurately. Esmolol, cardene and LR started to keep SBP < 120 and HR < 60. Electrolytes repleted.        See flowsheet for further assessment/details.  Family updated on current condition/plan of care, questions answered, and emotional support provided.  MD updated on current condition, vitals, labs, and gtts.

## 2024-04-26 NOTE — PROGRESS NOTES
Updated Dr. Sebastian on VS, gtts and notified Dr. Sebastian of K+ 3.5 and Mg 1.8. Orders received and implemented for electrolytes repletion, no new orders at this time.

## 2024-04-26 NOTE — PROGRESS NOTES
[facility]  Critical Care - Surgery  Progress Note    Patient Name: Heidy Polo  MRN: [id number]  Admission Date: 4/25/2024  Hospital Length of Stay: 1 days  Code Status: Full Code  Attending Provider: Aquilino Ochoa MD  Primary Care Provider: Chas Angela MD   Principal Problem: Dissection of aorta    Subjective:     Hospital/ICU Course:  No notes on file    Interval History/Significant Events: NAEO. VSS. On esmolol gtt @ 75. Given ativan x1 dose for increased anxiety this am. To OR today for ascending aorta replacement.    Follow-up For: Procedure(s) (LRB):  REPLACEMENT, AORTA, ASCENDING (N/A)    Post-Operative Day: Day of Surgery    Objective:     Vital Signs (Most Recent):  Temp: 97.8 °F (36.6 °C) (04/26/24 0300)  Pulse: 77 (04/26/24 0630)  Resp: 18 (04/26/24 0630)  BP: (!) 129/59 (04/25/24 1648)  SpO2: 100 % (04/26/24 0630) Vital Signs (24h Range):  Temp:  [97.7 °F (36.5 °C)-98.1 °F (36.7 °C)] 97.8 °F (36.6 °C)  Pulse:  [57-84] 77  Resp:  [10-35] 18  SpO2:  [97 %-100 %] 100 %  BP: (101-148)/(55-79) 129/59  Arterial Line BP: ()/() 104/55     Weight: 75.5 kg (166 lb 7.2 oz)  Body mass index is 28.57 kg/m².      Intake/Output Summary (Last 24 hours) at 4/26/2024 0642  Last data filed at 4/26/2024 0500  Gross per 24 hour   Intake 1983.26 ml   Output 500 ml   Net 1483.26 ml          Physical Exam  Vitals and nursing note reviewed.   Constitutional:       General: She is not in acute distress.  HENT:      Head: Normocephalic and atraumatic.      Nose: Nose normal.      Mouth/Throat:      Pharynx: Oropharynx is clear.   Eyes:      Extraocular Movements: Extraocular movements intact.      Conjunctiva/sclera: Conjunctivae normal.   Cardiovascular:      Rate and Rhythm: Normal rate and regular rhythm.      Pulses: Normal pulses.      Heart sounds: Normal heart sounds.      Comments: Bilateral radial alines   Pulmonary:      Effort: Pulmonary effort is normal.   Abdominal:       General: Abdomen is flat.      Palpations: Abdomen is soft.   Musculoskeletal:         General: Normal range of motion.      Cervical back: Normal range of motion.   Skin:     General: Skin is warm.   Neurological:      General: No focal deficit present.      Mental Status: She is alert and oriented to person, place, and time.   Psychiatric:         Mood and Affect: Mood normal.            Vents:       Lines/Drains/Airways       Drain  Duration             Female External Urinary Catheter w/ Suction 04/25/24 1545 <1 day              Arterial Line  Duration             Arterial Line 04/25/24 1600 Right Radial <1 day    Arterial Line 04/25/24 1624 Left Radial <1 day              Peripheral Intravenous Line  Duration                  Peripheral IV - Single Lumen 04/25/24 1611 22 G Left Forearm <1 day         Peripheral IV - Single Lumen 04/25/24 1612 20 G Left Antecubital <1 day         Peripheral IV - Single Lumen 04/25/24 1652 18 G;1 3/4 in Anterior;Left Upper Arm <1 day         Peripheral IV - Single Lumen 04/25/24 1652 20 G;1 3/4 in Anterior;Left;Proximal Forearm <1 day                    Significant Labs:    CBC/Anemia Profile:  Recent Labs   Lab 04/25/24  1154 04/25/24  1530 04/26/24  0315   WBC 5.30 7.61 7.30   HGB 11.7* 11.1* 10.5*   HCT 34.4* 32.8* 29.7*    281 211   MCV 92 92 90   RDW 12.4 12.5 12.4        Chemistries:  Recent Labs   Lab 04/25/24  1154 04/25/24  1530 04/26/24  0315    138 137   K 3.5 3.5 3.6    102 105   CO2 27 25 23   BUN 26* 25* 21   CREATININE 0.9 0.8 0.8   CALCIUM 9.4 9.7 8.8   ALBUMIN 3.9 3.7 3.4*   PROT 6.8 6.3 5.9*   BILITOT 0.8 0.8 1.2*   ALKPHOS 67 66 71   ALT 14 10 9*   AST 13 11 12   MG  --  1.8 2.1   PHOS  --  2.8 2.8       All pertinent labs within the past 24 hours have been reviewed.    Significant Imaging:  I have reviewed all pertinent imaging results/findings within the past 24 hours.  Assessment/Plan:     Cardiac/Vascular  * Dissection of aorta     Neuro/Psych:     - Sedation: none     - Pain:     - Scheduled Tylenol 1000mg Q8H   - PRN Oxycodone and dilaudid                Cardiac:     - Plan for OR on 4/26 for ascending aorta replacement with Dr. Ochoa     - BP Goal: MAP>60, HR<60, SBP <110    - Inotropes/Pressors: None     - Anti-HTNs: Cardene gtt     - Rhythm: NSR    - Beta Blocker: Esmolol gtt     - Statin: will start when appropriate         Pulmonary:     - Goal SpO2 >92%    - Stable on RA     - ABGs PRN         Renal:    - Trend BUN/Cr     - Record strict Is/Os    Recent Labs   Lab 04/25/24  1154 04/25/24  1530 04/26/24  0315   BUN 26* 25* 21   CREATININE 0.9 0.8 0.8         FEN / GI:     - Daily CMP, PRN K/Mag/Phos per protocol     - Replace electrolytes as needed    - Nutrition: NPO    - Bowel Regimen: Miralax, docusate        ID:     - Afebrile     - WBC stable     - Abx: start perioperative cefazolin 2g Q8H x 5 doses post op    Recent Labs   Lab 04/25/24  1154 04/25/24  1530 04/26/24  0315   WBC 5.30 7.61 7.30         Heme/Onc:     - Hgb 11.1 pre-operatively    - CBC daily    Recent Labs   Lab 04/25/24  1154 04/25/24  1530 04/26/24  0315   HGB 11.7* 11.1* 10.5*    281 211   APTT  --  24.8 23.2   INR  --  1.0 0.9         Endocrine:     - CTS Goal -140    - HgbA1c: 4.8     - MDSSI         PPx:   Feeding: NPO  Analgesia/Sedation: see above   Thromboembolic Prevention: SCDs  HOB >30: Yes  Stress Ulcer: N/A  Glucose Control: MDSSI      Lines/Drains/Airway:     PIV x 2   PICC:    Right radial adelina    Left radial adelina          Dispo/Code Status/Palliative:     - Continue SICU Care    - Full Code           Critical care was time spent personally by me on the following activities: development of treatment plan with patient or surrogate and bedside caregivers, discussions with consultants, evaluation of patient's response to treatment, examination of patient, ordering and performing treatments and interventions, ordering and review of  laboratory studies, ordering and review of radiographic studies, pulse oximetry, re-evaluation of patient's condition.  This critical care time did not overlap with that of any other provider or involve time for any procedures.     Alex Sebastian MD  Critical Care - Surgery  [facility]

## 2024-04-26 NOTE — BRIEF OP NOTE
[facility]  Brief Operative Note    SUMMARY     Surgery Date: 4/26/2024     Surgeons and Role:     * Aquilino Ochoa MD - Primary     * Mushtaq Laird DO - Fellow    Assisting Surgeon: None    Pre-op Diagnosis:  Dissecting aneurysm of aorta [I71.00]    Post-op Diagnosis:  Post-Op Diagnosis Codes:     * Dissecting aneurysm of aorta [I71.00]     * Intramural hematoma of ascending aorta and aortic arch    Procedure(s) (LRB):  REPLACEMENT, AORTA, ASCENDING (N/A)  HEMIARCH REPLACEMENT  APPLICATION, WOUND VAC (N/A)    Anesthesia: General    Implants:  Implant Name Type Inv. Item Serial No.  Lot No. LRB No. Used Action   FELT THIN [id number] 5EA/BX - [id number]  FELT THIN [id number] 5EA/BX  [employer] [id number] N/A 1 Implanted   HEMASHIELD PLATINUM WOVEN DOUBLE VELOUR VASCULAR GRAFT   [id number]  [id number] N/A 1 Implanted   PUTTY HEMASORB BONE RESORBABLE - [id number]  PUTTY HEMASORB BONE RESORBABLE  [id number] N/A 1 Implanted       Operative Findings: Intramural hematoma of the aneurysmal ascending aorta and aortic arch. Ascending replacement and hemiarch replacement under 30 min of hypothermic circulatory arrest at 26C.     Estimated Blood Loss: 100 cc          Specimens:   Specimen (24h ago, onward)       Start     Ordered    04/26/24 1358  Specimen to Pathology, Surgery Cardiovascular  Once        Comments: Pre-op Diagnosis: Dissecting aneurysm of aorta [I71.00]Procedure(s):REPLACEMENT, AORTA, ASCENDING Number of specimens: 1Name of specimens: 1) AORTIC ANEURYSM DISSECTION - PERMANENT     References:    Click here for ordering Quick Tip   Question Answer Comment   Procedure Type: Cardiovascular    Specimen Class: Complex case/Special        04/26/24 1358    Pending  Specimen to Pathology, Surgery Cardiovascular  Once        Comments: Pre-op Diagnosis: Dissecting aneurysm of aorta [I71.00]Procedure(s):REPLACEMENT, AORTA, ASCENDING Number of specimens: Name of specimens: 1) AORTIC ANEURYSM  DISSECTION-PERMANENT     References:    Click here for ordering Quick Tip   Question Answer Comment   Procedure Type: Cardiovascular    Specimen Class: Complex case/Special        Pending                    [id number]

## 2024-04-26 NOTE — PLAN OF CARE
10:44 AM  The SW tried to complete the patient's DPA but the patient's bedside nurse informed the SW that the patient was in the OR.     Axel Hawkins LMSW  Case Management [facility]

## 2024-04-27 LAB
ALBUMIN SERPL BCP-MCNC: 3.8 G/DL (ref 3.5–5.2)
ALBUMIN SERPL BCP-MCNC: 3.8 G/DL (ref 3.5–5.2)
ALBUMIN SERPL BCP-MCNC: 4 G/DL (ref 3.5–5.2)
ALBUMIN SERPL BCP-MCNC: 4.1 G/DL (ref 3.5–5.2)
ALLENS TEST: ABNORMAL
ALP SERPL-CCNC: 117 U/L (ref 55–135)
ALP SERPL-CCNC: 120 U/L (ref 55–135)
ALP SERPL-CCNC: 120 U/L (ref 55–135)
ALP SERPL-CCNC: 124 U/L (ref 55–135)
ALT SERPL W/O P-5'-P-CCNC: 162 U/L (ref 10–44)
ALT SERPL W/O P-5'-P-CCNC: 191 U/L (ref 10–44)
ALT SERPL W/O P-5'-P-CCNC: 239 U/L (ref 10–44)
ALT SERPL W/O P-5'-P-CCNC: 281 U/L (ref 10–44)
ANION GAP SERPL CALC-SCNC: 10 MMOL/L (ref 8–16)
ANION GAP SERPL CALC-SCNC: 11 MMOL/L (ref 8–16)
ANION GAP SERPL CALC-SCNC: 13 MMOL/L (ref 8–16)
ANION GAP SERPL CALC-SCNC: 13 MMOL/L (ref 8–16)
APTT PPP: 26.5 SEC (ref 21–32)
AST SERPL-CCNC: 170 U/L (ref 10–40)
AST SERPL-CCNC: 235 U/L (ref 10–40)
AST SERPL-CCNC: 384 U/L (ref 10–40)
AST SERPL-CCNC: 629 U/L (ref 10–40)
BILIRUB SERPL-MCNC: 1.9 MG/DL (ref 0.1–1)
BILIRUB SERPL-MCNC: 2.3 MG/DL (ref 0.1–1)
BILIRUB SERPL-MCNC: 3.1 MG/DL (ref 0.1–1)
BILIRUB SERPL-MCNC: 3.9 MG/DL (ref 0.1–1)
BUN SERPL-MCNC: 16 MG/DL (ref 8–23)
BUN SERPL-MCNC: 17 MG/DL (ref 8–23)
BUN SERPL-MCNC: 18 MG/DL (ref 8–23)
BUN SERPL-MCNC: 19 MG/DL (ref 8–23)
CALCIUM SERPL-MCNC: 8.8 MG/DL (ref 8.7–10.5)
CALCIUM SERPL-MCNC: 9.1 MG/DL (ref 8.7–10.5)
CALCIUM SERPL-MCNC: 9.2 MG/DL (ref 8.7–10.5)
CALCIUM SERPL-MCNC: 9.4 MG/DL (ref 8.7–10.5)
CHLORIDE SERPL-SCNC: 102 MMOL/L (ref 95–110)
CHLORIDE SERPL-SCNC: 102 MMOL/L (ref 95–110)
CHLORIDE SERPL-SCNC: 104 MMOL/L (ref 95–110)
CHLORIDE SERPL-SCNC: 104 MMOL/L (ref 95–110)
CO2 SERPL-SCNC: 23 MMOL/L (ref 23–29)
CO2 SERPL-SCNC: 24 MMOL/L (ref 23–29)
CO2 SERPL-SCNC: 26 MMOL/L (ref 23–29)
CO2 SERPL-SCNC: 29 MMOL/L (ref 23–29)
CREAT SERPL-MCNC: 0.8 MG/DL (ref 0.5–1.4)
CREAT SERPL-MCNC: 0.9 MG/DL (ref 0.5–1.4)
DELSYS: ABNORMAL
ERYTHROCYTE [DISTWIDTH] IN BLOOD BY AUTOMATED COUNT: 14.4 % (ref 11.5–14.5)
ERYTHROCYTE [DISTWIDTH] IN BLOOD BY AUTOMATED COUNT: 15.1 % (ref 11.5–14.5)
ERYTHROCYTE [DISTWIDTH] IN BLOOD BY AUTOMATED COUNT: 15.1 % (ref 11.5–14.5)
ERYTHROCYTE [DISTWIDTH] IN BLOOD BY AUTOMATED COUNT: 15.3 % (ref 11.5–14.5)
ERYTHROCYTE [SEDIMENTATION RATE] IN BLOOD BY WESTERGREN METHOD: 18 MM/H
ERYTHROCYTE [SEDIMENTATION RATE] IN BLOOD BY WESTERGREN METHOD: 20 MM/H
EST. GFR  (NO RACE VARIABLE): >60 ML/MIN/1.73 M^2
FIO2: 40
GLUCOSE SERPL-MCNC: 105 MG/DL (ref 70–110)
GLUCOSE SERPL-MCNC: 138 MG/DL (ref 70–110)
GLUCOSE SERPL-MCNC: 142 MG/DL (ref 70–110)
GLUCOSE SERPL-MCNC: 147 MG/DL (ref 70–110)
HCO3 UR-SCNC: 22 MMOL/L (ref 24–28)
HCO3 UR-SCNC: 23.5 MMOL/L (ref 24–28)
HCO3 UR-SCNC: 23.7 MMOL/L (ref 24–28)
HCO3 UR-SCNC: 24 MMOL/L (ref 24–28)
HCO3 UR-SCNC: 24 MMOL/L (ref 24–28)
HCO3 UR-SCNC: 24.1 MMOL/L (ref 24–28)
HCO3 UR-SCNC: 24.6 MMOL/L (ref 24–28)
HCO3 UR-SCNC: 24.8 MMOL/L (ref 24–28)
HCO3 UR-SCNC: 24.9 MMOL/L (ref 24–28)
HCT VFR BLD AUTO: 23.2 % (ref 37–48.5)
HCT VFR BLD AUTO: 23.9 % (ref 37–48.5)
HCT VFR BLD AUTO: 24.8 % (ref 37–48.5)
HCT VFR BLD AUTO: 25.1 % (ref 37–48.5)
HCT VFR BLD CALC: 22 %PCV (ref 36–54)
HCT VFR BLD CALC: 23 %PCV (ref 36–54)
HCT VFR BLD CALC: 24 %PCV (ref 36–54)
HCT VFR BLD CALC: 25 %PCV (ref 36–54)
HGB BLD-MCNC: 8.4 G/DL (ref 12–16)
HGB BLD-MCNC: 8.6 G/DL (ref 12–16)
HGB BLD-MCNC: 8.8 G/DL (ref 12–16)
HGB BLD-MCNC: 8.9 G/DL (ref 12–16)
INR PPP: 1 (ref 0.8–1.2)
LDH SERPL L TO P-CCNC: 2.33 MMOL/L (ref 0.36–1.25)
LDH SERPL L TO P-CCNC: 2.68 MMOL/L (ref 0.36–1.25)
LDH SERPL L TO P-CCNC: 3 MMOL/L (ref 0.36–1.25)
LDH SERPL L TO P-CCNC: 3.3 MMOL/L (ref 0.36–1.25)
LDH SERPL L TO P-CCNC: 3.45 MMOL/L (ref 0.36–1.25)
LDH SERPL L TO P-CCNC: 3.46 MMOL/L (ref 0.36–1.25)
LDH SERPL L TO P-CCNC: 4.1 MMOL/L (ref 0.36–1.25)
LDH SERPL L TO P-CCNC: 4.52 MMOL/L (ref 0.36–1.25)
LDH SERPL L TO P-CCNC: 6.05 MMOL/L (ref 0.36–1.25)
LDH SERPL L TO P-CCNC: 7.12 MMOL/L (ref 0.36–1.25)
MAGNESIUM SERPL-MCNC: 1.9 MG/DL (ref 1.6–2.6)
MAGNESIUM SERPL-MCNC: 2.3 MG/DL (ref 1.6–2.6)
MAGNESIUM SERPL-MCNC: 2.4 MG/DL (ref 1.6–2.6)
MAGNESIUM SERPL-MCNC: 2.6 MG/DL (ref 1.6–2.6)
MCH RBC QN AUTO: 30 PG (ref 27–31)
MCH RBC QN AUTO: 30 PG (ref 27–31)
MCH RBC QN AUTO: 30.1 PG (ref 27–31)
MCH RBC QN AUTO: 30.1 PG (ref 27–31)
MCHC RBC AUTO-ENTMCNC: 35.1 G/DL (ref 32–36)
MCHC RBC AUTO-ENTMCNC: 35.9 G/DL (ref 32–36)
MCHC RBC AUTO-ENTMCNC: 36 G/DL (ref 32–36)
MCHC RBC AUTO-ENTMCNC: 36.2 G/DL (ref 32–36)
MCV RBC AUTO: 83 FL (ref 82–98)
MCV RBC AUTO: 84 FL (ref 82–98)
MCV RBC AUTO: 84 FL (ref 82–98)
MCV RBC AUTO: 86 FL (ref 82–98)
MODE: ABNORMAL
PCO2 BLDA: 28.9 MMHG (ref 35–45)
PCO2 BLDA: 31.6 MMHG (ref 35–45)
PCO2 BLDA: 32.9 MMHG (ref 35–45)
PCO2 BLDA: 33.4 MMHG (ref 35–45)
PCO2 BLDA: 33.5 MMHG (ref 35–45)
PCO2 BLDA: 34 MMHG (ref 35–45)
PCO2 BLDA: 34 MMHG (ref 35–45)
PCO2 BLDA: 34.8 MMHG (ref 35–45)
PCO2 BLDA: 35 MMHG (ref 35–45)
PEEP: 5
PH SMN: 7.44 [PH] (ref 7.35–7.45)
PH SMN: 7.45 [PH] (ref 7.35–7.45)
PH SMN: 7.46 [PH] (ref 7.35–7.45)
PH SMN: 7.46 [PH] (ref 7.35–7.45)
PH SMN: 7.47 [PH] (ref 7.35–7.45)
PH SMN: 7.47 [PH] (ref 7.35–7.45)
PH SMN: 7.48 [PH] (ref 7.35–7.45)
PH SMN: 7.49 [PH] (ref 7.35–7.45)
PH SMN: 7.49 [PH] (ref 7.35–7.45)
PHOSPHATE SERPL-MCNC: 3.8 MG/DL (ref 2.7–4.5)
PHOSPHATE SERPL-MCNC: 3.8 MG/DL (ref 2.7–4.5)
PHOSPHATE SERPL-MCNC: 4 MG/DL (ref 2.7–4.5)
PHOSPHATE SERPL-MCNC: 4.2 MG/DL (ref 2.7–4.5)
PLATELET # BLD AUTO: 104 K/UL (ref 150–450)
PLATELET # BLD AUTO: 109 K/UL (ref 150–450)
PLATELET # BLD AUTO: 94 K/UL (ref 150–450)
PLATELET # BLD AUTO: 96 K/UL (ref 150–450)
PMV BLD AUTO: 10.9 FL (ref 9.2–12.9)
PMV BLD AUTO: 11.1 FL (ref 9.2–12.9)
PMV BLD AUTO: 11.1 FL (ref 9.2–12.9)
PMV BLD AUTO: 11.4 FL (ref 9.2–12.9)
PO2 BLDA: 128 MMHG (ref 80–100)
PO2 BLDA: 129 MMHG (ref 80–100)
PO2 BLDA: 130 MMHG (ref 80–100)
PO2 BLDA: 133 MMHG (ref 80–100)
PO2 BLDA: 137 MMHG (ref 80–100)
PO2 BLDA: 138 MMHG (ref 80–100)
PO2 BLDA: 141 MMHG (ref 80–100)
POC BE: -1 MMOL/L
POC BE: -1 MMOL/L
POC BE: 0 MMOL/L
POC BE: 1 MMOL/L
POC IONIZED CALCIUM: 1.16 MMOL/L (ref 1.06–1.42)
POC IONIZED CALCIUM: 1.18 MMOL/L (ref 1.06–1.42)
POC IONIZED CALCIUM: 1.18 MMOL/L (ref 1.06–1.42)
POC IONIZED CALCIUM: 1.19 MMOL/L (ref 1.06–1.42)
POC IONIZED CALCIUM: 1.19 MMOL/L (ref 1.06–1.42)
POC IONIZED CALCIUM: 1.2 MMOL/L (ref 1.06–1.42)
POC IONIZED CALCIUM: 1.2 MMOL/L (ref 1.06–1.42)
POC IONIZED CALCIUM: 1.24 MMOL/L (ref 1.06–1.42)
POC IONIZED CALCIUM: 1.24 MMOL/L (ref 1.06–1.42)
POC SATURATED O2: 99 % (ref 95–100)
POC TCO2: 23 MMOL/L (ref 23–27)
POC TCO2: 25 MMOL/L (ref 23–27)
POC TCO2: 26 MMOL/L (ref 23–27)
POCT GLUCOSE: 106 MG/DL (ref 70–110)
POCT GLUCOSE: 112 MG/DL (ref 70–110)
POCT GLUCOSE: 114 MG/DL (ref 70–110)
POCT GLUCOSE: 116 MG/DL (ref 70–110)
POCT GLUCOSE: 119 MG/DL (ref 70–110)
POCT GLUCOSE: 119 MG/DL (ref 70–110)
POCT GLUCOSE: 122 MG/DL (ref 70–110)
POCT GLUCOSE: 125 MG/DL (ref 70–110)
POCT GLUCOSE: 129 MG/DL (ref 70–110)
POCT GLUCOSE: 131 MG/DL (ref 70–110)
POCT GLUCOSE: 132 MG/DL (ref 70–110)
POCT GLUCOSE: 132 MG/DL (ref 70–110)
POCT GLUCOSE: 136 MG/DL (ref 70–110)
POCT GLUCOSE: 141 MG/DL (ref 70–110)
POCT GLUCOSE: 142 MG/DL (ref 70–110)
POCT GLUCOSE: 143 MG/DL (ref 70–110)
POCT GLUCOSE: 143 MG/DL (ref 70–110)
POCT GLUCOSE: 145 MG/DL (ref 70–110)
POCT GLUCOSE: 145 MG/DL (ref 70–110)
POCT GLUCOSE: 151 MG/DL (ref 70–110)
POCT GLUCOSE: 154 MG/DL (ref 70–110)
POCT GLUCOSE: 154 MG/DL (ref 70–110)
POCT GLUCOSE: 66 MG/DL (ref 70–110)
POCT GLUCOSE: 84 MG/DL (ref 70–110)
POTASSIUM BLD-SCNC: 2.9 MMOL/L (ref 3.5–5.1)
POTASSIUM BLD-SCNC: 3 MMOL/L (ref 3.5–5.1)
POTASSIUM BLD-SCNC: 3.6 MMOL/L (ref 3.5–5.1)
POTASSIUM BLD-SCNC: 3.7 MMOL/L (ref 3.5–5.1)
POTASSIUM BLD-SCNC: 3.9 MMOL/L (ref 3.5–5.1)
POTASSIUM BLD-SCNC: 4.1 MMOL/L (ref 3.5–5.1)
POTASSIUM SERPL-SCNC: 3.6 MMOL/L (ref 3.5–5.1)
POTASSIUM SERPL-SCNC: 3.7 MMOL/L (ref 3.5–5.1)
POTASSIUM SERPL-SCNC: 3.9 MMOL/L (ref 3.5–5.1)
POTASSIUM SERPL-SCNC: 4.1 MMOL/L (ref 3.5–5.1)
PROT SERPL-MCNC: 5.8 G/DL (ref 6–8.4)
PROT SERPL-MCNC: 5.8 G/DL (ref 6–8.4)
PROT SERPL-MCNC: 5.9 G/DL (ref 6–8.4)
PROT SERPL-MCNC: 5.9 G/DL (ref 6–8.4)
PROTHROMBIN TIME: 11 SEC (ref 9–12.5)
PROTHROMBIN TIME: 11 SEC (ref 9–12.5)
PROTHROMBIN TIME: 11.1 SEC (ref 9–12.5)
PROTHROMBIN TIME: 11.2 SEC (ref 9–12.5)
RBC # BLD AUTO: 2.79 M/UL (ref 4–5.4)
RBC # BLD AUTO: 2.86 M/UL (ref 4–5.4)
RBC # BLD AUTO: 2.93 M/UL (ref 4–5.4)
RBC # BLD AUTO: 2.97 M/UL (ref 4–5.4)
SAMPLE: ABNORMAL
SITE: ABNORMAL
SODIUM BLD-SCNC: 140 MMOL/L (ref 136–145)
SODIUM BLD-SCNC: 141 MMOL/L (ref 136–145)
SODIUM BLD-SCNC: 142 MMOL/L (ref 136–145)
SODIUM BLD-SCNC: 142 MMOL/L (ref 136–145)
SODIUM BLD-SCNC: 143 MMOL/L (ref 136–145)
SODIUM SERPL-SCNC: 139 MMOL/L (ref 136–145)
SODIUM SERPL-SCNC: 140 MMOL/L (ref 136–145)
SODIUM SERPL-SCNC: 141 MMOL/L (ref 136–145)
SODIUM SERPL-SCNC: 141 MMOL/L (ref 136–145)
VT: 400
WBC # BLD AUTO: 11.72 K/UL (ref 3.9–12.7)
WBC # BLD AUTO: 12.99 K/UL (ref 3.9–12.7)
WBC # BLD AUTO: 6.63 K/UL (ref 3.9–12.7)
WBC # BLD AUTO: 9.56 K/UL (ref 3.9–12.7)

## 2024-04-27 PROCEDURE — 80053 COMPREHEN METABOLIC PANEL: CPT | Mod: 91

## 2024-04-27 PROCEDURE — 83605 ASSAY OF LACTIC ACID: CPT

## 2024-04-27 PROCEDURE — 84100 ASSAY OF PHOSPHORUS: CPT | Mod: 91

## 2024-04-27 PROCEDURE — 63600175 PHARM REV CODE 636 W HCPCS: Performed by: STUDENT IN AN ORGANIZED HEALTH CARE EDUCATION/TRAINING PROGRAM

## 2024-04-27 PROCEDURE — 82330 ASSAY OF CALCIUM: CPT

## 2024-04-27 PROCEDURE — 02HV33Z INSERTION OF INFUSION DEVICE INTO SUPERIOR VENA CAVA, PERCUTANEOUS APPROACH: ICD-10-PCS | Performed by: THORACIC SURGERY (CARDIOTHORACIC VASCULAR SURGERY)

## 2024-04-27 PROCEDURE — 99900026 HC AIRWAY MAINTENANCE (STAT)

## 2024-04-27 PROCEDURE — 25000003 PHARM REV CODE 250: Performed by: STUDENT IN AN ORGANIZED HEALTH CARE EDUCATION/TRAINING PROGRAM

## 2024-04-27 PROCEDURE — A4216 STERILE WATER/SALINE, 10 ML: HCPCS | Performed by: THORACIC SURGERY (CARDIOTHORACIC VASCULAR SURGERY)

## 2024-04-27 PROCEDURE — 94010 BREATHING CAPACITY TEST: CPT

## 2024-04-27 PROCEDURE — 27100171 HC OXYGEN HIGH FLOW UP TO 24 HOURS

## 2024-04-27 PROCEDURE — 85014 HEMATOCRIT: CPT

## 2024-04-27 PROCEDURE — 37799 UNLISTED PX VASCULAR SURGERY: CPT

## 2024-04-27 PROCEDURE — 82803 BLOOD GASES ANY COMBINATION: CPT

## 2024-04-27 PROCEDURE — 25000003 PHARM REV CODE 250: Performed by: THORACIC SURGERY (CARDIOTHORACIC VASCULAR SURGERY)

## 2024-04-27 PROCEDURE — 92610 EVALUATE SWALLOWING FUNCTION: CPT

## 2024-04-27 PROCEDURE — 85610 PROTHROMBIN TIME: CPT

## 2024-04-27 PROCEDURE — 94799 UNLISTED PULMONARY SVC/PX: CPT

## 2024-04-27 PROCEDURE — 25000003 PHARM REV CODE 250

## 2024-04-27 PROCEDURE — 85730 THROMBOPLASTIN TIME PARTIAL: CPT | Performed by: STUDENT IN AN ORGANIZED HEALTH CARE EDUCATION/TRAINING PROGRAM

## 2024-04-27 PROCEDURE — 97535 SELF CARE MNGMENT TRAINING: CPT

## 2024-04-27 PROCEDURE — 63600175 PHARM REV CODE 636 W HCPCS

## 2024-04-27 PROCEDURE — 83735 ASSAY OF MAGNESIUM: CPT | Mod: 91

## 2024-04-27 PROCEDURE — 99900017 HC EXTUBATION W/PARAMETERS (STAT)

## 2024-04-27 PROCEDURE — 36573 INSJ PICC RS&I 5 YR+: CPT

## 2024-04-27 PROCEDURE — C1751 CATH, INF, PER/CENT/MIDLINE: HCPCS

## 2024-04-27 PROCEDURE — 97530 THERAPEUTIC ACTIVITIES: CPT

## 2024-04-27 PROCEDURE — 97166 OT EVAL MOD COMPLEX 45 MIN: CPT

## 2024-04-27 PROCEDURE — 76937 US GUIDE VASCULAR ACCESS: CPT

## 2024-04-27 PROCEDURE — 94003 VENT MGMT INPAT SUBQ DAY: CPT

## 2024-04-27 PROCEDURE — 84295 ASSAY OF SERUM SODIUM: CPT

## 2024-04-27 PROCEDURE — 85027 COMPLETE CBC AUTOMATED: CPT | Mod: 91

## 2024-04-27 PROCEDURE — 97162 PT EVAL MOD COMPLEX 30 MIN: CPT

## 2024-04-27 PROCEDURE — 99900035 HC TECH TIME PER 15 MIN (STAT)

## 2024-04-27 PROCEDURE — 84132 ASSAY OF SERUM POTASSIUM: CPT

## 2024-04-27 PROCEDURE — 99499 UNLISTED E&M SERVICE: CPT | Mod: GC,,, | Performed by: ANESTHESIOLOGY

## 2024-04-27 PROCEDURE — 94761 N-INVAS EAR/PLS OXIMETRY MLT: CPT | Mod: XB

## 2024-04-27 PROCEDURE — 94150 VITAL CAPACITY TEST: CPT

## 2024-04-27 PROCEDURE — 20000000 HC ICU ROOM

## 2024-04-27 RX ORDER — ASPIRIN 81 MG/1
81 TABLET ORAL DAILY
Status: DISCONTINUED | OUTPATIENT
Start: 2024-04-27 | End: 2024-05-01 | Stop reason: HOSPADM

## 2024-04-27 RX ORDER — ACETAMINOPHEN 500 MG
1000 TABLET ORAL EVERY 8 HOURS
Status: DISCONTINUED | OUTPATIENT
Start: 2024-04-27 | End: 2024-05-01 | Stop reason: HOSPADM

## 2024-04-27 RX ORDER — SODIUM CHLORIDE 0.9 % (FLUSH) 0.9 %
10 SYRINGE (ML) INJECTION
Status: DISCONTINUED | OUTPATIENT
Start: 2024-04-27 | End: 2024-05-01 | Stop reason: HOSPADM

## 2024-04-27 RX ORDER — FUROSEMIDE 10 MG/ML
10 INJECTION INTRAMUSCULAR; INTRAVENOUS ONCE
Status: COMPLETED | OUTPATIENT
Start: 2024-04-27 | End: 2024-04-27

## 2024-04-27 RX ORDER — DEXMEDETOMIDINE HYDROCHLORIDE 4 UG/ML
0-1.4 INJECTION, SOLUTION INTRAVENOUS CONTINUOUS
Status: DISCONTINUED | OUTPATIENT
Start: 2024-04-27 | End: 2024-04-27

## 2024-04-27 RX ORDER — SODIUM CHLORIDE 0.9 % (FLUSH) 0.9 %
10 SYRINGE (ML) INJECTION EVERY 6 HOURS
Status: DISCONTINUED | OUTPATIENT
Start: 2024-04-27 | End: 2024-05-01 | Stop reason: HOSPADM

## 2024-04-27 RX ADMIN — SODIUM PHOSPHATE, MONOBASIC, MONOHYDRATE AND SODIUM PHOSPHATE, DIBASIC, ANHYDROUS 20.01 MMOL: 142; 276 INJECTION, SOLUTION INTRAVENOUS at 12:04

## 2024-04-27 RX ADMIN — DEXMEDETOMIDINE HYDROCHLORIDE 0.2 MCG/KG/HR: 4 INJECTION INTRAVENOUS at 06:04

## 2024-04-27 RX ADMIN — FAMOTIDINE 20 MG: 10 INJECTION, SOLUTION INTRAVENOUS at 08:04

## 2024-04-27 RX ADMIN — FUROSEMIDE 2.5 MG/HR: 10 INJECTION, SOLUTION INTRAMUSCULAR; INTRAVENOUS at 12:04

## 2024-04-27 RX ADMIN — POLYETHYLENE GLYCOL 3350 17 G: 17 POWDER, FOR SOLUTION ORAL at 08:04

## 2024-04-27 RX ADMIN — FUROSEMIDE 2.5 MG/HR: 10 INJECTION, SOLUTION INTRAMUSCULAR; INTRAVENOUS at 11:04

## 2024-04-27 RX ADMIN — OXYCODONE 5 MG: 5 TABLET ORAL at 07:04

## 2024-04-27 RX ADMIN — INSULIN HUMAN 2.4 UNITS/HR: 1 INJECTION, SOLUTION INTRAVENOUS at 02:04

## 2024-04-27 RX ADMIN — CEFAZOLIN 2 G: 2 INJECTION, POWDER, FOR SOLUTION INTRAMUSCULAR; INTRAVENOUS at 04:04

## 2024-04-27 RX ADMIN — ACETAMINOPHEN 1001.6 MG: 650 SOLUTION ORAL at 05:04

## 2024-04-27 RX ADMIN — DOCUSATE SODIUM 100 MG: 100 CAPSULE, LIQUID FILLED ORAL at 08:04

## 2024-04-27 RX ADMIN — ACETAMINOPHEN 1001.6 MG: 650 SOLUTION ORAL at 01:04

## 2024-04-27 RX ADMIN — FUROSEMIDE 10 MG: 10 INJECTION, SOLUTION INTRAVENOUS at 10:04

## 2024-04-27 RX ADMIN — MUPIROCIN: 20 OINTMENT TOPICAL at 09:04

## 2024-04-27 RX ADMIN — POTASSIUM CHLORIDE 20 MEQ: 200 INJECTION, SOLUTION INTRAVENOUS at 04:04

## 2024-04-27 RX ADMIN — CEFAZOLIN 2 G: 2 INJECTION, POWDER, FOR SOLUTION INTRAMUSCULAR; INTRAVENOUS at 11:04

## 2024-04-27 RX ADMIN — POTASSIUM CHLORIDE 20 MEQ: 200 INJECTION, SOLUTION INTRAVENOUS at 10:04

## 2024-04-27 RX ADMIN — Medication 10 ML: at 06:04

## 2024-04-27 RX ADMIN — PROPOFOL 45 MCG/KG/MIN: 10 INJECTION, EMULSION INTRAVENOUS at 02:04

## 2024-04-27 RX ADMIN — MUPIROCIN: 20 OINTMENT TOPICAL at 08:04

## 2024-04-27 RX ADMIN — OXYCODONE HYDROCHLORIDE 10 MG: 10 TABLET ORAL at 11:04

## 2024-04-27 RX ADMIN — OXYCODONE 5 MG: 5 TABLET ORAL at 01:04

## 2024-04-27 RX ADMIN — MAGNESIUM SULFATE HEPTAHYDRATE 2 G: 40 INJECTION, SOLUTION INTRAVENOUS at 06:04

## 2024-04-27 RX ADMIN — ASPIRIN 81 MG: 81 TABLET, COATED ORAL at 01:04

## 2024-04-27 NOTE — PROCEDURES
"Heidy Polo is a 68 y.o. female patient.    Temp: 98.6 °F (37 °C) (04/27/24 0701)  Pulse: 78 (04/27/24 1100)  Resp: (!) 25 (04/27/24 1100)  BP: (!) 140/65 (04/27/24 0945)  SpO2: 100 % (04/27/24 1100)  Weight: 80 kg (176 lb 5.9 oz) (04/27/24 1030)  Height: 5' 4" (162.6 cm) (04/27/24 1030)    PICC  Date/Time: 4/27/2024 11:15 AM  Performed by: Luis Knott RN  Assisting provider: Baylee Peters LPN  Consent Done: Yes  Time out: Immediately prior to procedure a time out was called to verify the correct patient, procedure, equipment, support staff and site/side marked as required  Indications: med administration and vascular access  Anesthesia: local infiltration  Local anesthetic: lidocaine 1% without epinephrine  Anesthetic Total (mL): 3  Description of findings: picc  Preparation: skin prepped with ChloraPrep  Skin prep agent dried: skin prep agent completely dried prior to procedure  Sterile barriers: all five maximum sterile barriers used - cap, mask, sterile gown, sterile gloves, and large sterile sheet  Hand hygiene: hand hygiene performed prior to central venous catheter insertion  Location details: left brachial  Catheter type: triple lumen  Catheter size: 5 Fr  Catheter Length: 38cm    Ultrasound guidance: yes  Vessel Caliber: medium and patent, compressibility normal  Vascular Doppler: not done  Needle advanced into vessel with real time Ultrasound guidance.  Guidewire confirmed in vessel.  Image recorded and saved.  Sterile sheath used.  no esophageal manometryNumber of attempts: 1  Post-procedure: blood return through all ports, sterile dressing applied and chlorhexidine patch  Technical procedures used: 3CG  Specimens: No  Implants: No  Assessment: placement verified by x-ray  Complications: none          Name Baylee Peters LPN  4/27/2024    "

## 2024-04-27 NOTE — PLAN OF CARE
Pt engaged well in therapy this date.     Problem: Occupational Therapy  Goal: Occupational Therapy Goal  Description: Goals to be met by: 5/27/24     Patient will increase functional independence with ADLs by performing:    UE Dressing with Stand-by Assistance.  LE Dressing with Stand-by Assistance.  Grooming while standing at sink with Contact Guard Assistance.  Toileting from toilet with Contact Guard Assistance for hygiene and clothing management.   Rolling to Bilateral with Supervision.   Supine to sit with Supervision.  Step transfer with Contact Guard Assistance  Toilet transfer to toilet with Contact Guard Assistance.    Outcome: Progressing

## 2024-04-27 NOTE — PT/OT/SLP EVAL
Occupational Therapy  Co - Evaluation with PT    Co-evaluation/treatment performed due to patient's multiple deficits requiring two skilled therapists to appropriately and safely assess patient's strength and endurance while facilitating functional tasks in addition to accommodating for patient's activity tolerance.       Name: Heidy Polo  MRN: [id number]  Admitting Diagnosis: Dissection of aorta  Recent Surgery: Procedure(s) (LRB):  REPLACEMENT, AORTA, ASCENDING (N/A)  APPLICATION, WOUND VAC (N/A) 1 Day Post-Op    Recommendations:     Discharge Recommendations:  (tbd)  Discharge Equipment Recommendations:  none  Barriers to discharge:  None    Assessment:     Heidy Polo is a 68 y.o. female with a medical diagnosis of Dissection of aorta.  She presents with performance deficits affecting function: weakness, impaired self care skills, impaired functional mobility, impaired balance, impaired endurance, gait instability, pain, decreased safety awareness, decreased lower extremity function, decreased upper extremity function.     Pt engaged well in therapy this date, encountered supine with family present.  Pt presents with pleasant demeanor and agreeable to therapy, educated re: sternal precautions.  This date, pt required assistance with bed mobility 2* line navigation.  Pt able to complete STS transfer with CGA and took side steps to head of bed with CGA.  Pt required mod A of 2 persons to complete sit to supine transition.  Patient currently demonstrates a need for low intensity therapy on a scheduled basis secondary to a decline in functional status due to illness.       Rehab Prognosis: Good; patient would benefit from acute skilled OT services to address these deficits and reach maximum level of function.       Plan:     Patient to be seen 3 x/week to address the above listed problems via self-care/home management, therapeutic activities, therapeutic exercises  Plan of Care Expires: 05/27/24  Plan of Care  "Reviewed with: patient, son, grandchild(sadia)    Subjective     Chief Complaint: "I'm doing alright"   Patient/Family Comments/goals: Get better, return home     Occupational Profile:  Living Environment: Pt lives alone in [facility] with small threshold step, WIS with small ledge.   Previous level of function: Independent in ADLs, functional mobility.  Pt drives and works.   Roles and Routines: mother/grandmother.  Equipment Used at Home: none  Assistance upon Discharge: Family lives on same street and able to help as needed    Pain/Comfort:  Pain Rating 1: 0/10  Pain Rating Post-Intervention 1: 0/10    Patients cultural, spiritual, Jewish conflicts given the current situation: no    Objective:     Communicated with: EVA Kelly prior to session.  Patient found supine with bed alarm, arterial line, Trialysis, PICC line, oxygen, telemetry, central line, chest tube (pacer wires) upon OT entry to room.    General Precautions: Standard, aspiration, fall, sternal  Orthopedic Precautions: N/A  Braces: N/A  Respiratory Status: Nasal cannula, flow 3 L/min,which was removed prior to mobility with RN guidance, pt's O2 stayed ~96% throughout session     Occupational Performance:    Bed Mobility:    Patient completed Rolling/Turning to Left with  minimum assistance  Patient completed Rolling/Turning to Right with minimum assistance  Patient completed Scooting/Bridging with minimum assistance  Patient completed Supine to Sit with minimum assistance  Patient completed Sit to Supine with moderate assistance and 2 persons  Pt able to sit EOB ~15min with good balance, SBA    Functional Mobility/Transfers:  Patient completed Sit <> Stand Transfer with contact guard assistance  with  HHA   Functional Mobility: Pt able to take ~6 small steps to HOB with CGA and HHA    Activities of Daily Living:  Grooming: set up assistance washing face with warm washcloth   Upper Body Dressing: moderate assistance donning 2nd gown as robe, navigating lines "   Lower Body Dressing: total assistance donning bilateral nonslip socks     Cognitive/Visual Perceptual:  Cognitive/Psychosocial Skills:     -       Oriented to: AOx4   -       Follows Commands/attention:Follows multistep  commands  -       Communication: clear/fluent  -       Memory: No Deficits noted  -       Safety awareness/insight to disability: impaired   -       Mood/Affect/Coping skills/emotional control: Appropriate to situation  Visual/Perceptual:      -Intact      Physical Exam:  Balance:    -       Sitting: Good. Standing: Fair, CGA  Postural examination/scapula alignment:    -       Rounded shoulders  Skin integrity: Visible skin intact  Edema:  None noted  Sensation:    -       Intact  Motor Planning:    -       Intact  Dominant hand:    -       right  Upper Extremity Range of Motion:     -       Right Upper Extremity: WFL  -       Left Upper Extremity: WFL   Strength:    -       Right Upper Extremity: WFL  -       Left Upper Extremity: WFL  Fine Motor Coordination:    -       Intact  Neurological:    -       Intact    AMPAC 6 Click ADL:  AMPAC Total Score: 24    Treatment & Education:  Pt educated on role of OT, POC, and goals for therapy.    POC was dicussed with patient/caregiver, who was included in its development and is in agreement with the identified goals and treatment plan.   Patient and family aware of patient's deficits and therapy progression.   Time provided for therapeutic counseling and discussion of health disposition.   Educated on importance of EOB/OOB mobility, maintaining routine, sitting up in chair, and maximizing independence with ADLs during admission   Pt completed ADLs and functional mobility for treatment session as noted above   Pt/caregiver verbalized understanding and expressed no further concerns/questions.  Updated communication board with level of assist required       Patient left supine with all lines intact, call button in reach, bed alarm on, and RN and family  present    GOALS:   Multidisciplinary Problems       Occupational Therapy Goals          Problem: Occupational Therapy    Goal Priority Disciplines Outcome Interventions   Occupational Therapy Goal     OT, PT/OT Progressing    Description: Goals to be met by: 5/27/24     Patient will increase functional independence with ADLs by performing:    UE Dressing with Stand-by Assistance.  LE Dressing with Stand-by Assistance.  Grooming while standing at sink with Contact Guard Assistance.  Toileting from toilet with Contact Guard Assistance for hygiene and clothing management.   Rolling to Bilateral with Supervision.   Supine to sit with Supervision.  Step transfer with Contact Guard Assistance  Toilet transfer to toilet with Contact Guard Assistance.                         History:     Past Medical History:   Diagnosis Date    Breast cancer 2001    right    Cancer     breast in 2001 -right     Depression 07/16/2012    History of breast cancer     Hyperlipidemia     Hypertension     Hypothyroid          Past Surgical History:   Procedure Laterality Date    CAPSULOTOMY OF JOINT Left 06/04/2021    Procedure: CAPSULOTOMY, JOINT 2nd MPJ;  Surgeon: Marnie Mckeon DPM;  Location: [facility];  Service: Podiatry;  Laterality: Left;    CAPSULOTOMY OF JOINT Left 01/07/2022    3rd and 4th    CAPSULOTOMY OF JOINT Left 01/07/2022    Procedure: 2,3,4,5;  Surgeon: Marnie Mckeon DPM;  Location: [facility];  Service: Podiatry;  Laterality: Left;    CHOLECYSTECTOMY      COLONOSCOPY N/A 11/09/2020    Procedure: COLONOSCOPY;  Surgeon: Adelaide Hernández MD;  Location: [facility];  Service: Endoscopy;  Laterality: N/A;  with biopsy    CORRECTION OF HAMMER TOE Left 06/04/2021    Procedure: CORRECTION, HAMMER TOE 2nd with K-wire stabilization;  Surgeon: Marnie Mckeon DPM;  Location: [facility] OR;  Service: Podiatry;  Laterality: Left;  COVID VACCINATION CONFIRMED    CORRECTION OF HAMMER TOE Left 01/07/2022    3rd, 4th and 5th    CORRECTION OF HAMMER TOE Left  01/07/2022    Procedure: CORRECTION, HAMMER TOE 2,3,4,5;  Surgeon: Marnie Mckeon DPM;  Location: [facility];  Service: Podiatry;  Laterality: Left;    HERNIA REPAIR      LAPAROSCOPIC SLEEVE GASTRECTOMY      MODIFIED RADICAL MASTECTOMY W/ AXILLARY LYMPH NODE DISSECTION Right 2001    TOTAL KNEE ARTHROPLASTY Right 02/09/2021    Procedure: ARTHROPLASTY, KNEE, TOTAL;  Surgeon: Ilya Murphy MD;  Location: [facility];  Service: Orthopedics;  Laterality: Right;       Time Tracking:     OT Date of Treatment: 04/27/24  OT Start Time: 1331  OT Stop Time: 1401  OT Total Time (min): 30 min    Billable Minutes:Evaluation 8  Self Care/Home Management 22 4/27/2024

## 2024-04-27 NOTE — PROGRESS NOTES
[facility]  Critical Care - Surgery  Progress Note    Patient Name: Heidy Polo  MRN: [id number]  Admission Date: 4/25/2024  Hospital Length of Stay: 2 days  Code Status: Prior  Attending Provider: Aquilino Ochoa MD  Primary Care Provider: Chas Angela MD   Principal Problem: Dissection of aorta    Subjective:     Hospital/ICU Course:  No notes on file    Interval History/Significant Events: POD 1 ascending aorta and hemiarch replacement. Intubated on minimal settings.  2.5, cardene 2.5. s/p 2L albumin post op. good UOP /h. Lactate 2.68    Follow-up For: Procedure(s) (LRB):  REPLACEMENT, AORTA, ASCENDING (N/A)  APPLICATION, WOUND VAC (N/A)    Post-Operative Day: 1 Day Post-Op    Objective:     Vital Signs (Most Recent):  Temp: 98 °F (36.7 °C) (04/27/24 0300)  Pulse: 72 (04/27/24 0645)  Resp: 18 (04/27/24 0645)  BP: (!) 92/53 (04/26/24 1815)  SpO2: 100 % (04/27/24 0645) Vital Signs (24h Range):  Temp:  [97.7 °F (36.5 °C)-98 °F (36.7 °C)] 98 °F (36.7 °C)  Pulse:  [66-86] 72  Resp:  [18-20] 18  SpO2:  [96 %-100 %] 100 %  BP: (92)/(53) 92/53  Arterial Line BP: ()/(51-73) 117/59     Weight: 80 kg (176 lb 5.9 oz)  Body mass index is 30.27 kg/m².      Intake/Output Summary (Last 24 hours) at 4/27/2024 0713  Last data filed at 4/27/2024 0600  Gross per 24 hour   Intake 9309.39 ml   Output 3902 ml   Net 5407.39 ml          Physical Exam  Vitals and nursing note reviewed.   Constitutional:       General: She is not in acute distress.  Cardiovascular:      Rate and Rhythm: Normal rate and regular rhythm.      Pulses: Normal pulses.      Comments: Midline sternal incision with prevena in place  CT x3 with dark SS output.  V wires in place   Palpable DP pulses   Pulmonary:      Effort: No respiratory distress.      Comments: Intubated on minimal settings   Genitourinary:     Comments: Hanson draining clear urine   Skin:     General: Skin is warm and dry.   Neurological:       Comments: Wakes up, follows commands             Vents:  Vent Mode: A/C (04/27/24 0306)  Set Rate: 18 BPM (04/27/24 0306)  Vt Set: 400 mL (04/27/24 0306)  PEEP/CPAP: 5 cmH20 (04/27/24 0306)  Oxygen Concentration (%): 40 (04/27/24 0645)  Peak Airway Pressure: 17 cmH20 (04/27/24 0306)  Plateau Pressure: 14 cmH20 (04/27/24 0306)  Total Ve: 7.28 L/m (04/27/24 0306)  Negative Inspiratory Force (cm H2O): 0 (04/27/24 0306)  F/VT Ratio<105 (RSBI): (!) 44.33 (04/27/24 0306)    Lines/Drains/Airways       Central Venous Catheter Line  Duration              Introducer with Double Lumen 04/26/24 0900 Internal Jugular Right <1 day    Trialysis (Dialysis) Catheter 04/26/24 0901 left internal jugular <1 day              Drain  Duration                  Chest Tube 04/26/24 1422 Tube - 2 Anterior Mediastinal 32 Fr. <1 day         Chest Tube 04/26/24 1423 Tube - 1 Anterior Mediastinal 32 Fr. <1 day         Chest Tube 04/26/24 1454 Tube - 3 Right Pleural 32 Fr. <1 day         NG/OG Tube 04/26/24 1722 orogastric Center mouth <1 day         Urethral Catheter 04/26/24 0750 Temperature probe;Silicone;Non-latex;Straight-tip 16 Fr. <1 day              Airway  Duration                  Airway - Non-Surgical 04/26/24 0737 Endotracheal Tube <1 day              Arterial Line  Duration             Arterial Line 04/25/24 1624 Left Radial 1 day    Arterial Line 04/26/24 1907 Right Radial <1 day              Line  Duration                  Pacer Wires 04/26/24 1407 <1 day              Peripheral Intravenous Line  Duration                  Peripheral IV - Single Lumen 04/25/24 1612 20 G Left Antecubital 1 day         Peripheral IV - Single Lumen 04/25/24 1652 18 G;1 3/4 in Anterior;Left Upper Arm 1 day         Peripheral IV - Single Lumen 04/25/24 1652 20 G;1 3/4 in Anterior;Left;Proximal Forearm 1 day         Peripheral IV - Single Lumen 04/26/24 1000 14 G  Left Forearm <1 day         Peripheral IV - Single Lumen 04/26/24 1719 18 G Right Forearm <1  day                    Significant Labs:    CBC/Anemia Profile:  Recent Labs   Lab 04/26/24  1614 04/26/24  1620 04/26/24 1910 04/26/24 1925 04/27/24 0312 04/27/24 0312 04/27/24  0607   WBC 8.31  --  8.17  --  6.63  --   --    HGB 10.1*  --  9.8*  --  8.4*  --   --    HCT 29.3*   < > 27.5*   < > 23.2* 23* 24*   *  --  116*  --  94*  --   --    MCV 86  --  86  --  83  --   --    RDW 13.4  --  13.8  --  14.4  --   --     < > = values in this interval not displayed.        Chemistries:  Recent Labs   Lab 04/26/24  1614 04/26/24 1910 04/26/24 2108 04/27/24 0312    143 141 140   K 3.1* 3.3* 3.1* 3.7    105 104 104   CO2 22* 21* 22* 23   BUN 16 17 15 16   CREATININE 0.8 0.9 1.0 0.9   CALCIUM 9.8 9.4 9.4 9.4   ALBUMIN 2.8* 3.6 3.8 4.1   PROT 5.3* 5.8* 6.0 5.9*   BILITOT 2.8* 3.7* 3.4* 3.9*   ALKPHOS 145* 145* 127 120   * 408* 356* 281*   AST 1,011* 995* 872* 629*   MG 2.3 2.1  --  1.9   PHOS 3.4 2.0*  --  3.8       All pertinent labs within the past 24 hours have been reviewed.    Significant Imaging:  I have reviewed all pertinent imaging results/findings within the past 24 hours.  Assessment/Plan:     Cardiac/Vascular  * Dissection of aorta    Neuro/Psych:     - Sedation: precedex, wean to extubate      - Pain:     - Scheduled Tylenol 1000mg Q8H   - PRN Oxycodone and dilaudid                Cardiac:     - s/p ascending aorta and hemiarch replacement w/ Dr. Ochoa 4/26/25    - BP Goal: MAP 60-80 (<75 if possible)    - Inotropes/Pressors:  5     - Anti-HTNs: Cardene gtt     - Rhythm: NSR    - Beta Blocker: start when appropriate    - Statin: will start when appropriate     - s/p 2L albumin post op    - lactate 2.68 (3.0)    - ASA 81 today         Pulmonary:     - Goal SpO2 >92%    - intubated on minimal settings     - wean for SBT and extubation today      - ABGs PRN    - :    - med 1: 138 (228)   - med 2: 89 (199)   - pleur: 31 (82)   - 463 total, dark SS    Recent Labs      04/27/24  0607   PH 7.481*   PCO2 33.4*   PO2 130*   HCO3 24.9   POCSATURATED 99   BE 1     Vent Mode: A/C  Oxygen Concentration (%):  [] 40  Resp Rate Total:  [18 br/min-21 br/min] 21 br/min  Vt Set:  [400 mL] 400 mL  PEEP/CPAP:  [5 cmH20] 5 cmH20  Mean Airway Pressure:  [8 cmH20-8.5 cmH20] 8 cmH20          Renal:    - Trend BUN/Cr     - Record strict Is/Os    - UOP: 1.3L (3.4L/24h)   - /hr    - net +5.6L (+7L/24h)    - lasix 10 push, start lasix gtt non-titrating at 2.5    Recent Labs   Lab 04/26/24 1910 04/26/24 2108 04/27/24  0312   BUN 17 15 16   CREATININE 0.9 1.0 0.9         FEN / GI:     - q6 CMP, PRN K/Mag/Phos per protocol     - Replace electrolytes as needed    - Nutrition: NPO     - Bowel Regimen: Miralax, docusate    - f/u speech eval once extubated         ID:     - Afebrile     - WBC stable     - Abx: complete perioperative cefazolin 2g Q8H x 5 doses post op    Recent Labs   Lab 04/26/24  1614 04/26/24 1910 04/27/24  0312   WBC 8.31 8.17 6.63         Heme/Onc:     - Hgb 11.1 pre-operatively    - q6 CBC     Recent Labs   Lab 04/26/24  1451 04/26/24  1614 04/26/24 1910 04/27/24  0312   HGB  --  10.1* 9.8* 8.4*   PLT  --  143* 116* 94*   APTT 24.3 25.1  --  26.5   INR 1.2 1.0 1.0 1.0         Endocrine:     - CTS Goal -140    - HgbA1c: 4.8     - insulin gtt, endocrine consulted         PPx:   Feeding: NPO  Analgesia/Sedation: see above   Thromboembolic Prevention: SCDs  HOB >30: Yes  Stress Ulcer: N/A  Glucose Control: SSI      Lines/Drains/Airway:     PIV x 2   PICC:    Right radial adelina    Left radial adelina          Dispo/Code Status/Palliative:     - Continue SICU Care    - Full Code           Tashia Flanagan MD  Critical Care - Surgery  [facility]

## 2024-04-27 NOTE — PLAN OF CARE
Recommendations     1. Advance PO diet order to Cardiac as tolerated.   2. If PO intake poor >48 hrs, add Boost Plus ONS BID to supplement intake.   3. If NPO >72 hrs, begin enteral feeds with Impact Peptide 1.5.  4. RD to monitor & follow-up.     Goals: Meet % EEN, EPN by RD f/u date  Nutrition Goal Status: new  Communication of RD Recs: other (comment) (POC)

## 2024-04-27 NOTE — PLAN OF CARE
Problem: Physical Therapy  Goal: Physical Therapy Goal  Description: Goals to met by 5/11/2024    1. Supine to sit with Stand-by Assistance  2. Sit to supine with Stand-by Assistance  3. Rolling to Left and Right with Stand-by Assistance.  4. Sit to stand transfer with Stand-by Assistance  5. Bed to chair transfer with Stand-by Assistance using No Assistive Device  6. Gait  x 50 feet with Stand-by Assistance using No Assistive Device   7. Ascend/Descend 6 inch curb step with Contact Guard Assistance using No Assistive Device.  8. Stand for 5 minutes with Stand-by Assistance using No Assistive Device  9. Lower extremity exercise program x15 reps per Instruction, with assistance as needed in order to facilitate improved strength, improved postural control, and improvement in functional independence    PT Eval: 4/27/2024

## 2024-04-27 NOTE — PROCEDURES
R Radial A line.    Dr. Ibrahima Weldon, Dr. Mi Ruiz     Indications, risks, and benefits explained to surrogate decision maker and informed consent obtained. A time-out was completed verifying correct patient, procedure, and site. An Sunnys test was performed to ensure adequate perfusion. The patients R wrist was prepped and draped in sterile fashion.  An Arrow arterial line was introduced into the radial artery. The catheter was threaded over the guide wire and the needle was removed with appropriate pulsatile blood return. The catheter was then secured in place to the skin and a sterile dressing applied. Perfusion to the extremity distal to the point of catheter insertion was checked and found to be adequate. Blood loss was minimal and the patient tolerated the procedure well with no complications.

## 2024-04-27 NOTE — ASSESSMENT & PLAN NOTE
Neuro/Psych:     - Sedation: precedex, wean to extubate      - Pain:     - Scheduled Tylenol 1000mg Q8H   - PRN Oxycodone and dilaudid                Cardiac:     - s/p ascending aorta and hemiarch replacement w/ Dr. Ochoa 4/26/25    - BP Goal: MAP 60-80 (<75 if possible)    - Inotropes/Pressors:  5     - Anti-HTNs: Cardene gtt     - Rhythm: NSR    - Beta Blocker: start when appropriate    - Statin: will start when appropriate     - s/p 2L albumin post op    - lactate 2.68 (3.0)    - ASA 81 today         Pulmonary:     - Goal SpO2 >92%    - intubated on minimal settings     - wean for SBT and extubation today      - ABGs PRN    - :    - med 1: 138 (228)   - med 2: 89 (199)   - pleur: 31 (36)   - 463 total, dark SS    Recent Labs     04/27/24  0607   PH 7.481*   PCO2 33.4*   PO2 130*   HCO3 24.9   POCSATURATED 99   BE 1     Vent Mode: A/C  Oxygen Concentration (%):  [] 40  Resp Rate Total:  [18 br/min-21 br/min] 21 br/min  Vt Set:  [400 mL] 400 mL  PEEP/CPAP:  [5 cmH20] 5 cmH20  Mean Airway Pressure:  [8 cmH20-8.5 cmH20] 8 cmH20          Renal:    - Trend BUN/Cr     - Record strict Is/Os    - UOP: 1.3L (3.4L/24h)   - /hr    - net +5.6L (+7L/24h)    - lasix 10 push, start lasix gtt non-titrating at 2.5    Recent Labs   Lab 04/26/24  1910 04/26/24  2108 04/27/24  0312   BUN 17 15 16   CREATININE 0.9 1.0 0.9         FEN / GI:     - q6 CMP, PRN K/Mag/Phos per protocol     - Replace electrolytes as needed    - Nutrition: NPO     - Bowel Regimen: Miralax, docusate    - f/u speech eval once extubated         ID:     - Afebrile     - WBC stable     - Abx: complete perioperative cefazolin 2g Q8H x 5 doses post op    Recent Labs   Lab 04/26/24  1614 04/26/24  1910 04/27/24  0312   WBC 8.31 8.17 6.63         Heme/Onc:     - Hgb 11.1 pre-operatively    - q6 CBC     Recent Labs   Lab 04/26/24  1451 04/26/24  1614 04/26/24 1910 04/27/24  0312   HGB  --  10.1* 9.8* 8.4*   PLT  --  143* 116* 94*   APTT 24.3  25.1  --  26.5   INR 1.2 1.0 1.0 1.0         Endocrine:     - CTS Goal -140    - HgbA1c: 4.8     - insulin gtt, endocrine consulted         PPx:   Feeding: NPO  Analgesia/Sedation: see above   Thromboembolic Prevention: SCDs  HOB >30: Yes  Stress Ulcer: N/A  Glucose Control: SSI      Lines/Drains/Airway:     PIV x 2   PICC:    Right radial adelina    Left radial adelina          Dispo/Code Status/Palliative:     - Continue SICU Care    - Full Code

## 2024-04-27 NOTE — PT/OT/SLP EVAL
Physical Therapy Co-Evaluation and Co-Treatment  Co-evaluation with OT for maximal pt participation, safety, and activity tolerance    Patient Name:  Heidy Polo   MRN:  [id number]  Admit Date: 4/25/2024  Admitting Diagnosis:  Dissection of aorta   Length of Stay: 2 days  Recent Surgery: Procedure(s) (LRB):  REPLACEMENT, AORTA, ASCENDING (N/A)  APPLICATION, WOUND VAC (N/A) 1 Day Post-Op    Recommendations:     Discharge Recommendations:  Low Intensity Therapy  Discharge Equipment Recommendations: none   Justification for Equipment: N/A  Barriers to discharge: Evolving Clinical Presentation    Assessment:     Heidy Polo is a 68 y.o. female admitted with a medical diagnosis of Dissection of aorta.  She presents with the following impairments/functional limitations: weakness, impaired functional mobility, impaired endurance, gait instability, impaired balance, impaired self care skills, decreased lower extremity function, pain, decreased safety awareness, orthopedic precautions.     Pt agreeable to therapy, feeling tired from eventful day after ambulation but willing to at least sit EOB. Pt able to stand at EOB and take lateral steps, further mobility deferred at this time.    Rehab Prognosis: Good; patient would benefit from acute skilled PT services to address these deficits and reach maximum level of function.      Treatment Tolerated: Well    Highest level of mobility achieved this visit: 6 small lateral steps to R at EOB with CGA    Activity with RN/PCT: bed mobility only with one person assist    Plan:     During this hospitalization, patient to be seen 4 x/week to address the identified rehab impairments via gait training, therapeutic activities, therapeutic exercises, neuromuscular re-education and progress towards the established goals.    Plan of Care Expires:  05/27/24    Subjective     EVA Kelly notified prior to session. Pt's son and granddaughter present upon PT entrance into room.    Chief Complaint:  "pain and fatigue  Patient/Family Comments/goals: "My legs feel very weak"  Pain/Comfort:  Pain Rating 1: 5/10  Location - Orientation 1: midline  Location 1: chest  Pain Addressed 1: Reposition, Distraction, Pre-medicate for activity    Social History:  Residence: lives alone 1-story house with threshold [place].  Support available: family all live nearby  Equipment Used: none  Equipment Owned (not using): None  Prior level of function: independent  Work: Employed.   Drive: yes.       Objective:     Additional staff present: OT [place]    Patient found HOB elevated with: arterial line, Trialysis, central line, oxygen, peripheral IV, external pacer, PICC line, du catheter, chest tube, wound vac     General Precautions: Standard, aspiration, fall, sternal   Orthopedic Precautions:N/A   Braces: N/A   Body mass index is 30.27 kg/m².  Oxygen Device: Nasal Cannula 3L (removed for therapy and tolerated well)    Vitals: BP (!) 140/65   Pulse 78   Temp 98.6 °F (37 °C) (Oral)   Resp (!) 24   Ht 5' 4" (1.626 m)   Wt 80 kg (176 lb 5.9 oz)   SpO2 95%   BMI 30.27 kg/m²     Exams:  Cognition:   Alert and Cooperative  Command following: Follows two-step verbal commands  Fluency: clear/fluent  Hearing: Intact  Vision:  Intact  Skin Integrity: Visible skin intact    Physical Exam:   Edema - None noted  ROM - ANJELICA LEs WFL  Strength - ANJELICA hips 4/5, ANJELICA knees and ankles 5/5   Sensation - Intact to light touch  Coordination - No deficits noted    Outcome Measures:    AM-PAC 6 CLICK MOBILITY  Turning over in bed (including adjusting bedclothes, sheets and blankets)?: 2  Sitting down on and standing up from a chair with arms (e.g., wheelchair, bedside commode, etc.): 3  Moving from lying on back to sitting on the side of the bed?: 2  Moving to and from a bed to a chair (including a wheelchair)?: 3  Need to walk in hospital room?: 3  Climbing 3-5 steps with a railing?: 2  Basic Mobility Total Score: 15     Functional Mobility:    Bed " Mobility:   Scooting to HOB via supine bridge: minimum assistance  Supine to Sit: minimum assistance; from R side of bed  Scooting anteriorly to EOB to have both feet planted on floor: moderate assistance  Sit to Supine: moderate assistance of 2 persons; to L side of bed    Sitting Balance at Edge of Bed:  Assistance Level Required: Stand-by Assistance  Time: 15 min  Postural deviations noted: no deviations noted    Transfers:   Sit > Stand Transfer: contact guard assistance with no assistive device  Stand > Sit Transfer: contact guard assistance with no assistive device  x1 trials from EOB    Standing Balance:  Assistance Level Required: Contact Guard Assistance  Patient used: no assistive device  Time: 90 secs  Postural deviations noted: no deviations noted      Gait:   Patient ambulated: 6 small lateral steps at EOB   Patient required: contact guard  Patient used:  no assistive device  Gait Pattern observed: swing-to gait  Gait Deviation(s): decreased step length and decreased sridhar  Impairments due to: pain, decreased strength, and decreased endurance  all lines remained intact throughout ambulation trial  Nurse present for vital sign monitoring    Education:  Time provided for education, counseling and discussion of health disposition in regards to patient's current status  All questions answered within PT scope of practice and to patient's satisfaction  PT role in POC to address current functional deficits  Pt educated on proper body mechanics, safety techniques, and energy conservation with PT facilitation and cueing throughout session    Patient left HOB elevated with all lines intact, call button in reach, and RN and family present.    GOALS:   Multidisciplinary Problems       Physical Therapy Goals          Problem: Physical Therapy    Goal Priority Disciplines Outcome Goal Variances Interventions   Physical Therapy Goal     PT, PT/OT Progressing     Description: Goals to met by 5/11/2024    1. Supine to  sit with Stand-by Assistance  2. Sit to supine with Stand-by Assistance  3. Rolling to Left and Right with Stand-by Assistance.  4. Sit to stand transfer with Stand-by Assistance  5. Bed to chair transfer with Stand-by Assistance using No Assistive Device  6. Gait  x 50 feet with Stand-by Assistance using No Assistive Device   7. Ascend/Descend 6 inch curb step with Contact Guard Assistance using No Assistive Device.  8. Stand for 5 minutes with Stand-by Assistance using No Assistive Device  9. Lower extremity exercise program x15 reps per Instruction, with assistance as needed in order to facilitate improved strength, improved postural control, and improvement in functional independence                       History:     Past Medical History:   Diagnosis Date    Breast cancer 2001    right    Cancer     breast in 2001 -right     Depression 07/16/2012    History of breast cancer     Hyperlipidemia     Hypertension     Hypothyroid        Past Surgical History:   Procedure Laterality Date    CAPSULOTOMY OF JOINT Left 06/04/2021    Procedure: CAPSULOTOMY, JOINT 2nd MPJ;  Surgeon: Marnie Mckeon DPM;  Location: [facility];  Service: Podiatry;  Laterality: Left;    CAPSULOTOMY OF JOINT Left 01/07/2022    3rd and 4th    CAPSULOTOMY OF JOINT Left 01/07/2022    Procedure: 2,3,4,5;  Surgeon: Marnie Mckeon DPM;  Location: [facility] OR;  Service: Podiatry;  Laterality: Left;    CHOLECYSTECTOMY      COLONOSCOPY N/A 11/09/2020    Procedure: COLONOSCOPY;  Surgeon: Adelaide Hernández MD;  Location: [facility];  Service: Endoscopy;  Laterality: N/A;  with biopsy    CORRECTION OF HAMMER TOE Left 06/04/2021    Procedure: CORRECTION, HAMMER TOE 2nd with K-wire stabilization;  Surgeon: Marnie Mckeon DPM;  Location: [facility] OR;  Service: Podiatry;  Laterality: Left;  COVID VACCINATION CONFIRMED    CORRECTION OF HAMMER TOE Left 01/07/2022    3rd, 4th and 5th    CORRECTION OF HAMMER TOE Left 01/07/2022    Procedure: CORRECTION, HAMMER TOE 2,3,4,5;  Surgeon:  Marnie Mckeon DPM;  Location: [facility];  Service: Podiatry;  Laterality: Left;    HERNIA REPAIR      LAPAROSCOPIC SLEEVE GASTRECTOMY      MODIFIED RADICAL MASTECTOMY W/ AXILLARY LYMPH NODE DISSECTION Right 2001    TOTAL KNEE ARTHROPLASTY Right 02/09/2021    Procedure: ARTHROPLASTY, KNEE, TOTAL;  Surgeon: Ilya Murphy MD;  Location: [facility];  Service: Orthopedics;  Laterality: Right;       Time Tracking:     PT Received On: 04/27/24  PT Start Time: 1333     PT Stop Time: 1401  PT Total Time (min): 28 min     Billable Minutes: Evaluation 1 procedure and Therapeutic Activity 12 min    Shankar Elaine, PT, DPT  4/27/2024

## 2024-04-27 NOTE — NURSING
SICU PLAN OF CARE    Dx: Dissection of aorta    Goals of Care:   Map 60-80    Vital Signs (last 12 hours):   Temp:  [97.7 °F (36.5 °C)-98 °F (36.7 °C)]   Pulse:  [66-86]   Resp:  [18-20]   BP: (92)/(53)   SpO2:  [96 %-100 %]   Arterial Line BP: ()/(51-69)      Neuro: Follows commands , Arouses to Voice, Intubated , and Sedated    Cardiac: NSR    Respiratory: Ventilator; Mode: A/C, 100% FiO2, 5 PEEP    Gtts: Propofol, Insulin, Dobutamine, and Cardene Precedex    Urine Output: Urethral Catheter  1250 mL/shift    Drains:   Med #1:148  Med #2:93  Pleural: 36    Diet: NPO      Labs/Accuchecks: Q1hr accuchecks, ABG. Daily labs.    Skin:  Incision, lines, drains.  Patient turned q2h, bony prominences protected, and mattress inflated/working correctly.   René Score: 16. If René Score is 16 or less, complete 4EYES note each shift.    Shift Events: 1L albumin given.  Insulin gtt titrated according to nomogram. Cardene titrated to maintain MAP goals. Q1hr ABGs performed. Lactic decreased from 7.7 to 2.68 throughout shift. Precedex started to assist in working towards extubation. See flowsheet for further assessment/details. Family updated on current condition/plan of care, questions answered, and emotional support provided.  MD updated on current condition, vitals, labs, and gtts.

## 2024-04-27 NOTE — ANESTHESIA POSTPROCEDURE EVALUATION
Anesthesia Post Evaluation    Patient: Heidy Polo    Procedure(s) Performed: Procedure(s) (LRB):  REPLACEMENT, AORTA, ASCENDING (N/A)  APPLICATION, WOUND VAC (N/A)    Final Anesthesia Type: general      Patient location during evaluation: ICU  Patient participation: No - Unable to Participate, Intubation  Post-procedure vital signs: reviewed and stable  Pain management: adequate  Airway patency: patent    PONV status at discharge: No PONV  Anesthetic complications: no      Cardiovascular status: hemodynamically stable  Respiratory status: ventilator and ETT  Hydration status: euvolemic                Vitals Value Taken Time   /63 04/26/24 1905   Temp 36.6 04/26/24 1916   Pulse 86 04/26/24 1915   Resp 20 04/26/24 1915   SpO2 100 % 04/26/24 1915   Vitals shown include unfiled device data.      No case tracking events are documented in the log.      Pain/Riaz Score: Pain Rating Prior to Med Admin: 10 (4/26/2024  5:36 AM)  Pain Rating Post Med Admin: 4 (4/26/2024  6:06 AM)

## 2024-04-27 NOTE — EICU
Intervention Initiated From:  Bedside    Cheko intervened regarding:  Time-Out    Nurse Notified:  Yes    Called into room for time out of right radial line insertion. See flowsheet.

## 2024-04-27 NOTE — SUBJECTIVE & OBJECTIVE
Interval History/Significant Events: POD 1 ascending aorta and hemiarch replacement. Intubated on minimal settings.  2.5, cardene 2.5. s/p 2L albumin post op. good UOP /h. Lactate 2.68    Follow-up For: Procedure(s) (LRB):  REPLACEMENT, AORTA, ASCENDING (N/A)  APPLICATION, WOUND VAC (N/A)    Post-Operative Day: 1 Day Post-Op    Objective:     Vital Signs (Most Recent):  Temp: 98 °F (36.7 °C) (04/27/24 0300)  Pulse: 72 (04/27/24 0645)  Resp: 18 (04/27/24 0645)  BP: (!) 92/53 (04/26/24 1815)  SpO2: 100 % (04/27/24 0645) Vital Signs (24h Range):  Temp:  [97.7 °F (36.5 °C)-98 °F (36.7 °C)] 98 °F (36.7 °C)  Pulse:  [66-86] 72  Resp:  [18-20] 18  SpO2:  [96 %-100 %] 100 %  BP: (92)/(53) 92/53  Arterial Line BP: ()/(51-73) 117/59     Weight: 80 kg (176 lb 5.9 oz)  Body mass index is 30.27 kg/m².      Intake/Output Summary (Last 24 hours) at 4/27/2024 0713  Last data filed at 4/27/2024 0600  Gross per 24 hour   Intake 9309.39 ml   Output 3902 ml   Net 5407.39 ml          Physical Exam  Vitals and nursing note reviewed.   Constitutional:       General: She is not in acute distress.  Cardiovascular:      Rate and Rhythm: Normal rate and regular rhythm.      Pulses: Normal pulses.      Comments: Midline sternal incision with prevena in place  CT x3 with dark SS output.  V wires in place   Palpable DP pulses   Pulmonary:      Effort: No respiratory distress.      Comments: Intubated on minimal settings   Genitourinary:     Comments: Hanson draining clear urine   Skin:     General: Skin is warm and dry.   Neurological:      Comments: Wakes up, follows commands             Vents:  Vent Mode: A/C (04/27/24 0306)  Set Rate: 18 BPM (04/27/24 0306)  Vt Set: 400 mL (04/27/24 0306)  PEEP/CPAP: 5 cmH20 (04/27/24 0306)  Oxygen Concentration (%): 40 (04/27/24 0645)  Peak Airway Pressure: 17 cmH20 (04/27/24 0306)  Plateau Pressure: 14 cmH20 (04/27/24 0306)  Total Ve: 7.28 L/m (04/27/24 0306)  Negative Inspiratory Force (cm  H2O): 0 (04/27/24 0306)  F/VT Ratio<105 (RSBI): (!) 44.33 (04/27/24 0306)    Lines/Drains/Airways       Central Venous Catheter Line  Duration              Introducer with Double Lumen 04/26/24 0900 Internal Jugular Right <1 day    Trialysis (Dialysis) Catheter 04/26/24 0901 left internal jugular <1 day              Drain  Duration                  Chest Tube 04/26/24 1422 Tube - 2 Anterior Mediastinal 32 Fr. <1 day         Chest Tube 04/26/24 1423 Tube - 1 Anterior Mediastinal 32 Fr. <1 day         Chest Tube 04/26/24 1454 Tube - 3 Right Pleural 32 Fr. <1 day         NG/OG Tube 04/26/24 1722 orogastric Center mouth <1 day         Urethral Catheter 04/26/24 0750 Temperature probe;Silicone;Non-latex;Straight-tip 16 Fr. <1 day              Airway  Duration                  Airway - Non-Surgical 04/26/24 0737 Endotracheal Tube <1 day              Arterial Line  Duration             Arterial Line 04/25/24 1624 Left Radial 1 day    Arterial Line 04/26/24 1907 Right Radial <1 day              Line  Duration                  Pacer Wires 04/26/24 1407 <1 day              Peripheral Intravenous Line  Duration                  Peripheral IV - Single Lumen 04/25/24 1612 20 G Left Antecubital 1 day         Peripheral IV - Single Lumen 04/25/24 1652 18 G;1 3/4 in Anterior;Left Upper Arm 1 day         Peripheral IV - Single Lumen 04/25/24 1652 20 G;1 3/4 in Anterior;Left;Proximal Forearm 1 day         Peripheral IV - Single Lumen 04/26/24 1000 14 G  Left Forearm <1 day         Peripheral IV - Single Lumen 04/26/24 1719 18 G Right Forearm <1 day                    Significant Labs:    CBC/Anemia Profile:  Recent Labs   Lab 04/26/24  1614 04/26/24  1620 04/26/24  1910 04/26/24  1925 04/27/24  0312 04/27/24  0312 04/27/24  0607   WBC 8.31  --  8.17  --  6.63  --   --    HGB 10.1*  --  9.8*  --  8.4*  --   --    HCT 29.3*   < > 27.5*   < > 23.2* 23* 24*   *  --  116*  --  94*  --   --    MCV 86  --  86  --  83  --   --    RDW  13.4  --  13.8  --  14.4  --   --     < > = values in this interval not displayed.        Chemistries:  Recent Labs   Lab 04/26/24  1614 04/26/24  1910 04/26/24  2108 04/27/24  0312    143 141 140   K 3.1* 3.3* 3.1* 3.7    105 104 104   CO2 22* 21* 22* 23   BUN 16 17 15 16   CREATININE 0.8 0.9 1.0 0.9   CALCIUM 9.8 9.4 9.4 9.4   ALBUMIN 2.8* 3.6 3.8 4.1   PROT 5.3* 5.8* 6.0 5.9*   BILITOT 2.8* 3.7* 3.4* 3.9*   ALKPHOS 145* 145* 127 120   * 408* 356* 281*   AST 1,011* 995* 872* 629*   MG 2.3 2.1  --  1.9   PHOS 3.4 2.0*  --  3.8       All pertinent labs within the past 24 hours have been reviewed.    Significant Imaging:  I have reviewed all pertinent imaging results/findings within the past 24 hours.

## 2024-04-27 NOTE — CONSULTS
T/L PICC placed in left brachial vein, 38 cm in length with 0 cm exposed. Arm circumference 35 cm. Lot# [id number]

## 2024-04-27 NOTE — PT/OT/SLP EVAL
Speech Language Pathology Evaluation  Bedside Swallow    Patient Name:  Heidy Polo   MRN:  [id number]  Admitting Diagnosis: Dissection of aorta    Recommendations:                 General Recommendations:   follow up x 1 to ensure diet tolerance  Diet recommendations:  Regular Diet - IDDSI Level 7, Thin liquids - IDDSI Level 0   Aspiration Precautions: 1 bite/sip at a time, Alternating bites/sips, Assistance with meals, Avoid talking while eating, Feed only when awake/alert, Frequent oral care, HOB to 90 degrees, Meds whole 1 at a time, Monitor for s/s of aspiration, Small bites/sips, and Strict aspiration precautions   General Precautions: Standard, aspiration, fall, sternal  Communication strategies:  go to room if call light pushed    Assessment:     Heidy Polo is a 68 y.o. female. Swallowing abilities appear to be functional for a regular consistency diet and thin liquids. Standard aspiration precautions should be followed. SLP services to follow up x 1 to ensure diet tolerance.     History:     Past Medical History:   Diagnosis Date    Breast cancer 2001    right    Cancer     breast in 2001 -right     Depression 07/16/2012    History of breast cancer     Hyperlipidemia     Hypertension     Hypothyroid        Past Surgical History:   Procedure Laterality Date    CAPSULOTOMY OF JOINT Left 06/04/2021    Procedure: CAPSULOTOMY, JOINT 2nd MPJ;  Surgeon: Marnie Mckeon DPM;  [place] [facility] [place] [place];  Service: Podiatry;  Laterality: Left;    CAPSULOTOMY OF JOINT Left 01/07/2022    3rd and 4th    CAPSULOTOMY OF JOINT Left 01/07/2022    Procedure: 2,3,4,5;  Surgeon: Marnie Mckeon DPM;  [place] [facility] [place] [place];  Service: Podiatry;  Laterality: Left;    CHOLECYSTECTOMY      COLONOSCOPY N/A 11/09/2020    Procedure: COLONOSCOPY;  Surgeon: Adelaide Hernández MD;  [place] [facility] [place] [place] [place];  Service: Endoscopy;  Laterality: N/A;  with biopsy    CORRECTION OF HAMMER TOE Left 06/04/2021    Procedure: CORRECTION, HAMMER TOE 2nd with K-wire  stabilization;  Surgeon: Marnie Mckeon DPM;  Location: [facility] OR;  Service: Podiatry;  Laterality: Left;  COVID VACCINATION CONFIRMED    CORRECTION OF HAMMER TOE Left 01/07/2022    3rd, 4th and 5th    CORRECTION OF HAMMER TOE Left 01/07/2022    Procedure: CORRECTION, HAMMER TOE 2,3,4,5;  Surgeon: Marnie Mckeon DPM;  Location: [facility] OR;  Service: Podiatry;  Laterality: Left;    HERNIA REPAIR      LAPAROSCOPIC SLEEVE GASTRECTOMY      MODIFIED RADICAL MASTECTOMY W/ AXILLARY LYMPH NODE DISSECTION Right 2001    TOTAL KNEE ARTHROPLASTY Right 02/09/2021    Procedure: ARTHROPLASTY, KNEE, TOTAL;  Surgeon: Ilya Murphy MD;  Location: [facility] OR;  Service: Orthopedics;  Laterality: Right;     HPI:  Heidy Polo is a 67yo female with pmhx of breast cancer, hld, htn, hypothyroidism, and depression who presented to [facility] ED with complaint of left-sided chest pain that started less than an hour prior to arrival. CTA at that time demonstrated adal type A aortic dissection with dissection flap extending from the aortic root up to the level of the distal arch. Associated aneurysmal dilatation measuring up to 5 cm in maximum dimension. [place] [facility] [place] and admitted to the SICU for close hemodynamic monitoring.         Prior Intubation HX:  4/26 - 4/27 (extubated at 0915 on 4/27)    Modified Barium Swallow: none on file    Chest X-Rays: 4/27/24: FINDINGS:  ET tube, NG tube with its tip just within the stomach, surgical drains, left central venous catheter, epicardial pacing wires and numerous monitoring leads all noted.  Heart size and pulmonary vessels are similar.  Continued increased opacification retrocardiac region on the left.  Patient is rotated to the left.     Prior diet: currently NPO    Subjective     Pt asleep upon entry, but easily roused to verbal stimuli and able to maintain alertness t/o evaluation.     Pain/Comfort:  Pain Rating 1: 0/10     Respiratory Status: Nasal cannula, flow 3  L/min spo2 99%    Objective:     Oral Musculature Evaluation  Oral Musculature: WFL  Dentition: present and adequate  Secretion Management: adequate  Mucosal Quality: adequate  Mandibular Strength and Mobility: WFL  Oral Labial Strength and Mobility: WFL  Lingual Strength and Mobility: WFL  Velar Elevation: WFL  Buccal Strength and Mobility: WFL  Volitional Cough: adequate  Volitional Swallow: elicited  Voice Prior to PO Intake: dry, clear, decreased volume and intensity due to debility    Bedside Swallow Eval:   Consistencies Assessed:  Thin liquids ice x 1, 1/2 tsp x 1, full tsp x 1, cup sip x 1, straw sips x 5  Puree 1/2 tsp x 1, full tsp x 1  Mixed consistencies small pill with straw sip water x 1  Solids 1/5 cracker x 1      Oral Phase:   WFL    Pharyngeal Phase:   Slight throat clear x 1, but no overt clinical signs/symptoms of aspiration  no overt clinical signs/symptoms of pharyngeal dysphagia    Compensatory Strategies  None    Treatment: Education provided to pt and daughters regarding role of SLP, purpose of swallowing assessment, diet recommendations, medication administration, and plan to follow up x 1 to ensure tolerance once advanced to a regular consistency diet. Understanding was demonstrated.     Goals:   Multidisciplinary Problems       SLP Goals          Problem: SLP    Goal Priority Disciplines Outcome   SLP Goal     SLP    Description: Speech Language Pathology Goals  Goals expected to be met by 5/6:  1. Pt will tolerate regular consistency diet and thin liquids without s/s of aspiration.                                Plan:     Patient to be seen:  2 x/week   Plan of Care expires:  05/26/24  Plan of Care reviewed with:  patient, daughter   SLP Follow-Up:  Yes       Discharge recommendations:   (tbd)     Time Tracking:     SLP Treatment Date:   04/27/24  Speech Start Time:  1249  Speech Stop Time:  1304     Speech Total Time (min):  15 min    Billable Minutes: Eval Swallow and Oral Function 7  and Self Care/Home Management Training 8    04/27/2024

## 2024-04-27 NOTE — CONSULTS
"[facility] - Surgical Intensive Care  Adult Nutrition  Consult Note    SUMMARY     Recommendations    1. Advance PO diet order to Cardiac as tolerated.   2. If PO intake poor >48 hrs, add Boost Plus ONS BID to supplement intake.   3. If NPO >72 hrs, begin enteral feeds with Impact Peptide 1.5.  4. RD to monitor & follow-up.    Goals: Meet % EEN, EPN by RD f/u date  Nutrition Goal Status: new  Communication of RD Recs: other (comment) (POC)    Assessment and Plan    Nutrition Problem:  Inadequate energy intake    Related to (etiology):   Inability to consume sufficient energy     Signs and Symptoms (as evidenced by):   NPO    Interventions(treatment strategy):  Collaboration of nutrition care w/ other providers    Nutrition Diagnosis Status:   New    Reason for Assessment    Reason For Assessment: consult  Diagnosis: other (see comments) (Dissection of aorta)  Relevant Medical History: Ca, HTN, HLD  Interdisciplinary Rounds: did not attend    General Information Comments: Extubated this AM, remains NPO. Pt appears nourished w/ UBW of 165# - no indicators of malnutrition noted. S/p ascending aorta replacement.   Nutrition Discharge Planning: Adequate nutrition    Nutrition/Diet History    Factors Affecting Nutritional Intake: NPO    Anthropometrics    Temp: 98.6 °F (37 °C)  Height Method: Stated  Height: 5' 4" (162.6 cm)  Height (inches): 64 in  Weight Method: Bed Scale  Weight: 80 kg (176 lb 5.9 oz)  Weight (lb): 176.37 lb  Ideal Body Weight (IBW), Female: 120 lb  % Ideal Body Weight, Female (lb): 146.98 %  BMI (Calculated): 30.3  BMI Grade: 30 - 34.9- obesity - grade I    Lab/Procedures/Meds    Pertinent Labs Reviewed: reviewed  Pertinent Medications Reviewed: reviewed  Pertinent Medications Comments: Dobutamine, Epi, Insulin    Estimated/Assessed Needs    Weight Used For Calorie Calculations: 80 kg (176 lb 5.9 oz)    Energy Calorie Requirements (kcal): 1584 kcal/d  Energy Need Method: [place] (1.2 " PAL)    Protein Requirements: 88 g/d (1.1 g/kg)  Weight Used For Protein Calculations: 80 kg (176 lb 5.9 oz)    Estimated Fluid Requirement Method: RDA Method (Per MD)  RDA Method (mL): 1584    Nutrition Prescription Ordered    Current Diet Order: NPO    Evaluation of Received Nutrient/Fluid Intake    I/O: +7L since admit    Comments: LBM: 4/24    Nutrition Risk    Level of Risk/Frequency of Follow-up:  (1x/week)     Monitor and Evaluation    Food and Nutrient Intake: enteral nutrition intake, food and beverage intake, energy intake  Food and Nutrient Adminstration: diet order, enteral and parenteral nutrition administration  Physical Activity and Function: nutrition-related ADLs and IADLs  Anthropometric Measurements: weight, weight change  Biochemical Data, Medical Tests and Procedures: inflammatory profile, lipid profile, glucose/endocrine profile, gastrointestinal profile, electrolyte and renal panel  Nutrition-Focused Physical Findings: overall appearance     Nutrition Follow-Up    RD Follow-up?: Yes

## 2024-04-28 LAB
ALBUMIN SERPL BCP-MCNC: 3.7 G/DL (ref 3.5–5.2)
ALBUMIN SERPL BCP-MCNC: 3.8 G/DL (ref 3.5–5.2)
ALP SERPL-CCNC: 116 U/L (ref 55–135)
ALP SERPL-CCNC: 119 U/L (ref 55–135)
ALP SERPL-CCNC: 123 U/L (ref 55–135)
ALP SERPL-CCNC: 124 U/L (ref 55–135)
ALT SERPL W/O P-5'-P-CCNC: 106 U/L (ref 10–44)
ALT SERPL W/O P-5'-P-CCNC: 125 U/L (ref 10–44)
ALT SERPL W/O P-5'-P-CCNC: 135 U/L (ref 10–44)
ALT SERPL W/O P-5'-P-CCNC: 96 U/L (ref 10–44)
ANION GAP SERPL CALC-SCNC: 10 MMOL/L (ref 8–16)
ANION GAP SERPL CALC-SCNC: 11 MMOL/L (ref 8–16)
ANION GAP SERPL CALC-SCNC: 12 MMOL/L (ref 8–16)
ANION GAP SERPL CALC-SCNC: 12 MMOL/L (ref 8–16)
APTT PPP: 23.6 SEC (ref 21–32)
AST SERPL-CCNC: 107 U/L (ref 10–40)
AST SERPL-CCNC: 127 U/L (ref 10–40)
AST SERPL-CCNC: 56 U/L (ref 10–40)
AST SERPL-CCNC: 76 U/L (ref 10–40)
BILIRUB SERPL-MCNC: 1.6 MG/DL (ref 0.1–1)
BILIRUB SERPL-MCNC: 1.6 MG/DL (ref 0.1–1)
BILIRUB SERPL-MCNC: 1.7 MG/DL (ref 0.1–1)
BILIRUB SERPL-MCNC: 1.8 MG/DL (ref 0.1–1)
BLD PROD TYP BPU: NORMAL
BLOOD UNIT EXPIRATION DATE: NORMAL
BLOOD UNIT TYPE CODE: 6200
BLOOD UNIT TYPE: NORMAL
BUN SERPL-MCNC: 20 MG/DL (ref 8–23)
BUN SERPL-MCNC: 22 MG/DL (ref 8–23)
BUN SERPL-MCNC: 23 MG/DL (ref 8–23)
BUN SERPL-MCNC: 23 MG/DL (ref 8–23)
CALCIUM SERPL-MCNC: 8.8 MG/DL (ref 8.7–10.5)
CALCIUM SERPL-MCNC: 8.9 MG/DL (ref 8.7–10.5)
CALCIUM SERPL-MCNC: 9 MG/DL (ref 8.7–10.5)
CALCIUM SERPL-MCNC: 9 MG/DL (ref 8.7–10.5)
CHLORIDE SERPL-SCNC: 101 MMOL/L (ref 95–110)
CHLORIDE SERPL-SCNC: 102 MMOL/L (ref 95–110)
CO2 SERPL-SCNC: 26 MMOL/L (ref 23–29)
CO2 SERPL-SCNC: 26 MMOL/L (ref 23–29)
CO2 SERPL-SCNC: 27 MMOL/L (ref 23–29)
CO2 SERPL-SCNC: 27 MMOL/L (ref 23–29)
CODING SYSTEM: NORMAL
CREAT SERPL-MCNC: 0.8 MG/DL (ref 0.5–1.4)
CREAT SERPL-MCNC: 0.9 MG/DL (ref 0.5–1.4)
CROSSMATCH INTERPRETATION: NORMAL
DISPENSE STATUS: NORMAL
ERYTHROCYTE [DISTWIDTH] IN BLOOD BY AUTOMATED COUNT: 15.3 % (ref 11.5–14.5)
ERYTHROCYTE [DISTWIDTH] IN BLOOD BY AUTOMATED COUNT: 15.5 % (ref 11.5–14.5)
ERYTHROCYTE [DISTWIDTH] IN BLOOD BY AUTOMATED COUNT: 15.6 % (ref 11.5–14.5)
ERYTHROCYTE [DISTWIDTH] IN BLOOD BY AUTOMATED COUNT: 15.9 % (ref 11.5–14.5)
EST. GFR  (NO RACE VARIABLE): >60 ML/MIN/1.73 M^2
GLUCOSE SERPL-MCNC: 106 MG/DL (ref 70–110)
GLUCOSE SERPL-MCNC: 108 MG/DL (ref 70–110)
GLUCOSE SERPL-MCNC: 113 MG/DL (ref 70–110)
GLUCOSE SERPL-MCNC: 92 MG/DL (ref 70–110)
HCT VFR BLD AUTO: 24.1 % (ref 37–48.5)
HCT VFR BLD AUTO: 25.1 % (ref 37–48.5)
HCT VFR BLD AUTO: 25.6 % (ref 37–48.5)
HCT VFR BLD AUTO: 25.7 % (ref 37–48.5)
HGB BLD-MCNC: 8.7 G/DL (ref 12–16)
HGB BLD-MCNC: 8.8 G/DL (ref 12–16)
HGB BLD-MCNC: 8.9 G/DL (ref 12–16)
HGB BLD-MCNC: 9 G/DL (ref 12–16)
INR PPP: 1 (ref 0.8–1.2)
MAGNESIUM SERPL-MCNC: 2 MG/DL (ref 1.6–2.6)
MAGNESIUM SERPL-MCNC: 2 MG/DL (ref 1.6–2.6)
MAGNESIUM SERPL-MCNC: 2.1 MG/DL (ref 1.6–2.6)
MAGNESIUM SERPL-MCNC: 2.2 MG/DL (ref 1.6–2.6)
MCH RBC QN AUTO: 30.3 PG (ref 27–31)
MCH RBC QN AUTO: 30.4 PG (ref 27–31)
MCH RBC QN AUTO: 30.6 PG (ref 27–31)
MCH RBC QN AUTO: 30.7 PG (ref 27–31)
MCHC RBC AUTO-ENTMCNC: 34.6 G/DL (ref 32–36)
MCHC RBC AUTO-ENTMCNC: 35.1 G/DL (ref 32–36)
MCHC RBC AUTO-ENTMCNC: 35.2 G/DL (ref 32–36)
MCHC RBC AUTO-ENTMCNC: 36.1 G/DL (ref 32–36)
MCV RBC AUTO: 85 FL (ref 82–98)
MCV RBC AUTO: 87 FL (ref 82–98)
NUM UNITS TRANS FFP: NORMAL
PHOSPHATE SERPL-MCNC: 2.9 MG/DL (ref 2.7–4.5)
PHOSPHATE SERPL-MCNC: 3.3 MG/DL (ref 2.7–4.5)
PHOSPHATE SERPL-MCNC: 3.8 MG/DL (ref 2.7–4.5)
PHOSPHATE SERPL-MCNC: 3.9 MG/DL (ref 2.7–4.5)
PLATELET # BLD AUTO: 122 K/UL (ref 150–450)
PLATELET # BLD AUTO: 127 K/UL (ref 150–450)
PLATELET # BLD AUTO: 128 K/UL (ref 150–450)
PLATELET # BLD AUTO: 130 K/UL (ref 150–450)
PMV BLD AUTO: 10.8 FL (ref 9.2–12.9)
PMV BLD AUTO: 11.2 FL (ref 9.2–12.9)
PMV BLD AUTO: 11.2 FL (ref 9.2–12.9)
PMV BLD AUTO: 11.4 FL (ref 9.2–12.9)
POCT GLUCOSE: 101 MG/DL (ref 70–110)
POCT GLUCOSE: 101 MG/DL (ref 70–110)
POCT GLUCOSE: 103 MG/DL (ref 70–110)
POCT GLUCOSE: 103 MG/DL (ref 70–110)
POCT GLUCOSE: 105 MG/DL (ref 70–110)
POCT GLUCOSE: 110 MG/DL (ref 70–110)
POCT GLUCOSE: 110 MG/DL (ref 70–110)
POCT GLUCOSE: 111 MG/DL (ref 70–110)
POCT GLUCOSE: 112 MG/DL (ref 70–110)
POCT GLUCOSE: 131 MG/DL (ref 70–110)
POCT GLUCOSE: 82 MG/DL (ref 70–110)
POCT GLUCOSE: 84 MG/DL (ref 70–110)
POCT GLUCOSE: 90 MG/DL (ref 70–110)
POCT GLUCOSE: 94 MG/DL (ref 70–110)
POCT GLUCOSE: 96 MG/DL (ref 70–110)
POTASSIUM SERPL-SCNC: 3.8 MMOL/L (ref 3.5–5.1)
POTASSIUM SERPL-SCNC: 3.9 MMOL/L (ref 3.5–5.1)
POTASSIUM SERPL-SCNC: 4.2 MMOL/L (ref 3.5–5.1)
POTASSIUM SERPL-SCNC: 5.1 MMOL/L (ref 3.5–5.1)
PROT SERPL-MCNC: 5.8 G/DL (ref 6–8.4)
PROT SERPL-MCNC: 5.9 G/DL (ref 6–8.4)
PROT SERPL-MCNC: 6 G/DL (ref 6–8.4)
PROT SERPL-MCNC: 6.1 G/DL (ref 6–8.4)
PROTHROMBIN TIME: 10.5 SEC (ref 9–12.5)
PROTHROMBIN TIME: 10.6 SEC (ref 9–12.5)
PROTHROMBIN TIME: 10.7 SEC (ref 9–12.5)
PROTHROMBIN TIME: 10.8 SEC (ref 9–12.5)
RBC # BLD AUTO: 2.83 M/UL (ref 4–5.4)
RBC # BLD AUTO: 2.88 M/UL (ref 4–5.4)
RBC # BLD AUTO: 2.94 M/UL (ref 4–5.4)
RBC # BLD AUTO: 2.96 M/UL (ref 4–5.4)
SODIUM SERPL-SCNC: 137 MMOL/L (ref 136–145)
SODIUM SERPL-SCNC: 139 MMOL/L (ref 136–145)
SODIUM SERPL-SCNC: 140 MMOL/L (ref 136–145)
SODIUM SERPL-SCNC: 140 MMOL/L (ref 136–145)
WBC # BLD AUTO: 10.98 K/UL (ref 3.9–12.7)
WBC # BLD AUTO: 11.35 K/UL (ref 3.9–12.7)
WBC # BLD AUTO: 12.24 K/UL (ref 3.9–12.7)
WBC # BLD AUTO: 12.88 K/UL (ref 3.9–12.7)

## 2024-04-28 PROCEDURE — 80053 COMPREHEN METABOLIC PANEL: CPT | Mod: 91

## 2024-04-28 PROCEDURE — 85027 COMPLETE CBC AUTOMATED: CPT | Mod: 91

## 2024-04-28 PROCEDURE — 99900035 HC TECH TIME PER 15 MIN (STAT)

## 2024-04-28 PROCEDURE — 85730 THROMBOPLASTIN TIME PARTIAL: CPT | Performed by: STUDENT IN AN ORGANIZED HEALTH CARE EDUCATION/TRAINING PROGRAM

## 2024-04-28 PROCEDURE — 25000003 PHARM REV CODE 250: Performed by: THORACIC SURGERY (CARDIOTHORACIC VASCULAR SURGERY)

## 2024-04-28 PROCEDURE — 63600175 PHARM REV CODE 636 W HCPCS: Performed by: STUDENT IN AN ORGANIZED HEALTH CARE EDUCATION/TRAINING PROGRAM

## 2024-04-28 PROCEDURE — 94761 N-INVAS EAR/PLS OXIMETRY MLT: CPT

## 2024-04-28 PROCEDURE — 83735 ASSAY OF MAGNESIUM: CPT | Mod: 91

## 2024-04-28 PROCEDURE — 20000000 HC ICU ROOM

## 2024-04-28 PROCEDURE — 25000003 PHARM REV CODE 250: Performed by: STUDENT IN AN ORGANIZED HEALTH CARE EDUCATION/TRAINING PROGRAM

## 2024-04-28 PROCEDURE — A4216 STERILE WATER/SALINE, 10 ML: HCPCS | Performed by: THORACIC SURGERY (CARDIOTHORACIC VASCULAR SURGERY)

## 2024-04-28 PROCEDURE — 25000003 PHARM REV CODE 250

## 2024-04-28 PROCEDURE — 99233 SBSQ HOSP IP/OBS HIGH 50: CPT | Mod: ,,, | Performed by: NURSE PRACTITIONER

## 2024-04-28 PROCEDURE — 85610 PROTHROMBIN TIME: CPT

## 2024-04-28 PROCEDURE — 99232 SBSQ HOSP IP/OBS MODERATE 35: CPT | Mod: GC,,, | Performed by: ANESTHESIOLOGY

## 2024-04-28 PROCEDURE — 84100 ASSAY OF PHOSPHORUS: CPT | Mod: 91

## 2024-04-28 PROCEDURE — 94799 UNLISTED PULMONARY SVC/PX: CPT | Mod: XB

## 2024-04-28 RX ORDER — IBUPROFEN 200 MG
16 TABLET ORAL
Status: DISCONTINUED | OUTPATIENT
Start: 2024-04-28 | End: 2024-04-30

## 2024-04-28 RX ORDER — LABETALOL 100 MG/1
100 TABLET, FILM COATED ORAL EVERY 8 HOURS
Status: DISCONTINUED | OUTPATIENT
Start: 2024-04-28 | End: 2024-04-28

## 2024-04-28 RX ORDER — INSULIN ASPART 100 [IU]/ML
0-10 INJECTION, SOLUTION INTRAVENOUS; SUBCUTANEOUS
Status: DISCONTINUED | OUTPATIENT
Start: 2024-04-28 | End: 2024-04-30

## 2024-04-28 RX ORDER — GLUCAGON 1 MG
1 KIT INJECTION
Status: DISCONTINUED | OUTPATIENT
Start: 2024-04-28 | End: 2024-04-30

## 2024-04-28 RX ORDER — IBUPROFEN 200 MG
24 TABLET ORAL
Status: DISCONTINUED | OUTPATIENT
Start: 2024-04-28 | End: 2024-04-30

## 2024-04-28 RX ORDER — LABETALOL 100 MG/1
100 TABLET, FILM COATED ORAL EVERY 8 HOURS
Status: DISCONTINUED | OUTPATIENT
Start: 2024-04-28 | End: 2024-04-29

## 2024-04-28 RX ADMIN — DOCUSATE SODIUM 100 MG: 100 CAPSULE, LIQUID FILLED ORAL at 08:04

## 2024-04-28 RX ADMIN — MUPIROCIN: 20 OINTMENT TOPICAL at 08:04

## 2024-04-28 RX ADMIN — ACETAMINOPHEN 1000 MG: 500 TABLET ORAL at 10:04

## 2024-04-28 RX ADMIN — FAMOTIDINE 20 MG: 10 INJECTION, SOLUTION INTRAVENOUS at 08:04

## 2024-04-28 RX ADMIN — ACETAMINOPHEN 1000 MG: 500 TABLET ORAL at 06:04

## 2024-04-28 RX ADMIN — NICARDIPINE HYDROCHLORIDE 3 MG/HR: 0.2 INJECTION, SOLUTION INTRAVENOUS at 08:04

## 2024-04-28 RX ADMIN — OXYCODONE HYDROCHLORIDE 10 MG: 10 TABLET ORAL at 11:04

## 2024-04-28 RX ADMIN — LABETALOL HYDROCHLORIDE 100 MG: 100 TABLET, FILM COATED ORAL at 10:04

## 2024-04-28 RX ADMIN — Medication 10 ML: at 07:04

## 2024-04-28 RX ADMIN — POTASSIUM CHLORIDE 20 MEQ: 200 INJECTION, SOLUTION INTRAVENOUS at 10:04

## 2024-04-28 RX ADMIN — Medication 10 ML: at 11:04

## 2024-04-28 RX ADMIN — OXYCODONE HYDROCHLORIDE 10 MG: 10 TABLET ORAL at 08:04

## 2024-04-28 RX ADMIN — Medication 10 ML: at 05:04

## 2024-04-28 RX ADMIN — ACETAMINOPHEN 1000 MG: 500 TABLET ORAL at 02:04

## 2024-04-28 RX ADMIN — POLYETHYLENE GLYCOL 3350 17 G: 17 POWDER, FOR SOLUTION ORAL at 08:04

## 2024-04-28 RX ADMIN — NICARDIPINE HYDROCHLORIDE 10 MG/HR: 0.2 INJECTION, SOLUTION INTRAVENOUS at 08:04

## 2024-04-28 RX ADMIN — Medication 10 ML: at 01:04

## 2024-04-28 RX ADMIN — OXYCODONE HYDROCHLORIDE 10 MG: 10 TABLET ORAL at 03:04

## 2024-04-28 RX ADMIN — ASPIRIN 81 MG: 81 TABLET, COATED ORAL at 08:04

## 2024-04-28 NOTE — SUBJECTIVE & OBJECTIVE
"Interval HPI:   Overnight events: Remains in SICU. POD 2. BG well controlled with IV intensive insulin protocol titrated off this morning. Diet NPO    Eating:   NPO  Nausea: No  Hypoglycemia and intervention: No  Fever: No  TPN and/or TF: No  If yes, type of TF/TPN and rate: n/a    BP (!) 140/65   Pulse 73   Temp 97.6 °F (36.4 °C) (Oral)   Resp (!) 9   Ht 5' 4" (1.626 m)   Wt 80 kg (176 lb 5.9 oz)   SpO2 99%   BMI 30.27 kg/m²     Labs Reviewed and Include    Recent Labs   Lab 04/28/24  0836   GLU 92   CALCIUM 8.9   ALBUMIN 3.8   PROT 6.1      K 3.9   CO2 27      BUN 23   CREATININE 0.8   ALKPHOS 123   *   *   BILITOT 1.7*     Lab Results   Component Value Date    WBC 12.24 04/28/2024    HGB 8.9 (L) 04/28/2024    HCT 25.7 (L) 04/28/2024    MCV 87 04/28/2024     (L) 04/28/2024     Recent Labs   Lab 04/25/24  1154   TSH 2.684     Lab Results   Component Value Date    HGBA1C 4.8 11/22/2023       Nutritional status:   Body mass index is 30.27 kg/m².  Lab Results   Component Value Date    ALBUMIN 3.8 04/28/2024    ALBUMIN 3.7 04/28/2024    ALBUMIN 3.8 04/27/2024     No results found for: "PREALBUMIN"    Estimated Creatinine Clearance: 69.8 mL/min (based on SCr of 0.8 mg/dL).    Accu-Checks  Recent Labs     04/28/24  0015 04/28/24  0104 04/28/24  0125 04/28/24  0203 04/28/24  0311 04/28/24  0411 04/28/24  0506 04/28/24  0616 04/28/24  0712 04/28/24  0731   POCTGLUCOSE 105 90 96 112* 110 94 103 103 82 84       Current Medications and/or Treatments Impacting Glycemic Control  Immunotherapy:    Immunosuppressants       None          Steroids:   Hormones (From admission, onward)      None          Pressors:    Autonomic Drugs (From admission, onward)      None          Hyperglycemia/Diabetes Medications:   Antihyperglycemics (From admission, onward)      Start     Stop Route Frequency Ordered    04/28/24 0911  insulin aspart U-100 pen 0-10 Units         -- SubQ Before meals & nightly PRN " 04/28/24 0811

## 2024-04-28 NOTE — PROGRESS NOTES
"[facility] - Surgical Intensive Care  Endocrinology  Progress Note    Admit Date: 4/25/2024     Reason for Consult: Management of Hyperglycemia     Surgical Procedure and Date: 4/26/24  REPLACEMENT, AORTA, ASCENDING (N/A)  HEMIARCH REPLACEMENT        Lab Results   Component Value Date    HGBA1C 4.8 11/22/2023         HPI:   Patient is a 68 y.o. female with a diagnosis of Breast cancer (2001), Depression (07/16/2012), Hyperlipidemia, Hypertension, and Hypothyroid who presented to [facility] ED with complaint of left-sided chest pain that started less than an hour prior to arrival. CTA at that time demonstrated adal type A aortic dissection with dissection flap extending from the aortic root up to the level of the distal arch. Associated aneurysmal dilatation measuring up to 5 cm in maximum dimension. She was transferred to [facility] for higher level of care and admitted to the SICU for close hemodynamic monitoring. Patient now presents for the above procedure(s). Endocrinology consulted for management of hyperglycemia.        Interval HPI:   Overnight events: Remains in SICU. POD 2. BG well controlled with IV intensive insulin protocol titrated off this morning. Diet NPO    Eating:   NPO  Nausea: No  Hypoglycemia and intervention: No  Fever: No  TPN and/or TF: No  If yes, type of TF/TPN and rate: n/a    BP (!) 140/65   Pulse 73   Temp 97.6 °F (36.4 °C) (Oral)   Resp (!) 9   Ht 5' 4" (1.626 m)   Wt 80 kg (176 lb 5.9 oz)   SpO2 99%   BMI 30.27 kg/m²     Labs Reviewed and Include    Recent Labs   Lab 04/28/24  0836   GLU 92   CALCIUM 8.9   ALBUMIN 3.8   PROT 6.1      K 3.9   CO2 27      BUN 23   CREATININE 0.8   ALKPHOS 123   *   *   BILITOT 1.7*     Lab Results   Component Value Date    WBC 12.24 04/28/2024    HGB 8.9 (L) 04/28/2024    HCT 25.7 (L) 04/28/2024    MCV 87 04/28/2024     (L) 04/28/2024     Recent Labs   Lab 04/25/24  1154   TSH 2.684     Lab Results   Component Value Date    " "HGBA1C 4.8 11/22/2023       Nutritional status:   Body mass index is 30.27 kg/m².  Lab Results   Component Value Date    ALBUMIN 3.8 04/28/2024    ALBUMIN 3.7 04/28/2024    ALBUMIN 3.8 04/27/2024     No results found for: "PREALBUMIN"    Estimated Creatinine Clearance: 69.8 mL/min (based on SCr of 0.8 mg/dL).    Accu-Checks  Recent Labs     04/28/24  0015 04/28/24  0104 04/28/24  0125 04/28/24  0203 04/28/24  0311 04/28/24  0411 04/28/24  0506 04/28/24  0616 04/28/24  0712 04/28/24  0731   POCTGLUCOSE 105 90 96 112* 110 94 103 103 82 84       Current Medications and/or Treatments Impacting Glycemic Control  Immunotherapy:    Immunosuppressants       None          Steroids:   Hormones (From admission, onward)      None          Pressors:    Autonomic Drugs (From admission, onward)      None          Hyperglycemia/Diabetes Medications:   Antihyperglycemics (From admission, onward)      Start     Stop Route Frequency Ordered    04/28/24 0911  insulin aspart U-100 pen 0-10 Units         -- SubQ Before meals & nightly PRN 04/28/24 0811            ASSESSMENT and PLAN    Cardiac/Vascular  * Dissection of aorta  Managed per primary team  Optimize BG control        Mixed hyperlipidemia  May increase insulin resistance.         Endocrine  Transient hyperglycemia post procedure  BG goal 110-140    Discontinue IV insulin infusion protocol  Start Moderate Dose Correction Scale  BG monitoring ac/hs     ** Please call Endocrine for any BG related issues **    Discharge planning: OLIVIER Javed NP  Endocrinology  [facility] - Surgical Intensive Care  "

## 2024-04-28 NOTE — NURSING
SICU PLAN OF CARE    Dx: Dissection of aorta    Goals of Care: activity as tolerated    Vital Signs (last 12 hours):   Temp:  [98.1 °F (36.7 °C)-98.6 °F (37 °C)]   Pulse:  [75-82]   Resp:  [9-31]   SpO2:  [92 %-99 %]   Arterial Line BP: ()/(48-76)      Neuro: AAOx4, Follows commands , and Moves all extremities spontaneously     Cardiac: SR 75        Respiratory: Room Air    Gtts: Insulin, Lasix, and Dobutamine    Urine Output: Urethral Catheter  125 mL/hr        Drains: chest tube 1 - 160   Chest tube 2- 0   Chest tube 3 - 120    Diet: NPO          Labs/Accuchecks: q6 CMP, CBC, MG, PHOS, PT-INR    Skin:   .  Patient turned q2h, bony prominences protected, and mattress inflated/working correctly.   René Score: 18. If René Score is 16 or less, complete 4EYES note each shift.    Shift Events:   .  See flowsheet for further assessment/details.  Family updated on current condition/plan of care, questions answered, and emotional support provided.  MD updated on current condition, vitals, labs, and gtts.

## 2024-04-28 NOTE — ASSESSMENT & PLAN NOTE
BG goal 110-140    Discontinue IV insulin infusion protocol  Start Moderate Dose Correction Scale  BG monitoring ac/hs     ** Please call Endocrine for any BG related issues **    Discharge planning: OLIVIER

## 2024-04-28 NOTE — PLAN OF CARE
[facility] - Surgical Intensive Care  Initial Discharge Assessment       Primary Care Provider: Chas Angela MD    Admission Diagnosis: Dissecting aneurysm of aorta [I71.00]    Admission Date: 4/25/2024  Expected Discharge Date: 5/7/2024    Transition of Care Barriers: None    Payor: [employer] [facility] / Plan: [employer] CHOICES / Product Type: Medicare Advantage /     Extended Emergency Contact Information  Primary Emergency Contact: Stefanie Mendoza  Address: [address] [place] [address] [place]  Work Phone: [phone]  Mobile Phone: [phone]  Relation: Daughter  Secondary Emergency Contact: Dexter Polo  Address: [address] [place] [address] [facility]  Home Phone: [phone]  Mobile Phone: [phone]  Relation: Son    Discharge Plan A: Home  Discharge Plan B: Home, Home Health      [address] [facility]  [address]  [facility] [address]  Phone: [phone] Fax: [phone]    [facility] - [address]  [address] [address]  Phone: [phone] Fax: [phone]    [facility] - [place], [place] - [address]  [address]  [facility]  [facility] [address]  Phone: [phone] Fax: [phone]      Initial Assessment (most recent)       Adult Discharge Assessment - 04/28/24 1105          Discharge Assessment    Assessment Type Discharge Planning Assessment     Confirmed/corrected address, phone number and insurance Yes     Confirmed Demographics Correct on Facesheet     Source of Information patient;family     If unable to respond/provide information was family/caregiver contacted? --   Daughter- Stefanie Mendoza- [phone], [phone], Son Dexter Polo- [phone]    Communicated SUYAPA with patient/caregiver Yes     Reason For Admission  Dissection of aorta     People in Home alone     Facility Arrived From: [facility]     Do you expect to return to your current living situation? Yes     Do you have help at home or someone to help you manage your care at home? Yes     Who are your caregiver(s) and their phone number(s)? Dissection of aorta     Prior to hospitilization cognitive status: Alert/Oriented     Current cognitive status: Alert/Oriented     Walking or Climbing Stairs Difficulty no     Dressing/Bathing Difficulty no     Do you have any problems with: --   Pt denies    Home Accessibility stairs to enter home     Number of Stairs, Main Entrance none     Surface of Stairs, Main Entrance linoleum     Stair Railings, Main Entrance none     Landing, Stairs, Main Entrance no railings     Stairs Comment, Main Entrance none     Home Layout Able to live on 1st floor     Equipment Currently Used at Home none     Readmission within 30 days? No     Patient currently being followed by outpatient case management? No     Do you currently have service(s) that help you manage your care at home? No     Do you take prescription medications? Yes     Do you have prescription coverage? Yes     Coverage [employer]ARE [facility] CHOICES -     Do you have any problems affording any of your prescribed medications? No     Is the patient taking medications as prescribed? yes     Who is going to help you get home at discharge? Daughter- Stefanie Mendoza- [phone], [phone], Son Dexter Polo- [phone]     How do you get to doctors appointments? family or friend will provide;car, drives self     Are you on dialysis? No     Do you take coumadin? No     Discharge Plan A Home     Discharge Plan B Home;Home Health     DME Needed Upon Discharge  other (see comments)   TBD    Discharge Plan discussed with: Patient     Transition of Care Barriers None        Physical Activity    On average, how many days per week do you engage in moderate to  strenuous exercise (like a brisk walk)? 5 days     On average, how many minutes do you engage in exercise at this level? 20 min        Financial Resource Strain    How hard is it for you to pay for the very basics like food, housing, medical care, and heating? Not very hard        Housing Stability    In the last 12 months, was there a time when you were not able to pay the mortgage or rent on time? No     At any time in the past 12 months, were you homeless or living in a shelter (including now)? No        Transportation Needs    In the past 12 months, has lack of transportation kept you from medical appointments or from getting medications? No     In the past 12 months, has lack of transportation kept you from meetings, work, or from getting things needed for daily living? No        Food Insecurity    Within the past 12 months, you worried that your food would run out before you got the money to buy more. Never true        Stress    Do you feel stress - tense, restless, nervous, or anxious, or unable to sleep at night because your mind is troubled all the time - these days? Only a little        Social Connections    In a typical week, how many times do you talk on the phone with family, friends, or neighbors? More than three times a week     How often do you get together with friends or relatives? Twice a week     Do you belong to any clubs or organizations such as Yarsani groups, unions, fraternal or athletic groups, or school groups? No     How often do you attend meetings of the clubs or organizations you belong to? Never        Alcohol Use    Q1: How often do you have a drink containing alcohol? Never     Q2: How many drinks containing alcohol do you have on a typical day when you are drinking? Patient does not drink     Q3: How often do you have six or more drinks on one occasion? Never                          SW spoke with patient/family in [facility] for Discharge Planning Assessment. Per patient, Pt lives alone  in a single family home on a slab foundation with zero steps to porch and point of entry.  Patient was independent with ADLS and DID NOT use DME or in-home assistive equipment. Pt is not on dialysis but takes Asprin daily,  takes medications as prescribed / keeps refilled / has resources for all daily and prescriptive needs. Preferred pharmacy is [facility] - Agreeable to bedside delivery / Wants any necessary medication sent to preferred pharmacy.   Will have help from  Daughter- Stefanie Mendoza- [phone], [phone], Son Dexter Polo- [phone] and other immediate family upon discharge - Family to provide transportation home.  All questions addressed. Unit and SW direct numbers provided. Will continue to follow for course of hospitalization    4/25/2024  3:23 PM  Dissecting aneurysm of aorta [I71.00]    PCP: Chas Angela MD    PHARMACY:   [address] - [address] [facility]  [address] [facility] [address]  Phone: [phone] Fax: [phone]    [facility]) - [place] [id number] [facility]  [address] [address]  Phone: [phone] Fax: [phone]    [facility] - [place], [place] - [address]  [address]  [facility] [address]  [facility] [address]  Phone: [phone] Fax: [phone]      Payor: [employer] MGD [facility]ARE [facility] / Plan: [employer] CHOICES / Product Type: Medicare Advantage /       Jessica Adames LMSW  PRN - [facility]  [id number]

## 2024-04-28 NOTE — PLAN OF CARE
SICU PLAN OF CARE    Dx: Dissection of aorta    Goals of Care: MAP < 80    Vital Signs (last 12 hours):   Pulse:  [69-81]   Resp:  [8-28]   BP: (140)/(65)   SpO2:  [94 %-100 %]   Arterial Line BP: ()/(42-58)      Neuro: AAOx4, Follows commands , and Moves all extremities spontaneously     Cardiac: NSR 70-80s        Respiratory: Room Air      Gtts: Insulin, Lasix, Dobutamine, and Cardene      Urine Output: Urethral Catheter  1285 mL/shift      Drains:   Lt Med Chest Tube, total output 60 mL/shift  Rt Med Chest Tube, total output 110 mL/shift  Pleural Chest Tube, total output 140 mL/shift      Diet: NPO        Labs/Accuchecks: Q1 Accuchecks, Q6 CBC & CMP    Skin:  No new skin breakdown noted this shift. Patient turned q2h, bony prominences protected, and mattress inflated/working correctly.   René Score: 18. If René Score is 16 or less, complete 4EYES note each shift.    Shift Events:  Extubated this AM. Lasix gtt added. PICC placed. Swallow study with SLP. Worked with PT/OT. AM bath complete. Minimal pain complaints from patient. See flowsheet for further assessment/details.  Family updated on current condition/plan of care, questions answered, and emotional support provided.  MD updated on current condition, vitals, labs, and gtts.

## 2024-04-28 NOTE — PROGRESS NOTES
[facility]  Critical Care - Surgery  Progress Note    Patient Name: Heidy Polo  MRN: [id number]  Admission Date: 4/25/2024  Hospital Length of Stay: 3 days  Code Status: Prior  Attending Provider: Aquilino Ochoa MD  Primary Care Provider: Chas Angela MD   Principal Problem: Dissection of aorta    Subjective:     Hospital/ICU Course:  No notes on file    Interval History/Significant Events: NAEO. Lasix gtt decreased to 1, did received 10mg lasix x1 with excellent UOP. On and off cardene, off now since midnigh. Still on dobutamine 2.5. Sats well on RA after extubation yesterday. PICC placed yesterday. Speech cleared for thin liquid diet.    Follow-up For: Procedure(s) (LRB):  REPLACEMENT, AORTA, ASCENDING (N/A)  APPLICATION, WOUND VAC (N/A)    Post-Operative Day: 2 Days Post-Op    Objective:     Vital Signs (Most Recent):  Temp: 98.1 °F (36.7 °C) (04/28/24 0315)  Pulse: 78 (04/28/24 0500)  Resp: 10 (04/28/24 0500)  BP: (!) 140/65 (04/27/24 0945)  SpO2: (!) 94 % (04/28/24 0500) Vital Signs (24h Range):  Temp:  [98.1 °F (36.7 °C)-98.6 °F (37 °C)] 98.1 °F (36.7 °C)  Pulse:  [69-82] 78  Resp:  [8-31] 10  SpO2:  [92 %-100 %] 94 %  BP: (130-140)/(59-65) 140/65  Arterial Line BP: ()/(42-60) 115/54     Weight: 80 kg (176 lb 5.9 oz)  Body mass index is 30.27 kg/m².      Intake/Output Summary (Last 24 hours) at 4/28/2024 0606  Last data filed at 4/28/2024 0500  Gross per 24 hour   Intake 751.09 ml   Output 3295 ml   Net -2543.91 ml          Physical Exam  Vitals and nursing note reviewed.   Constitutional:       General: She is not in acute distress. Awake, alert  Cardiovascular:      Rate and Rhythm: Normal rate and regular rhythm.      Pulses: Normal pulses.      Comments: Midline sternal incision with prevena in place  CT x3 with dark SS output. No AL  V wires in place   R IJ trialysis line in place  L PICC in place  L radial A line in place     Pulmonary:      Effort: No respiratory  distress.      Comments: on RA  Genitourinary:     Comments: Hanson draining clear urine   Skin:     General: Skin is warm and dry.   Neurological:      Comments: at baseline, no neurological deficits          Vents:    No ventilator requirement currently    Lines/Drains/Airways       Peripherally Inserted Central Catheter Line  Duration             PICC Triple Lumen 04/27/24 1115 left brachial <1 day              Central Venous Catheter Line  Duration              Introducer with Double Lumen 04/26/24 0900 Internal Jugular Right 1 day    Trialysis (Dialysis) Catheter 04/26/24 0901 left internal jugular 1 day              Drain  Duration                  Chest Tube 04/26/24 1422 Tube - 2 Anterior Mediastinal 32 Fr. 1 day         Chest Tube 04/26/24 1423 Tube - 1 Anterior Mediastinal 32 Fr. 1 day         Chest Tube 04/26/24 1454 Tube - 3 Right Pleural 32 Fr. 1 day         Urethral Catheter 04/26/24 0750 Temperature probe;Silicone;Non-latex;Straight-tip 16 Fr. 1 day              Arterial Line  Duration             Arterial Line 04/25/24 1624 Left Radial 2 days              Line  Duration                  Pacer Wires 04/26/24 1407 1 day              Peripheral Intravenous Line  Duration                  Peripheral IV - Single Lumen 04/25/24 1320 22 G Left;Posterior Hand 2 days         Peripheral IV - Single Lumen 04/25/24 1612 20 G Left Antecubital 2 days         Peripheral IV - Single Lumen 04/25/24 1652 18 G;1 3/4 in Anterior;Left Upper Arm 2 days         Peripheral IV - Single Lumen 04/25/24 1652 20 G;1 3/4 in Anterior;Left;Proximal Forearm 2 days         Peripheral IV - Single Lumen 04/26/24 1000 14 G  Left Forearm 1 day                    Significant Labs:    CBC/Anemia Profile:  Recent Labs   Lab 04/27/24  1505 04/27/24  2112 04/28/24  0308   WBC 11.72 12.99* 12.88*   HGB 8.6* 8.8* 8.7*   HCT 23.9* 25.1* 24.1*   * 109* 122*   MCV 84 86 85   RDW 15.1* 15.3* 15.6*        Chemistries:  Recent Labs   Lab  04/27/24  1505 04/27/24 2112 04/28/24  0308    141 140   K 3.6 3.9 4.2    102 101   CO2 26 29 27   BUN 18 19 20   CREATININE 0.9 0.9 0.9   CALCIUM 8.8 9.1 9.0   ALBUMIN 3.8 3.8 3.7   PROT 5.8* 5.9* 5.8*   BILITOT 2.3* 1.9* 1.8*   ALKPHOS 120 117 116   * 162* 135*   * 170* 127*   MG 2.4 2.3 2.1   PHOS 4.2 3.8 3.9       All pertinent labs within the past 24 hours have been reviewed.    Significant Imaging:  I have reviewed all pertinent imaging results/findings within the past 24 hours.  Assessment/Plan:     Cardiac/Vascular  * Dissection of aorta    Neuro/Psych:     - Sedation: none    - Pain:     - Scheduled Tylenol 1000mg Q8H   - PRN Oxycodone 5/10mg               Cardiac:     - s/p ascending aorta and hemiarch replacement w/ Dr. Ochoa 4/26/25    - BP Goal: MAP 60-80 (<75 if possible)    - Inotropes/Pressors: dobutamine 2.5    - Anti-HTNs: on low dose cardene gtt    - Rhythm: NSR    - Beta Blocker: start labetalol 100mg q8h PO    - Statin: will start when appropriate     - s/p 2L albumin post op    - continue ASA 81        Pulmonary:     - Goal SpO2 >92%, on RA    - IS, OOB, PT/OT    - ABGs PRN    - :    - med 1: 220 (238)   - med 2: 60 (203)   - pleur: 260 (41)   - dark ssg output all tubes    Recent Labs     04/27/24  0607   PH 7.481*   PCO2 33.4*   PO2 130*   HCO3 24.9   POCSATURATED 99   BE 1                 Renal:    - Trend BUN/Cr     - Record strict Is/Os    - UOP: 2.8L/24h   Net -2.5L/24hr         Net +4.6L during admission    - continue lasix gtt @1, goal UOP -1 to -1.5L /24hr    Recent Labs   Lab 04/27/24  1505 04/27/24 2112 04/28/24  0308   BUN 18 19 20   CREATININE 0.9 0.9 0.9           FEN / GI:     - q6 CMP, PRN K/Mag/Phos per protocol     - Replace electrolytes as needed    - Nutrition: advance to [facility] today    - Bowel Regimen: Miralax, docusate    - speech eval: cleared for thin liquids    - pepcid IV for GI prophylaxis        ID:     - Afebrile     - WBC stable     -  Abx: completed perioperative cefazolin 2g Q8H x 5 doses post op    Recent Labs   Lab 04/27/24  1505 04/27/24  2112 04/28/24  0308   WBC 11.72 12.99* 12.88*           Heme/Onc:     - Hgb 11.1 pre-operatively, 8.7 today    - q6h CBC     Recent Labs   Lab 04/26/24  1614 04/26/24  1910 04/27/24  0312 04/27/24  0900 04/27/24  1505 04/27/24  2112 04/28/24  0308   HGB 10.1*   < > 8.4*   < > 8.6* 8.8* 8.7*   *   < > 94*   < > 104* 109* 122*   APTT 25.1  --  26.5  --   --   --  23.6   INR 1.0   < > 1.0   < > 1.0 1.0 1.0    < > = values in this interval not displayed.           Endocrine:     - CTS Goal -140    - HgbA1c: 4.8     - insulin gtt, endocrine following    - POCT q1h for now        PPx:   Feeding: CLD  Analgesia/Sedation: see above   Thromboembolic Prevention: SCDs  HOB >30: Yes  Stress Ulcer: pepcid  Glucose Control: SSI      Lines/Drains/Airway:  PIV x 2   PICC L arm   L IJ trialysis line (remove today)   Left radial A line    CT x3              Hanson (remove today)      Dispo/Code Status/Palliative:     - SICU today, possible step down tomorrow    - Full Code         Critical care was time spent personally by me on the following activities: development of treatment plan with patient or surrogate and bedside caregivers, discussions with consultants, evaluation of patient's response to treatment, examination of patient, ordering and performing treatments and interventions, ordering and review of laboratory studies, ordering and review of radiographic studies, pulse oximetry, re-evaluation of patient's condition.  This critical care time did not overlap with that of any other provider or involve time for any procedures.     Abebe Sparks MD  Critical Care - Surgery  [facility]

## 2024-04-28 NOTE — SUBJECTIVE & OBJECTIVE
Interval History/Significant Events: NAEO. Lasix gtt decreased to 1, did received 10mg lasix x1 with excellent UOP. On and off cardene, off now since midnigh. Still on dobutamine 2.5. Sats well on RA after extubation yesterday. PICC placed yesterday. Speech cleared for thin liquid diet.    Follow-up For: Procedure(s) (LRB):  REPLACEMENT, AORTA, ASCENDING (N/A)  APPLICATION, WOUND VAC (N/A)    Post-Operative Day: 2 Days Post-Op    Objective:     Vital Signs (Most Recent):  Temp: 98.1 °F (36.7 °C) (04/28/24 0315)  Pulse: 78 (04/28/24 0500)  Resp: 10 (04/28/24 0500)  BP: (!) 140/65 (04/27/24 0945)  SpO2: (!) 94 % (04/28/24 0500) Vital Signs (24h Range):  Temp:  [98.1 °F (36.7 °C)-98.6 °F (37 °C)] 98.1 °F (36.7 °C)  Pulse:  [69-82] 78  Resp:  [8-31] 10  SpO2:  [92 %-100 %] 94 %  BP: (130-140)/(59-65) 140/65  Arterial Line BP: ()/(42-60) 115/54     Weight: 80 kg (176 lb 5.9 oz)  Body mass index is 30.27 kg/m².      Intake/Output Summary (Last 24 hours) at 4/28/2024 0606  Last data filed at 4/28/2024 0500  Gross per 24 hour   Intake 751.09 ml   Output 3295 ml   Net -2543.91 ml          Physical Exam  Vitals and nursing note reviewed.   Constitutional:       General: She is not in acute distress. Awake, alert  Cardiovascular:      Rate and Rhythm: Normal rate and regular rhythm.      Pulses: Normal pulses.      Comments: Midline sternal incision with prevena in place  CT x3 with dark SS output. No AL  V wires in place   R IJ trialysis line in place  L PICC in place  L radial A line in place     Pulmonary:      Effort: No respiratory distress.      Comments: on RA  Genitourinary:     Comments: Hanson draining clear urine   Skin:     General: Skin is warm and dry.   Neurological:      Comments: at baseline, no neurological deficits          Vents:    No ventilator requirement currently    Lines/Drains/Airways       Peripherally Inserted Central Catheter Line  Duration             PICC Triple Lumen 04/27/24 1115 left  brachial <1 day              Central Venous Catheter Line  Duration              Introducer with Double Lumen 04/26/24 0900 Internal Jugular Right 1 day    Trialysis (Dialysis) Catheter 04/26/24 0901 left internal jugular 1 day              Drain  Duration                  Chest Tube 04/26/24 1422 Tube - 2 Anterior Mediastinal 32 Fr. 1 day         Chest Tube 04/26/24 1423 Tube - 1 Anterior Mediastinal 32 Fr. 1 day         Chest Tube 04/26/24 1454 Tube - 3 Right Pleural 32 Fr. 1 day         Urethral Catheter 04/26/24 0750 Temperature probe;Silicone;Non-latex;Straight-tip 16 Fr. 1 day              Arterial Line  Duration             Arterial Line 04/25/24 1624 Left Radial 2 days              Line  Duration                  Pacer Wires 04/26/24 1407 1 day              Peripheral Intravenous Line  Duration                  Peripheral IV - Single Lumen 04/25/24 1320 22 G Left;Posterior Hand 2 days         Peripheral IV - Single Lumen 04/25/24 1612 20 G Left Antecubital 2 days         Peripheral IV - Single Lumen 04/25/24 1652 18 G;1 3/4 in Anterior;Left Upper Arm 2 days         Peripheral IV - Single Lumen 04/25/24 1652 20 G;1 3/4 in Anterior;Left;Proximal Forearm 2 days         Peripheral IV - Single Lumen 04/26/24 1000 14 G  Left Forearm 1 day                    Significant Labs:    CBC/Anemia Profile:  Recent Labs   Lab 04/27/24  1505 04/27/24 2112 04/28/24  0308   WBC 11.72 12.99* 12.88*   HGB 8.6* 8.8* 8.7*   HCT 23.9* 25.1* 24.1*   * 109* 122*   MCV 84 86 85   RDW 15.1* 15.3* 15.6*        Chemistries:  Recent Labs   Lab 04/27/24  1505 04/27/24 2112 04/28/24  0308    141 140   K 3.6 3.9 4.2    102 101   CO2 26 29 27   BUN 18 19 20   CREATININE 0.9 0.9 0.9   CALCIUM 8.8 9.1 9.0   ALBUMIN 3.8 3.8 3.7   PROT 5.8* 5.9* 5.8*   BILITOT 2.3* 1.9* 1.8*   ALKPHOS 120 117 116   * 162* 135*   * 170* 127*   MG 2.4 2.3 2.1   PHOS 4.2 3.8 3.9       All pertinent labs within the past 24 hours  have been reviewed.    Significant Imaging:  I have reviewed all pertinent imaging results/findings within the past 24 hours.

## 2024-04-28 NOTE — ASSESSMENT & PLAN NOTE
Neuro/Psych:     - Sedation: none    - Pain:     - Scheduled Tylenol 1000mg Q8H   - PRN Oxycodone 5/10mg               Cardiac:     - s/p ascending aorta and hemiarch replacement w/ Dr. Ochoa 4/26/25    - BP Goal: MAP 60-80 (<75 if possible)    - Inotropes/Pressors: dobutamine 2.5    - Anti-HTNs: on low dose cardene gtt    - Rhythm: NSR    - Beta Blocker: start labetalol 100mg q8h PO    - Statin: will start when appropriate     - s/p 2L albumin post op    - continue ASA 81        Pulmonary:     - Goal SpO2 >92%, on RA    - IS, OOB, PT/OT    - ABGs PRN    - :    - med 1: 220 (238)   - med 2: 60 (203)   - pleur: 260 (41)   - dark ssg output all tubes    Recent Labs     04/27/24  0607   PH 7.481*   PCO2 33.4*   PO2 130*   HCO3 24.9   POCSATURATED 99   BE 1                 Renal:    - Trend BUN/Cr     - Record strict Is/Os    - UOP: 2.8L/24h   Net -2.5L/24hr         Net +4.6L during admission    - continue lasix gtt @1, goal UOP -1 to -1.5L /24hr    Recent Labs   Lab 04/27/24  1505 04/27/24 2112 04/28/24  0308   BUN 18 19 20   CREATININE 0.9 0.9 0.9           FEN / GI:     - q6 CMP, PRN K/Mag/Phos per protocol     - Replace electrolytes as needed    - Nutrition: advance to [facility] today    - Bowel Regimen: Miralax, docusate    - speech eval: cleared for thin liquids    - pepcid IV for GI prophylaxis        ID:     - Afebrile     - WBC stable     - Abx: completed perioperative cefazolin 2g Q8H x 5 doses post op    Recent Labs   Lab 04/27/24  1505 04/27/24 2112 04/28/24  0308   WBC 11.72 12.99* 12.88*           Heme/Onc:     - Hgb 11.1 pre-operatively, 8.7 today    - q6h CBC     Recent Labs   Lab 04/26/24  1614 04/26/24  1910 04/27/24  0312 04/27/24  0900 04/27/24  1505 04/27/24  2112 04/28/24  0308   HGB 10.1*   < > 8.4*   < > 8.6* 8.8* 8.7*   *   < > 94*   < > 104* 109* 122*   APTT 25.1  --  26.5  --   --   --  23.6   INR 1.0   < > 1.0   < > 1.0 1.0 1.0    < > = values in this interval not displayed.            Endocrine:     - CTS Goal -140    - HgbA1c: 4.8     - insulin gtt, endocrine following    - POCT q1h for now        PPx:   Feeding: CLD  Analgesia/Sedation: see above   Thromboembolic Prevention: SCDs  HOB >30: Yes  Stress Ulcer: pepcid  Glucose Control: SSI      Lines/Drains/Airway:  PIV x 2   PICC L arm   L IJ trialysis line (remove today)   Left radial A line    CT x3              Hanson (remove today)      Dispo/Code Status/Palliative:     - SICU today, possible step down tomorrow    - Full Code

## 2024-04-29 DIAGNOSIS — Z95.828 S/P ASCENDING AORTIC REPLACEMENT: Primary | ICD-10-CM

## 2024-04-29 LAB
ALBUMIN SERPL BCP-MCNC: 3.5 G/DL (ref 3.5–5.2)
ALP SERPL-CCNC: 126 U/L (ref 55–135)
ALP SERPL-CCNC: 141 U/L (ref 55–135)
ALP SERPL-CCNC: 142 U/L (ref 55–135)
ALT SERPL W/O P-5'-P-CCNC: 74 U/L (ref 10–44)
ALT SERPL W/O P-5'-P-CCNC: 78 U/L (ref 10–44)
ALT SERPL W/O P-5'-P-CCNC: 80 U/L (ref 10–44)
ANION GAP SERPL CALC-SCNC: 10 MMOL/L (ref 8–16)
ANION GAP SERPL CALC-SCNC: 12 MMOL/L (ref 8–16)
ANION GAP SERPL CALC-SCNC: 6 MMOL/L (ref 8–16)
APTT PPP: 25.7 SEC (ref 21–32)
AST SERPL-CCNC: 38 U/L (ref 10–40)
AST SERPL-CCNC: 42 U/L (ref 10–40)
AST SERPL-CCNC: 49 U/L (ref 10–40)
BILIRUB SERPL-MCNC: 1.3 MG/DL (ref 0.1–1)
BILIRUB SERPL-MCNC: 1.5 MG/DL (ref 0.1–1)
BILIRUB SERPL-MCNC: 1.6 MG/DL (ref 0.1–1)
BLD PROD TYP BPU: NORMAL
BLD PROD TYP BPU: NORMAL
BLOOD UNIT EXPIRATION DATE: NORMAL
BLOOD UNIT EXPIRATION DATE: NORMAL
BLOOD UNIT TYPE CODE: 6200
BLOOD UNIT TYPE CODE: 6200
BLOOD UNIT TYPE: NORMAL
BLOOD UNIT TYPE: NORMAL
BUN SERPL-MCNC: 20 MG/DL (ref 8–23)
BUN SERPL-MCNC: 21 MG/DL (ref 8–23)
BUN SERPL-MCNC: 23 MG/DL (ref 8–23)
CALCIUM SERPL-MCNC: 9 MG/DL (ref 8.7–10.5)
CHLORIDE SERPL-SCNC: 100 MMOL/L (ref 95–110)
CHLORIDE SERPL-SCNC: 101 MMOL/L (ref 95–110)
CHLORIDE SERPL-SCNC: 99 MMOL/L (ref 95–110)
CO2 SERPL-SCNC: 27 MMOL/L (ref 23–29)
CO2 SERPL-SCNC: 28 MMOL/L (ref 23–29)
CO2 SERPL-SCNC: 29 MMOL/L (ref 23–29)
CODING SYSTEM: NORMAL
CODING SYSTEM: NORMAL
CREAT SERPL-MCNC: 0.7 MG/DL (ref 0.5–1.4)
CREAT SERPL-MCNC: 0.8 MG/DL (ref 0.5–1.4)
CREAT SERPL-MCNC: 0.8 MG/DL (ref 0.5–1.4)
CROSSMATCH INTERPRETATION: NORMAL
CROSSMATCH INTERPRETATION: NORMAL
DISPENSE STATUS: NORMAL
DISPENSE STATUS: NORMAL
ERYTHROCYTE [DISTWIDTH] IN BLOOD BY AUTOMATED COUNT: 15 % (ref 11.5–14.5)
ERYTHROCYTE [DISTWIDTH] IN BLOOD BY AUTOMATED COUNT: 15.1 % (ref 11.5–14.5)
EST. GFR  (NO RACE VARIABLE): >60 ML/MIN/1.73 M^2
GLUCOSE SERPL-MCNC: 101 MG/DL (ref 70–110)
GLUCOSE SERPL-MCNC: 120 MG/DL (ref 70–110)
GLUCOSE SERPL-MCNC: 311 MG/DL (ref 70–110)
GLUCOSE SERPL-MCNC: 83 MG/DL (ref 70–110)
HCO3 UR-SCNC: 24.4 MMOL/L (ref 24–28)
HCT VFR BLD AUTO: 28.3 % (ref 37–48.5)
HCT VFR BLD AUTO: 29.6 % (ref 37–48.5)
HCT VFR BLD CALC: <15 %PCV (ref 36–54)
HGB BLD-MCNC: 9.1 G/DL (ref 12–16)
HGB BLD-MCNC: 9.8 G/DL (ref 12–16)
INR PPP: 1 (ref 0.8–1.2)
INR PPP: 1 (ref 0.8–1.2)
MAGNESIUM SERPL-MCNC: 2 MG/DL (ref 1.6–2.6)
MAGNESIUM SERPL-MCNC: 2 MG/DL (ref 1.6–2.6)
MAGNESIUM SERPL-MCNC: 2.1 MG/DL (ref 1.6–2.6)
MCH RBC QN AUTO: 29.5 PG (ref 27–31)
MCH RBC QN AUTO: 29.8 PG (ref 27–31)
MCHC RBC AUTO-ENTMCNC: 32.2 G/DL (ref 32–36)
MCHC RBC AUTO-ENTMCNC: 33.1 G/DL (ref 32–36)
MCV RBC AUTO: 90 FL (ref 82–98)
MCV RBC AUTO: 92 FL (ref 82–98)
NUM UNITS TRANS PACKED RBC: NORMAL
NUM UNITS TRANS PACKED RBC: NORMAL
PCO2 BLDA: 36.1 MMHG (ref 35–45)
PH SMN: 7.44 [PH] (ref 7.35–7.45)
PHOSPHATE SERPL-MCNC: 3.1 MG/DL (ref 2.7–4.5)
PHOSPHATE SERPL-MCNC: 3.4 MG/DL (ref 2.7–4.5)
PHOSPHATE SERPL-MCNC: 3.5 MG/DL (ref 2.7–4.5)
PLATELET # BLD AUTO: 126 K/UL (ref 150–450)
PLATELET # BLD AUTO: 128 K/UL (ref 150–450)
PMV BLD AUTO: 11.5 FL (ref 9.2–12.9)
PMV BLD AUTO: 11.7 FL (ref 9.2–12.9)
PO2 BLDA: 506 MMHG (ref 80–100)
POC BE: 0 MMOL/L
POC IONIZED CALCIUM: 0.85 MMOL/L (ref 1.06–1.42)
POC SATURATED O2: 100 % (ref 95–100)
POC TCO2: 26 MMOL/L (ref 23–27)
POCT GLUCOSE: 109 MG/DL (ref 70–110)
POCT GLUCOSE: 127 MG/DL (ref 70–110)
POTASSIUM BLD-SCNC: 3 MMOL/L (ref 3.5–5.1)
POTASSIUM SERPL-SCNC: 3.7 MMOL/L (ref 3.5–5.1)
POTASSIUM SERPL-SCNC: 3.9 MMOL/L (ref 3.5–5.1)
POTASSIUM SERPL-SCNC: 5.1 MMOL/L (ref 3.5–5.1)
PROT SERPL-MCNC: 5.8 G/DL (ref 6–8.4)
PROT SERPL-MCNC: 5.9 G/DL (ref 6–8.4)
PROT SERPL-MCNC: 6 G/DL (ref 6–8.4)
PROTHROMBIN TIME: 10.6 SEC (ref 9–12.5)
PROTHROMBIN TIME: 10.7 SEC (ref 9–12.5)
RBC # BLD AUTO: 3.08 M/UL (ref 4–5.4)
RBC # BLD AUTO: 3.29 M/UL (ref 4–5.4)
SAMPLE: ABNORMAL
SODIUM BLD-SCNC: 138 MMOL/L (ref 136–145)
SODIUM SERPL-SCNC: 136 MMOL/L (ref 136–145)
SODIUM SERPL-SCNC: 136 MMOL/L (ref 136–145)
SODIUM SERPL-SCNC: 140 MMOL/L (ref 136–145)
WBC # BLD AUTO: 8.4 K/UL (ref 3.9–12.7)
WBC # BLD AUTO: 8.65 K/UL (ref 3.9–12.7)

## 2024-04-29 PROCEDURE — 99232 SBSQ HOSP IP/OBS MODERATE 35: CPT | Mod: ,,, | Performed by: NURSE PRACTITIONER

## 2024-04-29 PROCEDURE — 85027 COMPLETE CBC AUTOMATED: CPT

## 2024-04-29 PROCEDURE — 25000003 PHARM REV CODE 250: Performed by: STUDENT IN AN ORGANIZED HEALTH CARE EDUCATION/TRAINING PROGRAM

## 2024-04-29 PROCEDURE — A4216 STERILE WATER/SALINE, 10 ML: HCPCS | Performed by: THORACIC SURGERY (CARDIOTHORACIC VASCULAR SURGERY)

## 2024-04-29 PROCEDURE — 25000003 PHARM REV CODE 250: Performed by: THORACIC SURGERY (CARDIOTHORACIC VASCULAR SURGERY)

## 2024-04-29 PROCEDURE — 85610 PROTHROMBIN TIME: CPT | Mod: 91

## 2024-04-29 PROCEDURE — 83735 ASSAY OF MAGNESIUM: CPT | Mod: 91

## 2024-04-29 PROCEDURE — 63600175 PHARM REV CODE 636 W HCPCS: Performed by: STUDENT IN AN ORGANIZED HEALTH CARE EDUCATION/TRAINING PROGRAM

## 2024-04-29 PROCEDURE — 99900035 HC TECH TIME PER 15 MIN (STAT)

## 2024-04-29 PROCEDURE — 97535 SELF CARE MNGMENT TRAINING: CPT

## 2024-04-29 PROCEDURE — 94761 N-INVAS EAR/PLS OXIMETRY MLT: CPT

## 2024-04-29 PROCEDURE — 25000003 PHARM REV CODE 250

## 2024-04-29 PROCEDURE — 99291 CRITICAL CARE FIRST HOUR: CPT | Mod: GC,,, | Performed by: INTERNAL MEDICINE

## 2024-04-29 PROCEDURE — 20600001 HC STEP DOWN PRIVATE ROOM

## 2024-04-29 PROCEDURE — 92526 ORAL FUNCTION THERAPY: CPT

## 2024-04-29 PROCEDURE — 80053 COMPREHEN METABOLIC PANEL: CPT

## 2024-04-29 PROCEDURE — 84100 ASSAY OF PHOSPHORUS: CPT | Mod: 91

## 2024-04-29 PROCEDURE — 97116 GAIT TRAINING THERAPY: CPT

## 2024-04-29 PROCEDURE — 63600175 PHARM REV CODE 636 W HCPCS

## 2024-04-29 PROCEDURE — 85730 THROMBOPLASTIN TIME PARTIAL: CPT | Performed by: STUDENT IN AN ORGANIZED HEALTH CARE EDUCATION/TRAINING PROGRAM

## 2024-04-29 RX ORDER — POLYETHYLENE GLYCOL 3350 17 G/17G
17 POWDER, FOR SOLUTION ORAL 2 TIMES DAILY
Status: DISCONTINUED | OUTPATIENT
Start: 2024-04-29 | End: 2024-05-01 | Stop reason: HOSPADM

## 2024-04-29 RX ORDER — LABETALOL 100 MG/1
200 TABLET, FILM COATED ORAL EVERY 8 HOURS
Status: DISCONTINUED | OUTPATIENT
Start: 2024-04-29 | End: 2024-04-30

## 2024-04-29 RX ORDER — FAMOTIDINE 20 MG/1
20 TABLET, FILM COATED ORAL 2 TIMES DAILY
Status: DISCONTINUED | OUTPATIENT
Start: 2024-04-29 | End: 2024-05-01 | Stop reason: HOSPADM

## 2024-04-29 RX ORDER — FUROSEMIDE 10 MG/ML
10 INJECTION INTRAMUSCULAR; INTRAVENOUS
Status: DISCONTINUED | OUTPATIENT
Start: 2024-04-29 | End: 2024-04-30

## 2024-04-29 RX ORDER — BISACODYL 10 MG/1
10 SUPPOSITORY RECTAL ONCE
Status: COMPLETED | OUTPATIENT
Start: 2024-04-29 | End: 2024-04-29

## 2024-04-29 RX ORDER — FUROSEMIDE 10 MG/ML
10 INJECTION INTRAMUSCULAR; INTRAVENOUS 2 TIMES DAILY
Status: DISCONTINUED | OUTPATIENT
Start: 2024-04-29 | End: 2024-04-29

## 2024-04-29 RX ORDER — FUROSEMIDE 10 MG/ML
10 INJECTION INTRAMUSCULAR; INTRAVENOUS ONCE
Status: COMPLETED | OUTPATIENT
Start: 2024-04-29 | End: 2024-04-29

## 2024-04-29 RX ADMIN — OXYCODONE HYDROCHLORIDE 10 MG: 10 TABLET ORAL at 05:04

## 2024-04-29 RX ADMIN — FUROSEMIDE 10 MG: 10 INJECTION, SOLUTION INTRAVENOUS at 06:04

## 2024-04-29 RX ADMIN — FAMOTIDINE 20 MG: 20 TABLET ORAL at 09:04

## 2024-04-29 RX ADMIN — FAMOTIDINE 20 MG: 10 INJECTION, SOLUTION INTRAVENOUS at 08:04

## 2024-04-29 RX ADMIN — ASPIRIN 81 MG: 81 TABLET, COATED ORAL at 08:04

## 2024-04-29 RX ADMIN — ACETAMINOPHEN 1000 MG: 500 TABLET ORAL at 05:04

## 2024-04-29 RX ADMIN — LABETALOL HYDROCHLORIDE 100 MG: 100 TABLET, FILM COATED ORAL at 05:04

## 2024-04-29 RX ADMIN — FUROSEMIDE 10 MG: 10 INJECTION, SOLUTION INTRAVENOUS at 11:04

## 2024-04-29 RX ADMIN — Medication 10 ML: at 06:04

## 2024-04-29 RX ADMIN — POTASSIUM CHLORIDE 20 MEQ: 200 INJECTION, SOLUTION INTRAVENOUS at 07:04

## 2024-04-29 RX ADMIN — LABETALOL HYDROCHLORIDE 200 MG: 100 TABLET, FILM COATED ORAL at 01:04

## 2024-04-29 RX ADMIN — BISACODYL 10 MG: 10 SUPPOSITORY RECTAL at 01:04

## 2024-04-29 RX ADMIN — DOCUSATE SODIUM 100 MG: 100 CAPSULE, LIQUID FILLED ORAL at 08:04

## 2024-04-29 RX ADMIN — POLYETHYLENE GLYCOL 3350 17 G: 17 POWDER, FOR SOLUTION ORAL at 08:04

## 2024-04-29 RX ADMIN — Medication 10 ML: at 12:04

## 2024-04-29 RX ADMIN — LABETALOL HYDROCHLORIDE 200 MG: 100 TABLET, FILM COATED ORAL at 09:04

## 2024-04-29 RX ADMIN — ACETAMINOPHEN 1000 MG: 500 TABLET ORAL at 09:04

## 2024-04-29 RX ADMIN — OXYCODONE HYDROCHLORIDE 10 MG: 10 TABLET ORAL at 01:04

## 2024-04-29 NOTE — PT/OT/SLP PROGRESS
Physical Therapy   Progress Note    Patient Name:  Heidy Polo  MRN: [id number]    Admit Date: 4/25/2024  Admitting Diagnosis:  Dissecting aneurysm of aorta  Length of Stay: 4 days  Recent Surgery: Procedure(s) (LRB):  REPLACEMENT, AORTA, ASCENDING (N/A)  APPLICATION, WOUND VAC (N/A) 3 Days Post-Op    Recommendations:     Discharge Recommendations: low intensity therapy  Equipment recommendations: none  Barriers to discharge: None Identified     Assessment:     Heidy Polo is a 68 y.o. female admitted to [facility] on 4/25/2024 with medical diagnosis of Dissecting aneurysm of aorta. Pt presents with weakness, impaired endurance, impaired functional mobility, decreased coordination, impaired cardiopulmonary response to activity. Pt is progressing towards goals, but has not yet reached prior level of function.     Pt agreeable to therapy session with family in room. Able to recall 1/3 sternal precautions. Pt able to stand, ambulate to sink, perform standing ADLs, transfer to BSC, and then ambulate back to recliner. All VSS during session.    Heidy Polo would benefit from continued acute PT intervention to improve quality of life, focus on recovery of impairments, provide patient/caregiver education, reduce fall risk, and maximize (I) and safety with functional mobility. Once medically stable, recommending pt discharge to low intensity therapy.  Patient continues to demonstrate the need for low intensity therapy on a scheduled basis exhibited by decreased independence with functional mobility .      Rehab Prognosis: Good    Plan:     During this hospitalization, patient to be seen 4 x/week to address the identified rehab impairments via gait training, therapeutic activities, therapeutic exercises, neuromuscular re-education and progress towards stated goals.     Plan of Care Expires:  05/27/24  Plan of Care reviewed with: patient and family    This plan of care has been discussed with the patient/caregiver, who was  included in its development and is in agreement with the identified goals and treatment plan.     Subjective     Communicated with RN prior to session.  Patient found up in chair upon PT entry to room, agreeable to therapy session. Pt's family present during session.    Patient/Family Comments/goals: none stated    Pain/Comfort:  Pain Rating 1: 0/10  Pain Rating Post-Intervention 1: 0/10    Patients cultural, spiritual, Anglican conflicts given the current situation: None identified     Objective:   OT present for cotreat due to pt's multiple medical comorbidities and functional/cognition deficits requiring two skilled therapists to appropriately progress pt's musculoskeletal strength, neuromuscular control, and endurance while taking into consideration medical acuity and pt safety.    Patient found with: chest tube, peripheral IV, wound vac, telemetry, pulse ox (continuous), blood pressure cuff    General Precautions: Standard, aspiration, fall, sternal   Orthopedic Precautions:N/A   Braces:     Oxygen Device: room air    Cognition:  Pt is Alert during session.    Therapist provided skilled verbal and tactile cueing to facilitate the following functional mobility tasks. Listed tasks are focused on recovery of impairments and improving pt's independence and efficiency with bed mobility, transfers and ambulation as able.     Bed Mobility:  Not assessed d/t up in recliner    Transfers:   Sit <> Stand Transfer: Contact Guard Assistance  From recliner with no AD  From [facility] with no AD                 Gait:  Distance: 10 ft + standing ADLs + 10 ft  Assistance level: Contact Guard Assistance  Assistive Device: none  Gait Assessment: decreased step length , decreased sridhar, decreased gait speed, and reduced reciprocal arm swing     Balance:  Dynamic Sitting: GOOD: Maintains balance through MODERATE excursions of active trunk movement  Standing:  Static: FAIR+: Takes MINIMAL challenges from all directions   Dynamic: FAIR:  Needs CONTACT GUARD during gait    Outcome Measure: AM-PAC 6 CLICK MOBILITY  Total Score:18     Patient/Caregiver Education and Additional Therapeutic Activities/Exercises   Therapist reinforced education re:   Post-op sternal precautions, including no lifting > 5 lbs, pulling or pushing with BUEs.    Modifying daily activities and functional mobility tasks to maintain sternal precautions appropriately   Importance of participation in therapy and engaging in increased OOB mobility with assistance to improve endurance     Provided pt/caregiver education regarding:   PT POC and goals for therapy   Safety with mobility and fall risk   Safe management of AD as needed   Energy conservation techniques   Instruction on use of call button and importance of calling nursing staff for assistance with mobility     Patient/caregiver able to verbalize understanding; will follow-up with pt/caregiver during current admit for additional questions/concerns within scope of practice.     White board updated.     Patient left up in chair with all lines intact and call button in reach.    Goals:     Multidisciplinary Problems       Physical Therapy Goals          Problem: Physical Therapy    Goal Priority Disciplines Outcome Goal Variances Interventions   Physical Therapy Goal     PT, PT/OT Progressing     Description: Goals to met by 5/11/2024    1. Supine to sit with Stand-by Assistance  2. Sit to supine with Stand-by Assistance  3. Rolling to Left and Right with Stand-by Assistance.  4. Sit to stand transfer with Stand-by Assistance  5. Bed to chair transfer with Stand-by Assistance using No Assistive Device  6. Gait  x 50 feet with Stand-by Assistance using No Assistive Device   7. Ascend/Descend 6 inch curb step with Contact Guard Assistance using No Assistive Device.  8. Stand for 5 minutes with Stand-by Assistance using No Assistive Device  9. Lower extremity exercise program x15 reps per Instruction, with assistance as needed in  order to facilitate improved strength, improved postural control, and improvement in functional independence                       Time Tracking:       PT Received On: 04/29/24  PT Start Time: 1339     PT Stop Time: 1358  PT Total Time (min): 19 min     Billable Minutes: Gait Training 19 04/29/2024

## 2024-04-29 NOTE — PLAN OF CARE
[facility]  Discharge Reassessment    Primary Care Provider: Chas Angela MD    Expected Discharge Date: 5/7/2024    Reassessment (most recent)       Discharge Reassessment - 04/29/24 1109          Discharge Reassessment    Assessment Type Discharge Planning Reassessment     Did the patient's condition or plan change since previous assessment? Yes     Discharge Plan discussed with: Patient     Communicated SUYAPA with patient/caregiver Date not available/Unable to determine     Discharge Plan A Home with family;Home     Discharge Plan B Home Health     DME Needed Upon Discharge  none     Transition of Care Barriers None     Why the patient remains in the hospital Requires continued medical care        Post-Acute Status    Discharge Delays None known at this time                 Remains in SICU

## 2024-04-29 NOTE — SUBJECTIVE & OBJECTIVE
Interval History/Significant Events: NAEON. OOB in chair this AM. Remained on lasix gtt at 1, meeting goal UOP (1.8 L net negative last 24hrs). Off cardene since 1 AM. On dobutamine at 2.5, plan to come off. MAPs 60s-80s for vast majority of the night. Tolerating CLD well. A line flat and unable to pull blood, will remove.    Follow-up For: Procedure(s) (LRB):  REPLACEMENT, AORTA, ASCENDING (N/A)  APPLICATION, WOUND VAC (N/A)    Post-Operative Day: 3 Days Post-Op    Objective:     Vital Signs (Most Recent):  Temp: 98 °F (36.7 °C) (04/29/24 0300)  Pulse: 69 (04/29/24 0423)  Resp: 18 (04/29/24 0531)  BP: (!) 155/70 (04/28/24 0945)  SpO2: 96 % (04/29/24 0423) Vital Signs (24h Range):  Temp:  [98 °F (36.7 °C)-98.9 °F (37.2 °C)] 98 °F (36.7 °C)  Pulse:  [] 69  Resp:  [9-42] 18  SpO2:  [83 %-100 %] 96 %  BP: (155)/(70) 155/70  Arterial Line BP: ()/(45-77) 114/55     Weight: 80 kg (176 lb 5.9 oz)  Body mass index is 30.27 kg/m².      Intake/Output Summary (Last 24 hours) at 4/29/2024 0715  Last data filed at 4/29/2024 0400  Gross per 24 hour   Intake 284.22 ml   Output 2120 ml   Net -1835.78 ml          Physical Exam  Vitals and nursing note reviewed.   Constitutional:       General: She is not in acute distress.  HENT:      Mouth/Throat:      Pharynx: Oropharynx is clear. No oropharyngeal exudate.   Eyes:      Extraocular Movements: Extraocular movements intact.      Conjunctiva/sclera: Conjunctivae normal.   Cardiovascular:      Rate and Rhythm: Normal rate and regular rhythm.      Pulses: Normal pulses.      Comments: Midline sternal incision with prevena in place  CT x3 with SS output.  V wires in place   Palpable DP pulses   Pulmonary:      Effort: Pulmonary effort is normal. No respiratory distress.      Comments: Room air  Genitourinary:     Comments: Hanson draining clear urine   Musculoskeletal:      Cervical back: Normal range of motion. No rigidity.   Skin:     General: Skin is warm and dry.    Neurological:      General: No focal deficit present.      Mental Status: She is oriented to person, place, and time.            Vents:  Vent Mode: Spont (04/27/24 0917)  Set Rate: 0 BPM (04/27/24 0745)  Vt Set: 0 mL (04/27/24 0745)  Pressure Support: 5 cmH20 (04/27/24 0917)  PEEP/CPAP: 5 cmH20 (04/27/24 0917)  Oxygen Concentration (%): 40 (04/27/24 0917)  Peak Airway Pressure: 4.3 cmH20 (04/27/24 0917)  Plateau Pressure: 14 cmH20 (04/27/24 0917)  Total Ve: 6.2 L/m (04/27/24 0917)  Negative Inspiratory Force (cm H2O): -24 (04/27/24 0917)  F/VT Ratio<105 (RSBI): (!) 26.55 (04/27/24 0745)    Lines/Drains/Airways       Peripherally Inserted Central Catheter Line  Duration             PICC Triple Lumen 04/27/24 1115 left brachial 1 day              Drain  Duration                  Chest Tube 04/26/24 1422 Tube - 2 Anterior Mediastinal 32 Fr. 2 days         Chest Tube 04/26/24 1423 Tube - 1 Anterior Mediastinal 32 Fr. 2 days         Chest Tube 04/26/24 1454 Tube - 3 Right Pleural 32 Fr. 2 days              Arterial Line  Duration             Arterial Line 04/25/24 1624 Left Radial 3 days              Line  Duration                  Pacer Wires 04/26/24 1407 2 days              Peripheral Intravenous Line  Duration                  Peripheral IV - Single Lumen 04/25/24 1320 22 G Left;Posterior Hand 3 days         Peripheral IV - Single Lumen 04/25/24 1612 20 G Left Antecubital 3 days         Peripheral IV - Single Lumen 04/25/24 1652 18 G;1 3/4 in Anterior;Left Upper Arm 3 days         Peripheral IV - Single Lumen 04/25/24 1652 20 G;1 3/4 in Anterior;Left;Proximal Forearm 3 days         Peripheral IV - Single Lumen 04/26/24 1000 14 G  Left Forearm 2 days                    Significant Labs:    CBC/Anemia Profile:  Recent Labs   Lab 04/28/24  1437 04/28/24  2035 04/29/24  0431   WBC 11.35 10.98 8.40   HGB 8.8* 9.0* 9.8*   HCT 25.1* 25.6* 29.6*   * 130* 126*   MCV 87 87 90   RDW 15.5* 15.3* 15.1*         Chemistries:  Recent Labs   Lab 04/28/24  0836 04/28/24  1437 04/28/24  2035    137 139   K 3.9 5.1 3.8    101 102   CO2 27 26 26   BUN 23 23 22   CREATININE 0.8 0.8 0.8   CALCIUM 8.9 8.8 9.0   ALBUMIN 3.8 3.7 3.7   PROT 6.1 5.9* 6.0   BILITOT 1.7* 1.6* 1.6*   ALKPHOS 123 119 124   * 106* 96*   * 76* 56*   MG 2.2 2.0 2.0   PHOS 3.8 3.3 2.9       All pertinent labs within the past 24 hours have been reviewed.    Significant Imaging:  I have reviewed all pertinent imaging results/findings within the past 24 hours.

## 2024-04-29 NOTE — PROGRESS NOTES
"[facility]  Endocrinology  Progress Note    Admit Date: 4/25/2024     Reason for Consult: Management of Hyperglycemia     Surgical Procedure and Date: 4/26/24  REPLACEMENT, AORTA, ASCENDING (N/A)  HEMIARCH REPLACEMENT        Lab Results   Component Value Date    HGBA1C 4.8 11/22/2023         HPI:   Patient is a 68 y.o. female with a diagnosis of Breast cancer (2001), Depression (07/16/2012), Hyperlipidemia, Hypertension, and Hypothyroid who presented to [facility] ED with complaint of left-sided chest pain that started less than an hour prior to arrival. CTA at that time demonstrated adal type A aortic dissection with dissection flap extending from the aortic root up to the level of the distal arch. Associated aneurysmal dilatation measuring up to 5 cm in maximum dimension. She was transferred to [facility] for higher level of care and admitted to the SICU for close hemodynamic monitoring. Patient now presents for the above procedure(s). Endocrinology consulted for management of hyperglycemia.        Interval HPI:   Overnight events: Remains in SICU. POD 3. BG well controlled and within goal ranges without insulin. Diet Clear Liquid Thin    Eating:    clear liquids  Nausea: No  Hypoglycemia and intervention: No  Fever: No  TPN and/or TF: No  If yes, type of TF/TPN and rate: n/a    BP (!) 155/70   Pulse 69   Temp 98 °F (36.7 °C) (Oral)   Resp 18   Ht 5' 4" (1.626 m)   Wt 80 kg (176 lb 5.9 oz)   SpO2 96%   BMI 30.27 kg/m²     Labs Reviewed and Include    Recent Labs   Lab 04/29/24  0431   GLU 83   CALCIUM 9.0   ALBUMIN 3.5   PROT 5.8*      K 3.9   CO2 28      BUN 21   CREATININE 0.8   ALKPHOS 126   ALT 78*   AST 42*   BILITOT 1.5*     Lab Results   Component Value Date    WBC 8.40 04/29/2024    HGB 9.8 (L) 04/29/2024    HCT 29.6 (L) 04/29/2024    MCV 90 04/29/2024     (L) 04/29/2024     Recent Labs   Lab 04/25/24  1154   TSH 2.684     Lab Results   Component Value Date    HGBA1C " "4.8 11/22/2023       Nutritional status:   Body mass index is 30.27 kg/m².  Lab Results   Component Value Date    ALBUMIN 3.5 04/29/2024    ALBUMIN 3.7 04/28/2024    ALBUMIN 3.7 04/28/2024     No results found for: "PREALBUMIN"    Estimated Creatinine Clearance: 69.8 mL/min (based on SCr of 0.8 mg/dL).    Accu-Checks  Recent Labs     04/28/24  0311 04/28/24  0411 04/28/24  0506 04/28/24  0616 04/28/24  0712 04/28/24  0731 04/28/24  1107 04/28/24  1444 04/28/24  1713 04/28/24 2037   POCTGLUCOSE 110 94 103 103 82 84 101 131* 110 111*       Current Medications and/or Treatments Impacting Glycemic Control  Immunotherapy:    Immunosuppressants       None          Steroids:   Hormones (From admission, onward)      None          Pressors:    Autonomic Drugs (From admission, onward)      None          Hyperglycemia/Diabetes Medications:   Antihyperglycemics (From admission, onward)      Start     Stop Route Frequency Ordered    04/28/24 0911  insulin aspart U-100 pen 0-10 Units         -- SubQ Before meals & nightly PRN 04/28/24 0811            ASSESSMENT and PLAN    Cardiac/Vascular  * Dissection of aorta  Managed per primary team  Optimize BG control        Mixed hyperlipidemia  May increase insulin resistance.         Endocrine  Transient hyperglycemia post procedure  BG goal 110-140    Continue Moderate Dose Correction Scale  BG monitoring ac/hs     ** Please call Endocrine for any BG related issues **      Endocrine will consider sign off soon once tolerating regular diet.     Discharge planning: likely no needs             Brandee Javed NP  Endocrinology  [facility] - Surgical Intensive Care  "

## 2024-04-29 NOTE — SUBJECTIVE & OBJECTIVE
"Interval HPI:   Overnight events: Remains in SICU. POD 3. BG well controlled and within goal ranges without insulin. Diet Clear Liquid Thin    Eating:    clear liquids  Nausea: No  Hypoglycemia and intervention: No  Fever: No  TPN and/or TF: No  If yes, type of TF/TPN and rate: n/a    BP (!) 155/70   Pulse 69   Temp 98 °F (36.7 °C) (Oral)   Resp 18   Ht 5' 4" (1.626 m)   Wt 80 kg (176 lb 5.9 oz)   SpO2 96%   BMI 30.27 kg/m²     Labs Reviewed and Include    Recent Labs   Lab 04/29/24  0431   GLU 83   CALCIUM 9.0   ALBUMIN 3.5   PROT 5.8*      K 3.9   CO2 28      BUN 21   CREATININE 0.8   ALKPHOS 126   ALT 78*   AST 42*   BILITOT 1.5*     Lab Results   Component Value Date    WBC 8.40 04/29/2024    HGB 9.8 (L) 04/29/2024    HCT 29.6 (L) 04/29/2024    MCV 90 04/29/2024     (L) 04/29/2024     Recent Labs   Lab 04/25/24  1154   TSH 2.684     Lab Results   Component Value Date    HGBA1C 4.8 11/22/2023       Nutritional status:   Body mass index is 30.27 kg/m².  Lab Results   Component Value Date    ALBUMIN 3.5 04/29/2024    ALBUMIN 3.7 04/28/2024    ALBUMIN 3.7 04/28/2024     No results found for: "PREALBUMIN"    Estimated Creatinine Clearance: 69.8 mL/min (based on SCr of 0.8 mg/dL).    Accu-Checks  Recent Labs     04/28/24  0311 04/28/24  0411 04/28/24  0506 04/28/24  0616 04/28/24  0712 04/28/24  0731 04/28/24  1107 04/28/24  1444 04/28/24  1713 04/28/24 2037   POCTGLUCOSE 110 94 103 103 82 84 101 131* 110 111*       Current Medications and/or Treatments Impacting Glycemic Control  Immunotherapy:    Immunosuppressants       None          Steroids:   Hormones (From admission, onward)      None          Pressors:    Autonomic Drugs (From admission, onward)      None          Hyperglycemia/Diabetes Medications:   Antihyperglycemics (From admission, onward)      Start     Stop Route Frequency Ordered    04/28/24 0911  insulin aspart U-100 pen 0-10 Units         -- SubQ Before meals & nightly PRN " 04/28/24 0811

## 2024-04-29 NOTE — PT/OT/SLP PROGRESS
Occupational Therapy   Treatment    Name: Heidy Polo  MRN: [id number]  Admitting Diagnosis:  Dissecting aneurysm of aorta  3 Days Post-Op    Recommendations:     Discharge Recommendations: Low Intensity Therapy  Discharge Equipment Recommendations:  none  Barriers to discharge:  None    Assessment:     Heidy Polo is a 68 y.o. female with a medical diagnosis of Dissecting aneurysm of aorta. Performance deficits affecting function are weakness, impaired endurance, impaired self care skills, impaired functional mobility, gait instability, impaired balance, impaired cardiopulmonary response to activity. Patient tolerated treatment session and ambulated to sink for ADLs and needed to urinate on BSC prior to returning to bedside chair. All VSS throughout session. Patient also needed education on sternal precautions prior to mobility on this date. Patient would benefit from continued skilled acute OT 3x/wk to improve functional mobility, increase independence with ADLs, and address established goals. Recommending low intensity therapy once medically appropriate for discharge to increase maximal independence, reduce burden of care, and ensure safety.     Rehab Prognosis:  Good; patient would benefit from acute skilled OT services to address these deficits and reach maximum level of function.       Plan:     Patient to be seen 3 x/week to address the above listed problems via self-care/home management, therapeutic activities, therapeutic exercises  Plan of Care Expires: 05/27/24  Plan of Care Reviewed with: patient, family    Subjective     Chief Complaint: none noted  Patient/Family Comments/goals: Patient agreed to therapy  Pain/Comfort:  Pain Rating 1: 0/10  Location - Side 1: Bilateral  Location - Orientation 1: generalized  Location 1: chest  Pain Addressed 1: Distraction, Reposition  Pain Rating Post-Intervention 1: 0/10    Objective:     Communicated with: KAMAR prior to session.  Patient found up in chair with  chest tube, peripheral IV, wound vac, telemetry, pulse ox (continuous), blood pressure cuff upon OT entry to room.    General Precautions: Standard, aspiration, fall, sternal    Orthopedic Precautions:N/A  Braces: N/A  Respiratory Status: Room air     Occupational Performance:     Functional Mobility/Transfers:  Patient completed Sit <> Stand Transfer with contact guard assistance  with  hand-held assist   Patient completed Toilet Transfer BSC>bedside chair with functional ambulation technique with contact guard assistance with  no AD and hand-held assist  Functional Mobility: Patient ambulated bedside chair>sink for g/h task with CGA and no AD HHA; patient then ambulated to BSC with CGA and HHA)    Activities of Daily Living:  Grooming: contact guard assistance for balance as patient standing at sink for oral care and washing face  Upper Body Dressing: maximal assistance Donning back gown due to lines  Lower Body Dressing: total assistance [place] and donning socks as patient sat on BSC  Toileting: contact guard assistance        AMPAC 6 Click ADL: 18    Treatment & Education:  Role of OT and POC  ADL retraining  Functional mobility training  Safety  Sternal precautions    Co-treatment performed due to patient's multiple deficits requiring two skilled therapists to appropriately and safely assess patient's strength and endurance while facilitating functional tasks in addition to accommodating for patient's activity tolerance.     Patient left up in chair with all lines intact, call button in reach, nurse notified, family present, and all needs met.     GOALS:   Multidisciplinary Problems       Occupational Therapy Goals          Problem: Occupational Therapy    Goal Priority Disciplines Outcome Interventions   Occupational Therapy Goal     OT, PT/OT Progressing    Description: Goals to be met by: 5/27/24     Patient will increase functional independence with ADLs by performing:    UE Dressing with Stand-by  Assistance.  LE Dressing with Stand-by Assistance.  Grooming while standing at sink with Contact Guard Assistance.  Toileting from toilet with Contact Guard Assistance for hygiene and clothing management.   Rolling to Bilateral with Supervision.   Supine to sit with Supervision.  Step transfer with Contact Guard Assistance  Toilet transfer to toilet with Contact Guard Assistance.                         Time Tracking:     OT Date of Treatment: 04/29/24  OT Start Time: 1339  OT Stop Time: 1358  OT Total Time (min): 19 min    Billable Minutes:Self Care/Home Management 19 4/29/2024

## 2024-04-29 NOTE — PROGRESS NOTES
[facility]  Critical Care - Surgery  Progress Note    Patient Name: Heidy Polo  MRN: [id number]  Admission Date: 4/25/2024  Hospital Length of Stay: 4 days  Code Status: Prior  Attending Provider: Aquilino Ochoa MD  Primary Care Provider: Chas Angela MD   Principal Problem: Dissection of aorta    Subjective:     Hospital/ICU Course:  No notes on file    Interval History/Significant Events: NAEON. OOB in chair this AM. Remained on lasix gtt at 1, meeting goal UOP (1.8 L net negative last 24hrs). Off cardene since 1 AM. On dobutamine at 2.5, plan to come off. MAPs 60s-80s for vast majority of the night. Tolerating CLD well. A line flat and unable to pull blood, will remove.    Follow-up For: Procedure(s) (LRB):  REPLACEMENT, AORTA, ASCENDING (N/A)  APPLICATION, WOUND VAC (N/A)    Post-Operative Day: 3 Days Post-Op    Objective:     Vital Signs (Most Recent):  Temp: 98 °F (36.7 °C) (04/29/24 0300)  Pulse: 69 (04/29/24 0423)  Resp: 18 (04/29/24 0531)  BP: (!) 155/70 (04/28/24 0945)  SpO2: 96 % (04/29/24 0423) Vital Signs (24h Range):  Temp:  [98 °F (36.7 °C)-98.9 °F (37.2 °C)] 98 °F (36.7 °C)  Pulse:  [] 69  Resp:  [9-42] 18  SpO2:  [83 %-100 %] 96 %  BP: (155)/(70) 155/70  Arterial Line BP: ()/(45-77) 114/55     Weight: 80 kg (176 lb 5.9 oz)  Body mass index is 30.27 kg/m².      Intake/Output Summary (Last 24 hours) at 4/29/2024 0715  Last data filed at 4/29/2024 0400  Gross per 24 hour   Intake 284.22 ml   Output 2120 ml   Net -1835.78 ml          Physical Exam  Vitals and nursing note reviewed.   Constitutional:       General: She is not in acute distress.  HENT:      Mouth/Throat:      Pharynx: Oropharynx is clear. No oropharyngeal exudate.   Eyes:      Extraocular Movements: Extraocular movements intact.      Conjunctiva/sclera: Conjunctivae normal.   Cardiovascular:      Rate and Rhythm: Normal rate and regular rhythm.      Pulses: Normal pulses.      Comments: Midline  sternal incision with prevena in place  CT x3 with SS output.  V wires in place   Palpable DP pulses   Pulmonary:      Effort: Pulmonary effort is normal. No respiratory distress.      Comments: Room air  Genitourinary:     Comments: Hanson draining clear urine   Musculoskeletal:      Cervical back: Normal range of motion. No rigidity.   Skin:     General: Skin is warm and dry.   Neurological:      General: No focal deficit present.      Mental Status: She is oriented to person, place, and time.            Vents:  Vent Mode: Spont (04/27/24 0917)  Set Rate: 0 BPM (04/27/24 0745)  Vt Set: 0 mL (04/27/24 0745)  Pressure Support: 5 cmH20 (04/27/24 0917)  PEEP/CPAP: 5 cmH20 (04/27/24 0917)  Oxygen Concentration (%): 40 (04/27/24 0917)  Peak Airway Pressure: 4.3 cmH20 (04/27/24 0917)  Plateau Pressure: 14 cmH20 (04/27/24 0917)  Total Ve: 6.2 L/m (04/27/24 0917)  Negative Inspiratory Force (cm H2O): -24 (04/27/24 0917)  F/VT Ratio<105 (RSBI): (!) 26.55 (04/27/24 0745)    Lines/Drains/Airways       Peripherally Inserted Central Catheter Line  Duration             PICC Triple Lumen 04/27/24 1115 left brachial 1 day              Drain  Duration                  Chest Tube 04/26/24 1422 Tube - 2 Anterior Mediastinal 32 Fr. 2 days         Chest Tube 04/26/24 1423 Tube - 1 Anterior Mediastinal 32 Fr. 2 days         Chest Tube 04/26/24 1454 Tube - 3 Right Pleural 32 Fr. 2 days              Arterial Line  Duration             Arterial Line 04/25/24 1624 Left Radial 3 days              Line  Duration                  Pacer Wires 04/26/24 1407 2 days              Peripheral Intravenous Line  Duration                  Peripheral IV - Single Lumen 04/25/24 1320 22 G Left;Posterior Hand 3 days         Peripheral IV - Single Lumen 04/25/24 1612 20 G Left Antecubital 3 days         Peripheral IV - Single Lumen 04/25/24 1652 18 G;1 3/4 in Anterior;Left Upper Arm 3 days         Peripheral IV - Single Lumen 04/25/24 1652 20 G;1 3/4 in  Anterior;Left;Proximal Forearm 3 days         Peripheral IV - Single Lumen 04/26/24 1000 14 G  Left Forearm 2 days                    Significant Labs:    CBC/Anemia Profile:  Recent Labs   Lab 04/28/24  1437 04/28/24 2035 04/29/24  0431   WBC 11.35 10.98 8.40   HGB 8.8* 9.0* 9.8*   HCT 25.1* 25.6* 29.6*   * 130* 126*   MCV 87 87 90   RDW 15.5* 15.3* 15.1*        Chemistries:  Recent Labs   Lab 04/28/24  0836 04/28/24  1437 04/28/24 2035    137 139   K 3.9 5.1 3.8    101 102   CO2 27 26 26   BUN 23 23 22   CREATININE 0.8 0.8 0.8   CALCIUM 8.9 8.8 9.0   ALBUMIN 3.8 3.7 3.7   PROT 6.1 5.9* 6.0   BILITOT 1.7* 1.6* 1.6*   ALKPHOS 123 119 124   * 106* 96*   * 76* 56*   MG 2.2 2.0 2.0   PHOS 3.8 3.3 2.9       All pertinent labs within the past 24 hours have been reviewed.    Significant Imaging:  I have reviewed all pertinent imaging results/findings within the past 24 hours.  Assessment/Plan:     Cardiac/Vascular  * Dissecting aneurysm of aorta    Neuro/Psych:     - Sedation: none    - Pain:     - Scheduled Tylenol 1000mg Q8H   - PRN Oxycodone 5/10mg               Cardiac:     - s/p ascending aorta and hemiarch replacement w/ Dr. Ochoa 4/26/25    - BP Goal: MAP 60-80 (<75 if possible)    - Inotropes/Pressors: off dobutamine    - Anti-HTNs: off Cardene since 1AM    - Rhythm: NSR    - Beta Blocker: increased labetalol to 200mg q8h PO    - Statin: will start when appropriate     - s/p 2L albumin post op    - continue ASA 81        Pulmonary:     - Goal SpO2 >92%, on RA    - IS, OOB, PT/OT    - ABGs PRN    - :    - med 1: 130cc (40cc overnight)   - med 2: 40cc (0cc overnight) will remove today   - pleur: 190cc (50cc overnight)   - SS output all tubes    Recent Labs     04/27/24  0607   PH 7.481*   PCO2 33.4*   PO2 130*   HCO3 24.9   POCSATURATED 99   BE 1             Renal:    - Trend BUN/Cr     - Record strict Is/Os    - UOP: 1.8L/24h   Net -1.9L/24hr         Net +2.6L during  admission    - d/c lasix gtt @1    - start 10 IV lasix BID    - goal UOP -1L /24hr    Recent Labs   Lab 04/28/24  0836 04/28/24  1437 04/28/24 2035   BUN 23 23 22   CREATININE 0.8 0.8 0.8           FEN / GI:     - q6 CMP, PRN K/Mag/Phos per protocol     - Replace electrolytes as needed    - Nutrition: CLD, advance after BM    - Bowel Regimen: Miralax, docusate, suppository 04/29    - speech eval: cleared for thin liquids; will advance after BM     - pepcid IV for GI prophylaxis        ID:     - Afebrile     - WBC stable     - Abx: completed perioperative cefazolin 2g Q8H x 5 doses post op    Recent Labs   Lab 04/28/24  1437 04/28/24 2035 04/29/24  0431   WBC 11.35 10.98 8.40           Heme/Onc:     - Hgb 11.1 pre-operatively, 9.8 today    - q6h CBC     Recent Labs   Lab 04/27/24  0312 04/27/24  0900 04/28/24  0308 04/28/24  0836 04/28/24  1437 04/28/24 2035 04/29/24  0431   HGB 8.4*   < > 8.7*   < > 8.8* 9.0* 9.8*   PLT 94*   < > 122*   < > 127* 130* 126*   APTT 26.5  --  23.6  --   --   --  25.7   INR 1.0   < > 1.0   < > 1.0 1.0 1.0    < > = values in this interval not displayed.           Endocrine:     - CTS Goal -140    - HgbA1c: 4.8     - SSI    - POCT ACHS        PPx:   Feeding: CLD  Analgesia/Sedation: see above   Thromboembolic Prevention: SCDs  HOB >30: Yes  Stress Ulcer: pepcid  Glucose Control: SSI      Lines/Drains/Airway:  PIV x 2   PICC L arm    CT x3        Dispo/Code Status/Palliative:     - step down    - Full Code             Critical care was time spent personally by me on the following activities: development of treatment plan with patient or surrogate and bedside caregivers, discussions with consultants, evaluation of patient's response to treatment, examination of patient, ordering and performing treatments and interventions, ordering and review of laboratory studies, ordering and review of radiographic studies, pulse oximetry, re-evaluation of patient's condition.  This critical care  time did not overlap with that of any other provider or involve time for any procedures.     Rodolfo Jackson MD  Critical Care - Surgery  [facility]

## 2024-04-29 NOTE — CARE UPDATE
SICU Staff Addendum  Please see resident/rich note from 4/29/2024 for full details of the patients history of present illness / progress note for today. I have performed a substantial portion of the critical care visit.I have reviewed and concur with the RICH/resident's history, physical, assessment, and plan.  I have personally interviewed and examined the patient at bedside.  See below for any additional findings.    Reason for admission:  Dissecting aneurysm of aorta  Present on Admission:   Essential hypertension   History of hypothyroidism   Mixed hyperlipidemia   Dissecting aneurysm of aorta      Goals for Today:   - POD 3 s/p adal type A repair with circ arrest (ascending and hemiarch)  - no acute events overnight   - OOB to chair this AM  - sleeping overnight ok   - pain well controlled on current regimen   -  dc'd this AM. MAP 60-80. Labetalol for BP/ HR control.   - DC lasix gtt, will transition to pushes today. I/O goal negative 1L. Still positive 2.6L from this admission   - CMP in the afternoon to ensure things are stable after  discontinued   - clears. Bowel reigmen -- no BM yet. Will advance once she has a BM   - kidney function stable  - DC adelina, DC chest tube   - PT OT   - possible transfer to the floor today   - Holding DVT ppx per CTS. GI PPX per CTS     45 minutes of critical care time was spent personally by me on the following activities: development of treatment plan with patient or surrogate and bedside caregivers, discussions with consultants, evaluation of patient's response to treatment, examination of patient, ordering and performing treatments and interventions, ordering and review of laboratory studies, ordering and review of radiographic studies, pulse oximetry, re-evaluation of patient's condition.  This critical care time did not overlap with that of any other provider or involve time for any procedures.    Princess Kay MD  Anesthesia Critical Care  [facility] [phone]

## 2024-04-29 NOTE — ASSESSMENT & PLAN NOTE
BG goal 110-140    Continue Moderate Dose Correction Scale  BG monitoring ac/hs     ** Please call Endocrine for any BG related issues **      Endocrine will consider sign off soon once tolerating regular diet.     Discharge planning: likely no needs

## 2024-04-29 NOTE — PT/OT/SLP PROGRESS
Speech Language Pathology Treatment  Discharge    Patient Name:  Heidy Polo   MRN:  [id number]  Admitting Diagnosis: Dissecting aneurysm of aorta    Recommendations:                 General Recommendations:   no follow up recommended  Diet recommendations:  Regular Diet - IDDSI Level 7, Liquid Diet Level: Thin liquids - IDDSI Level 0   Aspiration Precautions: Standard aspiration precautions   General Precautions: Standard, aspiration, fall, sternal  Communication strategies:  none    Assessment:     Heidy Polo is a 68 y.o. female with oropharyngeal swallow deemed WFL. No further ST warranted    Subjective     Awake/alert  Family at bedside    Pain/Comfort:  Pain Rating 1: 0/10  Pain Rating Post-Intervention 1: 0/10    Respiratory Status: Room air    Objective:     Has the patient been evaluated by SLP for swallowing?   Yes  Keep patient NPO? No     Pt repositioned upright in bedside chair for PO trials. She denied any difficulty with PO intake at this time, but is currently on a clear liquid diet. She tolerated cookie x2 and thin liquids x5 with timely swallow initiation and no overt s/s of airway compromise. Recommend regular diet/thin liquids at this time. No further ST warranted.     Goals:   Multidisciplinary Problems       SLP Goals          Problem: SLP    Goal Priority Disciplines Outcome   SLP Goal     SLP    Description: Speech Language Pathology Goals  Goals expected to be met by 5/6:  1. Pt will tolerate regular consistency diet and thin liquids without s/s of aspiration.                                Plan:     Patient to be seen:  2 x/week   Plan of Care expires:  05/26/24  Plan of Care reviewed with:  patient, family   SLP Follow-Up:  No       Discharge recommendations:   (tbd)     Time Tracking:     SLP Treatment Date:   04/29/24  Speech Start Time:  0840  Speech Stop Time:  0845     Speech Total Time (min):  5 min    Billable Minutes: Treatment Swallowing Dysfunction 5    04/29/2024

## 2024-04-29 NOTE — ASSESSMENT & PLAN NOTE
Neuro/Psych:     - Sedation: none    - Pain:     - Scheduled Tylenol 1000mg Q8H   - PRN Oxycodone 5/10mg               Cardiac:     - s/p ascending aorta and hemiarch replacement w/ Dr. Ochoa 4/26/25    - BP Goal: MAP 60-80 (<75 if possible)    - Inotropes/Pressors: off dobutamine    - Anti-HTNs: off Cardene since 1AM    - Rhythm: NSR    - Beta Blocker: increased labetalol to 200mg q8h PO    - Statin: will start when appropriate     - s/p 2L albumin post op    - continue ASA 81        Pulmonary:     - Goal SpO2 >92%, on RA    - IS, OOB, PT/OT    - ABGs PRN    - :    - med 1: 130cc (40cc overnight)   - med 2: 40cc (0cc overnight) will remove today   - pleur: 190cc (50cc overnight)   - SS output all tubes    Recent Labs     04/27/24  0607   PH 7.481*   PCO2 33.4*   PO2 130*   HCO3 24.9   POCSATURATED 99   BE 1             Renal:    - Trend BUN/Cr     - Record strict Is/Os    - UOP: 1.8L/24h   Net -1.9L/24hr         Net +2.6L during admission    - d/c lasix gtt @1    - start 10 IV lasix BID    - goal UOP -1L /24hr    Recent Labs   Lab 04/28/24  0836 04/28/24  1437 04/28/24 2035   BUN 23 23 22   CREATININE 0.8 0.8 0.8           FEN / GI:     - q6 CMP, PRN K/Mag/Phos per protocol     - Replace electrolytes as needed    - Nutrition: CLD, advance after BM    - Bowel Regimen: Miralax, docusate, suppository 04/29    - speech eval: cleared for thin liquids; will advance after BM     - pepcid IV for GI prophylaxis        ID:     - Afebrile     - WBC stable     - Abx: completed perioperative cefazolin 2g Q8H x 5 doses post op    Recent Labs   Lab 04/28/24  1437 04/28/24 2035 04/29/24  0431   WBC 11.35 10.98 8.40           Heme/Onc:     - Hgb 11.1 pre-operatively, 9.8 today    - q6h CBC     Recent Labs   Lab 04/27/24  0312 04/27/24  0900 04/28/24  0308 04/28/24  0836 04/28/24  1437 04/28/24  2035 04/29/24  0431   HGB 8.4*   < > 8.7*   < > 8.8* 9.0* 9.8*   PLT 94*   < > 122*   < > 127* 130* 126*   APTT 26.5  --  23.6  --    --   --  25.7   INR 1.0   < > 1.0   < > 1.0 1.0 1.0    < > = values in this interval not displayed.           Endocrine:     - CTS Goal -140    - HgbA1c: 4.8     - SSI    - POCT ACHS        PPx:   Feeding: CLD  Analgesia/Sedation: see above   Thromboembolic Prevention: SCDs  HOB >30: Yes  Stress Ulcer: pepcid  Glucose Control: SSI      Lines/Drains/Airway:  PIV x 2   PICC L arm    CT x3        Dispo/Code Status/Palliative:     - step down    - Full Code

## 2024-04-30 DIAGNOSIS — Z95.828 S/P ASCENDING AORTIC REPLACEMENT: Primary | ICD-10-CM

## 2024-04-30 LAB
ALBUMIN SERPL BCP-MCNC: 3.3 G/DL (ref 3.5–5.2)
ALP SERPL-CCNC: 132 U/L (ref 55–135)
ALT SERPL W/O P-5'-P-CCNC: 60 U/L (ref 10–44)
ANION GAP SERPL CALC-SCNC: 9 MMOL/L (ref 8–16)
APTT PPP: 21.1 SEC (ref 21–32)
AST SERPL-CCNC: 24 U/L (ref 10–40)
BILIRUB SERPL-MCNC: 1.2 MG/DL (ref 0.1–1)
BUN SERPL-MCNC: 20 MG/DL (ref 8–23)
CALCIUM SERPL-MCNC: 8.9 MG/DL (ref 8.7–10.5)
CHLORIDE SERPL-SCNC: 99 MMOL/L (ref 95–110)
CO2 SERPL-SCNC: 29 MMOL/L (ref 23–29)
CREAT SERPL-MCNC: 0.8 MG/DL (ref 0.5–1.4)
ERYTHROCYTE [DISTWIDTH] IN BLOOD BY AUTOMATED COUNT: 14.3 % (ref 11.5–14.5)
EST. GFR  (NO RACE VARIABLE): >60 ML/MIN/1.73 M^2
GLUCOSE SERPL-MCNC: 98 MG/DL (ref 70–110)
HCT VFR BLD AUTO: 26.9 % (ref 37–48.5)
HGB BLD-MCNC: 9.1 G/DL (ref 12–16)
MAGNESIUM SERPL-MCNC: 2.1 MG/DL (ref 1.6–2.6)
MAGNESIUM SERPL-MCNC: 2.1 MG/DL (ref 1.6–2.6)
MCH RBC QN AUTO: 30.3 PG (ref 27–31)
MCHC RBC AUTO-ENTMCNC: 33.8 G/DL (ref 32–36)
MCV RBC AUTO: 90 FL (ref 82–98)
PHOSPHATE SERPL-MCNC: 3.6 MG/DL (ref 2.7–4.5)
PHOSPHATE SERPL-MCNC: 3.7 MG/DL (ref 2.7–4.5)
PLATELET # BLD AUTO: 156 K/UL (ref 150–450)
PMV BLD AUTO: 10.4 FL (ref 9.2–12.9)
POCT GLUCOSE: 105 MG/DL (ref 70–110)
POCT GLUCOSE: 110 MG/DL (ref 70–110)
POCT GLUCOSE: 82 MG/DL (ref 70–110)
POTASSIUM SERPL-SCNC: 3.8 MMOL/L (ref 3.5–5.1)
POTASSIUM SERPL-SCNC: 3.9 MMOL/L (ref 3.5–5.1)
PROT SERPL-MCNC: 5.9 G/DL (ref 6–8.4)
RBC # BLD AUTO: 3 M/UL (ref 4–5.4)
SODIUM SERPL-SCNC: 137 MMOL/L (ref 136–145)
WBC # BLD AUTO: 7.15 K/UL (ref 3.9–12.7)

## 2024-04-30 PROCEDURE — 84100 ASSAY OF PHOSPHORUS: CPT | Mod: 91

## 2024-04-30 PROCEDURE — 25000003 PHARM REV CODE 250

## 2024-04-30 PROCEDURE — 63600175 PHARM REV CODE 636 W HCPCS

## 2024-04-30 PROCEDURE — A4216 STERILE WATER/SALINE, 10 ML: HCPCS | Performed by: THORACIC SURGERY (CARDIOTHORACIC VASCULAR SURGERY)

## 2024-04-30 PROCEDURE — 94761 N-INVAS EAR/PLS OXIMETRY MLT: CPT

## 2024-04-30 PROCEDURE — 80053 COMPREHEN METABOLIC PANEL: CPT

## 2024-04-30 PROCEDURE — 63600175 PHARM REV CODE 636 W HCPCS: Performed by: STUDENT IN AN ORGANIZED HEALTH CARE EDUCATION/TRAINING PROGRAM

## 2024-04-30 PROCEDURE — 25000003 PHARM REV CODE 250: Performed by: STUDENT IN AN ORGANIZED HEALTH CARE EDUCATION/TRAINING PROGRAM

## 2024-04-30 PROCEDURE — 25000003 PHARM REV CODE 250: Performed by: THORACIC SURGERY (CARDIOTHORACIC VASCULAR SURGERY)

## 2024-04-30 PROCEDURE — 97535 SELF CARE MNGMENT TRAINING: CPT

## 2024-04-30 PROCEDURE — 85730 THROMBOPLASTIN TIME PARTIAL: CPT | Performed by: STUDENT IN AN ORGANIZED HEALTH CARE EDUCATION/TRAINING PROGRAM

## 2024-04-30 PROCEDURE — 36415 COLL VENOUS BLD VENIPUNCTURE: CPT

## 2024-04-30 PROCEDURE — 97116 GAIT TRAINING THERAPY: CPT

## 2024-04-30 PROCEDURE — 83735 ASSAY OF MAGNESIUM: CPT | Mod: 91

## 2024-04-30 PROCEDURE — 85027 COMPLETE CBC AUTOMATED: CPT

## 2024-04-30 PROCEDURE — 84132 ASSAY OF SERUM POTASSIUM: CPT

## 2024-04-30 PROCEDURE — 99232 SBSQ HOSP IP/OBS MODERATE 35: CPT | Mod: ,,, | Performed by: NURSE PRACTITIONER

## 2024-04-30 PROCEDURE — 20600001 HC STEP DOWN PRIVATE ROOM

## 2024-04-30 PROCEDURE — 99233 SBSQ HOSP IP/OBS HIGH 50: CPT | Mod: GC,,, | Performed by: INTERNAL MEDICINE

## 2024-04-30 RX ORDER — FUROSEMIDE 20 MG/1
20 TABLET ORAL DAILY
Status: DISCONTINUED | OUTPATIENT
Start: 2024-05-01 | End: 2024-05-01 | Stop reason: HOSPADM

## 2024-04-30 RX ORDER — AMLODIPINE BESYLATE 2.5 MG/1
2.5 TABLET ORAL DAILY
Status: DISCONTINUED | OUTPATIENT
Start: 2024-05-01 | End: 2024-05-01

## 2024-04-30 RX ORDER — POTASSIUM CHLORIDE 20 MEQ/1
20 TABLET, EXTENDED RELEASE ORAL DAILY
Status: DISCONTINUED | OUTPATIENT
Start: 2024-04-30 | End: 2024-04-30

## 2024-04-30 RX ORDER — POTASSIUM CHLORIDE 20 MEQ/1
20 TABLET, EXTENDED RELEASE ORAL DAILY
Status: DISCONTINUED | OUTPATIENT
Start: 2024-05-01 | End: 2024-05-01 | Stop reason: HOSPADM

## 2024-04-30 RX ORDER — LANOLIN ALCOHOL/MO/W.PET/CERES
400 CREAM (GRAM) TOPICAL DAILY
Status: DISCONTINUED | OUTPATIENT
Start: 2024-04-30 | End: 2024-05-01 | Stop reason: HOSPADM

## 2024-04-30 RX ORDER — LABETALOL 100 MG/1
100 TABLET, FILM COATED ORAL EVERY 8 HOURS
Status: DISCONTINUED | OUTPATIENT
Start: 2024-04-30 | End: 2024-04-30

## 2024-04-30 RX ADMIN — ASPIRIN 81 MG: 81 TABLET, COATED ORAL at 08:04

## 2024-04-30 RX ADMIN — ACETAMINOPHEN 1000 MG: 500 TABLET ORAL at 06:04

## 2024-04-30 RX ADMIN — POTASSIUM CHLORIDE 20 MEQ: 200 INJECTION, SOLUTION INTRAVENOUS at 05:04

## 2024-04-30 RX ADMIN — FAMOTIDINE 20 MG: 20 TABLET ORAL at 09:04

## 2024-04-30 RX ADMIN — Medication 10 ML: at 12:04

## 2024-04-30 RX ADMIN — Medication 10 ML: at 06:04

## 2024-04-30 RX ADMIN — DOCUSATE SODIUM 100 MG: 100 CAPSULE, LIQUID FILLED ORAL at 08:04

## 2024-04-30 RX ADMIN — DOCUSATE SODIUM 100 MG: 100 CAPSULE, LIQUID FILLED ORAL at 09:04

## 2024-04-30 RX ADMIN — FUROSEMIDE 10 MG: 10 INJECTION, SOLUTION INTRAVENOUS at 06:04

## 2024-04-30 RX ADMIN — ACETAMINOPHEN 1000 MG: 500 TABLET ORAL at 09:04

## 2024-04-30 RX ADMIN — LABETALOL HYDROCHLORIDE 200 MG: 100 TABLET, FILM COATED ORAL at 02:04

## 2024-04-30 RX ADMIN — Medication 400 MG: at 09:04

## 2024-04-30 RX ADMIN — ACETAMINOPHEN 1000 MG: 500 TABLET ORAL at 02:04

## 2024-04-30 NOTE — PT/OT/SLP PROGRESS
Physical Therapy   Progress Note    Patient Name:  Heidy Polo  MRN: [id number]    Admit Date: 4/25/2024  Admitting Diagnosis:  Dissecting aneurysm of aorta  Length of Stay: 5 days  Recent Surgery: Procedure(s) (LRB):  REPLACEMENT, AORTA, ASCENDING (N/A)  APPLICATION, WOUND VAC (N/A) 4 Days Post-Op    Recommendations:     Discharge Recommendations: low intensity therapy  Equipment recommendations: none  Barriers to discharge: None Identified     Assessment:     Heidy Polo is a 68 y.o. [place] [facility] [place] with medical diagnosis of Dissecting aneurysm of aorta. Pt presents with impaired endurance, impaired functional mobility, impaired cardiopulmonary response to activity. Pt is progressing towards goals, but has not yet reached prior level of function.     Pt agreeable to therapy session. Able to recall 3/3 sternal precautions. Pt able to stand from recliner, transfer to bed, and then continue ambulating in hallway. Portable monitor used with nsg present. Performed standing ADLs and then returned to recliner. All VSS.    Heidy Polo would benefit from continued acute PT intervention to improve quality of life, focus on recovery of impairments, provide patient/caregiver education, reduce fall risk, and maximize (I) and safety with functional mobility. Once medically stable, recommending pt discharge to low intensity therapy.  Patient continues to demonstrate the need for low intensity therapy on a scheduled basis exhibited by decreased independence with functional mobility .      Rehab Prognosis: Good    Plan:     During this hospitalization, patient to be seen 4 x/week to address the identified rehab impairments via gait training, therapeutic activities, therapeutic exercises, neuromuscular re-education and progress towards stated goals.     Plan of Care Expires:  05/27/24  Plan of Care reviewed with: patient and family    This plan of care has been discussed with the patient/caregiver, who  "was included in its development and is in agreement with the identified goals and treatment plan.     Subjective     Communicated with RN prior to session.  Patient found up in chair upon PT entry to room, agreeable to therapy session. Pt's family present during session.    Patient/Family Comments/goals: "I feel good"    Pain/Comfort:  Pain Rating 1: 0/10  Pain Rating Post-Intervention 1: 0/10    Patients cultural, spiritual, Taoist conflicts given the current situation: None identified     Objective:   OT present for cotreat due to pt's multiple medical comorbidities and functional/cognition deficits requiring two skilled therapists to appropriately progress pt's musculoskeletal strength, neuromuscular control, and endurance while taking into consideration medical acuity and pt safety.    Patient found with: chest tube, peripheral IV, telemetry, pulse ox (continuous), blood pressure cuff    General Precautions: Standard, fall, sternal   Orthopedic Precautions:N/A   Braces:     Oxygen Device: room air    Cognition:  Pt is Alert during session.    Therapist provided skilled verbal and tactile cueing to facilitate the following functional mobility tasks. Listed tasks are focused on recovery of impairments and improving pt's independence and efficiency with bed mobility, transfers and ambulation as able.     Bed Mobility:  Not assessed d/t up in recliner    Transfers:   Sit <> Stand Transfer: Stand-by Assistance from recliner and BSC with no AD                  Gait:  Distance: 6 ft + 85 ft + standing ADLs + 10 ft  Assistance level: Stand-by Assistance  Assistive Device: none  Gait Assessment: decreased step length , decreased sridhar, decreased gait speed, and reduced reciprocal arm swing     Balance:  Dynamic Sitting: GOOD: Maintains balance through MODERATE excursions of active trunk movement  Standing:  Static: GOOD: Takes MODERATE challenges from all directions   Dynamic: FAIR+: Needs CLOSE SUPERVISION during " gait and is able to right self with minor LOB    Outcome Measure: AM-PAC 6 CLICK MOBILITY  Total Score:18     Patient/Caregiver Education and Additional Therapeutic Activities/Exercises   Therapist reinforced education re:   Post-op sternal precautions, including no lifting > 5 lbs, pulling or pushing with BUEs.    Modifying daily activities and functional mobility tasks to maintain sternal precautions appropriately   Importance of participation in therapy and engaging in increased OOB mobility with assistance to improve endurance     Provided pt/caregiver education regarding:   PT POC and goals for therapy   Safety with mobility and fall risk   Safe management of AD as needed   Energy conservation techniques   Instruction on use of call button and importance of calling nursing staff for assistance with mobility     Patient/caregiver able to verbalize understanding; will follow-up with pt/caregiver during current admit for additional questions/concerns within scope of practice.     White board updated.     Patient left up in chair with all lines intact, call button in reach, and nsg present.    Goals:     Multidisciplinary Problems       Physical Therapy Goals          Problem: Physical Therapy    Goal Priority Disciplines Outcome Goal Variances Interventions   Physical Therapy Goal     PT, PT/OT Progressing     Description: Goals to met by 5/11/2024    1. Supine to sit with Stand-by Assistance  2. Sit to supine with Stand-by Assistance  3. Rolling to Left and Right with Stand-by Assistance.  4. Sit to stand transfer with Stand-by Assistance - met 4/30      4b. Sit to stand transfer with independence.  5. Bed to chair transfer with Stand-by Assistance using No Assistive Device  6. Gait  x 50 feet with Stand-by Assistance using No Assistive Device - met 4/30      6b. Gait x 250 feet with independence using no assistive device.   7. Ascend/Descend 6 inch curb step with Contact Guard Assistance using No Assistive  Device.  8. Stand for 5 minutes with Stand-by Assistance using No Assistive Device  9. Lower extremity exercise program x15 reps per Instruction, with assistance as needed in order to facilitate improved strength, improved postural control, and improvement in functional independence                       Time Tracking:       PT Received On: 04/30/24  PT Start Time: 1046     PT Stop Time: 1105  PT Total Time (min): 19 min     Billable Minutes: Gait Training 19 04/30/2024

## 2024-04-30 NOTE — PLAN OF CARE
Problem: Physical Therapy  Goal: Physical Therapy Goal  Description: Goals to met by 5/11/2024    1. Supine to sit with Stand-by Assistance  2. Sit to supine with Stand-by Assistance  3. Rolling to Left and Right with Stand-by Assistance.  4. Sit to stand transfer with Stand-by Assistance - met 4/30      4b. Sit to stand transfer with independence.  5. Bed to chair transfer with Stand-by Assistance using No Assistive Device  6. Gait  x 50 feet with Stand-by Assistance using No Assistive Device - met 4/30      6b. Gait x 250 feet with independence using no assistive device.   7. Ascend/Descend 6 inch curb step with Contact Guard Assistance using No Assistive Device.  8. Stand for 5 minutes with Stand-by Assistance using No Assistive Device  9. Lower extremity exercise program x15 reps per Instruction, with assistance as needed in order to facilitate improved strength, improved postural control, and improvement in functional independence  Outcome: Progressing     4/30/2024

## 2024-04-30 NOTE — PLAN OF CARE
SICU PLAN OF CARE    Dx: Dissecting aneurysm of aorta    Goals of Care: MAP 60-80    Vital Signs (last 12 hours):   Temp:  [97.8 °F (36.6 °C)-98.6 °F (37 °C)]   Pulse:  [61-77]   Resp:  [12-35]   BP: ()/()   SpO2:  [97 %-100 %]      Neuro: AAOx4, Follows commands , and Moves all extremities spontaneously     Cardiac: NSR    Respiratory: Room Air    Urine Output: Voids Spontaneously  500 mL/shift    Drains: Chest Tube, total output 20mL/shift, Wound Vac, total output 0mL/shift    Diet: Cardiac  and 1500mL Fluid Restriction     Labs/Accuchecks: Accuchecks ACHS, daily labs    Skin:  No skin breakdown noted this shift.  Patient turned q2h, bony prominences protected, and mattress inflated/working correctly. All wounds/incisions per documentation.   René Score: 18. If René Score is 16 or less, complete 4EYES note each shift.    Shift Events:  No acute events this shift. Pt appeared to be asleep most of the night. Potassium replaced per PRN order. See flowsheet for further assessment/details.  Family updated on current condition/plan of care, questions answered, and emotional support provided.  MD updated on current condition, vitals, labs, and gtts.

## 2024-04-30 NOTE — PLAN OF CARE
SICU PLAN OF CARE    Dx: Dissecting aneurysm of aorta    Goals of Care: MAP 60-80    Vital Signs (last 12 hours):   Temp:  [98.1 °F (36.7 °C)-98.7 °F (37.1 °C)]   Pulse:  [59-75]   Resp:  [12-43]   BP: ()/(52-72)   SpO2:  [86 %-100 %]      Neuro: AAOx4, Follows commands , and Moves all extremities spontaneously     Cardiac: NSR    Respiratory: Room Air    Gtts: None    Urine Output: Voids Spontaneously  800 mL/shift    Drains: Chest Tube, total output 90mL/shift, Prevena wound vac    Diet: Cardiac  and 1500mL Fluid Restriction     Labs/Accuchecks: Daily labs    Skin:  No skin breakdown or redness noted. Sacral foam intact. Patient turns self, bony prominences protected, and mattress inflated and working correctly.     Shift Events:  OOBTC. Ambulated in hallway with PT/OT. Mediastinal chest tube removed by MD. Transfer to  by wheelchair and RN x2 @ 1710, on telemetry box [id number]. Patient sent with personal belongings, oriented to room, and handoff given to EVA Rand. See flowsheet for further assessment/details.  Family updated on current condition and plan of care, questions answered, and emotional support provided.  MD updated on current condition, vitals, labs, and gtts.

## 2024-04-30 NOTE — PT/OT/SLP PROGRESS
Occupational Therapy   Treatment    Name: Heidy Polo  MRN: [id number]  Admitting Diagnosis:  Dissecting aneurysm of aorta  4 Days Post-Op    Recommendations:     Discharge Recommendations: Low Intensity Therapy  Discharge Equipment Recommendations:  none  Barriers to discharge:  None    Assessment:     Heidy Polo is a 68 y.o. female with a medical diagnosis of Dissecting aneurysm of aorta. Performance deficits affecting function are weakness, impaired endurance, impaired self care skills, impaired functional mobility, gait instability, impaired balance, impaired cardiopulmonary response to activity. Patient agreed to therapy and was motivated to participate in tasks. All VSS throughout session. Patient would benefit from continued skilled acute OT 4x/wk to improve functional mobility, increase independence with ADLs, and address established goals. Recommending low intensity therapy once medically appropriate for discharge to increase maximal independence, reduce burden of care, and ensure safety.     Rehab Prognosis:  Good; patient would benefit from acute skilled OT services to address these deficits and reach maximum level of function.       Plan:     Patient to be seen 4 x/week to address the above listed problems via self-care/home management, therapeutic activities, therapeutic exercises  Plan of Care Expires: 05/27/24  Plan of Care Reviewed with: patient, family    Subjective     Chief Complaint: needing to go to the bathroom  Patient/Family Comments/goals: Patient agreed to therapy  Pain/Comfort:  Pain Rating 1: 0/10  Location - Side 1: Bilateral  Location - Orientation 1: generalized  Location 1: chest  Pain Addressed 1: Reposition, Distraction  Pain Rating Post-Intervention 1: 0/10    Objective:     Communicated with: NSYVETTE prior to session.  Patient found up in chair with chest tube, peripheral IV, pulse ox (continuous), blood pressure cuff upon OT entry to room.    General Precautions: Standard,  fall, sternal    Orthopedic Precautions:N/A  Braces: N/A  Respiratory Status: Room air     Occupational Performance:     Functional Mobility/Transfers:  Patient completed Sit <> Stand Transfer with stand by assistance  with  no assistive device   Patient completed Toilet Transfer bedside chair>BSC with functional ambulation technique with stand by assistance with  no AD; patient then ambulated in hallway with no AD and SBA to assess household mobility distance prior to ambulating to sink for g/h task. Patient then ambulated to bedside chair with no AD and SBA.     Activities of Daily Living:  Grooming: stand by assistance standing at sink for oral care and washing face  Upper Body Dressing: maximal assistance Donning back gown sitting in bedside chair only due to lines  Toileting: stand by assistance while on BSC      AMPA 6 Click ADL: 16    Treatment & Education:  Role of OT and POC  ADL retraining  Functional mobility training  Safety  Importance EOB/OOB activity  Sternal precautions    Co-treatment performed due to patient's multiple deficits requiring two skilled therapists to appropriately and safely assess patient's strength and endurance while facilitating functional tasks in addition to accommodating for patient's activity tolerance.     Patient left up in chair with all lines intact, call button in reach, nurse notified, family present, and all needs met.     GOALS:   Multidisciplinary Problems       Occupational Therapy Goals          Problem: Occupational Therapy    Goal Priority Disciplines Outcome Interventions   Occupational Therapy Goal     OT, PT/OT Progressing    Description: Goals to be met by: 5/27/24     Patient will increase functional independence with ADLs by performing:    UE Dressing with Stand-by Assistance.  LE Dressing with Stand-by Assistance.  Grooming while standing at sink with Contact Guard Assistance.  Toileting from toilet with Contact Guard Assistance for hygiene and clothing  management.   Rolling to Bilateral with Supervision.   Supine to sit with Supervision.  Step transfer with Contact Guard Assistance  Toilet transfer to toilet with Contact Guard Assistance.                         Time Tracking:     OT Date of Treatment: 04/30/24  OT Start Time: 1046  OT Stop Time: 1105  OT Total Time (min): 19 min    Billable Minutes:Self Care/Home Management 19 4/30/2024

## 2024-04-30 NOTE — PROGRESS NOTES
04/30/24 1400 04/30/24 1600   Vital Signs    Pulse  64  (!) 54  (MD notified)   Resp  21  26   SpO2  99 %    BP  112/72  112/48   MAP (mmHg)  86  69     Verified with CTS team ok to administer Labetalol 200 mg - given at 1410.  Notified CTS team of lower HR, but stable BP.  Team has lowered future Labetalol doses, and ok to continue transfer to CSU.  Will report to CSU RN.

## 2024-04-30 NOTE — CARE UPDATE
I have reviewed the chart of Heidy Polo who is hospitalized for the following:    Active Hospital Problems    Diagnosis    *Dissecting aneurysm of aorta    Transient hyperglycemia post procedure    Essential hypertension    History of hypothyroidism    Mixed hyperlipidemia    History of right breast cancer        Mirna Ayala, [facility]  [facility] Based [facility]

## 2024-04-30 NOTE — PROGRESS NOTES
"[facility] - [facility]  Endocrinology  Progress Note    Admit Date: 4/25/2024     Reason for Consult: Management of Hyperglycemia     Surgical Procedure and Date: 4/26/24  REPLACEMENT, AORTA, ASCENDING (N/A)  HEMIARCH REPLACEMENT        Lab Results   Component Value Date    HGBA1C 4.8 11/22/2023         HPI:   Patient is a 68 y.o. female with a diagnosis of Breast cancer (2001), Depression (07/16/2012), Hyperlipidemia, Hypertension, and Hypothyroid who presented to [facility] ED with complaint of left-sided chest pain that started less than an hour prior to arrival. CTA at that time demonstrated adal type A aortic dissection with dissection flap extending from the aortic root up to the level of the distal arch. Associated aneurysmal dilatation measuring up to 5 cm in maximum dimension. She was transferred to [facility] for higher level of care and admitted to the SICU for close hemodynamic monitoring. Patient now presents for the above procedure(s). Endocrinology consulted for management of hyperglycemia.        Interval HPI:   Overnight events: Remains in SICU. POD 4. BG well controlled and within goal ranges without insulin. Tolerating diet.   Eating:   Diet Cardiac Fluid - 1500mL  Nausea: No  Hypoglycemia and intervention: No  Fever: No  TPN and/or TF: No  If yes, type of TF/TPN and rate: n/a    /61   Pulse 73   Temp 98.7 °F (37.1 °C) (Oral)   Resp 19   Ht 5' 4" (1.626 m)   Wt 80 kg (176 lb 5.9 oz)   SpO2 99%   BMI 30.27 kg/m²     Labs Reviewed and Include    Recent Labs   Lab 04/30/24  0343   GLU 98   CALCIUM 8.9   ALBUMIN 3.3*   PROT 5.9*      K 3.8   CO2 29   CL 99   BUN 20   CREATININE 0.8   ALKPHOS 132   ALT 60*   AST 24   BILITOT 1.2*     Lab Results   Component Value Date    WBC 7.15 04/30/2024    HGB 9.1 (L) 04/30/2024    HCT 26.9 (L) 04/30/2024    MCV 90 04/30/2024     04/30/2024     Recent Labs   Lab 04/25/24  1154   TSH 2.684     Lab Results   Component Value Date    HGBA1C " "4.8 11/22/2023       Nutritional status:   Body mass index is 30.27 kg/m².  Lab Results   Component Value Date    ALBUMIN 3.3 (L) 04/30/2024    ALBUMIN 3.5 04/29/2024    ALBUMIN 3.5 04/29/2024     No results found for: "PREALBUMIN"    Estimated Creatinine Clearance: 69.8 mL/min (based on SCr of 0.8 mg/dL).    Accu-Checks  Recent Labs     04/28/24  0731 04/28/24  1107 04/28/24  1444 04/28/24  1713 04/28/24  2037 04/29/24  0907 04/29/24  1211 04/29/24  1815 04/30/24  0342 04/30/24  1110   POCTGLUCOSE 84 101 131* 110 111* 127* 109 82 110 105       Current Medications and/or Treatments Impacting Glycemic Control  Immunotherapy:    Immunosuppressants       None          Steroids:   Hormones (From admission, onward)      None          Pressors:    Autonomic Drugs (From admission, onward)      None          Hyperglycemia/Diabetes Medications:   Antihyperglycemics (From admission, onward)      Start     Stop Route Frequency Ordered    04/28/24 0911  insulin aspart U-100 pen 0-10 Units         -- SubQ Before meals & nightly PRN 04/28/24 0811            ASSESSMENT and PLAN    Cardiac/Vascular  * Dissecting aneurysm of aorta  Managed per primary team  Optimize BG control        Mixed hyperlipidemia  May increase insulin resistance.         Endocrine  Transient hyperglycemia post procedure  BG goal 110-140    Discontinue Moderate Dose Correction Scale and BG monitoring ac/hs  Recommend continuing to monitor BG with AM labs   BG monitoring ac/hs     Endocrine will sign off. Patient without insulin needs while tolerating diet and no hx of DM. Please reconsult if needed.           Brandee Javed, NP  Endocrinology  [facility] - Surgical Intensive Care  "

## 2024-04-30 NOTE — PROGRESS NOTES
[facility]  Critical Care - Surgery  Progress Note    Patient Name: Heidy Polo  MRN: [id number]  Admission Date: 4/25/2024  Hospital Length of Stay: 5 days  Code Status: Full Code  Attending Provider: Aquilino Ochoa MD  Primary Care Provider: Chas Angela MD   Principal Problem: Dissecting aneurysm of aorta    Subjective:     Hospital/ICU Course:  No notes on file    Interval History/Significant Events: NAEON. Reports pain is well-controlled. BP within goal.  115cc from meds and 140cc from pleural in 24 hours. Good UOP. Remains stable for step down    Follow-up For: Procedure(s) (LRB):  REPLACEMENT, AORTA, ASCENDING (N/A)  APPLICATION, WOUND VAC (N/A)    Post-Operative Day: 4 Days Post-Op    Objective:     Vital Signs (Most Recent):  Temp: 98.2 °F (36.8 °C) (04/30/24 0305)  Pulse: 65 (04/30/24 0600)  Resp: (!) 38 (04/30/24 0600)  BP: (!) 123/58 (04/30/24 0530)  SpO2: (!) 86 % (04/30/24 0600) Vital Signs (24h Range):  Temp:  [97.8 °F (36.6 °C)-98.6 °F (37 °C)] 98.2 °F (36.8 °C)  Pulse:  [61-77] 65  Resp:  [11-38] 38  SpO2:  [86 %-100 %] 86 %  BP: ()/() 123/58     Weight: 80 kg (176 lb 5.9 oz)  Body mass index is 30.27 kg/m².      Intake/Output Summary (Last 24 hours) at 4/30/2024 0656  Last data filed at 4/30/2024 0305  Gross per 24 hour   Intake 574.42 ml   Output 2605 ml   Net -2030.58 ml          Physical Exam  Vitals and nursing note reviewed.   Constitutional:       General: She is not in acute distress.  HENT:      Mouth/Throat:      Pharynx: Oropharynx is clear. No oropharyngeal exudate.   Eyes:      Extraocular Movements: Extraocular movements intact.      Conjunctiva/sclera: Conjunctivae normal.   Cardiovascular:      Rate and Rhythm: Normal rate and regular rhythm.      Pulses: Normal pulses.      Comments: Midline sternal incision with dressing c/d/i  CT x2 with SS output.  V wires in place   Palpable DP pulses   Pulmonary:      Effort: Pulmonary effort is  normal. No respiratory distress.      Comments: Room air  Abdominal:      General: There is no distension.      Palpations: Abdomen is soft.   Genitourinary:     Comments: Hanson draining clear urine   Musculoskeletal:      Cervical back: Normal range of motion. No rigidity.   Skin:     General: Skin is warm and dry.   Neurological:      General: No focal deficit present.      Mental Status: She is oriented to person, place, and time.            Vents:  Vent Mode: Spont (04/27/24 0917)  Set Rate: 0 BPM (04/27/24 0745)  Vt Set: 0 mL (04/27/24 0745)  Pressure Support: 5 cmH20 (04/27/24 0917)  PEEP/CPAP: 5 cmH20 (04/27/24 0917)  Oxygen Concentration (%): 40 (04/27/24 0917)  Peak Airway Pressure: 4.3 cmH20 (04/27/24 0917)  Plateau Pressure: 14 cmH20 (04/27/24 0917)  Total Ve: 6.2 L/m (04/27/24 0917)  Negative Inspiratory Force (cm H2O): -24 (04/27/24 0917)  F/VT Ratio<105 (RSBI): (!) 26.55 (04/27/24 0745)    Lines/Drains/Airways       Peripherally Inserted Central Catheter Line  Duration             PICC Triple Lumen 04/27/24 1115 left brachial 2 days              Drain  Duration                  Chest Tube 04/26/24 1422 Tube - 2 Anterior Mediastinal 32 Fr. 3 days         Chest Tube 04/26/24 1454 Tube - 3 Right Pleural 32 Fr. 3 days              Peripheral Intravenous Line  Duration                  Peripheral IV - Single Lumen 04/26/24 1000 14 G  Left Forearm 3 days                    Significant Labs:    CBC/Anemia Profile:  Recent Labs   Lab 04/29/24  0431 04/29/24  0908 04/30/24  0343   WBC 8.40 8.65 7.15   HGB 9.8* 9.1* 9.1*   HCT 29.6* 28.3* 26.9*   * 128* 156   MCV 90 92 90   RDW 15.1* 15.0* 14.3        Chemistries:  Recent Labs   Lab 04/29/24  0908 04/29/24  1610 04/29/24  1611 04/30/24  0343    136  --  137   K 5.1 3.7  --  3.8    99  --  99   CO2 29 27  --  29   BUN 23 20  --  20   CREATININE 0.8 0.7  --  0.8   CALCIUM 9.0 9.0  --  8.9   ALBUMIN 3.5 3.5  --  3.3*   PROT 5.9* 6.0  --  5.9*  "  BILITOT 1.6* 1.3*  --  1.2*   ALKPHOS 142* 141*  --  132   ALT 80* 74*  --  60*   AST 49* 38  --  24   MG 2.0  --  2.1 2.1   PHOS 3.5  --  3.4 3.6       All pertinent labs within the past 24 hours have been reviewed.    Significant Imaging:  I have reviewed all pertinent imaging results/findings within the past 24 hours.  Assessment/Plan:     Cardiac/Vascular  * Dissecting aneurysm of aorta    Neuro/Psych:     - Sedation: none    - Pain:     - Scheduled Tylenol 1000mg Q8H   - PRN Oxycodone 5/10mg               Cardiac:     - s/p ascending aorta and hemiarch replacement w/ Dr. Ochoa 4/26/25    - BP Goal: MAP 60-80 (<75 if possible)    - Inotropes/Pressors: none    - Anti-HTNs: Labetalol 200mg q8h PO    - Rhythm: NSR    - Beta Blocker: labetalol to 200mg q8h PO    - Statin: will start when appropriate     - s/p 2L albumin post op    - continue ASA 81        Pulmonary:     - Goal SpO2 >92%, on RA    - IS, OOB, PT/OT    - ABGs PRN    - :    - med: 115cc (20cc overnight) (will pull today)   - pleur: 140cc (0cc overnight)   - SS output all tubes    No results for input(s): "PH", "PCO2", "PO2", "HCO3", "POCSATURATED", "BE" in the last 72 hours.          Renal:    - Trend BUN/Cr     - Record strict Is/Os    - UOP: 2.9L/24h   Net -2.6L/24hr         Net +3.6cc during admission    - switch Lasix to 20mg qd    - PO Kcl 20 mEq qd and PO Mag 400mg qd    - goal UOP -1L /24hr    Recent Labs   Lab 04/29/24  0908 04/29/24  1610 04/30/24  0343   BUN 23 20 20   CREATININE 0.8 0.7 0.8         FEN / GI:     - qd CMP, PRN K/Mag/Phos per protocol     - Replace electrolytes as needed    - Nutrition: Cardiac diet    - Bowel Regimen: Miralax, docusate, suppository 04/29    - speech eval: cleared for regular diet     - pepcid IV for GI prophylaxis        ID:     - Afebrile     - WBC stable     - Abx: completed perioperative cefazolin 2g Q8H x 5 doses post op    Recent Labs   Lab 04/29/24  0431 04/29/24  0908 04/30/24  0343   WBC 8.40 " 8.65 7.15         Heme/Onc:     - Hgb 11.1 pre-operatively, 9.1 today    - qd CBC     Recent Labs   Lab 04/28/24  0308 04/28/24  0836 04/28/24  2035 04/29/24  0431 04/29/24  0908 04/30/24  0343   HGB 8.7*   < > 9.0* 9.8* 9.1* 9.1*   *   < > 130* 126* 128* 156   APTT 23.6  --   --  25.7  --  21.1   INR 1.0   < > 1.0 1.0 1.0  --     < > = values in this interval not displayed.         Endocrine:     - CTS Goal -140    - HgbA1c: 4.8     - SSI    - POCT ACHS        PPx:   Feeding: Cardiac diet  Analgesia/Sedation: see above   Thromboembolic Prevention: SCDs  HOB >30: Yes  Stress Ulcer: pepcid  Glucose Control: SSI      Lines/Drains/Airway:  PIV x 2   PICC L arm    CT x2        Dispo/Code Status/Palliative:     - step down; optimize for discharge 05/01 if she remains in SICU    - Full Code           Critical care was time spent personally by me on the following activities: development of treatment plan with patient or surrogate and bedside caregivers, discussions with consultants, evaluation of patient's response to treatment, examination of patient, ordering and performing treatments and interventions, ordering and review of laboratory studies, ordering and review of radiographic studies, pulse oximetry, re-evaluation of patient's condition.  This critical care time did not overlap with that of any other provider or involve time for any procedures.     Rodolfo Jackson MD  Critical Care - Surgery  [facility]

## 2024-04-30 NOTE — ASSESSMENT & PLAN NOTE
BG goal 110-140    Discontinue Moderate Dose Correction Scale and BG monitoring ac/hs  Recommend continuing to monitor BG with AM labs   BG monitoring ac/hs     Endocrine will sign off. Patient without insulin needs while tolerating diet and no hx of DM. Please reconsult if needed.

## 2024-04-30 NOTE — SUBJECTIVE & OBJECTIVE
Interval History/Significant Events: NAEON. Reports pain is well-controlled. BP within goal.  115cc from meds and 140cc from pleural in 24 hours. Good UOP. Remains stable for step down    Follow-up For: Procedure(s) (LRB):  REPLACEMENT, AORTA, ASCENDING (N/A)  APPLICATION, WOUND VAC (N/A)    Post-Operative Day: 4 Days Post-Op    Objective:     Vital Signs (Most Recent):  Temp: 98.2 °F (36.8 °C) (04/30/24 0305)  Pulse: 65 (04/30/24 0600)  Resp: (!) 38 (04/30/24 0600)  BP: (!) 123/58 (04/30/24 0530)  SpO2: (!) 86 % (04/30/24 0600) Vital Signs (24h Range):  Temp:  [97.8 °F (36.6 °C)-98.6 °F (37 °C)] 98.2 °F (36.8 °C)  Pulse:  [61-77] 65  Resp:  [11-38] 38  SpO2:  [86 %-100 %] 86 %  BP: ()/() 123/58     Weight: 80 kg (176 lb 5.9 oz)  Body mass index is 30.27 kg/m².      Intake/Output Summary (Last 24 hours) at 4/30/2024 0656  Last data filed at 4/30/2024 0305  Gross per 24 hour   Intake 574.42 ml   Output 2605 ml   Net -2030.58 ml          Physical Exam  Vitals and nursing note reviewed.   Constitutional:       General: She is not in acute distress.  HENT:      Mouth/Throat:      Pharynx: Oropharynx is clear. No oropharyngeal exudate.   Eyes:      Extraocular Movements: Extraocular movements intact.      Conjunctiva/sclera: Conjunctivae normal.   Cardiovascular:      Rate and Rhythm: Normal rate and regular rhythm.      Pulses: Normal pulses.      Comments: Midline sternal incision with dressing c/d/i  CT x2 with SS output.  V wires in place   Palpable DP pulses   Pulmonary:      Effort: Pulmonary effort is normal. No respiratory distress.      Comments: Room air  Abdominal:      General: There is no distension.      Palpations: Abdomen is soft.   Genitourinary:     Comments: Hanson draining clear urine   Musculoskeletal:      Cervical back: Normal range of motion. No rigidity.   Skin:     General: Skin is warm and dry.   Neurological:      General: No focal deficit present.      Mental Status: She is  oriented to person, place, and time.            Vents:  Vent Mode: Spont (04/27/24 0917)  Set Rate: 0 BPM (04/27/24 0745)  Vt Set: 0 mL (04/27/24 0745)  Pressure Support: 5 cmH20 (04/27/24 0917)  PEEP/CPAP: 5 cmH20 (04/27/24 0917)  Oxygen Concentration (%): 40 (04/27/24 0917)  Peak Airway Pressure: 4.3 cmH20 (04/27/24 0917)  Plateau Pressure: 14 cmH20 (04/27/24 0917)  Total Ve: 6.2 L/m (04/27/24 0917)  Negative Inspiratory Force (cm H2O): -24 (04/27/24 0917)  F/VT Ratio<105 (RSBI): (!) 26.55 (04/27/24 0745)    Lines/Drains/Airways       Peripherally Inserted Central Catheter Line  Duration             PICC Triple Lumen 04/27/24 1115 left brachial 2 days              Drain  Duration                  Chest Tube 04/26/24 1422 Tube - 2 Anterior Mediastinal 32 Fr. 3 days         Chest Tube 04/26/24 1454 Tube - 3 Right Pleural 32 Fr. 3 days              Peripheral Intravenous Line  Duration                  Peripheral IV - Single Lumen 04/26/24 1000 14 G  Left Forearm 3 days                    Significant Labs:    CBC/Anemia Profile:  Recent Labs   Lab 04/29/24  0431 04/29/24  0908 04/30/24  0343   WBC 8.40 8.65 7.15   HGB 9.8* 9.1* 9.1*   HCT 29.6* 28.3* 26.9*   * 128* 156   MCV 90 92 90   RDW 15.1* 15.0* 14.3        Chemistries:  Recent Labs   Lab 04/29/24  0908 04/29/24  1610 04/29/24  1611 04/30/24  0343    136  --  137   K 5.1 3.7  --  3.8    99  --  99   CO2 29 27  --  29   BUN 23 20  --  20   CREATININE 0.8 0.7  --  0.8   CALCIUM 9.0 9.0  --  8.9   ALBUMIN 3.5 3.5  --  3.3*   PROT 5.9* 6.0  --  5.9*   BILITOT 1.6* 1.3*  --  1.2*   ALKPHOS 142* 141*  --  132   ALT 80* 74*  --  60*   AST 49* 38  --  24   MG 2.0  --  2.1 2.1   PHOS 3.5  --  3.4 3.6       All pertinent labs within the past 24 hours have been reviewed.    Significant Imaging:  I have reviewed all pertinent imaging results/findings within the past 24 hours.

## 2024-04-30 NOTE — SUBJECTIVE & OBJECTIVE
"Interval HPI:   Overnight events: Remains in SICU. POD 4. BG well controlled and within goal ranges without insulin. Tolerating diet.   Eating:   Diet Cardiac Fluid - 1500mL  Nausea: No  Hypoglycemia and intervention: No  Fever: No  TPN and/or TF: No  If yes, type of TF/TPN and rate: n/a    /61   Pulse 73   Temp 98.7 °F (37.1 °C) (Oral)   Resp 19   Ht 5' 4" (1.626 m)   Wt 80 kg (176 lb 5.9 oz)   SpO2 99%   BMI 30.27 kg/m²     Labs Reviewed and Include    Recent Labs   Lab 04/30/24  0343   GLU 98   CALCIUM 8.9   ALBUMIN 3.3*   PROT 5.9*      K 3.8   CO2 29   CL 99   BUN 20   CREATININE 0.8   ALKPHOS 132   ALT 60*   AST 24   BILITOT 1.2*     Lab Results   Component Value Date    WBC 7.15 04/30/2024    HGB 9.1 (L) 04/30/2024    HCT 26.9 (L) 04/30/2024    MCV 90 04/30/2024     04/30/2024     Recent Labs   Lab 04/25/24  1154   TSH 2.684     Lab Results   Component Value Date    HGBA1C 4.8 11/22/2023       Nutritional status:   Body mass index is 30.27 kg/m².  Lab Results   Component Value Date    ALBUMIN 3.3 (L) 04/30/2024    ALBUMIN 3.5 04/29/2024    ALBUMIN 3.5 04/29/2024     No results found for: "PREALBUMIN"    Estimated Creatinine Clearance: 69.8 mL/min (based on SCr of 0.8 mg/dL).    Accu-Checks  Recent Labs     04/28/24  0731 04/28/24  1107 04/28/24  1444 04/28/24  1713 04/28/24  2037 04/29/24  0907 04/29/24  1211 04/29/24  1815 04/30/24  0342 04/30/24  1110   POCTGLUCOSE 84 101 131* 110 111* 127* 109 82 110 105       Current Medications and/or Treatments Impacting Glycemic Control  Immunotherapy:    Immunosuppressants       None          Steroids:   Hormones (From admission, onward)      None          Pressors:    Autonomic Drugs (From admission, onward)      None          Hyperglycemia/Diabetes Medications:   Antihyperglycemics (From admission, onward)      Start     Stop Route Frequency Ordered    04/28/24 0911  insulin aspart U-100 pen 0-10 Units         -- SubQ Before meals & nightly " PRN 04/28/24 0811

## 2024-04-30 NOTE — ASSESSMENT & PLAN NOTE
"  Neuro/Psych:     - Sedation: none    - Pain:     - Scheduled Tylenol 1000mg Q8H   - PRN Oxycodone 5/10mg               Cardiac:     - s/p ascending aorta and hemiarch replacement w/ Dr. Ochoa 4/26/25    - BP Goal: MAP 60-80 (<75 if possible)    - Inotropes/Pressors: none    - Anti-HTNs: Labetalol 200mg q8h PO    - Rhythm: NSR    - Beta Blocker: labetalol to 200mg q8h PO    - Statin: will start when appropriate     - s/p 2L albumin post op    - continue ASA 81        Pulmonary:     - Goal SpO2 >92%, on RA    - IS, OOB, PT/OT    - ABGs PRN    - :    - med: 115cc (20cc overnight) (will pull today)   - pleur: 140cc (0cc overnight)   - SS output all tubes    No results for input(s): "PH", "PCO2", "PO2", "HCO3", "POCSATURATED", "BE" in the last 72 hours.          Renal:    - Trend BUN/Cr     - Record strict Is/Os    - UOP: 2.9L/24h   Net -2.6L/24hr         Net +3.6cc during admission    - switch Lasix to 20mg qd    - PO Kcl 20 mEq qd and PO Mag 400mg qd    - goal UOP -1L /24hr    Recent Labs   Lab 04/29/24  0908 04/29/24  1610 04/30/24  0343   BUN 23 20 20   CREATININE 0.8 0.7 0.8         FEN / GI:     - qd CMP, PRN K/Mag/Phos per protocol     - Replace electrolytes as needed    - Nutrition: Cardiac diet    - Bowel Regimen: Miralax, docusate, suppository 04/29    - speech eval: cleared for regular diet     - pepcid IV for GI prophylaxis        ID:     - Afebrile     - WBC stable     - Abx: completed perioperative cefazolin 2g Q8H x 5 doses post op    Recent Labs   Lab 04/29/24  0431 04/29/24  0908 04/30/24  0343   WBC 8.40 8.65 7.15         Heme/Onc:     - Hgb 11.1 pre-operatively, 9.1 today    - qd CBC     Recent Labs   Lab 04/28/24  0308 04/28/24  0836 04/28/24  2035 04/29/24  0431 04/29/24  0908 04/30/24  0343   HGB 8.7*   < > 9.0* 9.8* 9.1* 9.1*   *   < > 130* 126* 128* 156   APTT 23.6  --   --  25.7  --  21.1   INR 1.0   < > 1.0 1.0 1.0  --     < > = values in this interval not displayed.         " Endocrine:     - CTS Goal -140    - HgbA1c: 4.8     - SSI    - POCT ACHS        PPx:   Feeding: Cardiac diet  Analgesia/Sedation: see above   Thromboembolic Prevention: SCDs  HOB >30: Yes  Stress Ulcer: pepcid  Glucose Control: SSI      Lines/Drains/Airway:  PIV x 2   PICC L arm    CT x2        Dispo/Code Status/Palliative:     - step down; optimize for discharge 05/01 if she remains in SICU    - Full Code

## 2024-05-01 ENCOUNTER — DOCUMENTATION ONLY (OUTPATIENT)
Dept: CARDIOTHORACIC SURGERY | Facility: CLINIC | Age: 69
End: 2024-05-01
Payer: MEDICARE

## 2024-05-01 VITALS
SYSTOLIC BLOOD PRESSURE: 153 MMHG | OXYGEN SATURATION: 99 % | RESPIRATION RATE: 16 BRPM | DIASTOLIC BLOOD PRESSURE: 64 MMHG | HEIGHT: 64 IN | WEIGHT: 160.06 LBS | TEMPERATURE: 98 F | HEART RATE: 70 BPM | BODY MASS INDEX: 27.33 KG/M2

## 2024-05-01 LAB
ALBUMIN SERPL BCP-MCNC: 3.3 G/DL (ref 3.5–5.2)
ALP SERPL-CCNC: 126 U/L (ref 55–135)
ALT SERPL W/O P-5'-P-CCNC: 45 U/L (ref 10–44)
ANION GAP SERPL CALC-SCNC: 10 MMOL/L (ref 8–16)
AST SERPL-CCNC: 17 U/L (ref 10–40)
BILIRUB SERPL-MCNC: 1 MG/DL (ref 0.1–1)
BUN SERPL-MCNC: 19 MG/DL (ref 8–23)
CALCIUM SERPL-MCNC: 9.3 MG/DL (ref 8.7–10.5)
CHLORIDE SERPL-SCNC: 101 MMOL/L (ref 95–110)
CO2 SERPL-SCNC: 28 MMOL/L (ref 23–29)
CREAT SERPL-MCNC: 0.8 MG/DL (ref 0.5–1.4)
ERYTHROCYTE [DISTWIDTH] IN BLOOD BY AUTOMATED COUNT: 13.8 % (ref 11.5–14.5)
EST. GFR  (NO RACE VARIABLE): >60 ML/MIN/1.73 M^2
FINAL PATHOLOGIC DIAGNOSIS: NORMAL
GLUCOSE SERPL-MCNC: 83 MG/DL (ref 70–110)
GROSS: NORMAL
HCT VFR BLD AUTO: 30.5 % (ref 37–48.5)
HGB BLD-MCNC: 10.2 G/DL (ref 12–16)
Lab: NORMAL
MAGNESIUM SERPL-MCNC: 2.1 MG/DL (ref 1.6–2.6)
MCH RBC QN AUTO: 29.7 PG (ref 27–31)
MCHC RBC AUTO-ENTMCNC: 33.4 G/DL (ref 32–36)
MCV RBC AUTO: 89 FL (ref 82–98)
PHOSPHATE SERPL-MCNC: 3.6 MG/DL (ref 2.7–4.5)
PLATELET # BLD AUTO: 245 K/UL (ref 150–450)
PMV BLD AUTO: 9.7 FL (ref 9.2–12.9)
POTASSIUM SERPL-SCNC: 3.8 MMOL/L (ref 3.5–5.1)
POTASSIUM SERPL-SCNC: 3.8 MMOL/L (ref 3.5–5.1)
PROT SERPL-MCNC: 6.4 G/DL (ref 6–8.4)
RBC # BLD AUTO: 3.44 M/UL (ref 4–5.4)
SODIUM SERPL-SCNC: 139 MMOL/L (ref 136–145)
WBC # BLD AUTO: 8.57 K/UL (ref 3.9–12.7)

## 2024-05-01 PROCEDURE — 25000003 PHARM REV CODE 250

## 2024-05-01 PROCEDURE — 83735 ASSAY OF MAGNESIUM: CPT

## 2024-05-01 PROCEDURE — 25000003 PHARM REV CODE 250: Performed by: STUDENT IN AN ORGANIZED HEALTH CARE EDUCATION/TRAINING PROGRAM

## 2024-05-01 PROCEDURE — 80053 COMPREHEN METABOLIC PANEL: CPT

## 2024-05-01 PROCEDURE — A4216 STERILE WATER/SALINE, 10 ML: HCPCS | Performed by: THORACIC SURGERY (CARDIOTHORACIC VASCULAR SURGERY)

## 2024-05-01 PROCEDURE — 85027 COMPLETE CBC AUTOMATED: CPT

## 2024-05-01 PROCEDURE — 97535 SELF CARE MNGMENT TRAINING: CPT

## 2024-05-01 PROCEDURE — 25000003 PHARM REV CODE 250: Performed by: THORACIC SURGERY (CARDIOTHORACIC VASCULAR SURGERY)

## 2024-05-01 PROCEDURE — 84100 ASSAY OF PHOSPHORUS: CPT

## 2024-05-01 RX ORDER — FAMOTIDINE 20 MG/1
20 TABLET, FILM COATED ORAL 2 TIMES DAILY
Qty: 60 TABLET | Refills: 11 | Status: SHIPPED | OUTPATIENT
Start: 2024-05-01 | End: 2024-05-27 | Stop reason: ALTCHOICE

## 2024-05-01 RX ORDER — METOPROLOL TARTRATE 25 MG/1
12.5 TABLET, FILM COATED ORAL 2 TIMES DAILY
Qty: 90 TABLET | Refills: 3 | Status: SHIPPED | OUTPATIENT
Start: 2024-05-01 | End: 2024-05-27 | Stop reason: SDUPTHER

## 2024-05-01 RX ORDER — ASPIRIN 325 MG
325 TABLET, DELAYED RELEASE (ENTERIC COATED) ORAL DAILY
Qty: 30 TABLET | Refills: 11 | Status: SHIPPED | OUTPATIENT
Start: 2024-05-01 | End: 2024-05-27 | Stop reason: SDUPTHER

## 2024-05-01 RX ORDER — AMLODIPINE BESYLATE 2.5 MG/1
2.5 TABLET ORAL DAILY
Status: DISCONTINUED | OUTPATIENT
Start: 2024-05-01 | End: 2024-05-01

## 2024-05-01 RX ORDER — AMLODIPINE BESYLATE 2.5 MG/1
5 TABLET ORAL DAILY
Qty: 180 TABLET | Refills: 3 | Status: SHIPPED | OUTPATIENT
Start: 2024-05-02 | End: 2024-05-27 | Stop reason: SDUPTHER

## 2024-05-01 RX ORDER — OXYCODONE HYDROCHLORIDE 5 MG/1
5 TABLET ORAL EVERY 4 HOURS PRN
Qty: 42 TABLET | Refills: 0 | Status: SHIPPED | OUTPATIENT
Start: 2024-05-01 | End: 2024-05-27 | Stop reason: ALTCHOICE

## 2024-05-01 RX ORDER — ASPIRIN 81 MG/1
81 TABLET ORAL DAILY
Qty: 30 TABLET | Refills: 11 | Status: SHIPPED | OUTPATIENT
Start: 2024-05-02 | End: 2024-05-01 | Stop reason: HOSPADM

## 2024-05-01 RX ORDER — AMLODIPINE BESYLATE 2.5 MG/1
2.5 TABLET ORAL DAILY
Status: DISCONTINUED | OUTPATIENT
Start: 2024-05-01 | End: 2024-05-01 | Stop reason: HOSPADM

## 2024-05-01 RX ORDER — AMLODIPINE BESYLATE 5 MG/1
5 TABLET ORAL DAILY
Status: DISCONTINUED | OUTPATIENT
Start: 2024-05-01 | End: 2024-05-01

## 2024-05-01 RX ORDER — ACETAMINOPHEN 500 MG
1000 TABLET ORAL EVERY 8 HOURS PRN
Qty: 30 TABLET | Refills: 0 | Status: SHIPPED | OUTPATIENT
Start: 2024-05-01 | End: 2024-05-27 | Stop reason: ALTCHOICE

## 2024-05-01 RX ORDER — ASPIRIN 325 MG
325 TABLET, DELAYED RELEASE (ENTERIC COATED) ORAL DAILY
Qty: 30 TABLET | Refills: 11 | Status: CANCELLED | OUTPATIENT
Start: 2024-05-01 | End: 2025-05-01

## 2024-05-01 RX ORDER — FUROSEMIDE 20 MG/1
20 TABLET ORAL DAILY
Qty: 30 TABLET | Refills: 11 | Status: SHIPPED | OUTPATIENT
Start: 2024-05-02 | End: 2024-05-27 | Stop reason: ALTCHOICE

## 2024-05-01 RX ORDER — POTASSIUM CHLORIDE 20 MEQ/1
20 TABLET, EXTENDED RELEASE ORAL DAILY
Qty: 30 TABLET | Refills: 11 | Status: SHIPPED | OUTPATIENT
Start: 2024-05-02 | End: 2024-05-27 | Stop reason: ALTCHOICE

## 2024-05-01 RX ORDER — METOPROLOL TARTRATE 25 MG/1
12.5 TABLET ORAL 2 TIMES DAILY
Status: DISCONTINUED | OUTPATIENT
Start: 2024-05-01 | End: 2024-05-01

## 2024-05-01 RX ORDER — METOPROLOL TARTRATE 25 MG/1
12.5 TABLET ORAL ONCE
Status: COMPLETED | OUTPATIENT
Start: 2024-05-01 | End: 2024-05-01

## 2024-05-01 RX ORDER — LIDOCAINE 50 MG/G
1 PATCH TOPICAL DAILY PRN
Qty: 15 PATCH | Refills: 0 | Status: SHIPPED | OUTPATIENT
Start: 2024-05-01 | End: 2024-05-27 | Stop reason: ALTCHOICE

## 2024-05-01 RX ORDER — POLYETHYLENE GLYCOL 3350 17 G/17G
17 POWDER, FOR SOLUTION ORAL 2 TIMES DAILY
Qty: 238 G | Refills: 0 | Status: SHIPPED | OUTPATIENT
Start: 2024-05-01 | End: 2024-05-27 | Stop reason: ALTCHOICE

## 2024-05-01 RX ADMIN — Medication 10 ML: at 12:05

## 2024-05-01 RX ADMIN — POLYETHYLENE GLYCOL 3350 17 G: 17 POWDER, FOR SOLUTION ORAL at 08:05

## 2024-05-01 RX ADMIN — Medication 400 MG: at 08:05

## 2024-05-01 RX ADMIN — METOPROLOL TARTRATE 12.5 MG: 25 TABLET, FILM COATED ORAL at 10:05

## 2024-05-01 RX ADMIN — DOCUSATE SODIUM 100 MG: 100 CAPSULE, LIQUID FILLED ORAL at 08:05

## 2024-05-01 RX ADMIN — ACETAMINOPHEN 1000 MG: 500 TABLET ORAL at 06:05

## 2024-05-01 RX ADMIN — FAMOTIDINE 20 MG: 20 TABLET ORAL at 08:05

## 2024-05-01 RX ADMIN — ASPIRIN 81 MG: 81 TABLET, COATED ORAL at 08:05

## 2024-05-01 RX ADMIN — FUROSEMIDE 20 MG: 20 TABLET ORAL at 08:05

## 2024-05-01 RX ADMIN — AMLODIPINE BESYLATE 2.5 MG: 2.5 TABLET ORAL at 08:05

## 2024-05-01 RX ADMIN — POTASSIUM CHLORIDE 20 MEQ: 1500 TABLET, EXTENDED RELEASE ORAL at 08:05

## 2024-05-01 RX ADMIN — Medication 10 ML: at 06:05

## 2024-05-01 NOTE — PT/OT/SLP PROGRESS
Occupational Therapy   Treatment    Name: Heidy Polo  MRN: [id number]  Admitting Diagnosis:  Dissecting aneurysm of aorta  5 Days Post-Op    Recommendations:     Discharge Recommendations: Low Intensity Therapy  Discharge Equipment Recommendations:  shower chair  Barriers to discharge:  None    Assessment:     Heidy Polo is a 68 y.o. female with a medical diagnosis of Dissecting aneurysm of aorta.  She presents with the following performance deficits affecting function are impaired endurance, impaired self care skills, impaired functional mobility, gait instability, impaired balance, decreased safety awareness, impaired coordination, impaired cardiopulmonary response to activity. Pt was able to verbalize 3/3 sternal precautions however required minimal cueing during sit<>stand transfer. Pt would continue to benefit from skilled OT services to maximize functional independence with ADLs and functional mobility, reduce caregiver burden, and facilitate safe discharge in the least restrictive environment.  Patient continues to demonstrate the need for low intensity therapy on a scheduled basis exhibited by decreased independence with self-care and functional mobility     Rehab Prognosis:  Good; patient would benefit from acute skilled OT services to address these deficits and reach maximum level of function.       Plan:     Patient to be seen 4 x/week to address the above listed problems via self-care/home management, therapeutic activities, therapeutic exercises, neuromuscular re-education  Plan of Care Expires: 05/27/24  Plan of Care Reviewed with: patient, family    Subjective     Chief Complaint: none stated  Patient/Family Comments/goals: to d/c home  Pain/Comfort:  Pain Rating 1: 0/10  Pain Rating Post-Intervention 1: 0/10    Objective:     Communicated with: RN prior to session.  Patient found  sitting on toilet  with telemetry upon OT entry to room.    General Precautions: Standard, fall, sternal     Orthopedic Precautions:N/A  Braces: N/A  Respiratory Status: Room air     Occupational Performance:     Bed Mobility:    Not assessed- pt sitting on toilet/returned to bedside chair end of session    Functional Mobility/Transfers:  Patient completed Sit <> Stand Transfer with stand by assistance  with  no assistive device   Cues to maintain sternal precautions  Functional Mobility: Pt engaged in functional mobility to simulate household/community distances 12 ft with SBA and no AD in order to maximize functional endurance and standing balance required for engagement in occupations of choice   No LOB or SOB noted    Activities of Daily Living:  Grooming: supervision to complete hand hygiene while standing at sinkside  Upper Body Dressing: stand by assistance to don overhead gown; vc's for sequencing with inclusion of sternal precautions  Toileting: stand by assistance for seated pericare and to pull underwear above b/l hips and rear in standing      [facility] 6 Click ADL: 24    Treatment & Education:  -Sternal precautions and application to functional tasks / ADLs reviewed:  including no lifting > 5 lbs, pulling or pushing with BUEs; Modifying daily activities and functional mobility tasks to maintain sternal precautions appropriately; Importance of participation in therapy and engaging in increased OOB mobility with assistance to improve endurance   -Provided education regarding role of OT, POC, & discharge recommendations with pt and family verbalizing understanding.  Pt had no further questions & when asked whether there were any concerns pt reported none.     Patient left up in chair with all lines intact, call button in reach, RN notified, and family present    GOALS:   Multidisciplinary Problems       Occupational Therapy Goals          Problem: Occupational Therapy    Goal Priority Disciplines Outcome Interventions   Occupational Therapy Goal     OT, PT/OT Progressing    Description: Goals to be met by: 5/27/24      Patient will increase functional independence with ADLs by performing:    UE Dressing with Stand-by Assistance.  LE Dressing with Stand-by Assistance.  Grooming while standing at sink with Contact Guard Assistance.  Toileting from toilet with Contact Guard Assistance for hygiene and clothing management.   Rolling to Bilateral with Supervision.   Supine to sit with Supervision.  Step transfer with Contact Guard Assistance  Toilet transfer to toilet with Contact Guard Assistance.                         Time Tracking:     OT Date of Treatment: 05/01/24  OT Start Time: 1314  OT Stop Time: 1325  OT Total Time (min): 11 min    Billable Minutes:Self Care/Home Management 11    OT/JEANNA: OT          5/1/2024

## 2024-05-01 NOTE — HPI
This is a 68-year-old lady who was transferred for intramural hematoma/aortic dissection. Patient has a [place] aortic dissection as intramural hematoma are considered in the similar category. Patient was hemodynamically stable overnight and blood pressure control was carried out. Extensive discussions had been carried with the family who wanted to proceed with the surgical repair in the morning. An informed consent was obtained.

## 2024-05-01 NOTE — PLAN OF CARE
CHW met with patient/family at bedside. Patient experience rounding completed and reviewed the following.     Do you know your discharge plan? Yes or No,    If yes, what is the plan? (Home, Home Health, Rehab, SNF, LTAC, or Other) Yes Home    Have you discussed your needs and preferences with your SW/CM? Yes or No  Yes Home    If you are discharging home, do you have help at home? Yes or No Yes (family)    Do you think you will need help additional at home at discharge? Yes or No   No    Do you currently have difficulty keeping up with bills, affording medicine or buying food? Yes or No  No    Assigned SW/CM notified of any patient/family needs or concerns. Appropriate resources provided to address patient's needs.  Noreen Iyer CHW  Case Management   [id number]

## 2024-05-01 NOTE — HOSPITAL COURSE
On 4/26/2024, the patient was taken to the Operating Room for the above stated procedure. Please see the previously dictated operative report for complete details. Postoperatively, the patient was taken from the  Operating Room to the ICU where the vital signs were monitored and pain was kept under control. The patient was weaned from the drips and extubated in the ICU per protocol.  Once hemodynamically stable, the patient was transferred to the Cardiac Step-Down floor for continued strengthening and ambulation. On postoperative day 5, the patient was ready for discharge to home. At the time of discharge, the patient was ambulating unassisted. Pain was well controlled with oral analgesics and the patient was tolerating the diet.    Of note, patient unable to tolerate labetalol 2/2 becoming bradycardic with HR in upper 50s. She was switched to metoprolol 12.5 mg at discharge.      MOBILITY AND ACTIVITY: As tolerated. Patient may shower. No heavy lifting of greater than 5 pounds and no driving.     DIET: An 1800-calorie ADA with a 1500 mL fluid restriction.     WOUND CARE INSTRUCTIONS: Check for redness, swelling and drainage around the  incision or wound. Patient is to call for any obvious bleeding, drainage, pus from the wound, unusual problems or difficulties or temperature of greater than 101   degrees.     FOLLOWUP: Follow up with Dr. Ochoa in approximately 3 weeks. Prior to this  appointment, the patient will have a chest x-ray and EKG.     Patient not placed on Ace-Inhibitor at the time of discharge due to potential for hypotension       DISCHARGE CONDITION: At the time of discharge, the patient was in sinus rhythm and afebrile with stable vital signs.

## 2024-05-01 NOTE — PLAN OF CARE
05/01/24 1532   Final Note   Assessment Type Final Discharge Note   Anticipated Discharge Disposition Home   What phone number can be called within the next 1-3 days to see how you are doing after discharge? [phone]   Hospital Resources/Appts/Education Provided Provided patient/caregiver with written discharge plan information;Provided education on problems/symptoms using teachback;Appointments scheduled and added to AVS   Post-Acute Status   Discharge Delays None known at this time     Patient discharged home / self care. Per physician discharge summary: DISCHARGE CONDITION: At the time of discharge, the patient was in sinus rhythm and afebrile with stable vital signs. Patient per chart review to follow up with [facility] and they set up their own appointments.     Noreen Horner RN    [phone]    Future Appointments   Date Time Provider Department Center   5/27/2024  8:15 AM [facility] OIC-XRAY [facility] XRAY [facility]   5/27/2024  8:45 AM ECHO, [facility] ECHOSTR [facility]   5/27/2024  9:45 AM EKG, APPT [facility] EKG [facility]   5/27/2024 10:00 AM Aquilino Ochoa MD [facility] CARDVAS [facility]

## 2024-05-01 NOTE — DISCHARGE SUMMARY
[facility] - Cardiology Stepdown  Cardiothoracic Surgery  Discharge Summary      Patient Name: Heidy Polo  MRN: [id number]  Admission Date: 4/25/2024  Hospital Length of Stay: 6 days  Discharge Date and Time:  05/01/2024 11:58 AM  Attending Physician: Aquilino Ochoa MD   Discharging Provider: Lali Ghosh PA-C  Primary Care Provider: Chas Angela MD    HPI:   This is a 68-year-old lady who was transferred for intramural hematoma/aortic dissection. Patient has a [place] aortic dissection as intramural hematoma are considered in the similar category. Patient was hemodynamically stable overnight and blood pressure control was carried out. Extensive discussions had been carried with the family who wanted to proceed with the surgical repair in the morning. An informed consent was obtained.     Procedure(s) (LRB):  REPLACEMENT, AORTA, ASCENDING (N/A)  APPLICATION, WOUND VAC (N/A)      Indwelling Lines/Drains at time of discharge:   Lines/Drains/Airways       Peripherally Inserted Central Catheter Line  Duration             PICC Triple Lumen 04/27/24 1115 left brachial 4 days              Drain  Duration                  Chest Tube 04/26/24 1454 Tube - 3 Right Pleural 32 Fr. 4 days                  Hospital Course: On 4/26/2024, the patient was taken to the Operating Room for the above stated procedure. Please see the previously dictated operative report for complete details. Postoperatively, the patient was taken from the  Operating Room to the ICU where the vital signs were monitored and pain was kept under control. The patient was weaned from the drips and extubated in the ICU per protocol.  Once hemodynamically stable, the patient was transferred to the Cardiac Step-Down floor for continued strengthening and ambulation. On postoperative day 5, the patient was ready for discharge to home. At the time of discharge, the patient was ambulating unassisted. Pain was well controlled with oral analgesics and the  patient was tolerating the diet.    Of note, patient unable to tolerate labetalol 2/2 becoming bradycardic with HR in upper 50s. She was switched to metoprolol 12.5 mg at discharge.      MOBILITY AND ACTIVITY: As tolerated. Patient may shower. No heavy lifting of greater than 5 pounds and no driving.     DIET: An 1800-calorie ADA with a 1500 mL fluid restriction.     WOUND CARE INSTRUCTIONS: Check for redness, swelling and drainage around the  incision or wound. Patient is to call for any obvious bleeding, drainage, pus from the wound, unusual problems or difficulties or temperature of greater than 101   degrees.     FOLLOWUP: Follow up with Dr. Ochoa in approximately 3 weeks. Prior to this  appointment, the patient will have a chest x-ray and EKG.     Patient not placed on Ace-Inhibitor at the time of discharge due to potential for hypotension       DISCHARGE CONDITION: At the time of discharge, the patient was in sinus rhythm and afebrile with stable vital signs.      Goals of Care Treatment Preferences:  Code Status: Full Code      Consults (From admission, onward)          Status Ordering Provider     Inpatient consult to PICC team ([facility])  Once        Provider:  (Not yet assigned)    Completed SABINA BROWN     Consult to Endocrinology  Once        Provider:  (Not yet assigned)    Completed DYLAN THURSTON     Inpatient consult to Registered Dietitian/Nutritionist  Once        Provider:  (Not yet assigned)    Completed DYLAN THURSTON     Inpatient consult to Midline team  Once        Provider:  (Not yet assigned)    Completed ADALGISA OCHOA            Significant Diagnostic Studies: N/A    Pending Diagnostic Studies:       None            No new Assessment & Plan notes have been filed under this hospital service since the last note was generated.  Service: Cardiothoracic Surgery    Final Active Diagnoses:    Diagnosis Date Noted POA    PRINCIPAL PROBLEM:  Dissecting aneurysm of aorta [I71.00] 04/25/2024 Yes     Transient hyperglycemia post procedure [R73.9] 04/26/2024 No    Essential hypertension [I10] 07/16/2012 Yes    History of hypothyroidism [Z86.39] 07/16/2012 Yes    Mixed hyperlipidemia [E78.2] 07/16/2012 Yes    History of right breast cancer [Z85.3] 01/16/2001 Not Applicable      Problems Resolved During this Admission:      Discharged Condition: stable    Disposition: Home or Self Care    Follow Up:    Patient Instructions:   No discharge procedures on file.  Medications:  Reconciled Home Medications:      Medication List        START taking these medications      acetaminophen 500 MG tablet  Commonly known as: TYLENOL  Take 2 tablets (1,000 mg total) by mouth every 8 (eight) hours as needed for Pain.     amLODIPine 2.5 MG tablet  Commonly known as: NORVASC  Take 2 tablets (5 mg total) by mouth once daily.  Start taking on: May 2, 2024     famotidine 20 MG tablet  Commonly known as: PEPCID  Take 1 tablet (20 mg total) by mouth 2 (two) times daily.     furosemide 20 MG tablet  Commonly known as: LASIX  Take 1 tablet (20 mg total) by mouth once daily.  Start taking on: May 2, 2024     LIDOcaine 5 %  Commonly known as: LIDODERM  Place 1 patch onto the skin daily as needed (pain). Cut in half and place on sides of surgical incision. Do not place on top of surgical sites. Remove & Discard patch within 12 hours or as directed by MD     metoprolol tartrate 25 MG tablet  Commonly known as: LOPRESSOR  Take ½ tablet (12.5 mg total) by mouth 2 (two) times daily.     oxyCODONE 5 MG immediate release tablet  Commonly known as: ROXICODONE  Take 1 tablet (5 mg total) by mouth every 4 (four) hours as needed for Pain.     polyethylene glycol 17 gram/dose powder  Commonly known as: GLYCOLAX  Use cap to measure 17 grams, mix in liquid and take by mouth 2 (two) times daily.     potassium chloride SA 20 MEQ tablet  Commonly known as: K-DUR,KLOR-CON  Take 1 tablet (20 mEq total) by mouth once daily.  Start taking on: May 2, 2024             CHANGE how you take these medications      aspirin 325 MG EC tablet  Commonly known as: ECOTRIN  Take 1 tablet (325 mg total) by mouth once daily.  What changed:   medication strength  how much to take  when to take this            CONTINUE taking these medications      blood pressure monitor LG arm size (OP)  Commonly known as: [employer] E[employer]  by Other route as needed for High Blood Pressure.            STOP taking these medications      chlorthalidone 25 MG Tab  Commonly known as: HYGROTEN     metoprolol succinate 25 MG 24 hr tablet  Commonly known as: TOPROL-XL     telmisartan 40 MG Tab  Commonly known as: MICARDIS            Time spent on the discharge of patient: 55 minutes    Lali Ghosh PAEmiliC  Cardiothoracic Surgery  [facility] - Cardiology Stepdown

## 2024-05-01 NOTE — PROGRESS NOTES
Met with pt at bedside and reviewed home wound care instructions upon discharge for pt's midsternal and chest tube site incisions, s/p Ascending Aorta Replacement.  Reviewed daily inspection and cleaning of surgical incisions once discharged and instructed pt to call the clinic with any questions or concerns.  Pt instructed to remove dressings in place once discharged, with the exception of steristrips if present, and to leave incisions open to air.  Pt instructed to refrain from applying anything to her incisions, other than soap and water.  Pt provided with a hand-out (see below) which contains detailed instructions on daily wound care inspection and care, once discharged.  Pt reminded of her 5 pound lifting, pushing, and pulling restrictions for the first 6 weeks following her surgery and to refrain from driving until released by Dr. Ochoa.  Pt instructed to wear her surgical bra / any bra without an underwire, to prevent dehiscence to her mid-sternal incision.  Pt instructed to continue utilizing her Incentive Spirometer to expand her lungs and prevent pneumonia. Pt instructed to weigh herself every morning, after waking up and going to the restroom, and to notify the clinic if she experiences a 3 pound weight gain from one morning to the next.  Pt encouraged to walk as much as possible throughout each day.  Pt reminded of her daily fluid and sodium restrictions.  Pt informed of his post-op appts on 5/27 and was provided with a copy of her appts for that day.  Pt verbalized understanding to all instructions and denied having questions at this time.        Showering   If your incisions are healing and there is no drainage - it is ok to take a shower.   Shower with your back to the shower spray.  It is ok for your incision to get wet, but the shower spray should not directly hit your chest.  Do not soak in a tub.   The water temperature should be warm -- not too hot or cold. Extreme water temperatures can cause  you to feel faint.  It is expected that you wash your incisions when you shower, however only use soap and water to cleanse the sites.  Use normal bar soap, not perfumed soap or body wash. During your recovery, do not try a new brand of soap.   Place soapy water on your hand or a clean washcloth and gently wash your incisions using an up-and-down motion.   Do not apply ointments, oils, or salves to your incisions.   Pat the skin gently to dry.      Wash your hands before and after caring for or touching your incisions.  Look in the mirror daily. Inspect your incisions for redness, drainage and warmth. Your incisions should be healing; there should NOT be an increase in opening.  Gently wash your incisions everyday with soap and warm water using a clean washcloth, or your hand and light touch.  Gently pat dry with a clean towel.  You do not need to cover your incisions unless they are draining.  If you are experiencing drainage, it is important to call your doctor.  Monday - Friday, from 8:00am - 5pm, call [phone].  Outside of these hours, please call [phone], which is a 24-hour nurse care advice line.     When to call your doctor:   Increased drainage or oozing from the incision(s)  Increased opening of the incision line(s) - there should not be gaps or pulling apart areas of the incision(s)  Redness along the incision(s) - if the incisions were red or pink when you left the hospital, they should be improving and not becoming more red  Warmth along the incision line(s)  Increased body temperature / Fever - greater than 101 degrees Fahrenheit  If you have diabetes and your blood sugar levels begin to vary

## 2024-05-02 ENCOUNTER — PATIENT MESSAGE (OUTPATIENT)
Dept: CARDIOTHORACIC SURGERY | Facility: CLINIC | Age: 69
End: 2024-05-02
Payer: MEDICARE

## 2024-05-02 ENCOUNTER — TELEPHONE (OUTPATIENT)
Dept: CARDIOTHORACIC SURGERY | Facility: CLINIC | Age: 69
End: 2024-05-02
Payer: MEDICARE

## 2024-05-02 ENCOUNTER — PATIENT OUTREACH (OUTPATIENT)
Dept: ADMINISTRATIVE | Facility: CLINIC | Age: 69
End: 2024-05-02
Payer: MEDICARE

## 2024-05-02 NOTE — TELEPHONE ENCOUNTER
Called pt to assess redness and swelling to the left and right sides of her neck, after applying Cortisone cream and taking Zyrtec earlier, as directed by ROLAND Malave; no answer.  Left VM requesting call back from pt to update Dr. Ochoa's office on the redness and swelling to her neck.

## 2024-05-02 NOTE — PROGRESS NOTES
C3 nurse attempted to contact Heidy Polo  for a TCC post hospital discharge follow up call. No answer. Left voicemail with callback information. The patient does not have a scheduled HOSFU appointment. Message sent to PCP staff for assistance with scheduling visit with patient.

## 2024-05-02 NOTE — PROGRESS NOTES
2nd Attempt made to reach patient for TCC call. Left voicemail please call [phone] leave first name, last name, and  Marilee will return your call.

## 2024-05-02 NOTE — TELEPHONE ENCOUNTER
"Received a call from pt's daughter, Herbert, following hospital discharge, who informed me that pt has redness at the site of where her trialysis catheter was located (left IJ) and redness to the right side of her neck (where her central line was located), which she stated she is unsure is related to the adhesive bandages that were in place.  Herbert asked if pt can apply Cortisone cream to these sites.  I asked Herbert to have the pt wash the sites with soap and water and pat them dry with a clean towel, and to then take a picture of the sites and upload it to [facility] for review, which she verbalized understanding to (pictures located in Patient Message reply from today, titled Important reminders following your recent surgery).  I reiterated the need for pt to remove any bandages in place to her surgical incisions, and to wash her incisions everyday with soap and water, refraining from the application of anything other than soap and water to her incisions, and to pat them dry with a clean towel leaving them open to air.  Herbert informed me that pt's "sternal incision looks great".  Herbert instructed to have pt notify Dr. Ochoa's office if pt experiences any openings, redness, swelling, or drainage to her incisions or if she experiences any fevers, which she verbalized understanding to.  Herbert instructed to have pt walk as much as she can throughout each day.  Reminded Herbert that pt cannot drive for the first 4 weeks after surgery, and for pt to refrain from lifting, pushing, and pulling anything greater than 5 pounds for the first 6 weeks following her surgery.  Herbert informed that pt should wear her surgical bra / any bra without an underwire, to prevent dehiscence to her mid-sternal incision, which she verbalized understanding to.  Herbert instructed to have pt continue utilizing her Incentive Spirometer to expand her lungs and prevent pneumonia.  Reminded about post-op appts on 5/27.  Herbert instructed to call the clinic " with any questions or concerns.  Herbert verbalized understanding to all instructions and denied having further questions at this time.      Pictures of pt's left and right neck received (located in Patient Message reply from today, titled Important reminders following your recent surgery), and reviewed by ROLAND Malave, who stated that pt should take 1 tablet of Zyrtec now, and apply an OTC 2% Cortisone cream to pt's right and left side of her neck, at the sites of redness and swelling.  ROLAND Malave, also stated that pt can take Benadryl, 25 mg, PO, this evening if the redness and swelling has not improved.  Called and spoke with pt and Herbert and informed them that Dr. Ochoa's PA reviewed the pictures that they sent and stated that pt should take 1 tablet of Zyrtec now, and apply an OTC 2% Cortisone cream to pt's right and left side of her neck, at the sites of redness and swelling, which they both verbalized understanding to.  Informed pt and Herbert that ROLAND Malave, also stated that pt can take Benadryl, 25 mg, PO, this evening if the redness and swelling has not improved, which they both verbalized understanding to.  Inquired if pt was experiencing any SOB or difficulty breathing or swallowing, which she denied.  During entirety of call, pt was able to complete full sentences clearly without stopping and without any audible issues.  Pt and her daughter instructed to contact Dr. Ochoa's office if the redness and swelling does not improve, worsens, and /or with any questions or concerns, which they both verbalized understanding to.

## 2024-05-03 NOTE — PROGRESS NOTES
3rd Attempt made to reach patient for TCC call. Left voicemail please call [phone] leave first name, last name, and  Marilee will return your call.

## 2024-05-17 ENCOUNTER — PATIENT MESSAGE (OUTPATIENT)
Dept: ADMINISTRATIVE | Facility: OTHER | Age: 69
End: 2024-05-17
Payer: MEDICARE

## 2024-05-21 NOTE — PROGRESS NOTES
Patient seen and examined. Patient is progressively increasing activity. No significant complaints.     Sternum: stable, see  picture below     Chest xray: Acceptable post op chest  EKG: NSR     Assessment:  S/p Surgery 4/26/24  1. Emergent repair of type A aortic dissection under hypothermic circulatory arrest.  2. Replacement of ascending and hemiarch using a 32 size Gelweave graft with a side-arm.  3. Cutdown of the right femoral artery and femoral vein with Initiation of cardiopulmonary bypass through the right femoral artery.  4. Repair of right femoral artery.  5. Chest closure with Prevena wound VAC 60 x 5 cm due to poor quality of the tissues with increased risk for wound complications and sternal dehiscence.     Plan:  Continue to maintain sternal precautions- on 6/7/2024 ok to push and pull with arms and lift no more than 20 lbs. On 7/19, sternal restrictions are lifted.   Can begin driving as long as he has power steering  Can begin cardiac rehab in the next couple of weeks  We will refer to cardiology to assume care   DC lasix  DC potassium  Sutures removed  Continue wound care   Doxycyline 100 mg PO BID x 7 days          No scheduled appointment, RTC prn

## 2024-05-23 ENCOUNTER — TELEPHONE (OUTPATIENT)
Dept: CARDIOTHORACIC SURGERY | Facility: CLINIC | Age: 69
End: 2024-05-23
Payer: MEDICARE

## 2024-05-23 NOTE — TELEPHONE ENCOUNTER
Called pt and left VM for her apt on Monday with Dr. Ochoa.   Contact no was provided if pt wants to call back.

## 2024-05-24 ENCOUNTER — PATIENT MESSAGE (OUTPATIENT)
Dept: CARDIOTHORACIC SURGERY | Facility: CLINIC | Age: 69
End: 2024-05-24
Payer: MEDICARE

## 2024-05-24 ENCOUNTER — TELEPHONE (OUTPATIENT)
Dept: CARDIOTHORACIC SURGERY | Facility: CLINIC | Age: 69
End: 2024-05-24
Payer: MEDICARE

## 2024-05-24 NOTE — TELEPHONE ENCOUNTER
Called and confirmed pt's appts for 5/27, including times and locations, which she verbalized understanding to.

## 2024-05-27 ENCOUNTER — DOCUMENTATION ONLY (OUTPATIENT)
Dept: CARDIOTHORACIC SURGERY | Facility: CLINIC | Age: 69
End: 2024-05-27

## 2024-05-27 ENCOUNTER — HOSPITAL ENCOUNTER (OUTPATIENT)
Dept: RADIOLOGY | Facility: HOSPITAL | Age: 69
Discharge: HOME | End: 2024-05-27
Attending: THORACIC SURGERY (CARDIOTHORACIC VASCULAR SURGERY)
Payer: MEDICARE

## 2024-05-27 ENCOUNTER — HOSPITAL ENCOUNTER (OUTPATIENT)
Dept: CARDIOLOGY | Facility: CLINIC | Age: 69
Discharge: HOME | End: 2024-05-27
Payer: MEDICARE

## 2024-05-27 ENCOUNTER — HOSPITAL ENCOUNTER (OUTPATIENT)
Dept: CARDIOLOGY | Facility: HOSPITAL | Age: 69
Discharge: HOME | End: 2024-05-27
Attending: THORACIC SURGERY (CARDIOTHORACIC VASCULAR SURGERY)
Payer: MEDICARE

## 2024-05-27 ENCOUNTER — OFFICE VISIT (OUTPATIENT)
Dept: CARDIOTHORACIC SURGERY | Facility: CLINIC | Age: 69
End: 2024-05-27
Payer: MEDICARE

## 2024-05-27 ENCOUNTER — TELEPHONE (OUTPATIENT)
Dept: CARDIAC REHAB | Facility: CLINIC | Age: 69
End: 2024-05-27
Payer: MEDICARE

## 2024-05-27 VITALS
DIASTOLIC BLOOD PRESSURE: 77 MMHG | SYSTOLIC BLOOD PRESSURE: 124 MMHG | WEIGHT: 160 LBS | BODY MASS INDEX: 27.31 KG/M2 | BODY MASS INDEX: 27.6 KG/M2 | HEART RATE: 99 BPM | HEIGHT: 64 IN | WEIGHT: 161.69 LBS | OXYGEN SATURATION: 99 % | HEIGHT: 64 IN

## 2024-05-27 DIAGNOSIS — Z95.828 S/P ASCENDING AORTIC REPLACEMENT: Primary | ICD-10-CM

## 2024-05-27 DIAGNOSIS — Z95.828 S/P ASCENDING AORTIC REPLACEMENT: ICD-10-CM

## 2024-05-27 DIAGNOSIS — Z76.89 ENCOUNTER TO ESTABLISH CARE: ICD-10-CM

## 2024-05-27 LAB
ASCENDING AORTA: 3.3 CM
AV INDEX (PROSTH): 0.76
AV MEAN GRADIENT: 3 MMHG
AV PEAK GRADIENT: 5 MMHG
AV VALVE AREA BY VELOCITY RATIO: 3.27 CM²
AV VALVE AREA: 3.47 CM²
AV VELOCITY RATIO: 0.72
BSA FOR ECHO PROCEDURE: 1.81 M2
CV ECHO LV RWT: 0.43 CM
DOP CALC AO PEAK VEL: 1.17 M/S
DOP CALC AO VTI: 21.05 CM
DOP CALC LVOT AREA: 4.6 CM2
DOP CALC LVOT DIAMETER: 2.41 CM
DOP CALC LVOT PEAK VEL: 0.84 M/S
DOP CALC LVOT STROKE VOLUME: 73 CM3
DOP CALCLVOT PEAK VEL VTI: 16.01 CM
E WAVE DECELERATION TIME: 171.75 MSEC
E/A RATIO: 0.96
E/E' RATIO: 8.11 M/S
ECHO LV POSTERIOR WALL: 0.9 CM (ref 0.6–1.1)
EJECTION FRACTION: 60 %
FRACTIONAL SHORTENING: 24 % (ref 28–44)
INTERVENTRICULAR SEPTUM: 0.95 CM (ref 0.6–1.1)
LA MAJOR: 5.15 CM
LA MINOR: 5.06 CM
LA WIDTH: 4.32 CM
LEFT ATRIUM SIZE: 2.96 CM
LEFT ATRIUM VOLUME INDEX MOD: 40.2 ML/M2
LEFT ATRIUM VOLUME INDEX: 31.2 ML/M2
LEFT ATRIUM VOLUME MOD: 71.51 CM3
LEFT ATRIUM VOLUME: 55.48 CM3
LEFT INTERNAL DIMENSION IN SYSTOLE: 3.18 CM (ref 2.1–4)
LEFT VENTRICLE DIASTOLIC VOLUME INDEX: 44.09 ML/M2
LEFT VENTRICLE DIASTOLIC VOLUME: 78.48 ML
LEFT VENTRICLE MASS INDEX: 69 G/M2
LEFT VENTRICLE SYSTOLIC VOLUME INDEX: 22.6 ML/M2
LEFT VENTRICLE SYSTOLIC VOLUME: 40.28 ML
LEFT VENTRICULAR INTERNAL DIMENSION IN DIASTOLE: 4.2 CM (ref 3.5–6)
LEFT VENTRICULAR MASS: 123.2 G
LV LATERAL E/E' RATIO: 7 M/S
LV SEPTAL E/E' RATIO: 9.63 M/S
MV PEAK A VEL: 0.8 M/S
MV PEAK E VEL: 0.77 M/S
MV STENOSIS PRESSURE HALF TIME: 49.81 MS
MV VALVE AREA P 1/2 METHOD: 4.42 CM2
OHS QRS DURATION: 78 MS
OHS QTC CALCULATION: 475 MS
PISA TR MAX VEL: 1.8 M/S
RA MAJOR: 5.07 CM
RA PRESSURE ESTIMATED: 3 MMHG
RA WIDTH: 3.34 CM
RV TB RVSP: 5 MMHG
SINUS: 3.3 CM
STJ: 2.06 CM
TDI LATERAL: 0.11 M/S
TDI SEPTAL: 0.08 M/S
TDI: 0.1 M/S
TR MAX PG: 13 MMHG
TRICUSPID ANNULAR PLANE SYSTOLIC EXCURSION: 1.16 CM
TV REST PULMONARY ARTERY PRESSURE: 16 MMHG
Z-SCORE OF LEFT VENTRICULAR DIMENSION IN END DIASTOLE: -1.58
Z-SCORE OF LEFT VENTRICULAR DIMENSION IN END SYSTOLE: 0.35

## 2024-05-27 PROCEDURE — 1160F RVW MEDS BY RX/DR IN RCRD: CPT | Mod: CPTII,S$GLB,, | Performed by: THORACIC SURGERY (CARDIOTHORACIC VASCULAR SURGERY)

## 2024-05-27 PROCEDURE — 4010F ACE/ARB THERAPY RXD/TAKEN: CPT | Mod: CPTII,S$GLB,, | Performed by: THORACIC SURGERY (CARDIOTHORACIC VASCULAR SURGERY)

## 2024-05-27 PROCEDURE — 71046 X-RAY EXAM CHEST 2 VIEWS: CPT | Mod: TC

## 2024-05-27 PROCEDURE — 1159F MED LIST DOCD IN RCRD: CPT | Mod: CPTII,S$GLB,, | Performed by: THORACIC SURGERY (CARDIOTHORACIC VASCULAR SURGERY)

## 2024-05-27 PROCEDURE — 93005 ELECTROCARDIOGRAM TRACING: CPT | Mod: S$GLB,,, | Performed by: THORACIC SURGERY (CARDIOTHORACIC VASCULAR SURGERY)

## 2024-05-27 PROCEDURE — 99999 PR PBB SHADOW E&M-EST. PATIENT-LVL III: CPT | Mod: PBBFAC,,, | Performed by: THORACIC SURGERY (CARDIOTHORACIC VASCULAR SURGERY)

## 2024-05-27 PROCEDURE — 3288F FALL RISK ASSESSMENT DOCD: CPT | Mod: CPTII,S$GLB,, | Performed by: THORACIC SURGERY (CARDIOTHORACIC VASCULAR SURGERY)

## 2024-05-27 PROCEDURE — 93306 TTE W/DOPPLER COMPLETE: CPT | Mod: 26,,, | Performed by: INTERNAL MEDICINE

## 2024-05-27 PROCEDURE — 93010 ELECTROCARDIOGRAM REPORT: CPT | Mod: S$GLB,,, | Performed by: INTERNAL MEDICINE

## 2024-05-27 PROCEDURE — 1126F AMNT PAIN NOTED NONE PRSNT: CPT | Mod: CPTII,S$GLB,, | Performed by: THORACIC SURGERY (CARDIOTHORACIC VASCULAR SURGERY)

## 2024-05-27 PROCEDURE — 99024 POSTOP FOLLOW-UP VISIT: CPT | Mod: S$GLB,,, | Performed by: THORACIC SURGERY (CARDIOTHORACIC VASCULAR SURGERY)

## 2024-05-27 PROCEDURE — 3078F DIAST BP <80 MM HG: CPT | Mod: CPTII,S$GLB,, | Performed by: THORACIC SURGERY (CARDIOTHORACIC VASCULAR SURGERY)

## 2024-05-27 PROCEDURE — 71046 X-RAY EXAM CHEST 2 VIEWS: CPT | Mod: 26,,, | Performed by: RADIOLOGY

## 2024-05-27 PROCEDURE — 1101F PT FALLS ASSESS-DOCD LE1/YR: CPT | Mod: CPTII,S$GLB,, | Performed by: THORACIC SURGERY (CARDIOTHORACIC VASCULAR SURGERY)

## 2024-05-27 PROCEDURE — 3074F SYST BP LT 130 MM HG: CPT | Mod: CPTII,S$GLB,, | Performed by: THORACIC SURGERY (CARDIOTHORACIC VASCULAR SURGERY)

## 2024-05-27 PROCEDURE — 93306 TTE W/DOPPLER COMPLETE: CPT

## 2024-05-27 RX ORDER — DOXYCYCLINE 100 MG/1
100 CAPSULE ORAL EVERY 12 HOURS
Qty: 14 CAPSULE | Refills: 0 | Status: SHIPPED | OUTPATIENT
Start: 2024-05-27 | End: 2024-06-03

## 2024-05-27 RX ORDER — ASPIRIN 325 MG
325 TABLET, DELAYED RELEASE (ENTERIC COATED) ORAL DAILY
Qty: 30 TABLET | Refills: 11 | Status: SHIPPED | OUTPATIENT
Start: 2024-05-27 | End: 2025-05-27

## 2024-05-27 RX ORDER — METOPROLOL TARTRATE 25 MG/1
12.5 TABLET, FILM COATED ORAL 2 TIMES DAILY
Qty: 90 TABLET | Refills: 3 | Status: SHIPPED | OUTPATIENT
Start: 2024-05-27 | End: 2025-05-27

## 2024-05-27 RX ORDER — AMLODIPINE BESYLATE 2.5 MG/1
5 TABLET ORAL DAILY
Qty: 180 TABLET | Refills: 3 | Status: SHIPPED | OUTPATIENT
Start: 2024-05-27 | End: 2025-05-27

## 2024-05-27 NOTE — TELEPHONE ENCOUNTER
Letter regarding Phase II cardiac rehab was sent to patient.  Will contact patient in 2 weeks to see if interested.  Also, information letter sent to [facility].  Richard Matias, RN  Cardiac Rehab Nurse

## 2024-05-27 NOTE — PROGRESS NOTES
Phase II cardiac rehab orders submitted to [facility].  Pt will establish care with Cardiology, within the next 4 weeks; [employer] message sent to Cardiologist that Dr. Ochoa is referring pt to, Dr. Dee.  Pt reminded to continue with her 5 pound lifting, pushing, and pulling restrictions for two more weeks, and that she can then advance to lifting, pushing, and pulling up to 20 pounds for 6 weeks, for a total of 12 weeks of restrictions.  Pt reminded to continue washing her incisions everyday with soap and water.  Pt was provided with a copy of her AVS, and instructed on medication changes.  Pt verbalized understanding to all instructions and denied having questions at this time.

## 2024-05-27 NOTE — LETTER
May 27, 2024    Heidy Polo  [address] [place] [address]             [facility]iac [facility]  [address]  [place] [address]  Phone: [phone]                                  [facility]   [address]   [place] [address]  [phone]         [facility]   [address] [place] [address] [phone]         [facility]    [address]  [place] [address] [phone]   Re: Heidy Polo  Clinic number: [id number]    Dear Ms. Polo:    You were recently admitted to an [facility] facility for cardiac (heart) problem.  Your physician has referred you to [facility].  Cardiac Rehab Phase 2 is an educational and exercise program, conducted in a outpatient setting, proven to help reduce your risk for recurrent heart events.    Cardiac rehab has two major parts:    1. Exercise training to help you achieve cardiovascular fitness while learning how to exercise safely and improve muscle strength and endurance.  Your exercise prescription will be based on the results of the cardiopulmonary stress test (CPX) which will be done before entering the program and at completion.  2. Education, counseling and training to help you understand your heart condition and find ways to reduce your risk of future heart problems.  The cardiac rehab team will help you learn how to cope with the stress of adjusting to a new lifestyle and to deal with your fears about the future.    Phase 2 is a 36-session program, meeting 3 times a week for 12 weeks.  Each session consists of an hour of exercise and half-hour dedicated to the educational topic of the day.  Class days vary per location.  Please contact your nearest facility for details.    Through cardiac rehab you will learn:  About your heart condition, medical therapies, and medication  Risk factors in y our lifestyle contributing to heart  disease  New strategies to modify your risk factors  About a healthy diet that can lower your blood cholesterol, control weight, help prevent or control high blood pressure, and diabetes  How to stop smoking  How to manage stress    If you are interested in getting started, call the [facility] of your choosing.     Sincerely,     [facility]

## 2024-05-27 NOTE — PATIENT INSTRUCTIONS
From today through June 7, please do not lift, push, or pull anything greater than 5 pounds.  From June 8 until July 19, you can lift, push, and pull up to 20 pounds.  Beginning on July 20, you will no longer have any lifting, pushing, or pulling restrictions.    Please follow up with your Cardiologist and PCP within the next 4 weeks.    Please continue to wash your surgical incisions everyday with soap and water and pat them dry with a clean towel.  Please do not apply anything to your incisions other than soap and water until you are 3 months out from your surgery.

## 2024-05-31 ENCOUNTER — TELEPHONE (OUTPATIENT)
Dept: CARDIAC REHAB | Facility: CLINIC | Age: 69
End: 2024-05-31
Payer: MEDICARE

## 2024-05-31 NOTE — TELEPHONE ENCOUNTER
----- Message from Lenka Mixon MA sent at 5/30/2024  1:18 PM CDT -----  The patient would like to talk to someone about her rehab please call [phone]. Thank you.

## 2024-06-03 ENCOUNTER — PATIENT MESSAGE (OUTPATIENT)
Dept: ADMINISTRATIVE | Facility: OTHER | Age: 69
End: 2024-06-03
Payer: MEDICARE

## 2024-06-07 DIAGNOSIS — Z95.828 S/P ASCENDING AORTIC REPLACEMENT: Primary | ICD-10-CM

## 2024-06-07 DIAGNOSIS — E78.00 PURE HYPERCHOLESTEROLEMIA: ICD-10-CM

## 2024-06-07 DIAGNOSIS — Z95.828 ACQUIRED PORTAL-SYSTEMIC SHUNT: ICD-10-CM

## 2024-06-07 DIAGNOSIS — R73.9 BLOOD GLUCOSE ELEVATED: ICD-10-CM

## 2024-06-15 ENCOUNTER — HOSPITAL ENCOUNTER (OUTPATIENT)
Facility: HOSPITAL | Age: 69
Discharge: HOME | End: 2024-06-16
Attending: EMERGENCY MEDICINE | Admitting: EMERGENCY MEDICINE
Payer: MEDICARE

## 2024-06-15 DIAGNOSIS — I50.9 CHF (CONGESTIVE HEART FAILURE): ICD-10-CM

## 2024-06-15 DIAGNOSIS — R06.02 SOB (SHORTNESS OF BREATH): ICD-10-CM

## 2024-06-15 LAB
ALBUMIN SERPL BCP-MCNC: 3.7 G/DL (ref 3.5–5.2)
ALP SERPL-CCNC: 92 U/L (ref 55–135)
ALT SERPL W/O P-5'-P-CCNC: 13 U/L (ref 10–44)
ANION GAP SERPL CALC-SCNC: 12 MMOL/L (ref 8–16)
AST SERPL-CCNC: 15 U/L (ref 10–40)
BASOPHILS # BLD AUTO: 0.06 K/UL (ref 0–0.2)
BASOPHILS NFR BLD: 1 % (ref 0–1.9)
BILIRUB SERPL-MCNC: 0.3 MG/DL (ref 0.1–1)
BILIRUB UR QL STRIP: NEGATIVE
BNP SERPL-MCNC: 173 PG/ML (ref 0–99)
BUN SERPL-MCNC: 19 MG/DL (ref 8–23)
CALCIUM SERPL-MCNC: 9.4 MG/DL (ref 8.7–10.5)
CHLORIDE SERPL-SCNC: 107 MMOL/L (ref 95–110)
CLARITY UR REFRACT.AUTO: CLEAR
CO2 SERPL-SCNC: 21 MMOL/L (ref 23–29)
COLOR UR AUTO: COLORLESS
CREAT SERPL-MCNC: 0.8 MG/DL (ref 0.5–1.4)
DIFFERENTIAL METHOD BLD: ABNORMAL
EOSINOPHIL # BLD AUTO: 0.3 K/UL (ref 0–0.5)
EOSINOPHIL NFR BLD: 5.2 % (ref 0–8)
ERYTHROCYTE [DISTWIDTH] IN BLOOD BY AUTOMATED COUNT: 13.9 % (ref 11.5–14.5)
EST. GFR  (NO RACE VARIABLE): >60 ML/MIN/1.73 M^2
GLUCOSE SERPL-MCNC: 91 MG/DL (ref 70–110)
GLUCOSE UR QL STRIP: ABNORMAL
HCT VFR BLD AUTO: 31.1 % (ref 37–48.5)
HCV AB SERPL QL IA: NORMAL
HGB BLD-MCNC: 10.4 G/DL (ref 12–16)
HGB UR QL STRIP: NEGATIVE
HIV 1+2 AB+HIV1 P24 AG SERPL QL IA: NORMAL
IMM GRANULOCYTES # BLD AUTO: 0.01 K/UL (ref 0–0.04)
IMM GRANULOCYTES NFR BLD AUTO: 0.2 % (ref 0–0.5)
KETONES UR QL STRIP: NEGATIVE
LEUKOCYTE ESTERASE UR QL STRIP: NEGATIVE
LYMPHOCYTES # BLD AUTO: 1.5 K/UL (ref 1–4.8)
LYMPHOCYTES NFR BLD: 24.5 % (ref 18–48)
MAGNESIUM SERPL-MCNC: 2 MG/DL (ref 1.6–2.6)
MCH RBC QN AUTO: 30.6 PG (ref 27–31)
MCHC RBC AUTO-ENTMCNC: 33.4 G/DL (ref 32–36)
MCV RBC AUTO: 92 FL (ref 82–98)
MONOCYTES # BLD AUTO: 0.5 K/UL (ref 0.3–1)
MONOCYTES NFR BLD: 8.2 % (ref 4–15)
NEUTROPHILS # BLD AUTO: 3.7 K/UL (ref 1.8–7.7)
NEUTROPHILS NFR BLD: 60.9 % (ref 38–73)
NITRITE UR QL STRIP: NEGATIVE
NRBC BLD-RTO: 0 /100 WBC
PH UR STRIP: 6 [PH] (ref 5–8)
PLATELET # BLD AUTO: 329 K/UL (ref 150–450)
PMV BLD AUTO: 9.5 FL (ref 9.2–12.9)
POTASSIUM SERPL-SCNC: 3.8 MMOL/L (ref 3.5–5.1)
PROT SERPL-MCNC: 7 G/DL (ref 6–8.4)
PROT UR QL STRIP: NEGATIVE
RBC # BLD AUTO: 3.4 M/UL (ref 4–5.4)
SODIUM SERPL-SCNC: 140 MMOL/L (ref 136–145)
SP GR UR STRIP: 1.01 (ref 1–1.03)
TROPONIN I SERPL DL<=0.01 NG/ML-MCNC: <0.006 NG/ML (ref 0–0.03)
URN SPEC COLLECT METH UR: ABNORMAL
WBC # BLD AUTO: 6.01 K/UL (ref 3.9–12.7)

## 2024-06-15 PROCEDURE — 93005 ELECTROCARDIOGRAM TRACING: CPT

## 2024-06-15 PROCEDURE — 83735 ASSAY OF MAGNESIUM: CPT

## 2024-06-15 PROCEDURE — 93010 ELECTROCARDIOGRAM REPORT: CPT | Mod: ,,, | Performed by: INTERNAL MEDICINE

## 2024-06-15 PROCEDURE — 81003 URINALYSIS AUTO W/O SCOPE: CPT

## 2024-06-15 PROCEDURE — 83880 ASSAY OF NATRIURETIC PEPTIDE: CPT

## 2024-06-15 PROCEDURE — 63600175 PHARM REV CODE 636 W HCPCS

## 2024-06-15 PROCEDURE — 86803 HEPATITIS C AB TEST: CPT | Performed by: PHYSICIAN ASSISTANT

## 2024-06-15 PROCEDURE — 80053 COMPREHEN METABOLIC PANEL: CPT

## 2024-06-15 PROCEDURE — 85025 COMPLETE CBC W/AUTO DIFF WBC: CPT

## 2024-06-15 PROCEDURE — 84484 ASSAY OF TROPONIN QUANT: CPT

## 2024-06-15 PROCEDURE — 87389 HIV-1 AG W/HIV-1&-2 AB AG IA: CPT | Performed by: PHYSICIAN ASSISTANT

## 2024-06-15 PROCEDURE — 99285 EMERGENCY DEPT VISIT HI MDM: CPT | Mod: 25

## 2024-06-15 PROCEDURE — 25500020 PHARM REV CODE 255: Performed by: EMERGENCY MEDICINE

## 2024-06-15 PROCEDURE — 96374 THER/PROPH/DIAG INJ IV PUSH: CPT | Mod: 59

## 2024-06-15 RX ORDER — FUROSEMIDE 10 MG/ML
40 INJECTION INTRAMUSCULAR; INTRAVENOUS
Status: COMPLETED | OUTPATIENT
Start: 2024-06-15 | End: 2024-06-15

## 2024-06-15 RX ADMIN — IOHEXOL 75 ML: 350 INJECTION, SOLUTION INTRAVENOUS at 10:06

## 2024-06-15 RX ADMIN — FUROSEMIDE 40 MG: 10 INJECTION, SOLUTION INTRAVENOUS at 09:06

## 2024-06-16 VITALS
BODY MASS INDEX: 28.17 KG/M2 | OXYGEN SATURATION: 98 % | HEIGHT: 64 IN | HEART RATE: 82 BPM | DIASTOLIC BLOOD PRESSURE: 76 MMHG | SYSTOLIC BLOOD PRESSURE: 128 MMHG | WEIGHT: 165 LBS | TEMPERATURE: 98 F | RESPIRATION RATE: 20 BRPM

## 2024-06-16 PROBLEM — R06.02 SHORTNESS OF BREATH: Status: ACTIVE | Noted: 2024-06-16

## 2024-06-16 LAB
ANION GAP SERPL CALC-SCNC: 12 MMOL/L (ref 8–16)
BASOPHILS # BLD AUTO: 0.06 K/UL (ref 0–0.2)
BASOPHILS NFR BLD: 0.8 % (ref 0–1.9)
BSA FOR ECHO PROCEDURE: 1.84 M2
BUN SERPL-MCNC: 17 MG/DL (ref 8–23)
CALCIUM SERPL-MCNC: 9.7 MG/DL (ref 8.7–10.5)
CHLORIDE SERPL-SCNC: 104 MMOL/L (ref 95–110)
CO2 SERPL-SCNC: 25 MMOL/L (ref 23–29)
CREAT SERPL-MCNC: 0.8 MG/DL (ref 0.5–1.4)
CV ECHO LV RWT: 0.43 CM
DIFFERENTIAL METHOD BLD: ABNORMAL
ECHO LV POSTERIOR WALL: 0.88 CM (ref 0.6–1.1)
EJECTION FRACTION: 58 %
EOSINOPHIL # BLD AUTO: 0.3 K/UL (ref 0–0.5)
EOSINOPHIL NFR BLD: 4.4 % (ref 0–8)
ERYTHROCYTE [DISTWIDTH] IN BLOOD BY AUTOMATED COUNT: 14 % (ref 11.5–14.5)
EST. GFR  (NO RACE VARIABLE): >60 ML/MIN/1.73 M^2
FRACTIONAL SHORTENING: 27 % (ref 28–44)
GLUCOSE SERPL-MCNC: 99 MG/DL (ref 70–110)
HCT VFR BLD AUTO: 33.4 % (ref 37–48.5)
HGB BLD-MCNC: 11.3 G/DL (ref 12–16)
IMM GRANULOCYTES # BLD AUTO: 0.02 K/UL (ref 0–0.04)
IMM GRANULOCYTES NFR BLD AUTO: 0.3 % (ref 0–0.5)
INTERVENTRICULAR SEPTUM: 0.87 CM (ref 0.6–1.1)
LEFT ATRIUM SIZE: 3.55 CM
LEFT INTERNAL DIMENSION IN SYSTOLE: 3 CM (ref 2.1–4)
LEFT VENTRICLE DIASTOLIC VOLUME INDEX: 41.04 ML/M2
LEFT VENTRICLE DIASTOLIC VOLUME: 73.88 ML
LEFT VENTRICLE MASS INDEX: 61 G/M2
LEFT VENTRICLE SYSTOLIC VOLUME INDEX: 19.5 ML/M2
LEFT VENTRICLE SYSTOLIC VOLUME: 35.11 ML
LEFT VENTRICULAR INTERNAL DIMENSION IN DIASTOLE: 4.09 CM (ref 3.5–6)
LEFT VENTRICULAR MASS: 109.39 G
LYMPHOCYTES # BLD AUTO: 1.8 K/UL (ref 1–4.8)
LYMPHOCYTES NFR BLD: 24.9 % (ref 18–48)
MAGNESIUM SERPL-MCNC: 2 MG/DL (ref 1.6–2.6)
MCH RBC QN AUTO: 30.5 PG (ref 27–31)
MCHC RBC AUTO-ENTMCNC: 33.8 G/DL (ref 32–36)
MCV RBC AUTO: 90 FL (ref 82–98)
MONOCYTES # BLD AUTO: 0.7 K/UL (ref 0.3–1)
MONOCYTES NFR BLD: 9.2 % (ref 4–15)
NEUTROPHILS # BLD AUTO: 4.3 K/UL (ref 1.8–7.7)
NEUTROPHILS NFR BLD: 60.4 % (ref 38–73)
NRBC BLD-RTO: 0 /100 WBC
OHS QRS DURATION: 78 MS
OHS QTC CALCULATION: 469 MS
PHOSPHATE SERPL-MCNC: 3.4 MG/DL (ref 2.7–4.5)
PISA TR MAX VEL: 2.15 M/S
PLATELET # BLD AUTO: 344 K/UL (ref 150–450)
PMV BLD AUTO: 9.4 FL (ref 9.2–12.9)
POTASSIUM SERPL-SCNC: 3.3 MMOL/L (ref 3.5–5.1)
RA PRESSURE ESTIMATED: 3 MMHG
RBC # BLD AUTO: 3.71 M/UL (ref 4–5.4)
RV TB RVSP: 5 MMHG
SODIUM SERPL-SCNC: 141 MMOL/L (ref 136–145)
TR MAX PG: 18 MMHG
TV REST PULMONARY ARTERY PRESSURE: 21 MMHG
WBC # BLD AUTO: 7.07 K/UL (ref 3.9–12.7)
Z-SCORE OF LEFT VENTRICULAR DIMENSION IN END DIASTOLE: -1.96
Z-SCORE OF LEFT VENTRICULAR DIMENSION IN END SYSTOLE: -0.2

## 2024-06-16 PROCEDURE — 85025 COMPLETE CBC W/AUTO DIFF WBC: CPT

## 2024-06-16 PROCEDURE — 84100 ASSAY OF PHOSPHORUS: CPT

## 2024-06-16 PROCEDURE — 83735 ASSAY OF MAGNESIUM: CPT

## 2024-06-16 PROCEDURE — 63600175 PHARM REV CODE 636 W HCPCS

## 2024-06-16 PROCEDURE — 25000003 PHARM REV CODE 250

## 2024-06-16 PROCEDURE — 80048 BASIC METABOLIC PNL TOTAL CA: CPT

## 2024-06-16 PROCEDURE — G0378 HOSPITAL OBSERVATION PER HR: HCPCS

## 2024-06-16 RX ORDER — IBUPROFEN 200 MG
24 TABLET ORAL
Status: DISCONTINUED | OUTPATIENT
Start: 2024-06-16 | End: 2024-06-16 | Stop reason: HOSPADM

## 2024-06-16 RX ORDER — ASPIRIN 325 MG
325 TABLET, DELAYED RELEASE (ENTERIC COATED) ORAL DAILY
Status: DISCONTINUED | OUTPATIENT
Start: 2024-06-16 | End: 2024-06-16 | Stop reason: HOSPADM

## 2024-06-16 RX ORDER — POLYETHYLENE GLYCOL 3350 17 G/17G
17 POWDER, FOR SOLUTION ORAL DAILY PRN
Status: DISCONTINUED | OUTPATIENT
Start: 2024-06-16 | End: 2024-06-16 | Stop reason: HOSPADM

## 2024-06-16 RX ORDER — METOPROLOL TARTRATE 25 MG/1
12.5 TABLET ORAL 2 TIMES DAILY
Status: DISCONTINUED | OUTPATIENT
Start: 2024-06-16 | End: 2024-06-16 | Stop reason: HOSPADM

## 2024-06-16 RX ORDER — FUROSEMIDE 10 MG/ML
20 INJECTION INTRAMUSCULAR; INTRAVENOUS EVERY 12 HOURS
Status: DISCONTINUED | OUTPATIENT
Start: 2024-06-16 | End: 2024-06-16 | Stop reason: HOSPADM

## 2024-06-16 RX ORDER — SODIUM CHLORIDE 0.9 % (FLUSH) 0.9 %
10 SYRINGE (ML) INJECTION
Status: DISCONTINUED | OUTPATIENT
Start: 2024-06-16 | End: 2024-06-16 | Stop reason: HOSPADM

## 2024-06-16 RX ORDER — IPRATROPIUM BROMIDE AND ALBUTEROL SULFATE 2.5; .5 MG/3ML; MG/3ML
3 SOLUTION RESPIRATORY (INHALATION) EVERY 4 HOURS PRN
Status: DISCONTINUED | OUTPATIENT
Start: 2024-06-16 | End: 2024-06-16 | Stop reason: HOSPADM

## 2024-06-16 RX ORDER — IBUPROFEN 200 MG
16 TABLET ORAL
Status: DISCONTINUED | OUTPATIENT
Start: 2024-06-16 | End: 2024-06-16 | Stop reason: HOSPADM

## 2024-06-16 RX ORDER — AMLODIPINE BESYLATE 5 MG/1
5 TABLET ORAL DAILY
Status: DISCONTINUED | OUTPATIENT
Start: 2024-06-16 | End: 2024-06-16 | Stop reason: HOSPADM

## 2024-06-16 RX ORDER — ACETAMINOPHEN 325 MG/1
650 TABLET ORAL EVERY 4 HOURS PRN
Status: DISCONTINUED | OUTPATIENT
Start: 2024-06-16 | End: 2024-06-16 | Stop reason: HOSPADM

## 2024-06-16 RX ORDER — BISACODYL 10 MG/1
10 SUPPOSITORY RECTAL DAILY PRN
Status: DISCONTINUED | OUTPATIENT
Start: 2024-06-16 | End: 2024-06-16 | Stop reason: HOSPADM

## 2024-06-16 RX ORDER — ONDANSETRON 8 MG/1
8 TABLET, ORALLY DISINTEGRATING ORAL EVERY 8 HOURS PRN
Status: DISCONTINUED | OUTPATIENT
Start: 2024-06-16 | End: 2024-06-16 | Stop reason: HOSPADM

## 2024-06-16 RX ORDER — GLUCAGON 1 MG
1 KIT INJECTION
Status: DISCONTINUED | OUTPATIENT
Start: 2024-06-16 | End: 2024-06-16 | Stop reason: HOSPADM

## 2024-06-16 RX ORDER — PROMETHAZINE HYDROCHLORIDE 25 MG/1
25 TABLET ORAL EVERY 6 HOURS PRN
Status: DISCONTINUED | OUTPATIENT
Start: 2024-06-16 | End: 2024-06-16 | Stop reason: HOSPADM

## 2024-06-16 RX ORDER — FUROSEMIDE 20 MG/1
20 TABLET ORAL DAILY PRN
Qty: 30 TABLET | Refills: 0 | Status: SHIPPED | OUTPATIENT
Start: 2024-06-16 | End: 2024-07-16

## 2024-06-16 RX ORDER — TALC
6 POWDER (GRAM) TOPICAL NIGHTLY PRN
Status: DISCONTINUED | OUTPATIENT
Start: 2024-06-16 | End: 2024-06-16 | Stop reason: HOSPADM

## 2024-06-16 RX ADMIN — METOPROLOL TARTRATE 12.5 MG: 25 TABLET, FILM COATED ORAL at 12:06

## 2024-06-16 RX ADMIN — FUROSEMIDE 20 MG: 10 INJECTION, SOLUTION INTRAMUSCULAR; INTRAVENOUS at 09:06

## 2024-06-16 RX ADMIN — ASPIRIN 325 MG: 325 TABLET, COATED ORAL at 09:06

## 2024-06-16 RX ADMIN — METOPROLOL TARTRATE 12.5 MG: 25 TABLET, FILM COATED ORAL at 09:06

## 2024-06-16 RX ADMIN — AMLODIPINE BESYLATE 5 MG: 5 TABLET ORAL at 09:06

## 2024-06-16 NOTE — ASSESSMENT & PLAN NOTE
- concern for possible CHF exacerbation, has been off lasix since 5/27 per CTS note  -   - CXR w/o pleural effusions  - LE edema on exam, + 5 lb weight gain  - s/p lasix 40 mg IV in ED, will continue 20 mg IV lasix BID  - repeat echo pending  - strict is and os  - fluid restriction  Results for orders placed during the hospital encounter of 05/27/24    Echo    Interpretation Summary    Aorta: The ascending aorta has been surgically replaced by a 3.2 cm graft. Aortic root is normal in size measuring 3.3 cm. Ascending aorta is normal measuring 3.3 cm.    Left Ventricle: The left ventricle is normal in size. Normal wall thickness. There is concentric remodeling. There is normal systolic function. Ejection fraction by visual approximation is 60%. There is normal diastolic function.    Right Ventricle: Normal right ventricular cavity size. Wall thickness is normal. Systolic function is normal.    Aortic Valve: There is no significant regurgitation.    Pulmonary Artery: The estimated pulmonary artery systolic pressure is 16 mmHg.    IVC/SVC: Normal venous pressure at 3 mmHg.

## 2024-06-16 NOTE — ED TRIAGE NOTES
Heidy Polo, a 68 y.o. female presents to the ED from home w/ complaint of SOB and weight gain. She reports gaining 5 lbs since yesterday and generalized edema. She reports SOB since this morning upon waking up. SOB worse when talking and upon exertion. 7 weeks post op following aortic dissection. Hx of HTN and breast cx.     Triage note:  Chief Complaint   Patient presents with    Shortness of Breath     Pt had open heart surgery 7 weeks ago, she is currently c/o SOB and generalized swelling. Pt states she gained 5lbs overnight.      Review of patient's allergies indicates:  No Known Allergies  Past Medical History:   Diagnosis Date    Breast cancer 2001    right    Cancer     breast in 2001 -right     Depression 07/16/2012    History of breast cancer     Hyperlipidemia     Hypertension     Hypothyroid

## 2024-06-16 NOTE — ED NOTES
Nurses Note -- 4 Eyes      6/16/2024   9:26 AM      Skin assessed during: Q Shift Change      [] No Altered Skin Integrity Present    []Prevention Measures Documented      [x] Yes- Altered Skin Integrity Present or Discovered   [] LDA Added if Not in Epic (Describe Wound)   [] New Altered Skin Integrity was Present on Admit and Documented in LDA   [x] Wound Image Taken    Wound Care Consulted? No    Attending Nurse:  Brandee Solorzano RN/Staff Member:  EVA Breen and EVA Buck

## 2024-06-16 NOTE — DISCHARGE INSTRUCTIONS
For Weight Gain > 2-3 lbs in 1 day or 4-6 lbs over 1 week:     - Increase as needed 20 mg lasix dose to daily for 5 days temporarily     - If weight does not decrease by ~2-3 lbs after 5 days of increased diuretic usage:   Notify MD or return to ED

## 2024-06-16 NOTE — ASSESSMENT & PLAN NOTE
Chronic, uncontrolled. Latest blood pressure and vitals reviewed-     Temp:  [98.4 °F (36.9 °C)]   Pulse:  []   Resp:  [16-23]   BP: (139-182)/()   SpO2:  [98 %-100 %] .   Home meds for hypertension were reviewed and noted below.   Hypertension Medications               amLODIPine (NORVASC) 2.5 MG tablet Take 2 tablets (5 mg total) by mouth once daily.    metoprolol tartrate (LOPRESSOR) 25 MG tablet Take ½ tablet (12.5 mg total) by mouth 2 (two) times daily.            While in the hospital, will manage blood pressure as follows; Continue home antihypertensive regimen    Will utilize p.r.n. blood pressure medication only if patient's blood pressure greater than 180/110 and she develops symptoms such as worsening chest pain or shortness of breath.

## 2024-06-16 NOTE — HOSPITAL COURSE
Heidy Polo was admitted to hospital medicine for management of shortness of breath, lower extremity edema and weight gain of +5 lbs. Had aortic dissection repair surgery with vasular  ~7 weeks ago and was discharged home on PO lasix. She was taken off of the lasix ~2 weeks ago and noticed slowly increasing weight gain and shortness of breath. CTA negative for PE or post op repair complications. Given IV lasix with >2 L UOP and significant improvement in symptoms. TTE obtained, aortic dissection repair without issue and no indications of heart failure on imaging. EF 60% with normal diastolic function. Patient eager to discharge home. Given an additional prescription of lasix and instructed on daily weights and when to take lasix for 2-3 weight gain which she is very familiar with. Has an appointment with cardiology soon for close follow up.     On day of discharge patient's vital signs were stable and patient appeared clinically ready for discharge. Hospital course, discharge plan and return precautions discussed - patient expressed understanding. All questions answered at that time.       Physical Exam  Gen: in NAD, appears stated age  Neuro: AAOx3, motor, sensory, and strength grossly intact BL  HEENT: EOMI, PERRLA; no JVD appreciated  CVS: RRR, no m/r/g. Post sternotomy scar well healing    Resp: lungs CTAB, no w/r/r; no belabored breathing or accessory muscle use appreciated   Abd: NTND, soft to palpation  Extrem: no UE or LE edema BL

## 2024-06-16 NOTE — SUBJECTIVE & OBJECTIVE
Past Medical History:   Diagnosis Date    Breast cancer 2001    right    Cancer     breast in 2001 -right     Depression 07/16/2012    History of breast cancer     Hyperlipidemia     Hypertension     Hypothyroid        Past Surgical History:   Procedure Laterality Date    APPLICATION OF WOUND VACUUM-ASSISTED CLOSURE DEVICE N/A 4/26/2024    Procedure: APPLICATION, WOUND VAC;  Surgeon: Aquilino Ochoa MD;  Location: [facility] OR 2ND FLR;  Service: Cardiovascular;  Laterality: N/A;  prevena 60x5    CAPSULOTOMY OF JOINT Left 06/04/2021    Procedure: CAPSULOTOMY, JOINT 2nd MPJ;  Surgeon: Marnie Mckeon DPM;  Location: [facility] OR;  Service: Podiatry;  Laterality: Left;    CAPSULOTOMY OF JOINT Left 01/07/2022    3rd and 4th    CAPSULOTOMY OF JOINT Left 01/07/2022    Procedure: 2,3,4,5;  Surgeon: Marnie Mckeon DPM;  Location: [facility] OR;  Service: Podiatry;  Laterality: Left;    CHOLECYSTECTOMY      COLONOSCOPY N/A 11/09/2020    Procedure: COLONOSCOPY;  Surgeon: Adelaide Hernández MD;  Location: [facility] ENDO;  Service: Endoscopy;  Laterality: N/A;  with biopsy    CORRECTION OF HAMMER TOE Left 06/04/2021    Procedure: CORRECTION, HAMMER TOE 2nd with K-wire stabilization;  Surgeon: Marnie Mckeon DPM;  Location: [facility] OR;  Service: Podiatry;  Laterality: Left;  COVID VACCINATION CONFIRMED    CORRECTION OF HAMMER TOE Left 01/07/2022    3rd, 4th and 5th    CORRECTION OF HAMMER TOE Left 01/07/2022    Procedure: CORRECTION, HAMMER TOE 2,3,4,5;  Surgeon: Marnie Mckeon DPM;  Location: [facility] OR;  Service: Podiatry;  Laterality: Left;    HERNIA REPAIR      LAPAROSCOPIC SLEEVE GASTRECTOMY      MODIFIED RADICAL MASTECTOMY W/ AXILLARY LYMPH NODE DISSECTION Right 2001    REPLACEMENT OF ASCENDING AORTA N/A 4/26/2024    Procedure: REPLACEMENT, AORTA, ASCENDING;  Surgeon: Aquilino Ochoa MD;  Location: [facility] OR 2ND FLR;  Service: Cardiovascular;  Laterality: N/A;  cut down of right femoral artery, aortic valve repair, hemiarch under circulatory arrest    TOTAL KNEE  ARTHROPLASTY Right 02/09/2021    Procedure: ARTHROPLASTY, KNEE, TOTAL;  Surgeon: Ilya Murphy MD;  Location: [facility];  Service: Orthopedics;  Laterality: Right;       Review of patient's allergies indicates:  No Known Allergies    No current facility-administered medications on file prior to encounter.     Current Outpatient Medications on File Prior to Encounter   Medication Sig    amLODIPine (NORVASC) 2.5 MG tablet Take 2 tablets (5 mg total) by mouth once daily.    aspirin (ECOTRIN) 325 MG EC tablet Take 1 tablet (325 mg total) by mouth once daily.    blood pressure monitor (IHEALFleksy EASE) LG arm size (OP) by Other route as needed for High Blood Pressure.    metoprolol tartrate (LOPRESSOR) 25 MG tablet Take ½ tablet (12.5 mg total) by mouth 2 (two) times daily.     Family History       Problem Relation (Age of Onset)    Breast cancer Sister, Maternal Aunt    Cancer Sister, Maternal Aunt, Maternal Grandmother    Diabetes Brother    Heart disease Maternal Grandfather    Hypertension Brother, Brother          Tobacco Use    Smoking status: Never    Smokeless tobacco: Never   Substance and Sexual Activity    Alcohol use: No     Comment: rare    Drug use: No    Sexual activity: Yes     Partners: Male     Birth control/protection: None     Review of Systems   Constitutional:  Negative for chills and fever.   Respiratory:  Positive for shortness of breath. Negative for chest tightness.    Cardiovascular:  Positive for leg swelling. Negative for chest pain and palpitations.   Gastrointestinal:  Negative for abdominal pain and nausea.   Genitourinary:  Negative for dysuria, frequency and urgency.   Neurological:  Negative for dizziness and weakness.     Objective:     Vital Signs (Most Recent):  Temp: 98.4 °F (36.9 °C) (06/15/24 2002)  Pulse: 92 (06/15/24 2300)  Resp: (!) 23 (06/15/24 2300)  BP: (!) 182/95 (06/15/24 2300)  SpO2: 100 % (06/15/24 2300) Vital Signs (24h Range):  Temp:  [98.4 °F (36.9 °C)] 98.4 °F (36.9  °C)  Pulse:  [] 92  Resp:  [16-23] 23  SpO2:  [98 %-100 %] 100 %  BP: (139-182)/() 182/95     Weight: 74.8 kg (165 lb)  Body mass index is 28.32 kg/m².     Physical Exam  Vitals and nursing note reviewed.   Constitutional:       General: She is not in acute distress.     Appearance: She is well-developed. She is not ill-appearing.   HENT:      Head: Normocephalic and atraumatic.   Eyes:      Pupils: Pupils are equal, round, and reactive to light.   Cardiovascular:      Rate and Rhythm: Normal rate and regular rhythm.   Pulmonary:      Effort: Pulmonary effort is normal. No respiratory distress.      Breath sounds: Normal breath sounds. No wheezing or rales.   Abdominal:      Palpations: Abdomen is soft.      Tenderness: There is no abdominal tenderness.   Musculoskeletal:         General: No tenderness.      Right lower leg: Edema (1+ pitting edema) present.      Left lower leg: Edema (1+ pitting edema) present.   Skin:     General: Skin is warm and dry.   Neurological:      Mental Status: She is alert and oriented to person, place, and time.   Psychiatric:         Behavior: Behavior normal.              CRANIAL NERVES     CN III, IV, VI   Pupils are equal, round, and reactive to light.       Significant Labs: All pertinent labs within the past 24 hours have been reviewed.  BMP:   Recent Labs   Lab 06/15/24  2050   GLU 91      K 3.8      CO2 21*   BUN 19   CREATININE 0.8   CALCIUM 9.4   MG 2.0     CBC:   Recent Labs   Lab 06/15/24  2050   WBC 6.01   HGB 10.4*   HCT 31.1*          Significant Imaging: I have reviewed all pertinent imaging results/findings within the past 24 hours.    Imaging Results              CTA Chest Non-Coronary (PE Studies) (Final result)  Result time 06/15/24 22:48:41      Final result by Joseph Osman MD (06/15/24 22:48:41)                   Impression:      1. No evidence of pulmonary embolism.  2. Moderate hiatal hernia with postop changes of the  stomach.      Electronically signed by: Joseph Berg  Date:    06/15/2024  Time:    22:48               Narrative:    EXAMINATION:  CTA CHEST NON CORONARY (PE STUDIES)    CLINICAL HISTORY:  Pulmonary embolism (PE) suspected, unknown D-dimer;    TECHNIQUE:  Low dose axial images, sagittal and coronal reformations were obtained from the thoracic inlet to the lung bases following the IV administration of 100 mL of Omnipaque 350.  Contrast timing was optimized to evaluate the pulmonary arteries.  MIP images were performed.    COMPARISON:  None    FINDINGS:  Pulmonary arteries:Pulmonary arteries are adequately enhanced.No filling defects to indicate pulmonary embolism.    Thoracic soft tissues: Unremarkable.    Aorta: Left-sided aortic arch.  No aneurysm or dissection.    Heart: Normal size. No effusion.    Moderate hiatal hernia with postop changes of the stomach.    Soheila/Mediastinum: No pathologic armando enlargement.    Airways: Patent.    Lungs/Pleura: Clear lungs. No pleural effusion or thickening.    Esophagus: Unremarkable.    Upper Abdomen: No abnormality of the partially imaged upper abdomen.    Bones: No acute fracture. No suspicious lytic or sclerotic lesions.                                       X-Ray Chest AP Portable (Final result)  Result time 06/15/24 20:55:21      Final result by Mushtaq Torrez MD (06/15/24 20:55:21)                   Impression:      No radiographic evidence of pneumonia or other source of cough/shortness of breath, noting that early/mild viral pneumonia or nonspecific bronchitis may be radiographically occult.      Electronically signed by: Mushtaq Torrez MD  Date:    06/15/2024  Time:    20:55               Narrative:    EXAMINATION:  XR CHEST AP PORTABLE    CLINICAL HISTORY:  SOB;    TECHNIQUE:  Single frontal view of the chest was performed.    COMPARISON:  Chest radiograph 05/27/2024, CTA chest 04/25/2024    FINDINGS:  Monitoring leads overlie the chest.  Patient is slightly  rotated.    No detrimental change.  Sternotomy wires and mediastinal clips stable.  Right axillary surgical clips stable.  Stable nonspecific elevation of the right hemidiaphragm.  Cardiomediastinal silhouette is midline and stable without evidence of failure.  Pulmonary vasculature and hilar contours are within normal limits.  The lungs are well expanded without consolidation, pleural effusion or pneumothorax.  No acute osseous process seen.  PA and lateral views can be obtained.

## 2024-06-16 NOTE — H&P
Jerrell Fernandez - Emergency Dept  [facility] Medicine  History & Physical    Patient Name: Heidy Polo  MRN: [id number]  Patient Class: OP- Observation  Admission Date: 6/15/2024  Attending Physician: Josette Cook MD   Primary Care Provider: Chas Angela MD         Patient information was obtained from patient and ER records.     Subjective:     Principal Problem:Shortness of breath    Chief Complaint:   Chief Complaint   Patient presents with    Shortness of Breath     Pt had open heart surgery 7 weeks ago, she is currently c/o SOB and generalized swelling. Pt states she gained 5lbs overnight.         HPI: Heidy Polo is a 68 y.o. female with a hx of aortic dissection s/p repair 7 weeks prior, HTN, and HLD presents to the ED for shortness of breath and lower extremity edema. Patient notes she started to have worsening shortness of breath that began this Am worse with exertion. Felt like she was having a hard time catching her breath. Also noticed to have increased swelling in her legs with an associated 5 pound weight gain overnight. States these symptoms are new for her. She has been watching her fluid intake/ diet at home. She had been doing well since her surgery for her aortic dissection and was originally placed on Lasix but was told to discontinue it about 2 weeks ago. She has not had her follow-up with Cardiology yet. Denies fever, chills, chest pain, back pain, abdominal pain, and urinary symptoms.    ED: AF, hypertensive. CBC/ CMP largely unremarkable. . CXR showed no radiographic evidence of pneumonia or other source of cough/shortness of breath, noting that early/mild viral pneumonia or nonspecific bronchitis may be radiographically occult. CTA Chest showed no evidence of pulmonary embolism. Given lasix in the ED.     Past Medical History:   Diagnosis Date    Breast cancer 2001    right    Cancer     breast in 2001 -right     Depression 07/16/2012    History of breast cancer      Hyperlipidemia     Hypertension     Hypothyroid        Past Surgical History:   Procedure Laterality Date    APPLICATION OF WOUND VACUUM-ASSISTED CLOSURE DEVICE N/A 4/26/2024    Procedure: APPLICATION, WOUND VAC;  Surgeon: Aquilino Ochoa MD;  Location: [facility] OR 2ND FLR;  Service: Cardiovascular;  Laterality: N/A;  prevena 60x5    CAPSULOTOMY OF JOINT Left 06/04/2021    Procedure: CAPSULOTOMY, JOINT 2nd MPJ;  Surgeon: Marnie Mckeon DPM;  Location: [facility] OR;  Service: Podiatry;  Laterality: Left;    CAPSULOTOMY OF JOINT Left 01/07/2022    3rd and 4th    CAPSULOTOMY OF JOINT Left 01/07/2022    Procedure: 2,3,4,5;  Surgeon: Marnie Mckeon DPM;  Location: [facility] OR;  Service: Podiatry;  Laterality: Left;    CHOLECYSTECTOMY      COLONOSCOPY N/A 11/09/2020    Procedure: COLONOSCOPY;  Surgeon: Adelaide Hernández MD;  Location: [facility] ENDO;  Service: Endoscopy;  Laterality: N/A;  with biopsy    CORRECTION OF HAMMER TOE Left 06/04/2021    Procedure: CORRECTION, HAMMER TOE 2nd with K-wire stabilization;  Surgeon: Marnie Mckeon DPM;  Location: [facility] OR;  Service: Podiatry;  Laterality: Left;  COVID VACCINATION CONFIRMED    CORRECTION OF HAMMER TOE Left 01/07/2022    3rd, 4th and 5th    CORRECTION OF HAMMER TOE Left 01/07/2022    Procedure: CORRECTION, HAMMER TOE 2,3,4,5;  Surgeon: Marnie Mckeon DPM;  Location: [facility] OR;  Service: Podiatry;  Laterality: Left;    HERNIA REPAIR      LAPAROSCOPIC SLEEVE GASTRECTOMY      MODIFIED RADICAL MASTECTOMY W/ AXILLARY LYMPH NODE DISSECTION Right 2001    REPLACEMENT OF ASCENDING AORTA N/A 4/26/2024    Procedure: REPLACEMENT, AORTA, ASCENDING;  Surgeon: Aquilino Ochoa MD;  Location: [facility] OR 2ND FLR;  Service: Cardiovascular;  Laterality: N/A;  cut down of right femoral artery, aortic valve repair, hemiarch under circulatory arrest    TOTAL KNEE ARTHROPLASTY Right 02/09/2021    Procedure: ARTHROPLASTY, KNEE, TOTAL;  Surgeon: Ilya Murphy MD;  Location: [facility] OR;  Service: Orthopedics;  Laterality: Right;        Review of patient's allergies indicates:  No Known Allergies    No current facility-administered medications on file prior to encounter.     Current Outpatient Medications on File Prior to Encounter   Medication Sig    amLODIPine (NORVASC) 2.5 MG tablet Take 2 tablets (5 mg total) by mouth once daily.    aspirin (ECOTRIN) 325 MG EC tablet Take 1 tablet (325 mg total) by mouth once daily.    blood pressure monitor ([employer] E[employer]) LG arm size (OP) by Other route as needed for High Blood Pressure.    metoprolol tartrate (LOPRESSOR) 25 MG tablet Take ½ tablet (12.5 mg total) by mouth 2 (two) times daily.     Family History       Problem Relation (Age of Onset)    Breast cancer Sister, Maternal Aunt    Cancer Sister, Maternal Aunt, Maternal Grandmother    Diabetes Brother    Heart disease Maternal Grandfather    Hypertension Brother, Brother          Tobacco Use    Smoking status: Never    Smokeless tobacco: Never   Substance and Sexual Activity    Alcohol use: No     Comment: rare    Drug use: No    Sexual activity: Yes     Partners: Male     Birth control/protection: None     Review of Systems   Constitutional:  Negative for chills and fever.   Respiratory:  Positive for shortness of breath. Negative for chest tightness.    Cardiovascular:  Positive for leg swelling. Negative for chest pain and palpitations.   Gastrointestinal:  Negative for abdominal pain and nausea.   Genitourinary:  Negative for dysuria, frequency and urgency.   Neurological:  Negative for dizziness and weakness.     Objective:     Vital Signs (Most Recent):  Temp: 98.4 °F (36.9 °C) (06/15/24 2002)  Pulse: 92 (06/15/24 2300)  Resp: (!) 23 (06/15/24 2300)  BP: (!) 182/95 (06/15/24 2300)  SpO2: 100 % (06/15/24 2300) Vital Signs (24h Range):  Temp:  [98.4 °F (36.9 °C)] 98.4 °F (36.9 °C)  Pulse:  [] 92  Resp:  [16-23] 23  SpO2:  [98 %-100 %] 100 %  BP: (139-182)/() 182/95     Weight: 74.8 kg (165 lb)  Body mass index is 28.32 kg/m².      Physical Exam  Vitals and nursing note reviewed.   Constitutional:       General: She is not in acute distress.     Appearance: She is well-developed. She is not ill-appearing.   HENT:      Head: Normocephalic and atraumatic.   Eyes:      Pupils: Pupils are equal, round, and reactive to light.   Cardiovascular:      Rate and Rhythm: Normal rate and regular rhythm.   Pulmonary:      Effort: Pulmonary effort is normal. No respiratory distress.      Breath sounds: Normal breath sounds. No wheezing or rales.   Abdominal:      Palpations: Abdomen is soft.      Tenderness: There is no abdominal tenderness.   Musculoskeletal:         General: No tenderness.      Right lower leg: Edema (1+ pitting edema) present.      Left lower leg: Edema (1+ pitting edema) present.   Skin:     General: Skin is warm and dry.   Neurological:      Mental Status: She is alert and oriented to person, place, and time.   Psychiatric:         Behavior: Behavior normal.              CRANIAL NERVES     CN III, IV, VI   Pupils are equal, round, and reactive to light.       Significant Labs: All pertinent labs within the past 24 hours have been reviewed.  BMP:   Recent Labs   Lab 06/15/24  2050   GLU 91      K 3.8      CO2 21*   BUN 19   CREATININE 0.8   CALCIUM 9.4   MG 2.0     CBC:   Recent Labs   Lab 06/15/24  2050   WBC 6.01   HGB 10.4*   HCT 31.1*          Significant Imaging: I have reviewed all pertinent imaging results/findings within the past 24 hours.    Imaging Results              CTA Chest Non-Coronary (PE Studies) (Final result)  Result time 06/15/24 22:48:41      Final result by Joseph Osman MD (06/15/24 22:48:41)                   Impression:      1. No evidence of pulmonary embolism.  2. Moderate hiatal hernia with postop changes of the stomach.      Electronically signed by: Joseph Osman  Date:    06/15/2024  Time:    22:48               Narrative:    EXAMINATION:  CTA CHEST NON CORONARY (PE  STUDIES)    CLINICAL HISTORY:  Pulmonary embolism (PE) suspected, unknown D-dimer;    TECHNIQUE:  Low dose axial images, sagittal and coronal reformations were obtained from the thoracic inlet to the lung bases following the IV administration of 100 mL of Omnipaque 350.  Contrast timing was optimized to evaluate the pulmonary arteries.  MIP images were performed.    COMPARISON:  None    FINDINGS:  Pulmonary arteries:Pulmonary arteries are adequately enhanced.No filling defects to indicate pulmonary embolism.    Thoracic soft tissues: Unremarkable.    Aorta: Left-sided aortic arch.  No aneurysm or dissection.    Heart: Normal size. No effusion.    Moderate hiatal hernia with postop changes of the stomach.    Soheila/Mediastinum: No pathologic armando enlargement.    Airways: Patent.    Lungs/Pleura: Clear lungs. No pleural effusion or thickening.    Esophagus: Unremarkable.    Upper Abdomen: No abnormality of the partially imaged upper abdomen.    Bones: No acute fracture. No suspicious lytic or sclerotic lesions.                                       X-Ray Chest AP Portable (Final result)  Result time 06/15/24 20:55:21      Final result by Mushtaq Torrez MD (06/15/24 20:55:21)                   Impression:      No radiographic evidence of pneumonia or other source of cough/shortness of breath, noting that early/mild viral pneumonia or nonspecific bronchitis may be radiographically occult.      Electronically signed by: Mushtaq Torrez MD  Date:    06/15/2024  Time:    20:55               Narrative:    EXAMINATION:  XR CHEST AP PORTABLE    CLINICAL HISTORY:  SOB;    TECHNIQUE:  Single frontal view of the chest was performed.    COMPARISON:  Chest radiograph 05/27/2024, CTA chest 04/25/2024    FINDINGS:  Monitoring leads overlie the chest.  Patient is slightly rotated.    No detrimental change.  Sternotomy wires and mediastinal clips stable.  Right axillary surgical clips stable.  Stable nonspecific elevation of the right  hemidiaphragm.  Cardiomediastinal silhouette is midline and stable without evidence of failure.  Pulmonary vasculature and hilar contours are within normal limits.  The lungs are well expanded without consolidation, pleural effusion or pneumothorax.  No acute osseous process seen.  PA and lateral views can be obtained.                                      Assessment/Plan:     * Shortness of breath  - concern for possible CHF exacerbation, has been off lasix since 5/27 per CTS note  -   - CXR w/o pleural effusions  - LE edema on exam, + 5 lb weight gain  - s/p lasix 40 mg IV in ED, will continue 20 mg IV lasix BID  - repeat echo pending  - strict is and os  - fluid restriction  Results for orders placed during the hospital encounter of 05/27/24    Echo    Interpretation Summary    Aorta: The ascending aorta has been surgically replaced by a 3.2 cm graft. Aortic root is normal in size measuring 3.3 cm. Ascending aorta is normal measuring 3.3 cm.    Left Ventricle: The left ventricle is normal in size. Normal wall thickness. There is concentric remodeling. There is normal systolic function. Ejection fraction by visual approximation is 60%. There is normal diastolic function.    Right Ventricle: Normal right ventricular cavity size. Wall thickness is normal. Systolic function is normal.    Aortic Valve: There is no significant regurgitation.    Pulmonary Artery: The estimated pulmonary artery systolic pressure is 16 mmHg.    IVC/SVC: Normal venous pressure at 3 mmHg.    Dissecting aneurysm of aorta  - hx noted  - continue ASA  - follows with Cardiology/ CTS outpatient     Essential hypertension  Chronic, uncontrolled. Latest blood pressure and vitals reviewed-     Temp:  [98.4 °F (36.9 °C)]   Pulse:  []   Resp:  [16-23]   BP: (139-182)/()   SpO2:  [98 %-100 %] .   Home meds for hypertension were reviewed and noted below.   Hypertension Medications               amLODIPine (NORVASC) 2.5 MG tablet Take  2 tablets (5 mg total) by mouth once daily.    metoprolol tartrate (LOPRESSOR) 25 MG tablet Take ½ tablet (12.5 mg total) by mouth 2 (two) times daily.            While in the hospital, will manage blood pressure as follows; Continue home antihypertensive regimen    Will utilize p.r.n. blood pressure medication only if patient's blood pressure greater than 180/110 and she develops symptoms such as worsening chest pain or shortness of breath.      VTE Risk Mitigation (From admission, onward)           Ordered     IP VTE LOW RISK PATIENT  Once         06/16/24 0001     Place sequential compression device  Until discontinued         06/16/24 0001                         On 06/16/2024, patient should be placed in hospital observation services under my care in collaboration with Dr. Carlos Andujar.           Katie Luna PA-C  Department of Hospital Medicine  [facility] - Emergency Dept

## 2024-06-16 NOTE — HPI
Heidy Polo is a 68 y.o. female with a hx of aortic dissection s/p repair 7 weeks prior, HTN, and HLD presents to the ED for shortness of breath and lower extremity edema. Patient notes she started to have worsening shortness of breath that began this Am worse with exertion. Felt like she was having a hard time catching her breath. Also noticed to have increased swelling in her legs with an associated 5 pound weight gain overnight. States these symptoms are new for her. She has been watching her fluid intake/ diet at home. She had been doing well since her surgery for her aortic dissection and was originally placed on Lasix but was told to discontinue it about 2 weeks ago. She has not had her follow-up with Cardiology yet. Denies fever, chills, chest pain, back pain, abdominal pain, and urinary symptoms.    ED: AF, hypertensive. CBC/ CMP largely unremarkable. . CXR showed no radiographic evidence of pneumonia or other source of cough/shortness of breath, noting that early/mild viral pneumonia or nonspecific bronchitis may be radiographically occult. CTA Chest showed no evidence of pulmonary embolism. Given lasix in the ED.

## 2024-06-16 NOTE — DISCHARGE SUMMARY
Jerrell Fernandez - Emergency Dept  [facility] Medicine  Discharge Summary      Patient Name: Heidy Polo  MRN: [id number]  TRACY: [id number]  Patient Class: OP- Observation  Admission Date: 6/15/2024  Hospital Length of Stay: 0 days  Discharge Date and Time: 6/16/2024  4:05 PM  Attending Physician: Марина att. providers found   Discharging Provider: Aurora Street PA-C  Primary Care Provider: Chas Angela MD  [facility] Medicine Team: [facility] HOSP MED  Aurora Street PA-C  Primary Care Team: [facility] MED     HPI:   Heidy Polo is a 68 y.o. female with a hx of aortic dissection s/p repair 7 weeks prior, HTN, and HLD presents to the ED for shortness of breath and lower extremity edema. Patient notes she started to have worsening shortness of breath that began this Am worse with exertion. Felt like she was having a hard time catching her breath. Also noticed to have increased swelling in her legs with an associated 5 pound weight gain overnight. States these symptoms are new for her. She has been watching her fluid intake/ diet at home. She had been doing well since her surgery for her aortic dissection and was originally placed on Lasix but was told to discontinue it about 2 weeks ago. She has not had her follow-up with Cardiology yet. Denies fever, chills, chest pain, back pain, abdominal pain, and urinary symptoms.    ED: AF, hypertensive. CBC/ CMP largely unremarkable. . CXR showed no radiographic evidence of pneumonia or other source of cough/shortness of breath, noting that early/mild viral pneumonia or nonspecific bronchitis may be radiographically occult. CTA Chest showed no evidence of pulmonary embolism. Given lasix in the ED.     * No surgery found *      Hospital Course:   Heidy Polo was admitted to hospital medicine for management of shortness of breath, lower extremity edema and weight gain of +5 lbs. Had aortic dissection repair surgery with vasular  ~7 weeks ago and was discharged home on PO  lasix. She was taken off of the lasix ~2 weeks ago and noticed slowly increasing weight gain and shortness of breath. CTA negative for PE or post op repair complications. Given IV lasix with >2 L UOP and significant improvement in symptoms. TTE obtained, aortic dissection repair without issue and no indications of heart failure on imaging. EF 60% with normal diastolic function. Patient eager to discharge home. Given an additional prescription of lasix and instructed on daily weights and when to take lasix for 2-3 weight gain which she is very familiar with. Has an appointment with cardiology soon for close follow up.     On day of discharge patient's vital signs were stable and patient appeared clinically ready for discharge. Hospital course, discharge plan and return precautions discussed - patient expressed understanding. All questions answered at that time.       Physical Exam  Gen: in NAD, appears stated age  Neuro: AAOx3, motor, sensory, and strength grossly intact BL  HEENT: EOMI, PERRLA; no JVD appreciated  CVS: RRR, no m/r/g. Post sternotomy scar well healing    Resp: lungs CTAB, no w/r/r; no belabored breathing or accessory muscle use appreciated   Abd: NTND, soft to palpation  Extrem: no UE or LE edema BL       Goals of Care Treatment Preferences:  Code Status: Full Code      Consults:     No new Assessment & Plan notes have been filed under this hospital service since the last note was generated.  Service: Hospital Medicine    Final Active Diagnoses:    Diagnosis Date Noted POA    PRINCIPAL PROBLEM:  Shortness of breath [R06.02] 06/16/2024 Yes    Dissecting aneurysm of aorta [I71.00] 04/25/2024 Yes    Essential hypertension [I10] 07/16/2012 Yes      Problems Resolved During this Admission:       Discharged Condition: good    Disposition: Home or Self Care    Follow Up:   Follow-up Information       Chas Angela MD Follow up.    Specialty: Family Medicine  Contact information:  [address]  [place] [address]  [phone]               [facility] CARDIOTHORACIC SURGERY Follow up.    Specialty: Cardiothoracic Surgery  Contact information:  Silvia Fernandez  [facility] [id number] [phone]                         Patient Instructions:      Ambulatory referral/consult to Cardiothoracic Surgery   Standing Status: Future   Referral Priority: Urgent Referral Type: Consultation   Referral Reason: Specialty Services Required   Requested Specialty: Cardiothoracic Surgery   Number of Visits Requested: 1     Ambulatory referral/consult to [facility]   Standing Status: Future   Referral Priority: Routine Referral Type: Consultation   Referral Reason: Specialty Services Required   Requested Specialty: Cardiology   Number of Visits Requested: 1     Call MD for:  temperature >100.4     Call MD for:  persistent nausea and vomiting or diarrhea     Call MD for:  severe uncontrolled pain     Call MD for:  redness, tenderness, or signs of infection (pain, swelling, redness, odor or green/yellow discharge around incision site)     Call MD for:  difficulty breathing or increased cough     Call MD for:  severe persistent headache     Call MD for:  worsening rash     Call MD for:  persistent dizziness, light-headedness, or visual disturbances     Call MD for:  increased confusion or weakness     Activity as tolerated       Significant Diagnostic Studies:     Lab Results   Component Value Date    WBC 7.07 06/16/2024    HGB 11.3 (L) 06/16/2024    HCT 33.4 (L) 06/16/2024    MCV 90 06/16/2024     06/16/2024       BMP  Lab Results   Component Value Date     06/16/2024    K 3.3 (L) 06/16/2024     06/16/2024    CO2 25 06/16/2024    BUN 17 06/16/2024    CREATININE 0.8 06/16/2024    CALCIUM 9.7 06/16/2024    ANIONGAP 12 06/16/2024    EGFRNORACEVR >60.0 06/16/2024     Imaging Results              CTA Chest Non-Coronary (PE Studies) (Final result)  Result time  06/15/24 22:48:41      Final result by Joseph Osman MD (06/15/24 22:48:41)                   Impression:      1. No evidence of pulmonary embolism.  2. Moderate hiatal hernia with postop changes of the stomach.      Electronically signed by: Joseph Osman  Date:    06/15/2024  Time:    22:48               Narrative:    EXAMINATION:  CTA CHEST NON CORONARY (PE STUDIES)    CLINICAL HISTORY:  Pulmonary embolism (PE) suspected, unknown D-dimer;    TECHNIQUE:  Low dose axial images, sagittal and coronal reformations were obtained from the thoracic inlet to the lung bases following the IV administration of 100 mL of Omnipaque 350.  Contrast timing was optimized to evaluate the pulmonary arteries.  MIP images were performed.    COMPARISON:  None    FINDINGS:  Pulmonary arteries:Pulmonary arteries are adequately enhanced.No filling defects to indicate pulmonary embolism.    Thoracic soft tissues: Unremarkable.    Aorta: Left-sided aortic arch.  No aneurysm or dissection.    Heart: Normal size. No effusion.    Moderate hiatal hernia with postop changes of the stomach.    Soheila/Mediastinum: No pathologic armando enlargement.    Airways: Patent.    Lungs/Pleura: Clear lungs. No pleural effusion or thickening.    Esophagus: Unremarkable.    Upper Abdomen: No abnormality of the partially imaged upper abdomen.    Bones: No acute fracture. No suspicious lytic or sclerotic lesions.                                       X-Ray Chest AP Portable (Final result)  Result time 06/15/24 20:55:21      Final result by Mushtaq Torrez MD (06/15/24 20:55:21)                   Impression:      No radiographic evidence of pneumonia or other source of cough/shortness of breath, noting that early/mild viral pneumonia or nonspecific bronchitis may be radiographically occult.      Electronically signed by: Mushtaq Torrez MD  Date:    06/15/2024  Time:    20:55               Narrative:    EXAMINATION:  XR CHEST AP PORTABLE    CLINICAL  HISTORY:  SOB;    TECHNIQUE:  Single frontal view of the chest was performed.    COMPARISON:  Chest radiograph 05/27/2024, CTA chest 04/25/2024    FINDINGS:  Monitoring leads overlie the chest.  Patient is slightly rotated.    No detrimental change.  Sternotomy wires and mediastinal clips stable.  Right axillary surgical clips stable.  Stable nonspecific elevation of the right hemidiaphragm.  Cardiomediastinal silhouette is midline and stable without evidence of failure.  Pulmonary vasculature and hilar contours are within normal limits.  The lungs are well expanded without consolidation, pleural effusion or pneumothorax.  No acute osseous process seen.  PA and lateral views can be obtained.                                    Results for orders placed during the hospital encounter of 06/15/24    Echo    Interpretation Summary    Left Ventricle: The left ventricle is normal in size. Normal wall thickness. There is normal systolic function with a visually estimated ejection fraction of 55 - 60%. Ejection fraction by visual approximation is 58%.    Right Ventricle: Normal right ventricular cavity size. Wall thickness is normal. Systolic function is normal.    Aortic Valve: The aortic valve is a trileaflet valve. There is mild aortic valve sclerosis. There is trace aortic regurgitation.    Tricuspid Valve: There is mild regurgitation.    Pulmonary Artery: The estimated pulmonary artery systolic pressure is 21 mmHg.    IVC/SVC: Normal venous pressure at 3 mmHg.    Pending Diagnostic Studies:       None           Medications:  Reconciled Home Medications:      Medication List        START taking these medications      furosemide 20 MG tablet  Commonly known as: LASIX  Take 1 tablet (20 mg total) by mouth daily as needed (weight gain of >3 lbs in 1 day, shortness of breath and lower leg swelling).            CONTINUE taking these medications      amLODIPine 2.5 MG tablet  Commonly known as: NORVASC  Take 2 tablets (5 mg  total) by mouth once daily.     aspirin 325 MG EC tablet  Commonly known as: ECOTRIN  Take 1 tablet (325 mg total) by mouth once daily.     blood pressure monitor LG arm size (OP)  Commonly known as: [employer] E[employer]  by Other route as needed for High Blood Pressure.     metoprolol tartrate 25 MG tablet  Commonly known as: LOPRESSOR  Take ½ tablet (12.5 mg total) by mouth 2 (two) times daily.              Indwelling Lines/Drains at time of discharge:   Lines/Drains/Airways       None                   Time spent on the discharge of patient: 36 minutes         Aurora Street PA-C  Department of Hospital Medicine  [facility] - Emergency Dept

## 2024-06-16 NOTE — ED NOTES
Nurses Note -- 4 Eyes      6/16/2024   2:20 AM      Skin assessed during: Admit      [] No Altered Skin Integrity Present    []Prevention Measures Documented      [x] Yes- Altered Skin Integrity Present or Discovered   [x] LDA Added if Not in Epic (Describe Wound)   [] New Altered Skin Integrity was Present on Admit and Documented in LDA   [x] Wound Image Taken    Wound Care Consulted? No    Attending Nurse:  Nuha Solorzano RN/Staff Member:  EVA Breen and EVA Buck

## 2024-06-16 NOTE — ED PROVIDER NOTES
Encounter Date: 6/15/2024       History     Chief Complaint   Patient presents with    Shortness of Breath     Pt had open heart surgery 7 weeks ago, she is currently c/o SOB and generalized swelling. Pt states she gained 5lbs overnight.      68-year-old female with history of breast cancer, s/p mastectomy, HTN, HLD, hypothyroidism, and other history as stated below who presents to the ED for chief complaint of SOB that started earlier today since she woke up.  Her shortness of breath started while she was sitting, but notes it is worse with exertion.  She denies any chest pain, fevers, chills, abdominal pain, nausea, vomiting, or changes in urination.  Patient states that she has had increased bilateral leg swelling.  She denies any leg pain.  She also notes that she has gained some weight.  Of note, patient had an aortic dissection repair approximately 7 weeks ago.    The history is provided by the patient. No  was used.     Review of patient's allergies indicates:  No Known Allergies  Past Medical History:   Diagnosis Date    Breast cancer 2001    right    Cancer     breast in 2001 -right     Depression 07/16/2012    History of breast cancer     Hyperlipidemia     Hypertension     Hypothyroid      Past Surgical History:   Procedure Laterality Date    APPLICATION OF WOUND VACUUM-ASSISTED CLOSURE DEVICE N/A 4/26/2024    Procedure: APPLICATION, WOUND VAC;  Surgeon: Aquilino Ochoa MD;  Location: [address];  Service: Cardiovascular;  Laterality: N/A;  prevena 60x5    CAPSULOTOMY OF JOINT Left 06/04/2021    Procedure: CAPSULOTOMY, JOINT 2nd MPJ;  Surgeon: Marnie Mckeon DPM;  Location: [facility] OR;  Service: Podiatry;  Laterality: Left;    CAPSULOTOMY OF JOINT Left 01/07/2022    3rd and 4th    CAPSULOTOMY OF JOINT Left 01/07/2022    Procedure: 2,3,4,5;  Surgeon: Marnie Mckeon DPM;  Location: [facility] OR;  Service: Podiatry;  Laterality: Left;    CHOLECYSTECTOMY      COLONOSCOPY N/A 11/09/2020     Procedure: COLONOSCOPY;  Surgeon: Adeliade Hernández MD;  Location: [facility] ENDO;  Service: Endoscopy;  Laterality: N/A;  with biopsy    CORRECTION OF HAMMER TOE Left 06/04/2021    Procedure: CORRECTION, HAMMER TOE 2nd with K-wire stabilization;  Surgeon: Marnie Mckeon DPM;  Location: [facility] OR;  Service: Podiatry;  Laterality: Left;  COVID VACCINATION CONFIRMED    CORRECTION OF HAMMER TOE Left 01/07/2022    3rd, 4th and 5th    CORRECTION OF HAMMER TOE Left 01/07/2022    Procedure: CORRECTION, HAMMER TOE 2,3,4,5;  Surgeon: Marnie Mckeon DPM;  Location: [facility] OR;  Service: Podiatry;  Laterality: Left;    HERNIA REPAIR      LAPAROSCOPIC SLEEVE GASTRECTOMY      MODIFIED RADICAL MASTECTOMY W/ AXILLARY LYMPH NODE DISSECTION Right 2001    REPLACEMENT OF ASCENDING AORTA N/A 4/26/2024    Procedure: REPLACEMENT, AORTA, ASCENDING;  Surgeon: Aquilino Ochoa MD;  Location: [facility] OR [facility] FLR;  Service: Cardiovascular;  Laterality: N/A;  cut down of right femoral artery, aortic valve repair, hemiarch under circulatory arrest    TOTAL KNEE ARTHROPLASTY Right 02/09/2021    Procedure: ARTHROPLASTY, KNEE, TOTAL;  Surgeon: Ilya Murphy MD;  Location: [facility] OR;  Service: Orthopedics;  Laterality: Right;     Family History   Problem Relation Name Age of Onset    Cancer Sister          breast ca    Breast cancer Sister      Diabetes Brother      Hypertension Brother      Cancer Maternal Aunt          breast ca    Breast cancer Maternal Aunt      Cancer Maternal Grandmother          ovarien ca    Heart disease Maternal Grandfather      Hypertension Brother       Social History     Tobacco Use    Smoking status: Never    Smokeless tobacco: Never   Substance Use Topics    Alcohol use: No     Comment: rare    Drug use: No     Review of Systems    Physical Exam     Initial Vitals [06/15/24 2002]   BP Pulse Resp Temp SpO2   139/69 109 16 98.4 °F (36.9 °C) 100 %      MAP       --         Physical Exam    Nursing note and vitals reviewed.  Constitutional:  No distress.   Patient is laying in bed in no apparent distress   HENT:   Head: Normocephalic.   Eyes: Conjunctivae and EOM are normal. No scleral icterus.   Neck: Neck supple.   Normal range of motion.  Cardiovascular:  Regular rhythm, normal heart sounds and intact distal pulses.           Tachycardic.  Bilateral radial and DP pulses intact.   Pulmonary/Chest: Breath sounds normal. No respiratory distress. She has no wheezes. She has no rhonchi. She has no rales. She exhibits no tenderness.   Abdominal: Abdomen is soft. She exhibits no distension. There is no abdominal tenderness. There is no rebound and no guarding.   Musculoskeletal:         General: Edema (Mild BLE edema) present. Normal range of motion.      Cervical back: Normal range of motion and neck supple.     Neurological: She is alert. She has normal strength. No sensory deficit.   5/5 motor strength and light touch sensation in all extremities.   Skin: Skin is warm. Capillary refill takes less than 2 seconds.   Psychiatric: She has a normal mood and affect.         ED Course   Procedures  Labs Reviewed   URINALYSIS, REFLEX TO URINE CULTURE - Abnormal; Notable for the following components:       Result Value    Color, UA Colorless (*)     Glucose, UA Trace (*)     All other components within normal limits    Narrative:     Specimen Source->Urine   B-TYPE NATRIURETIC PEPTIDE - Abnormal; Notable for the following components:     (*)     All other components within normal limits   COMPREHENSIVE METABOLIC PANEL - Abnormal; Notable for the following components:    CO2 21 (*)     All other components within normal limits   CBC W/ AUTO DIFFERENTIAL - Abnormal; Notable for the following components:    RBC 3.40 (*)     Hemoglobin 10.4 (*)     Hematocrit 31.1 (*)     All other components within normal limits   MAGNESIUM   TROPONIN I   HIV 1 / 2 ANTIBODY    Narrative:     Release to patient->Immediate   HEPATITIS C ANTIBODY    Narrative:     Release to  patient->Immediate   MAGNESIUM   PHOSPHORUS   CBC W/ AUTO DIFFERENTIAL          Imaging Results              CTA Chest Non-Coronary (PE Studies) (Final result)  Result time 06/15/24 22:48:41      Final result by Joseph Osman MD (06/15/24 22:48:41)                   Impression:      1. No evidence of pulmonary embolism.  2. Moderate hiatal hernia with postop changes of the stomach.      Electronically signed by: Joseph Osman  Date:    06/15/2024  Time:    22:48               Narrative:    EXAMINATION:  CTA CHEST NON CORONARY (PE STUDIES)    CLINICAL HISTORY:  Pulmonary embolism (PE) suspected, unknown D-dimer;    TECHNIQUE:  Low dose axial images, sagittal and coronal reformations were obtained from the thoracic inlet to the lung bases following the IV administration of 100 mL of Omnipaque 350.  Contrast timing was optimized to evaluate the pulmonary arteries.  MIP images were performed.    COMPARISON:  None    FINDINGS:  Pulmonary arteries:Pulmonary arteries are adequately enhanced.No filling defects to indicate pulmonary embolism.    Thoracic soft tissues: Unremarkable.    Aorta: Left-sided aortic arch.  No aneurysm or dissection.    Heart: Normal size. No effusion.    Moderate hiatal hernia with postop changes of the stomach.    Soheila/Mediastinum: No pathologic armando enlargement.    Airways: Patent.    Lungs/Pleura: Clear lungs. No pleural effusion or thickening.    Esophagus: Unremarkable.    Upper Abdomen: No abnormality of the partially imaged upper abdomen.    Bones: No acute fracture. No suspicious lytic or sclerotic lesions.                                       X-Ray Chest AP Portable (Final result)  Result time 06/15/24 20:55:21      Final result by Mushtaq Torrez MD (06/15/24 20:55:21)                   Impression:      No radiographic evidence of pneumonia or other source of cough/shortness of breath, noting that early/mild viral pneumonia or nonspecific bronchitis may be radiographically  occult.      Electronically signed by: Mushtaq Torrez MD  Date:    06/15/2024  Time:    20:55               Narrative:    EXAMINATION:  XR CHEST AP PORTABLE    CLINICAL HISTORY:  SOB;    TECHNIQUE:  Single frontal view of the chest was performed.    COMPARISON:  Chest radiograph 05/27/2024, CTA chest 04/25/2024    FINDINGS:  Monitoring leads overlie the chest.  Patient is slightly rotated.    No detrimental change.  Sternotomy wires and mediastinal clips stable.  Right axillary surgical clips stable.  Stable nonspecific elevation of the right hemidiaphragm.  Cardiomediastinal silhouette is midline and stable without evidence of failure.  Pulmonary vasculature and hilar contours are within normal limits.  The lungs are well expanded without consolidation, pleural effusion or pneumothorax.  No acute osseous process seen.  PA and lateral views can be obtained.                                       Medications   amLODIPine tablet 5 mg (has no administration in time range)   aspirin EC tablet 325 mg (has no administration in time range)   metoprolol tartrate (LOPRESSOR) split tablet 12.5 mg (12.5 mg Oral Given 6/16/24 0022)   sodium chloride 0.9% flush 10 mL (has no administration in time range)   furosemide injection 20 mg (has no administration in time range)   sodium chloride 0.9% flush 10 mL (has no administration in time range)   albuterol-ipratropium 2.5 mg-0.5 mg/3 mL nebulizer solution 3 mL (has no administration in time range)   melatonin tablet 6 mg (has no administration in time range)   ondansetron disintegrating tablet 8 mg (has no administration in time range)   promethazine tablet 25 mg (has no administration in time range)   polyethylene glycol packet 17 g (has no administration in time range)   bisacodyL suppository 10 mg (has no administration in time range)   acetaminophen tablet 650 mg (has no administration in time range)   glucose chewable tablet 16 g (has no administration in time range)   glucose  chewable tablet 24 g (has no administration in time range)   glucagon (human recombinant) injection 1 mg (has no administration in time range)   dextrose 10% bolus 125 mL 125 mL (has no administration in time range)   dextrose 10% bolus 250 mL 250 mL (has no administration in time range)   furosemide injection 40 mg (40 mg Intravenous Given 6/15/24 2155)   iohexoL (OMNIPAQUE 350) injection 75 mL (75 mLs Intravenous Given 6/15/24 2205)     Medical Decision Making  68-year-old female who presents with dyspnea.  She is tachycardic, other vitals WNL.  On exam, she has mild BLE edema.  Please see physical exam findings above for additional details.  Differential diagnoses include but are not limited to CHF, ACS, anemia, electrolyte abnormality, PE, aortic dissection, aortic aneurysm, PNA, UTI.  Moderate suspicion of a new CHF and fluid retention in the setting of the patient's SOB and BLE edema.  Low suspicion of aortic etiology given that the patient has good bilateral radial pulses and DP pulses, no focal neurological deficits.  Will evaluate for cardiac, infectious, and electrolyte abnormality etiology.  Ordered labs, CXR, and CTPE.  Please see ED course for additional details.    Patient will be admitted to Hospital Medicine for observation in the setting of her dyspnea.    Amount and/or Complexity of Data Reviewed  Labs: ordered. Decision-making details documented in ED Course.  Radiology: ordered and independent interpretation performed. Decision-making details documented in ED Course.  ECG/medicine tests: ordered and independent interpretation performed. Decision-making details documented in ED Course.    Risk  Prescription drug management.               ED Course as of 06/16/24 0218   Sat Carmine 15, 2024   2029 EKG shows sinus tachycardia, rate of 108 beats per minute, and no STEMI. [MD]   2100 BNP(!): 173  Elevated BNP.  Will order Lasix [MD]   2100 X-Ray Chest AP Portable  CXR shows no acute cardiopulmonary  abnormalities.  No significant change compared to prior scan. [MD]   6579 CTA Chest Non-Coronary (PE Studies)  CTPE shows no acute pulmonary emboli.  Moderate hiatal hernia with postop changes of the stomach. [MD]      ED Course User Index  [MD] Quinten Atkinson MD                             Clinical Impression:  Final diagnoses:  [R06.02] SOB (shortness of breath)  [I50.9] CHF (congestive heart failure)          ED Disposition Condition    Observation Stable                  Quinten Atkinson MD  Resident  06/16/24 1296

## 2024-06-17 ENCOUNTER — PATIENT OUTREACH (OUTPATIENT)
Dept: ADMINISTRATIVE | Facility: CLINIC | Age: 69
End: 2024-06-17
Payer: MEDICARE

## 2024-06-21 ENCOUNTER — DOCUMENTATION ONLY (OUTPATIENT)
Dept: CARDIOLOGY | Facility: CLINIC | Age: 69
End: 2024-06-21
Payer: MEDICARE

## 2024-06-21 DIAGNOSIS — R06.02 SOB (SHORTNESS OF BREATH): Primary | ICD-10-CM

## 2024-06-21 NOTE — PROGRESS NOTES
[facility] Phone Enrollment Complete.    Phone enrollment completed due to :Patient discharged before enrollment completed.  Called and spoke to Heidy via phone. Introduced self to pt as HFTCC nurse navigator.      Patient education will be completed on initial visit.     Reviewed the following key points of HFTCC program with pt and family:              1.) Take your medications as directed.               2.) Weight yourself daily              3.) Follow low salt and limited fluid diet.               4.) Stop smoking and start exercising              5.) Go to your appointments and call your team.          Reviewed plan for follow up once discharged to include phone calls, in person and virtual visits to assist pt optimizing their heart failure medication regimen and encouraging healthy lifestyle modifications.  Reminded pt that program will assist them over the next 4-6 weeks and then patient will be transferred to long term care provider .  Reminded pt how to contact HFTCC navigator via phone and or via [employer].      Pt given appointment today via [employer]     Pt also reminded RN will call 48-72 hours after discharge to check on them.      PT and family verbalize read back of information given.  Encouraged pt and family to read over information often and contact team with any questions or concerns.

## 2024-06-24 ENCOUNTER — OFFICE VISIT (OUTPATIENT)
Dept: CARDIOLOGY | Facility: CLINIC | Age: 69
End: 2024-06-24
Payer: MEDICARE

## 2024-06-24 VITALS
WEIGHT: 162.5 LBS | SYSTOLIC BLOOD PRESSURE: 144 MMHG | OXYGEN SATURATION: 95 % | HEIGHT: 64 IN | BODY MASS INDEX: 27.74 KG/M2 | DIASTOLIC BLOOD PRESSURE: 75 MMHG | HEART RATE: 90 BPM

## 2024-06-24 DIAGNOSIS — I10 ESSENTIAL HYPERTENSION: ICD-10-CM

## 2024-06-24 DIAGNOSIS — Z76.89 ENCOUNTER TO ESTABLISH CARE: ICD-10-CM

## 2024-06-24 DIAGNOSIS — Z95.828 S/P ASCENDING AORTIC REPLACEMENT: Primary | ICD-10-CM

## 2024-06-24 DIAGNOSIS — I50.32 CHRONIC HEART FAILURE WITH PRESERVED EJECTION FRACTION: ICD-10-CM

## 2024-06-24 DIAGNOSIS — E78.2 MIXED HYPERLIPIDEMIA: ICD-10-CM

## 2024-06-24 PROBLEM — R73.9 TRANSIENT HYPERGLYCEMIA POST PROCEDURE: Status: RESOLVED | Noted: 2024-04-26 | Resolved: 2024-06-24

## 2024-06-24 PROBLEM — R06.02 SHORTNESS OF BREATH: Status: RESOLVED | Noted: 2024-06-16 | Resolved: 2024-06-24

## 2024-06-24 PROCEDURE — 99204 OFFICE O/P NEW MOD 45 MIN: CPT | Mod: S$GLB,,, | Performed by: INTERNAL MEDICINE

## 2024-06-24 PROCEDURE — 99999 PR PBB SHADOW E&M-EST. PATIENT-LVL IV: CPT | Mod: PBBFAC,,, | Performed by: INTERNAL MEDICINE

## 2024-06-24 PROCEDURE — 3078F DIAST BP <80 MM HG: CPT | Mod: CPTII,S$GLB,, | Performed by: INTERNAL MEDICINE

## 2024-06-24 PROCEDURE — 3008F BODY MASS INDEX DOCD: CPT | Mod: CPTII,S$GLB,, | Performed by: INTERNAL MEDICINE

## 2024-06-24 PROCEDURE — 4010F ACE/ARB THERAPY RXD/TAKEN: CPT | Mod: CPTII,S$GLB,, | Performed by: INTERNAL MEDICINE

## 2024-06-24 PROCEDURE — 3077F SYST BP >= 140 MM HG: CPT | Mod: CPTII,S$GLB,, | Performed by: INTERNAL MEDICINE

## 2024-06-24 PROCEDURE — 1159F MED LIST DOCD IN RCRD: CPT | Mod: CPTII,S$GLB,, | Performed by: INTERNAL MEDICINE

## 2024-06-24 PROCEDURE — 1126F AMNT PAIN NOTED NONE PRSNT: CPT | Mod: CPTII,S$GLB,, | Performed by: INTERNAL MEDICINE

## 2024-06-24 RX ORDER — TELMISARTAN 40 MG/1
40 TABLET ORAL DAILY
Qty: 90 TABLET | Refills: 3 | Status: SHIPPED | OUTPATIENT
Start: 2024-06-24

## 2024-06-24 RX ORDER — ASPIRIN 81 MG/1
81 TABLET ORAL DAILY
Qty: 90 TABLET | Refills: 3 | Status: SHIPPED | OUTPATIENT
Start: 2024-06-24

## 2024-06-24 RX ORDER — ROSUVASTATIN CALCIUM 10 MG/1
10 TABLET, COATED ORAL DAILY
Qty: 90 TABLET | Refills: 3 | Status: SHIPPED | OUTPATIENT
Start: 2024-06-24 | End: 2025-06-24

## 2024-06-24 RX ORDER — FUROSEMIDE 20 MG/1
20 TABLET ORAL DAILY PRN
Qty: 60 TABLET | Refills: 0 | Status: SHIPPED | OUTPATIENT
Start: 2024-06-24

## 2024-06-24 NOTE — PROGRESS NOTES
PCP - Chas Angela MD    Subjective:   Heidy Polo is a 68 y.o. y.o. female with PMH significant for HTN, Type A aortic dissection in 4/2024 s/p surgical repair with Dr Ochoa who presents for     On 4/25/24, had sudden onset of L sided chest pain radiating to L side of neck and back b/w shoulder blades. She then got LH. Also got blurred vision. Then started getting SOB. Drove to [facility] ED. CTA showed [place] type A aortic dissection with dissection flap extending from the aortic root up to the level of the distal arch. Associated aneurysmal dilatation measuring up to 5 cm in maximum dimension. Coronary arteries, right brachiocephalic, left common carotid and left subclavian arteries arise from the true lumen and demonstrate appropriate contrast opacification. No mediastinal hematoma.     She was immediately moved to [facility] and surgery was done next AM by Dr Ochoa. She was subsequently discharged home and comes today for outpatient visit. She had to visit ED last week with SOB, leg swelling and 5 lbs weight gain and CXR showed mild congestion. Given IV lasix and next day echo was normal as below with CVP 3 and normal EF. On prn lasix since.     She says currently doing fine with lasix. W/o lasix, fluid comes back and feel SOB, orthopnea and leg swelling.     Echo 6/16/24    Left Ventricle: The left ventricle is normal in size. Normal wall thickness. There is normal systolic function with a visually estimated ejection fraction of 55 - 60%. Ejection fraction by visual approximation is 58%.    Right Ventricle: Normal right ventricular cavity size. Wall thickness is normal. Systolic function is normal.    Aortic Valve: The aortic valve is a trileaflet valve. There is mild aortic valve sclerosis. There is trace aortic regurgitation.    Tricuspid Valve: There is mild regurgitation.    Pulmonary Artery: The estimated pulmonary artery systolic pressure is 21 mmHg.    IVC/SVC: Normal venous pressure at  "3 mmHg.      Social History     Tobacco Use    Smoking status: Never    Smokeless tobacco: Never   Substance Use Topics    Alcohol use: No     Comment: rare     Family History   Problem Relation Name Age of Onset    Cancer Sister          breast ca    Breast cancer Sister      Diabetes Brother      Hypertension Brother      Cancer Maternal Aunt          breast ca    Breast cancer Maternal Aunt      Cancer Maternal Grandmother          ovarien ca    Heart disease Maternal Grandfather      Hypertension Brother         Meds:   Review of patient's allergies indicates:  No Known Allergies    Current Outpatient Medications:     blood pressure monitor (IHEALTH EASE) LG arm size (OP), by Other route as needed for High Blood Pressure., Disp: 1 each, Rfl: 0    metoprolol tartrate (LOPRESSOR) 25 MG tablet, Take ½ tablet (12.5 mg total) by mouth 2 (two) times daily., Disp: 90 tablet, Rfl: 3    aspirin (ECOTRIN) 81 MG EC tablet, Take 1 tablet (81 mg total) by mouth once daily., Disp: 90 tablet, Rfl: 3    furosemide (LASIX) 20 MG tablet, Take 1 tablet (20 mg total) by mouth daily as needed (weight gain of >3 lbs in 1 day, shortness of breath and lower leg swelling)., Disp: 60 tablet, Rfl: 0    rosuvastatin (CRESTOR) 10 MG tablet, Take 1 tablet (10 mg total) by mouth once daily., Disp: 90 tablet, Rfl: 3    telmisartan (MICARDIS) 40 MG Tab, Take 1 tablet (40 mg total) by mouth once daily., Disp: 90 tablet, Rfl: 3    Review of Systems   Constitutional:  Negative for chills and fever.   Respiratory:  Positive for shortness of breath (occasional).    Cardiovascular:  Positive for leg swelling (occasional). Negative for chest pain, orthopnea and PND.       Objective:   BP (!) 144/75   Pulse 90   Ht 5' 4" (1.626 m)   Wt 73.7 kg (162 lb 7.7 oz)   SpO2 95%   BMI 27.89 kg/m²   Physical Exam  HENT:      Head: Normocephalic.   Cardiovascular:      Rate and Rhythm: Normal rate and regular rhythm.   Pulmonary:      Effort: Pulmonary " "effort is normal.   Abdominal:      General: Abdomen is flat.   Skin:     General: Skin is warm.   Neurological:      General: No focal deficit present.      Mental Status: She is alert.   Psychiatric:         Mood and Affect: Mood normal.       Labs:     Lab Results   Component Value Date     06/16/2024    K 3.3 (L) 06/16/2024     06/16/2024    CO2 25 06/16/2024    BUN 17 06/16/2024    CREATININE 0.8 06/16/2024    ANIONGAP 12 06/16/2024     Lab Results   Component Value Date    HGBA1C 4.8 11/22/2023     Lab Results   Component Value Date     (H) 06/15/2024     (H) 04/25/2024       Lab Results   Component Value Date    WBC 7.07 06/16/2024    HGB 11.3 (L) 06/16/2024    HCT 33.4 (L) 06/16/2024    HCT 24 (L) 04/27/2024     06/16/2024    GRAN 4.3 06/16/2024    GRAN 60.4 06/16/2024     Lab Results   Component Value Date    CHOL 167 11/22/2023    HDL 62 11/22/2023    LDLCALC 87.0 11/22/2023    TRIG 90 11/22/2023       Lab Results   Component Value Date     06/16/2024    K 3.3 (L) 06/16/2024     06/16/2024    CO2 25 06/16/2024    BUN 17 06/16/2024    CREATININE 0.8 06/16/2024    ANIONGAP 12 06/16/2024     Lab Results   Component Value Date    HGBA1C 4.8 11/22/2023     Lab Results   Component Value Date     (H) 06/15/2024     (H) 04/25/2024       Vital Signs:   BP (!) 144/75   Pulse 90   Ht 5' 4" (1.626 m)   Wt 73.7 kg (162 lb 7.7 oz)   SpO2 95%   BMI 27.89 kg/m²   Lab Results   Component Value Date    WBC 7.07 06/16/2024    HGB 11.3 (L) 06/16/2024    HCT 33.4 (L) 06/16/2024    HCT 24 (L) 04/27/2024     06/16/2024    GRAN 4.3 06/16/2024    GRAN 60.4 06/16/2024     Lab Results   Component Value Date    CHOL 167 11/22/2023    HDL 62 11/22/2023    LDLCALC 87.0 11/22/2023    TRIG 90 11/22/2023            Assessment & Plan:     1. S/P ascending aortic replacement    2. Encounter to establish care    3. Essential hypertension    4. Mixed hyperlipidemia    5. " Chronic heart failure with preserved ejection fraction      #HFpEF, chronic and post-op  -gets occasional SOB, weight gain and leg edema that resolved with prn lasix 20 mg  -needed to take lasix twice in the past week  -we talked about fluid and salt restriction  -echo rev'd, EF normal, CVP 3 but was after IV lasix in the ED  -BMP to review K and Cr    #HTN  -144/75 in the office, reviewed home BP readings on her cell phone and BP is mostly above goal  -adding her prior medicine telmisartan 40 mg qd back on, continue lopressor 12.5 mg bid meanwhile  -check BP at home, goal SBP <130    #ASCVD risk  -10.4% 10 years ASCVD risk  -crestor 10 mg started today    #s/p ascending aortic arch replacement  -taking  daily, cut it back to 81 mg EC daily tablet  -need to control BP   -doesn't smoke, no DM    F/up in 10-12 weeks    Signed:  Paul Gaytan M.D.  Cardiovascular Fellow PGY-V  [facility]

## 2024-06-25 ENCOUNTER — PATIENT MESSAGE (OUTPATIENT)
Dept: CARDIOLOGY | Facility: CLINIC | Age: 69
End: 2024-06-25
Payer: MEDICARE

## 2024-07-01 ENCOUNTER — OFFICE VISIT (OUTPATIENT)
Dept: PRIMARY CARE CLINIC | Facility: CLINIC | Age: 69
End: 2024-07-01
Payer: MEDICARE

## 2024-07-01 ENCOUNTER — PATIENT MESSAGE (OUTPATIENT)
Dept: PRIMARY CARE CLINIC | Facility: CLINIC | Age: 69
End: 2024-07-01

## 2024-07-01 VITALS
DIASTOLIC BLOOD PRESSURE: 70 MMHG | WEIGHT: 163.69 LBS | BODY MASS INDEX: 27.95 KG/M2 | HEIGHT: 64 IN | HEART RATE: 102 BPM | SYSTOLIC BLOOD PRESSURE: 128 MMHG | OXYGEN SATURATION: 99 % | RESPIRATION RATE: 18 BRPM

## 2024-07-01 DIAGNOSIS — I10 ESSENTIAL HYPERTENSION: ICD-10-CM

## 2024-07-01 DIAGNOSIS — Z95.828 S/P ASCENDING AORTIC REPLACEMENT: Primary | ICD-10-CM

## 2024-07-01 DIAGNOSIS — E78.2 MIXED HYPERLIPIDEMIA: ICD-10-CM

## 2024-07-01 DIAGNOSIS — I50.32 CHRONIC HEART FAILURE WITH PRESERVED EJECTION FRACTION: ICD-10-CM

## 2024-07-01 PROBLEM — Z47.89 AFTERCARE FOLLOWING SURGERY OF THE MUSCULOSKELETAL SYSTEM: Status: RESOLVED | Noted: 2021-05-12 | Resolved: 2024-07-01

## 2024-07-01 PROBLEM — R73.03 PREDIABETES: Status: RESOLVED | Noted: 2020-10-19 | Resolved: 2024-07-01

## 2024-07-01 PROCEDURE — 3078F DIAST BP <80 MM HG: CPT | Mod: CPTII,S$GLB,, | Performed by: FAMILY MEDICINE

## 2024-07-01 PROCEDURE — 1101F PT FALLS ASSESS-DOCD LE1/YR: CPT | Mod: CPTII,S$GLB,, | Performed by: FAMILY MEDICINE

## 2024-07-01 PROCEDURE — 3008F BODY MASS INDEX DOCD: CPT | Mod: CPTII,S$GLB,, | Performed by: FAMILY MEDICINE

## 2024-07-01 PROCEDURE — 3288F FALL RISK ASSESSMENT DOCD: CPT | Mod: CPTII,S$GLB,, | Performed by: FAMILY MEDICINE

## 2024-07-01 PROCEDURE — 1160F RVW MEDS BY RX/DR IN RCRD: CPT | Mod: CPTII,S$GLB,, | Performed by: FAMILY MEDICINE

## 2024-07-01 PROCEDURE — 1126F AMNT PAIN NOTED NONE PRSNT: CPT | Mod: CPTII,S$GLB,, | Performed by: FAMILY MEDICINE

## 2024-07-01 PROCEDURE — 4010F ACE/ARB THERAPY RXD/TAKEN: CPT | Mod: CPTII,S$GLB,, | Performed by: FAMILY MEDICINE

## 2024-07-01 PROCEDURE — 99214 OFFICE O/P EST MOD 30 MIN: CPT | Mod: S$GLB,,, | Performed by: FAMILY MEDICINE

## 2024-07-01 PROCEDURE — 1159F MED LIST DOCD IN RCRD: CPT | Mod: CPTII,S$GLB,, | Performed by: FAMILY MEDICINE

## 2024-07-01 PROCEDURE — 3074F SYST BP LT 130 MM HG: CPT | Mod: CPTII,S$GLB,, | Performed by: FAMILY MEDICINE

## 2024-07-01 PROCEDURE — 99999 PR PBB SHADOW E&M-EST. PATIENT-LVL III: CPT | Mod: PBBFAC,,, | Performed by: FAMILY MEDICINE

## 2024-07-01 RX ORDER — CHLORTHALIDONE 25 MG/1
25 TABLET ORAL DAILY
Qty: 90 TABLET | Refills: 3 | Status: SHIPPED | OUTPATIENT
Start: 2024-07-01

## 2024-07-01 NOTE — PROGRESS NOTES
"Subjective:       Patient ID: Heidy Polo is a 68 y.o. female.    Chief Complaint: Follow-up (4 week )    Hospitalized with ascending aortic dissection, underwent emergent surgery with the next morning.  Has done reasonably well postoperatively.  Blood pressure was running a little high, so she was started back on telmisartan recently.  Had an episode of shortness of breath with mild pulmonary vascular congestion, treated with a dose of IV Lasix.  Has been taking p.r.n. Lasix ever since, taking approximately every other day.  Most recent echo looked fairly good.  Followed closely by Cardiology.  In the past, she has taken hydrochlorothiazide and, more recently, chlorthalidone.  Would like to try to get back on that instead of taking p.r.n. Lasix, if possible.    Follow-up  Pertinent negatives include no chest pain, chills or fever.     Review of Systems   Constitutional:  Negative for appetite change, chills, fever and unexpected weight change.   Respiratory:  Positive for shortness of breath.    Cardiovascular:  Positive for leg swelling. Negative for chest pain and palpitations.   Neurological:  Negative for dizziness.   Psychiatric/Behavioral:  Negative for agitation and confusion.        Objective:      Vitals:    07/01/24 1108   BP: 128/70   BP Location: Left arm   Patient Position: Sitting   BP Method: Medium (Manual)   Pulse: 102   Resp: 18   SpO2: 99%   Weight: 74.2 kg (163 lb 11.1 oz)   Height: 5' 4" (1.626 m)     BP Readings from Last 5 Encounters:   07/01/24 128/70   06/24/24 (!) 144/75   06/16/24 128/76   05/27/24 124/77   05/01/24 (!) 153/64     Wt Readings from Last 5 Encounters:   07/01/24 74.2 kg (163 lb 11.1 oz)   06/24/24 73.7 kg (162 lb 7.7 oz)   06/16/24 74.8 kg (165 lb)   05/27/24 73.4 kg (161 lb 11.3 oz)   05/27/24 72.6 kg (160 lb)     Physical Exam  Vitals and nursing note reviewed.   Constitutional:       General: She is not in acute distress.     Appearance: Normal appearance. She is " well-developed.   HENT:      Head: Normocephalic and atraumatic.   Cardiovascular:      Rate and Rhythm: Normal rate and regular rhythm.      Heart sounds: Normal heart sounds.   Pulmonary:      Effort: Pulmonary effort is normal.      Breath sounds: Normal breath sounds.   Musculoskeletal:      Right lower leg: No edema.      Left lower leg: No edema.   Skin:     General: Skin is warm and dry.   Neurological:      Mental Status: She is alert and oriented to person, place, and time.   Psychiatric:         Mood and Affect: Mood normal.         Behavior: Behavior normal.         Lab Results   Component Value Date    WBC 7.07 06/16/2024    HGB 11.3 (L) 06/16/2024    HCT 33.4 (L) 06/16/2024     06/16/2024    CHOL 167 11/22/2023    TRIG 90 11/22/2023    HDL 62 11/22/2023    ALT 13 06/15/2024    AST 15 06/15/2024     06/25/2024    K 3.9 06/25/2024     06/25/2024    CREATININE 0.8 06/25/2024    BUN 20 06/25/2024    CO2 25 06/25/2024    TSH 2.684 04/25/2024    INR 1.0 04/29/2024    HGBA1C 4.8 11/22/2023      Assessment:       1. S/P ascending aortic replacement    2. Chronic heart failure with preserved ejection fraction    3. Essential hypertension    4. Mixed hyperlipidemia        Plan:       S/P ascending aortic replacement  Management as per Cardiology  Chronic heart failure with preserved ejection fraction  Continue p.r.n. Lasix  Essential hypertension  -     chlorthalidone (HYGROTEN) 25 MG Tab; Take 1 tablet (25 mg total) by mouth once daily.  Dispense: 90 tablet; Refill: 3  Restart chlorthalidone.  Will message for updated home blood pressure readings in a few weeks  Mixed hyperlipidemia  Continue rosuvastatin    Medication List with Changes/Refills   New Medications    CHLORTHALIDONE (HYGROTEN) 25 MG TAB    Take 1 tablet (25 mg total) by mouth once daily.   Current Medications    ASPIRIN (ECOTRIN) 81 MG EC TABLET    Take 1 tablet (81 mg total) by mouth once daily.    BLOOD PRESSURE MONITOR  (IHEALTH EASE) LG ARM SIZE (OP)    by Other route as needed for High Blood Pressure.    FUROSEMIDE (LASIX) 20 MG TABLET    Take 1 tablet (20 mg total) by mouth daily as needed (weight gain of >3 lbs in 1 day, shortness of breath and lower leg swelling).    METOPROLOL TARTRATE (LOPRESSOR) 25 MG TABLET    Take ½ tablet (12.5 mg total) by mouth 2 (two) times daily.    ROSUVASTATIN (CRESTOR) 10 MG TABLET    Take 1 tablet (10 mg total) by mouth once daily.    TELMISARTAN (MICARDIS) 40 MG TAB    Take 1 tablet (40 mg total) by mouth once daily.

## 2024-07-03 ENCOUNTER — TELEPHONE (OUTPATIENT)
Dept: CARDIOLOGY | Facility: CLINIC | Age: 69
End: 2024-07-03

## 2024-07-03 ENCOUNTER — HOSPITAL ENCOUNTER (OUTPATIENT)
Dept: CARDIOLOGY | Facility: CLINIC | Age: 69
Discharge: HOME | End: 2024-07-03
Payer: MEDICARE

## 2024-07-03 ENCOUNTER — OFFICE VISIT (OUTPATIENT)
Dept: CARDIOLOGY | Facility: CLINIC | Age: 69
End: 2024-07-03
Payer: MEDICARE

## 2024-07-03 ENCOUNTER — HOSPITAL ENCOUNTER (OUTPATIENT)
Dept: CARDIOLOGY | Facility: HOSPITAL | Age: 69
Discharge: HOME | End: 2024-07-03
Attending: THORACIC SURGERY (CARDIOTHORACIC VASCULAR SURGERY)
Payer: MEDICARE

## 2024-07-03 VITALS
SYSTOLIC BLOOD PRESSURE: 130 MMHG | HEART RATE: 91 BPM | WEIGHT: 163 LBS | DIASTOLIC BLOOD PRESSURE: 80 MMHG | BODY MASS INDEX: 27.83 KG/M2 | HEIGHT: 64 IN

## 2024-07-03 VITALS
OXYGEN SATURATION: 100 % | SYSTOLIC BLOOD PRESSURE: 127 MMHG | WEIGHT: 163.25 LBS | HEIGHT: 64 IN | BODY MASS INDEX: 27.87 KG/M2 | DIASTOLIC BLOOD PRESSURE: 62 MMHG | HEART RATE: 102 BPM

## 2024-07-03 DIAGNOSIS — I10 ESSENTIAL HYPERTENSION: ICD-10-CM

## 2024-07-03 DIAGNOSIS — R00.0 TACHYCARDIA: ICD-10-CM

## 2024-07-03 DIAGNOSIS — Z95.828 S/P ASCENDING AORTIC REPLACEMENT: ICD-10-CM

## 2024-07-03 DIAGNOSIS — I50.32 CHRONIC HEART FAILURE WITH PRESERVED EJECTION FRACTION: Primary | ICD-10-CM

## 2024-07-03 DIAGNOSIS — I71.00 DISSECTING ANEURYSM OF AORTA: ICD-10-CM

## 2024-07-03 DIAGNOSIS — E78.2 MIXED HYPERLIPIDEMIA: ICD-10-CM

## 2024-07-03 LAB
CV STRESS BASE HR: 91 BPM
DIASTOLIC BLOOD PRESSURE: 80 MMHG
OHS CV CPX 1 MINUTE RECOVERY HEART RATE: 122 BPM
OHS CV CPX 85 PERCENT MAX PREDICTED HEART RATE MALE: 129
OHS CV CPX ABDOMINAL GIRTH: 40 CM
OHS CV CPX ANAEROBIC THRESHOLD DIASTOLIC BLOOD PRESSURE: 100 MMHG
OHS CV CPX ANAEROBIC THRESHOLD HEART RATE: 116
OHS CV CPX ANAEROBIC THRESHOLD RATE PRESSURE PRODUCT: NORMAL
OHS CV CPX ANAEROBIC THRESHOLD SYSTOLIC BLOOD PRESSURE: 127
OHS CV CPX DATA GRADE - AT: 8.9
OHS CV CPX DATA GRADE - PEAK: 10.4
OHS CV CPX DATA O2 SAT - PEAK: 97
OHS CV CPX DATA O2 SAT - REST: 96
OHS CV CPX DATA SPEED - AT: 2.7
OHS CV CPX DATA SPEED - PEAK: 3
OHS CV CPX DATA TIME - AT: 4.25
OHS CV CPX DATA TIME - PEAK: 5.16
OHS CV CPX DATA VE/VCO2 - AT: 36
OHS CV CPX DATA VE/VCO2 - PEAK: 40
OHS CV CPX DATA VE/VO2 - AT: 31
OHS CV CPX DATA VE/VO2 - PEAK: 41
OHS CV CPX DATA VO2 - AT: 14.3
OHS CV CPX DATA VO2 - PEAK: 15.6
OHS CV CPX DATA VO2 - REST: 3.9
OHS CV CPX ESTIMATED METS: 7
OHS CV CPX FEV1/FVC: 0.63
OHS CV CPX FORCED EXPIRATORY VOLUME: 1.71
OHS CV CPX FORCED VITAL CAPACITY (FVC): 2.7
OHS CV CPX HIGHEST VO: 27.5
OHS CV CPX MAX PREDICTED HEART RATE: 152
OHS CV CPX MAXIMAL VOLUNTARY VENTILATION (MVV) PREDICTED: 68.4
OHS CV CPX MAXIMAL VOLUNTARY VENTILATION (MVV): 47
OHS CV CPX MAXIUMUM EXERCISE VENTILATION (VE MAX): 44.8
OHS CV CPX PATIENT AGE: 68
OHS CV CPX PATIENT HEIGHT IN: 64
OHS CV CPX PATIENT IS FEMALE AGE 11-19: 0
OHS CV CPX PATIENT IS FEMALE AGE GREATER THAN 19: 1
OHS CV CPX PATIENT IS FEMALE AGE LESS THAN 11: 0
OHS CV CPX PATIENT IS FEMALE: 1
OHS CV CPX PATIENT IS MALE AGE 11-25: 0
OHS CV CPX PATIENT IS MALE AGE GREATER THAN 25: 0
OHS CV CPX PATIENT IS MALE AGE LESS THAN 11: 0
OHS CV CPX PATIENT IS MALE GREATER THAN 18: 0
OHS CV CPX PATIENT IS MALE LESS THAN OR EQUAL TO 18: 0
OHS CV CPX PATIENT IS MALE: 0
OHS CV CPX PATIENT WEIGHT RETURNED IN OZ: 2608
OHS CV CPX PEAK DIASTOLIC BLOOD PRESSURE: 95 MMHG
OHS CV CPX PEAK HEAR RATE: 126 BPM
OHS CV CPX PEAK RATE PRESSURE PRODUCT: NORMAL
OHS CV CPX PEAK SYSTOLIC BLOOD PRESSURE: 133 MMHG
OHS CV CPX PERCENT BODY FAT: 27.5
OHS CV CPX PERCENT MAX PREDICTED HEART RATE ACHIEVED: 86
OHS CV CPX PREDICTED VO2: 27.5 ML/KG/MIN
OHS CV CPX RATE PRESSURE PRODUCT PRESENTING: NORMAL
OHS CV CPX REST PET CO2: 27
OHS CV CPX VE/VCO2 SLOPE: 41.8
OHS QRS DURATION: 80 MS
OHS QTC CALCULATION: 480 MS
STRESS ECHO POST EXERCISE DUR MIN: 5 MINUTES
STRESS ECHO POST EXERCISE DUR SEC: 10 SECONDS
SYSTOLIC BLOOD PRESSURE: 130 MMHG

## 2024-07-03 PROCEDURE — 99204 OFFICE O/P NEW MOD 45 MIN: CPT | Mod: S$GLB,,,

## 2024-07-03 PROCEDURE — 3074F SYST BP LT 130 MM HG: CPT | Mod: CPTII,S$GLB,,

## 2024-07-03 PROCEDURE — 4010F ACE/ARB THERAPY RXD/TAKEN: CPT | Mod: CPTII,S$GLB,,

## 2024-07-03 PROCEDURE — 94621 CARDIOPULM EXERCISE TESTING: CPT | Mod: 26,,, | Performed by: INTERNAL MEDICINE

## 2024-07-03 PROCEDURE — 1159F MED LIST DOCD IN RCRD: CPT | Mod: CPTII,S$GLB,,

## 2024-07-03 PROCEDURE — 3008F BODY MASS INDEX DOCD: CPT | Mod: CPTII,S$GLB,,

## 2024-07-03 PROCEDURE — 99999 PR PBB SHADOW E&M-EST. PATIENT-LVL IV: CPT | Mod: PBBFAC,,,

## 2024-07-03 PROCEDURE — 3288F FALL RISK ASSESSMENT DOCD: CPT | Mod: CPTII,S$GLB,,

## 2024-07-03 PROCEDURE — 93010 ELECTROCARDIOGRAM REPORT: CPT | Mod: S$GLB,,, | Performed by: INTERNAL MEDICINE

## 2024-07-03 PROCEDURE — 94621 CARDIOPULM EXERCISE TESTING: CPT

## 2024-07-03 PROCEDURE — 1126F AMNT PAIN NOTED NONE PRSNT: CPT | Mod: CPTII,S$GLB,,

## 2024-07-03 PROCEDURE — 1101F PT FALLS ASSESS-DOCD LE1/YR: CPT | Mod: CPTII,S$GLB,,

## 2024-07-03 PROCEDURE — 1160F RVW MEDS BY RX/DR IN RCRD: CPT | Mod: CPTII,S$GLB,,

## 2024-07-03 PROCEDURE — 3078F DIAST BP <80 MM HG: CPT | Mod: CPTII,S$GLB,,

## 2024-07-03 RX ORDER — METOPROLOL TARTRATE 25 MG/1
25 TABLET, FILM COATED ORAL 2 TIMES DAILY
Qty: 180 TABLET | Refills: 3 | Status: SHIPPED | OUTPATIENT
Start: 2024-07-03 | End: 2025-07-03

## 2024-07-03 RX ORDER — FUROSEMIDE 20 MG/1
20 TABLET ORAL DAILY PRN
Qty: 90 TABLET | Refills: 3 | Status: SHIPPED | OUTPATIENT
Start: 2024-07-03

## 2024-07-03 NOTE — PROGRESS NOTES
Session: Orientation     Cardiac Rehab Individual Treatment Plan - Initial Assessment      Patient Name: Heidy Polo MRN: [id number]   : 1955   Age: 69 y.o.   Primary Diagnosis: ASCENDING AORTIC REPLACEMENT  Date of Event: 24  EF: 60%  Risk Stratification: high  Referring Physician: RENEE   Exercise Assessment:     CPX/TM Date: 7-3-24 Results   RHR 91   Max    Peak VO2 (CPX only) 15.6   Actual METS (CPX only) 4.5   Estimated METS 7.0     Anthropometrics    Height 64 inches   Weight 163 lbs   BMI 27.9   Abdominal Girth 40.0   Body Composition 27.5%     ST Depression noted on Stress Test?:No  Angina with exercise?: No   Fall Risk: Yes   Assistive Devices:  independent   Currently exercising? No   Heidy stated there were no limitations to exercise.  She had a right knee replaced but doesn't have any limitations because of it.       Exercise Plan:   Goals:  CR Exercise Goals: Attend Cardiac Rehab 3 times/week: In Progress  Home Aerobic Exercise: 2 additional days/week for 30-60 minutes: In Progress  Intensity of 12-15 on the Rate of Perceived Exertion (RPE) scale: In Progress  30% increase in entry estimated METS: 9.1 : In Progress  5 days/week for 30-60 minutes: In Progress  Demonstrate proper pulse taking technique: In Progress    Intervention:   Discussed importance of regular attendance to cardiac rehab class    Exercise Prescription:  THR Range 111-121   Mode: Treadmill  Recumbent Bike  Upright Bike  Nustep  Elliptical   Frequency:  3 days/week   Duration:  30 - 60 minutes   Intensity:  12 - 15 RPE   Resistance Training:  Yes: 5 lb weights with 10-15 reps based on strength and range of motion assessment     Home Prescription:  Mode Aerobic   Frequency: 2- 3 days/week   Duration: 30-60 minutes   Resistance Training: None        Education:  Orientation to Equipment; verbalizes understanding; Date: 24  Exercise Recommendations; verbalizes understanding; Date: 24  Exercise Safety;  verbalizes understanding; Date: 7-11-24  Class Preparation: verbalizes understanding; Date: 7-11-24  Signs and symptoms to report: verbalizes understanding; Date: 7-11-24  Caffeine/Hydration: verbalizes understanding; Date: 7-11-24  Exercise Terminology: verbalizes understanding; Date: 7-11-24  Resistance Training: verbalizes understanding; Date: 7-11-24    Comments:  I encouraged Heidy to begin thinking about some type of aerobic exercise she can participate in at least 2 non-rehab days per week for at least 30 minutes in addition to attending Phase II cardiac rehab classes 3 days per week.  She stated understanding.    All consent forms were signed, proper attire and shoes were discussed.       Heidy will begin Cardiac Rehab on Monday, July 15 at 10:00 am.    The exercise prescription will be adjusted based on tolerance of exercise intensity by patient.    Fay Hernandez., CEP

## 2024-07-03 NOTE — TELEPHONE ENCOUNTER
[facility]    Spoke with Ms. Heidy Polo to inform her of the following.       Can we call and let her know EKG in normal sinus rhythm. I'd like her to increase her lopressor to 25mg (full tablet) twice daily to see if helps with palpitations.     The pt was informed of her upcoming appt with us 7/24/2024 for 10a and labs prior. Also we will be calling her every week.     The pt verbalized understanding and repeated the medication instruction for clarification.

## 2024-07-03 NOTE — PATIENT INSTRUCTIONS
Continue lasix 20mg daily as needed for worsening shortness of breath, swelling or 3lb weight gain.    Will call today with lab and EKG results and possible lopressor adjustment.

## 2024-07-03 NOTE — PROGRESS NOTES
HISTORY: S/P ASCENDING AORTIC REPLACEMENT (24), HTN, HLP, PRE-DIABETES, EF=60%(24)    ANTHROPOMETRICS:     PRE   Height (in) 64   Weight (lb) 163   BMI 27.9   Abdominal Girth (in) 40.0   % Body Fat 27.5%       LAB RESULTS:    Lab Results   Component Value Date    HGB 12.2 2024     Lab Results   Component Value Date    HCT 38.4 2024     Lab Results   Component Value Date    MPV 9.6 2024       Lab Results   Component Value Date    CHOL 159 2024     Lab Results   Component Value Date    HDL 60 2024     Lab Results   Component Value Date    LDLCALC 82.0 2024     Lab Results   Component Value Date    TRIG 85 2024     Lab Results   Component Value Date    CHOLHDL 37.7 2024       Lab Results   Component Value Date    GLUF 90 2024     Lab Results   Component Value Date    HGBA1C 4.8 2023        Lab Results   Component Value Date    HSCRP 11.42 (H) 2024         PSYCHOSOCIAL SCORES:    EFREN     PRE   Anxiety 3   Depression 0   Somatic 8   Hostility 1              SF-36  36-Item Short Form Survey (SF-36) Scoring  Physical Functionin  Role limitations due to physical health: 25  Role limitations due to emotional problems: 100  Energy/fatigue: 45  Emotional well-bein  Social functionin.5  Pain: 67.5  General health: 60            PHQ-9:      2024     9:34 AM   PHQ-9 Depression Patient Health Questionnaire   Over the last two weeks how often have you been bothered by little interest or pleasure in doing things 0   Over the last two weeks how often have you been bothered by feeling down, depressed or hopeless 0   Over the last two weeks how often have you been bothered by trouble falling or staying asleep, or sleeping too much 1   Over the last two weeks how often have you been bothered by feeling tired or having little energy 1   Over the last two weeks how often have you been bothered by a poor appetite or overeating 1   Over the  last two weeks how often have you been bothered by feeling bad about yourself - or that you are a failure or have let yourself or your family down 0   Over the last two weeks how often have you been bothered by trouble concentrating on things, such as reading the newspaper or watching television 0   Over the last two weeks how often have you been bothered by moving or speaking so slowly that other people could have noticed. 0   Over the last two weeks how often have you been bothered by thoughts that you would be better off dead, or of hurting yourself 0   If you checked off any problems, how difficult have these problems made it for you to do your work, take care of things at home or get along with other people? Somewhat difficult   PHQ-9 Score 3                   EDUCATION SCORES:     PRE   Education Score 40

## 2024-07-05 ENCOUNTER — DOCUMENTATION ONLY (OUTPATIENT)
Dept: CARDIOLOGY | Facility: CLINIC | Age: 69
End: 2024-07-05
Payer: MEDICARE

## 2024-07-05 NOTE — PROGRESS NOTES
"[facility]) First Week Visit     Pt presents to clinic 7/3/2024.     Most Recent Hospital Discharge Date: 6/16/2024  Last admission Diagnosis/chief complaint: SOB        Visit Vitals:     Wt Readings from Last 3 Encounters:   07/03/24 73.9 kg (163 lb)   07/03/24 74 kg (163 lb 4 oz)   07/01/24 74.2 kg (163 lb 11.1 oz)     Temp Readings from Last 3 Encounters:   06/16/24 97.9 °F (36.6 °C) (Oral)   05/01/24 97.9 °F (36.6 °C) (Oral)   04/25/24 98.1 °F (36.7 °C) (Oral)     BP Readings from Last 3 Encounters:   07/03/24 130/80   07/03/24 127/62   07/01/24 128/70     Pulse Readings from Last 3 Encounters:   07/03/24 91   07/03/24 102   07/01/24 102            Pt reports the following:  []  Shortness of Breath with activity  []  Shortness of Breath at rest/ sleeping on 2-3 pillows "some days"  []  Fatigue  []  Edema   [] Chest pain or tightness  [] Weight Increase since discharge  [x] None of the above    Pt reports being in the Green (color) Zone. If in yellow/red, reminded that they should be calling HFTCC today or now.      Medications:   Medication reconciliation completed today per MA.  Pt reports having all medications available and understands how to take them appropriately. Reminded pt to call prior to making any changes to medications.      Education:    [x] Gave pt new  / Confirmed pt has  "[facility] Home Care Guide" .   Reviewed key points as listed below.      Recommend 2 gram sodium restriction and 1500cc fluid restriction.  Encourage physical activity with graded exercise program.  Requested patient to weigh themselves daily, and to notify us if their weight increases by more than 3 lbs in 1 day or 5 lbs in 3 days.      [x] Gave / Reviewed "Daily Weight and Symptom Tracker".  Reviewed with patient when and how to call  HFTCC according to "Yellow Zone" and "Red Zone".                  [x] Pt given list of low/high sodium food list                 " "  Watch for these Signs and Symptoms: If any of these occur, contact HFTCC immediately:   Increase in shortness of breath with movement   Increase in swelling in your legs and ankles   Weight gain of more than 3 pounds in a night or 5 pounds in 3 days.   Difficulty breathing when you are lying down   Worsening fatigue or tiredness   Stomach bloating, a full feeling or a loss of appetite   Increased coughing--especially when you are lying down     MyChart and Care Companion:              Patient active on myChart? Yes, patient uses regularly.    Contacting our Team:              Reviewed with pt how to contact HFT[facility] RN via phone or [employer] messaging.      HF TCC Program Plan:  Pt educated on follow-up plan while in HFTCC program.   [x]  PT given /reviewed upcoming appointment/check in dates. These will include weekly contact with RN or visits with providers over the next 4-6 weeks.                   [x]  Pt educated that they will transitioned to long term care provider team at week 4-6.  This team will be either Cardiology, PCP or Advanced Heart Failure depending on needs.          Pt was able to verbalize back to RN in their own words correct diet/fluid restrictions, necessity for exercise, warning signs and symptoms, when and how to contact their TCC team .       Plan:     See PA-C note.       [x]  Pt given AVS with follow up appointment within 3 weeks and medication detail list for ease of use. Next in clinic visit is 7/24/2024    [x]  Explained to pt they will have a phone "check in" by Nurse in one week/on 7/10/2024     [] Pt met with Gino Salmeron Dietician today after visit.  Refer to his note for details.     Will follow up with pt at next clinic visit and Nurse navigator available for pt questions, issues or concerns.     Please refer to provider visit for additional details and assessment.    "

## 2024-07-09 ENCOUNTER — TELEPHONE (OUTPATIENT)
Dept: CARDIAC REHAB | Facility: CLINIC | Age: 69
End: 2024-07-09
Payer: MEDICARE

## 2024-07-10 ENCOUNTER — TELEPHONE (OUTPATIENT)
Dept: CARDIOLOGY | Facility: CLINIC | Age: 69
End: 2024-07-10
Payer: MEDICARE

## 2024-07-10 NOTE — TELEPHONE ENCOUNTER
"[facility]) weekly phone follow up / triage call completed.     TCC RN Navigator spoke with  PT    Current Patient reported weight: 158.6 lbs   Patient Goal Weight: (158.0 lbs)  Recent Patient reported blood pressure and heart rate:     Pt reports the following:  []  Shortness of Breath with Activity  []  Shortness of Breath at rest   []  Fatigue  [x]  Edema PT states she took a fluid pill because she gained 2 lbs  [] Chest pain or tightness  [] Weight Increase since discharge  [] None of the above    Pt reports using "Daily weight and symptom tracker".    Pt reports being in the GREEN(color) Zone. If in yellow/red, reminded that they should be calling [facility] today or now.     Medications:   Medication compliance reviewed with pt.  Pt reports having medication list available and has all medications at home for use per list. Refill is on it's way (shipped)    Education:   Confirmed pt still has "[facility] Home Care Guide"  .     Reminded of key points as listed below.     Recommend 2 -3 gram sodium restriction and 1500 cc-2000 cc fluid restriction.  Encourage physical activity with graded exercise program.  Requested patient to weigh themselves daily, and to notify us if their weight increases by more than 3 lbs in 1 day or 5 lbs in 3 days.   Reminded to use "Daily weight and symptom tracker".  Even if pt does not have a scale, to use symptom tracker.       Watch for these Signs and Symptoms: If any of these occur, contact [facility] immediately:   Increase in shortness of breath with movement   Increase in swelling in your legs and ankles   Weight gain of more than 3 pounds in a day or 5 pounds in 3 days.   Difficulty breathing when you are lying down   Worsening fatigue or tiredness   Stomach bloating, a full feeling or a loss of appetite   Increased coughing--especially when you are lying down      Pt was able to verbalize back to RN in their own words " correct diet/fluid restrictions, necessity for exercise, warning signs and symptoms, when and how to contact their [facility] team.      Pt reminded of upcoming appointment.  PT reports they will attend.  [facility] on 7-24-24 , Next [facility] phone check for 7-17-24      Pt reminded of how and when to contact [facility]:  [phone] (Mon-Fri, 8a-5p) & for urgent issues on the weekend to page the Heart Transplant MD on call.  Pt also encouraged utilize [facility] messaging as well.      Pt  verbalized understanding and in agreement of plan.       Will follow up with pt at next clinic visit and RN navigator available for pt questions, issues or concerns.

## 2024-07-10 NOTE — TELEPHONE ENCOUNTER
"[facility]    Attempted to call pt to complete 1 week "check in" call. Unable to reach pt at listed phone numbers.  Was able to leave message on voicemail encouraging pt to return call with [facility] phone number and reminded of upcoming appt .       "

## 2024-07-11 ENCOUNTER — CLINICAL SUPPORT (OUTPATIENT)
Dept: CARDIAC REHAB | Facility: CLINIC | Age: 69
End: 2024-07-11
Payer: MEDICARE

## 2024-07-11 VITALS — OXYGEN SATURATION: 99 % | HEART RATE: 86 BPM | DIASTOLIC BLOOD PRESSURE: 82 MMHG | SYSTOLIC BLOOD PRESSURE: 128 MMHG

## 2024-07-11 DIAGNOSIS — Z76.89 ENCOUNTER TO ESTABLISH CARE: ICD-10-CM

## 2024-07-11 DIAGNOSIS — Z95.828 S/P ASCENDING AORTIC REPLACEMENT: ICD-10-CM

## 2024-07-11 PROCEDURE — 99999 PR PBB SHADOW E&M-EST. PATIENT-LVL II: CPT | Mod: PBBFAC,,,

## 2024-07-11 NOTE — PROGRESS NOTES
Session: Orientation   Cardiac Rehab Individual Treatment Plan - Initial Assessment      Patient Name: Heidy Polo MRN: [id number]   : 1955   Age: 69 y.o.   Date of Event: 2024   Primary Diagnosis: Ascending Aortic Replacement    EF: 60%    Physical Assessment:   There were no vitals taken for this visit.    ASSESSMENT:  Heart Sounds: regular rate and rhythm  Prosthetic Valve: No  Lung Sounds: normal air entry, lungs clear to auscultation  Capillary Refill: normal  Left Radial Pulse: Normal (+2)  Right Radial Pulse: Normal (+2)  Left Pedal Pulse: Normal (+2)  Right Pedal Pulse: Normal (+2)  Right Edema: none  Left Edema none  Strength: normal  Range of Motion: full range of motion  Existing Limitations:      Site   [] Arthritis, bursitis    [] Amputation, atrophy    [x] Other: Right shoulder, right knee replacement   []       Diabetic patient's foot examination comments:  N/A  Incisional site: healing well  Special needs: N/A    Psychosocial Assessment:   Outcome Survey Tools:    EFREN SCORES:     PRE   Anxiety 3   Depression 0   Somatic 8   Hostility 1              SF-36  36-Item Short Form Survey (SF-36) Scoring  Physical Functionin  Role limitations due to physical health: 25  Role limitations due to emotional problems: 100  Energy/fatigue: 45  Emotional well-bein  Social functionin.5  Pain: 67.5  General health: 60            PHQ-9:      2024     9:34 AM   PHQ-9 Depression Patient Health Questionnaire   Over the last two weeks how often have you been bothered by little interest or pleasure in doing things 0   Over the last two weeks how often have you been bothered by feeling down, depressed or hopeless 0   Over the last two weeks how often have you been bothered by trouble falling or staying asleep, or sleeping too much 1   Over the last two weeks how often have you been bothered by feeling tired or having little energy 1   Over the last two weeks how often have you been  bothered by a poor appetite or overeating 1   Over the last two weeks how often have you been bothered by feeling bad about yourself - or that you are a failure or have let yourself or your family down 0   Over the last two weeks how often have you been bothered by trouble concentrating on things, such as reading the newspaper or watching television 0   Over the last two weeks how often have you been bothered by moving or speaking so slowly that other people could have noticed. 0   Over the last two weeks how often have you been bothered by thoughts that you would be better off dead, or of hurting yourself 0   If you checked off any problems, how difficult have these problems made it for you to do your work, take care of things at home or get along with other people? Somewhat difficult   PHQ-9 Score 3              Living Arrangements: Lives alone  Family Support: family  Self Reported: Effective Coping Skills  Displays: calmness  Medication: not applicable    Psychosocial Plan:   Goals:  Verbalizes coping mechanisms  Maintain positive support system  Maintain positive outlook  Improve overall quality of life    Interventions/Recommendations:  Discussed Results of Surveys  Patient to Self Report Emotional Changes at Session Check In  Recommend Physical Activity  Recommend Attending Education Lectures  Notify MD: No  Program Referral: No  Pharmaceutical Intervention/Therapy: No  Other Needs: not applicable  Stage of Readiness to Change: Preparation    Education:  Stress Management; verbalizes understanding; Date: 7/11/2024  Stress; verbalizes understanding; Date: 7/11/2024    Comments:  Patient will be working on achieving goals that were set.  She reports to have good support from her family.  She is motivated & ready to start cardiac rehab.  She plans to attend lectures & exercise session on a consistent basis.  Patient has been instructed to notify staff in the event that circumstances change.  Patient verbalizes  understanding.    Other Core Components/Risk Factors Assessment:   RISK FACTORS:  hyperlipidemia, hypertension, positive family history, sedentary lifestyle    Learning Barriers: None    Education Level:  High School (9-12) or GED    Pre-test Score: 40%    Medication Compliance: has been compliant with taking medications    Other Core Components/Risk Factors Plan:   Goals:  Increase exercise tolerance: In Progress  Increase knowledge of CAD: In Progress  Decrease blood pressure: In Progress  Weight loss: In Progress  Demonstrate accurate pulse taking: In Progress  Identify and manage personal areas of stress: In Progress  Learn more about healthy eating: In Progress    Interventions/Recommendations:  Recommend regular attendance for Cardiac Rehab: Exercise and Education Lectures  Encourage medication compliance  Individual Education/ Counseling: Yes  Physician Referral: No    Education:    cholesterol, verbalizes understanding; Date: 7/11/2024  fluid overload/CHF, verbalizes understanding; Date: 7/11/2024  hypertension, verbalizes understanding; Date: 7/11/2024  risk factors, verbalizes understanding; Date: 7/11/2024  sodium, verbalizes understanding; Date: 7/11/2024  stress, verbalizes understanding; Date: 7/11/2024         Education method adapted to patients education level and preferred method of learning.  Method: explanation    Comments:  Patient will be working on achieving goals that were set.  She is currently not exercising & has been waiting to start rehab before beginning exercising.  She states that she has good support from her children, who are at times overly protective of her. Plans include that she will exercise 2 additional days outside of cardiac rehab.  She has a stationary bike at home.     Other Core Components/Hypertension Assessment:   Resting BP: 128/82  BP Readings from Last 1 Encounters:   07/03/24 130/80         BP Diagnosis: Hypertensive  Patient reported symptoms:  none    Medications:  Medication Prescribed? Adherent? Exception   Beta-blocker [x]Yes  []No  []Unknown [x]Yes  []No  []Unknown    ACEI/ARB [x]Yes  []No  []Unknown [x]Yes  []No  []Unknown    Calcium Channel Blocker []Yes  []No  []Unknown []Yes  []No  []Unknown    Diuretic []Yes  []No  []Unknown []Yes  []No  []Unknown        Other Core Components/Hypertension Plan:   Goals:  Blood Pressure <130/80    Interventions/Recommendations:  Med Card Reconciled: Yes  Encourage medication compliance  Encourage sodium reduction  Encourage weight loss  Recommend physical activity  Educate on contributory factors  Reduce stress, anxiety, anger, depression, and/or chronic pain  Encourage home blood pressure monitoring  Recommend daily weights    Education:    Hypertension; verbalizes understanding; Date: 7/11/2024  Coronary Artery Disease; verbalizes understanding; Date: 7/11/2024  Congestive Heart Failure; verbalizes understanding; Date: 7/11/2024  Risk Factors; verbalizes understanding; Date: 7/11/2024  Stress; verbalizes understanding; Date: 7/11/2024         Comments:  Patient plans to follow recommendations to improve heart health.  She is monitoring sodium intake carefully.  She is currently monitoring BP/weights on a daily basis.  She denies having any overwhelming stress or anxiety.      Does the patient have Heart Failure? No    Other Core Components/Tobacco Cessation Assessment:   Smoking Status: Lifetime Non-smoker  Primary Tobacco Type: N/A  Tobacco Usage: no  Smoking Cessation Barriers:  N/A  Stage of Readiness to Change: Maintenance    Other Core Components/Tobacco Cessation Plan:   Goals:  Maintain non-smoking status    Interventions:  Maintains non-smoking status    Education:    Risk Factors; verbalizes understanding; Date: 7/11/2024         Comments:  Discussed Cardiac Rehab program in depth with patient.  Medication list updated per patient & marked as reviewed.  Patient has been instructed to notify staff of any  problems while attending rehab (ie: chest pain, shortness of breath, lightheadedness, dizziness).  Patient has been instructed to monitor blood pressure readings outside of rehab & to keep a daily log of the readings.  Patient verbalizes understanding.    Molly Sorto RN  Cardiac Rehab Nurse

## 2024-07-11 NOTE — PROGRESS NOTES
Orientation   Cardiac Rehab Individual Treatment Plan - Initial Assessment      Patient Name: Heidy Polo MRN: [id number]   : 1955   Age: 69 y.o.   Primary Diagnosis: s/p ascending aortic replacement, HTN, HLD, pre-DM    Nutrition Assessment:     Anthropometrics    Height 64 inches   Weight 163 lbs   BMI 27.9   Abdominal Girth 40   Body Composition 27.5       Drug Allergies and Intolerances:  Review of patient's allergies indicates:  No Known Allergies    Food Allergies and Intolerances:  NA    Past Medical History:  Past Medical History:   Diagnosis Date    Breast cancer     right    Cancer     breast in  -right     Depression 2012    History of breast cancer     Hyperlipidemia     Hypertension     Hypothyroid     Prediabetes 10/19/2020       Past Surgical History:  Past Surgical History:   Procedure Laterality Date    APPLICATION OF WOUND VACUUM-ASSISTED CLOSURE DEVICE N/A 2024    Procedure: APPLICATION, WOUND VAC;  Surgeon: Aquilino Ochoa MD;  [address]  Service: Cardiovascular;  Laterality: N/A;  prevena 60x5    CAPSULOTOMY OF JOINT Left 2021    Procedure: CAPSULOTOMY, JOINT 2nd MPJ;  Surgeon: Marnie Mckeon DPM;  Location: [facility] OR;  Service: Podiatry;  Laterality: Left;    CAPSULOTOMY OF JOINT Left 2022    3rd and 4th    CAPSULOTOMY OF JOINT Left 2022    Procedure: 2,3,4,5;  Surgeon: Marnie Mckeon DPM;  Location: [facility] OR;  Service: Podiatry;  Laterality: Left;    CHOLECYSTECTOMY      COLONOSCOPY N/A 2020    Procedure: COLONOSCOPY;  Surgeon: Adelaide Hernández MD;  Location: [facility] ENDO;  Service: Endoscopy;  Laterality: N/A;  with biopsy    CORRECTION OF HAMMER TOE Left 2021    Procedure: CORRECTION, HAMMER TOE 2nd with K-wire stabilization;  Surgeon: Marnie Mckeon DPM;  Location: [facility] OR;  Service: Podiatry;  Laterality: Left;  COVID VACCINATION CONFIRMED    CORRECTION OF HAMMER TOE Left 2022    3rd, 4th and 5th    CORRECTION OF HAMMER TOE  Left 01/07/2022    Procedure: CORRECTION, HAMMER TOE 2,3,4,5;  Surgeon: Marnie Mckeon DPM;  Location: [facility];  Service: Podiatry;  Laterality: Left;    HERNIA REPAIR      LAPAROSCOPIC SLEEVE GASTRECTOMY      MODIFIED RADICAL MASTECTOMY W/ AXILLARY LYMPH NODE DISSECTION Right 2001    REPLACEMENT OF ASCENDING AORTA N/A 4/26/2024    Procedure: REPLACEMENT, AORTA, ASCENDING;  Surgeon: Aquilino Ochoa MD;  Location: [facility] OR [address] [place] [address];  Service: Cardiovascular;  Laterality: N/A;  cut down of right femoral artery, aortic valve repair, hemiarch under circulatory arrest    TOTAL KNEE ARTHROPLASTY Right 02/09/2021    Procedure: ARTHROPLASTY, KNEE, TOTAL;  Surgeon: Ilya Murphy MD;  Location: [facility] OR;  Service: Orthopedics;  Laterality: Right;       Medications:  Current Outpatient Medications   Medication Sig    aspirin (ECOTRIN) 81 MG EC tablet Take 1 tablet (81 mg total) by mouth once daily.    blood pressure monitor (IHEALTH EASE) LG arm size (OP) by Other route as needed for High Blood Pressure.    chlorthalidone (HYGROTEN) 25 MG Tab Take 1 tablet (25 mg total) by mouth once daily.    furosemide (LASIX) 20 MG tablet Take 1 tablet (20 mg total) by mouth daily as needed (weight gain of >3 lbs in 1 day, shortness of breath and lower leg swelling).    metoprolol tartrate (LOPRESSOR) 25 MG tablet Take 1 tablet (25 mg total) by mouth 2 (two) times daily.    rosuvastatin (CRESTOR) 10 MG tablet Take 1 tablet (10 mg total) by mouth once daily.    telmisartan (MICARDIS) 40 MG Tab Take 1 tablet (40 mg total) by mouth once daily.     No current facility-administered medications for this visit.       Vitamins and Supplements:  Vit D, B-12    Labs:  Patient confirms she is taking Crestor 10mg for cholesterol control.    Lab Results   Component Value Date    CHOL 159 07/03/2024     Lab Results   Component Value Date    HDL 60 07/03/2024     Lab Results   Component Value Date    LDLCALC 82.0 07/03/2024     Lab Results   Component Value  Date    TRIG 85 07/03/2024     Lab Results   Component Value Date    CHOLHDL 37.7 07/03/2024         Lab Results   Component Value Date    GLUF 90 07/03/2024     Lab Results   Component Value Date    HGBA1C 4.8 11/22/2023       Nutrition/Diet History:  Patient eats 2 meals daily.    Seasons food with garlic/onion powder, No Salt [employer].  Patient denies use of a salt shaker at the table on prepared foods.   Dines out 1 per week at restaurants.    Chooses fried foods 2 time(s) per month.    Chooses fish rarely  Beverages:  water and sugar-free beverages  Alcohol: social drinker    24 Hour Recall:  Breakfast: skipped  Lunch:chicken salad with crackers  Dinner:gonzalez chicken with rice, vegetable egg roll  Other: 2 oreos    Difficulty Chewing or Swallowing: No  Current Exercise: See Exercise Physiologist Note  Food Safety/Food Preparation:  shares with daughter  Living Arrangements/Family Support: Lives alone  Cultural/Spiritual/Personal Preferences: not applicable   Barriers to Education: none identified  Stage of Change Related to Diet Habits: Contemplation    Nutrition Diagnosis:  Food and nutrition related knowledge deficit related to the lack of prior nutrition education as evidenced by diet history and 24 hour recall    Nutrition Plan:   Goals:  LDL-C < 70 (for high risk patients)  Hgb A1c < 7%  BMI < 25 and abdominal girth < 40M/<35 F  2 gram sodium, Mediterranean diet  Rehab weight goal: -10  Fish intake (non-fried varieties) to a goal of 2-3 servings per week.   Increase fruit and vegetable intake    Interventions/Recommendations:  Lab results reviewed and discussed  Nutrition Prescription:  Total Energy Estimated Needs: 1022-4817 Kcal/d for weight loss  Method for Estimating Needs: 20-25kcal/kg ABW  Total Protein Estimated Needs: 60-77 g/d  Method for Estimating Needs: 1-1.3 g/Kg ABW  Total Fluid Estimated Needs: 1 mL/Kcal  Dietitian Consult: No  Patient to participate in Cardiac Rehab sessions three  times a week  Weekly Dietitian Weight Check  Encouraged patient to complete 3 day food diary  Follow Up Plan for Ongoing Self-Management Support    Education:  Mediterranean Diet; verbalizes understanding; Date: 7/11/24  Person taught: patient  Preferred Learning Method: Verbal, Written  Education Needed/Provided: Nutrition counseling and education related to cardiac rehabilitation  Education Method: Weekly nutrition lectures on the Mediterranean diet, cooking, shopping, and dining out  Written Materials Provided: 3 Day Food Record, Introduction to Mediterranean Diet  Strategies Implemented: Motivational interviewing, Goal setting, Self-Monitoring, and Problem Solving    Comments:   Discussed ways to incorporate healthy snacks, eating on a schedule, and monitoring sodium intake for heart health.  Pt has been making diet changes since event such as reducing sodium and much more mindful of label reading. She states she will work toward having a small breakfast daily in addition to increasing fish intake.    Diabetes  Is the patient diabetic? No      RD contact information provided.      Destiny Andrews MS, RDN/LDN

## 2024-07-15 ENCOUNTER — CLINICAL SUPPORT (OUTPATIENT)
Dept: CARDIAC REHAB | Facility: CLINIC | Age: 69
End: 2024-07-15
Payer: MEDICARE

## 2024-07-15 DIAGNOSIS — Z95.820 STATUS POST ANGIOPLASTY WITH STENT: Primary | ICD-10-CM

## 2024-07-15 PROCEDURE — 93798 PHYS/QHP OP CAR RHAB W/ECG: CPT | Mod: S$GLB,,, | Performed by: INTERNAL MEDICINE

## 2024-07-17 ENCOUNTER — DOCUMENTATION ONLY (OUTPATIENT)
Dept: CARDIOLOGY | Facility: CLINIC | Age: 69
End: 2024-07-17
Payer: MEDICARE

## 2024-07-17 ENCOUNTER — CLINICAL SUPPORT (OUTPATIENT)
Dept: CARDIAC REHAB | Facility: CLINIC | Age: 69
End: 2024-07-17
Payer: MEDICARE

## 2024-07-17 DIAGNOSIS — Z95.820 STATUS POST ANGIOPLASTY WITH STENT: Primary | ICD-10-CM

## 2024-07-17 PROCEDURE — 93798 PHYS/QHP OP CAR RHAB W/ECG: CPT | Mod: S$GLB,,, | Performed by: INTERNAL MEDICINE

## 2024-07-17 NOTE — PROGRESS NOTES
"[facility]) weekly phone follow up / triage call completed.     TCC LPN Navigator spoke with Ms. Heidy Polo.     Current Patient reported weight: 158.8lbs   Patient Goal Weight: (158lbs)  Recent Patient reported blood pressure and heart rate: BP-127/82 dose not remember the HR.    Pt reports the following:  []  Shortness of Breath with Activity  []  Shortness of Breath at rest   []  Fatigue  []  Edema   [] Chest pain or tightness  [] Weight Increase since discharge  [x] None of the above    Pt reports using "Daily weight and symptom tracker".    Pt reports being in the Green (color) Zone. If in yellow/red, reminded that they should be calling [facility] today or now.     Medications:   Medication compliance reviewed with pt.  Pt reports having medication list available and has all medications at home for use per list. The pt states her legs had a bit of edema and she took an extra lasix and its gone.     Education:   Confirmed pt still has "Heart Failure [facility]itional [facility] Clinic Home Care Guide"  .     Reminded of key points as listed below.     Recommend 2 -3 gram sodium restriction and 1500 cc-2000 cc fluid restriction.  Encourage physical activity with graded exercise program.  Requested patient to weigh themselves daily, and to notify us if their weight increases by more than 3 lbs in 1 day or 5 lbs in 3 days.   Reminded to use "Daily weight and symptom tracker".  Even if pt does not have a scale, to use symptom tracker.       Watch for these Signs and Symptoms: If any of these occur, contact [facility] immediately:   Increase in shortness of breath with movement   Increase in swelling in your legs and ankles   Weight gain of more than 3 pounds in a day or 5 pounds in 3 days.   Difficulty breathing when you are lying down   Worsening fatigue or tiredness   Stomach bloating, a full feeling or a loss of appetite   Increased coughing--especially when you are lying down      Pt was able " to verbalize back to LPN in their own words correct diet/fluid restrictions, necessity for exercise, warning signs and symptoms, when and how to contact their [facility] team.      Pt reminded of upcoming appointment.  PT reports they will attend. The pt has an appt on the 24th, but she would like it scheduled at a later time or another date. Message sent to MA.       Pt reminded of how and when to contact [facility]:  [phone] (Mon-Fri, 8a-5p) & for urgent issues on the weekend to page the Heart Transplant MD on call.  Pt also encouraged utilize [facility] messaging as well.      Pt verbalized understanding and in agreement of plan.       Will follow up with pt at next clinic visit and Nurse navigator available for pt questions, issues or concerns.

## 2024-07-18 NOTE — PROGRESS NOTES
[facility] Provider Note (Follow-up) Consult Note      HPI:     Patient unable to quantify distance walked before SOB, ambulating to clinic today and pace normal without issue. Denies worsening SOB, completing cardiac rehab. Taking prn lasix doses ~3x weekly              Patient sleeps on 1 number of pillows with head of bed elevated              Patient wakes up SOB, has to get out of bed, associated cough- denies recent PND symptoms              Palpitations - chronic intermittent palpitations (on chart review, prior EKG with PACs), denies recent              Dizzy, light-headed, pre-syncope or syncope- denies              Since discharge frequency of performing weights, home weight and weight change- performing daily weights; stable ~158-160lbs              Other information felt pertinent to HPI: Ms. Heidy Polo is a 69 yo female with a PMHx of recent aortic dissection s/p repair 7 weeks prior, HTN, and HLD who presents to second [facility] visit following recent Obs admission for ADHF. Presented to ED with c/o worsening SOB and BLE edema. In ED hypertensive; CBC/ CMP largely unremarkable. . CXR showed no radiographic evidence of pneumonia or other source of cough/shortness of breath. CTA Chest showed no evidence of pulmonary embolism or any post op repair complications. Given IV lasix with >2 L UOP and significant improvement in symptoms. TTE obtained, aortic dissection repair without issue and no indications of heart failure on imaging. EF 60% with normal diastolic function. She was adequately diuresed and discharged with prn lasix prescription.      PHYSICAL:   Vitals:    07/24/24 1411   BP: (!) 126/57   Pulse: 86      @[id number](3)@    JVD: no   Heart rhythm: regular  Cardiac murmur: No    S3: no  S4: no  Lungs: clear  Hepatojugular reflux: no  Edema: no         Lab Results   Component Value Date     (L) 07/24/2024    K 3.7 07/24/2024    MG 2.0 07/24/2024    CL 98 07/24/2024    CO2 27 07/24/2024     BUN 33 (H) 07/24/2024    CREATININE 0.9 07/24/2024     (H) 07/24/2024    CALCIUM 9.4 07/24/2024    AST 18 07/24/2024    ALT 22 07/24/2024    ALBUMIN 3.8 07/24/2024    PROT 6.8 07/24/2024    BILITOT 0.5 07/24/2024     Lab Results   Component Value Date    BNP 72 07/24/2024     (H) 07/03/2024     (H) 06/15/2024       ASSESSMENT: HFpEF     PLAN:      Patient Instructions:   Instruct the patient to notify this clinic if HH, a physician or an advanced care provider wants to change medication one of their HF medications   Activity and Diet restrictions:   Recommend 2-3 gram sodium restriction and 1500cc- 2000cc fluid restriction.  Encourage physical activity with graded exercise program.  Requested patient to weigh themselves daily, and to notify us if their weight increases by more than 3 lbs in 1 day or 5 lbs in 3 days.    Assigned dry weight on home scale: 158-160lbs  Medication changes (include current dose and changed dose): NYHA Class I symptoms. Well compensated on exam. Continue lasix 20mg daily prn.  Upcoming labs and date anticipated: 1 week phone check in with tentative phone dc     Danielle King PA-C

## 2024-07-19 ENCOUNTER — CLINICAL SUPPORT (OUTPATIENT)
Dept: CARDIAC REHAB | Facility: CLINIC | Age: 69
End: 2024-07-19
Payer: MEDICARE

## 2024-07-19 DIAGNOSIS — Z95.820 STATUS POST ANGIOPLASTY WITH STENT: Primary | ICD-10-CM

## 2024-07-19 PROCEDURE — 93798 PHYS/QHP OP CAR RHAB W/ECG: CPT | Mod: S$GLB,,, | Performed by: INTERNAL MEDICINE

## 2024-07-22 ENCOUNTER — CLINICAL SUPPORT (OUTPATIENT)
Dept: CARDIAC REHAB | Facility: CLINIC | Age: 69
End: 2024-07-22
Payer: MEDICARE

## 2024-07-22 DIAGNOSIS — Z95.820 STATUS POST ANGIOPLASTY WITH STENT: Primary | ICD-10-CM

## 2024-07-22 PROCEDURE — 93798 PHYS/QHP OP CAR RHAB W/ECG: CPT | Mod: S$GLB,,, | Performed by: INTERNAL MEDICINE

## 2024-07-24 ENCOUNTER — OFFICE VISIT (OUTPATIENT)
Dept: CARDIOLOGY | Facility: CLINIC | Age: 69
End: 2024-07-24
Payer: MEDICARE

## 2024-07-24 ENCOUNTER — CLINICAL SUPPORT (OUTPATIENT)
Dept: CARDIAC REHAB | Facility: CLINIC | Age: 69
End: 2024-07-24
Payer: MEDICARE

## 2024-07-24 ENCOUNTER — LAB VISIT (OUTPATIENT)
Dept: LAB | Facility: HOSPITAL | Age: 69
End: 2024-07-24
Payer: MEDICARE

## 2024-07-24 ENCOUNTER — TELEPHONE (OUTPATIENT)
Dept: CARDIOLOGY | Facility: CLINIC | Age: 69
End: 2024-07-24

## 2024-07-24 VITALS
OXYGEN SATURATION: 99 % | HEIGHT: 64 IN | WEIGHT: 165.38 LBS | HEART RATE: 86 BPM | DIASTOLIC BLOOD PRESSURE: 57 MMHG | BODY MASS INDEX: 28.24 KG/M2 | SYSTOLIC BLOOD PRESSURE: 126 MMHG

## 2024-07-24 DIAGNOSIS — E78.2 MIXED HYPERLIPIDEMIA: ICD-10-CM

## 2024-07-24 DIAGNOSIS — I10 ESSENTIAL HYPERTENSION: ICD-10-CM

## 2024-07-24 DIAGNOSIS — I71.00 DISSECTING ANEURYSM OF AORTA: ICD-10-CM

## 2024-07-24 DIAGNOSIS — R06.02 SOB (SHORTNESS OF BREATH): ICD-10-CM

## 2024-07-24 DIAGNOSIS — I50.32 CHRONIC HEART FAILURE WITH PRESERVED EJECTION FRACTION: Primary | ICD-10-CM

## 2024-07-24 DIAGNOSIS — Z95.828 S/P ASCENDING AORTIC REPLACEMENT: ICD-10-CM

## 2024-07-24 DIAGNOSIS — Z95.820 STATUS POST ANGIOPLASTY WITH STENT: Primary | ICD-10-CM

## 2024-07-24 LAB
ALBUMIN SERPL BCP-MCNC: 3.8 G/DL (ref 3.5–5.2)
ALP SERPL-CCNC: 81 U/L (ref 55–135)
ALT SERPL W/O P-5'-P-CCNC: 22 U/L (ref 10–44)
ANION GAP SERPL CALC-SCNC: 9 MMOL/L (ref 8–16)
AST SERPL-CCNC: 18 U/L (ref 10–40)
BILIRUB SERPL-MCNC: 0.5 MG/DL (ref 0.1–1)
BNP SERPL-MCNC: 72 PG/ML (ref 0–99)
BUN SERPL-MCNC: 33 MG/DL (ref 8–23)
CALCIUM SERPL-MCNC: 9.4 MG/DL (ref 8.7–10.5)
CHLORIDE SERPL-SCNC: 98 MMOL/L (ref 95–110)
CO2 SERPL-SCNC: 27 MMOL/L (ref 23–29)
CREAT SERPL-MCNC: 0.9 MG/DL (ref 0.5–1.4)
EST. GFR  (NO RACE VARIABLE): >60 ML/MIN/1.73 M^2
GLUCOSE SERPL-MCNC: 150 MG/DL (ref 70–110)
MAGNESIUM SERPL-MCNC: 2 MG/DL (ref 1.6–2.6)
PHOSPHATE SERPL-MCNC: 3.3 MG/DL (ref 2.7–4.5)
POTASSIUM SERPL-SCNC: 3.7 MMOL/L (ref 3.5–5.1)
PROT SERPL-MCNC: 6.8 G/DL (ref 6–8.4)
SODIUM SERPL-SCNC: 134 MMOL/L (ref 136–145)

## 2024-07-24 PROCEDURE — 1125F AMNT PAIN NOTED PAIN PRSNT: CPT | Mod: CPTII,S$GLB,,

## 2024-07-24 PROCEDURE — 83735 ASSAY OF MAGNESIUM: CPT

## 2024-07-24 PROCEDURE — 3288F FALL RISK ASSESSMENT DOCD: CPT | Mod: CPTII,S$GLB,,

## 2024-07-24 PROCEDURE — 3008F BODY MASS INDEX DOCD: CPT | Mod: CPTII,S$GLB,,

## 2024-07-24 PROCEDURE — 1101F PT FALLS ASSESS-DOCD LE1/YR: CPT | Mod: CPTII,S$GLB,,

## 2024-07-24 PROCEDURE — 1159F MED LIST DOCD IN RCRD: CPT | Mod: CPTII,S$GLB,,

## 2024-07-24 PROCEDURE — 1160F RVW MEDS BY RX/DR IN RCRD: CPT | Mod: CPTII,S$GLB,,

## 2024-07-24 PROCEDURE — 3078F DIAST BP <80 MM HG: CPT | Mod: CPTII,S$GLB,,

## 2024-07-24 PROCEDURE — 36415 COLL VENOUS BLD VENIPUNCTURE: CPT

## 2024-07-24 PROCEDURE — 83880 ASSAY OF NATRIURETIC PEPTIDE: CPT

## 2024-07-24 PROCEDURE — 4010F ACE/ARB THERAPY RXD/TAKEN: CPT | Mod: CPTII,S$GLB,,

## 2024-07-24 PROCEDURE — 3074F SYST BP LT 130 MM HG: CPT | Mod: CPTII,S$GLB,,

## 2024-07-24 PROCEDURE — 80053 COMPREHEN METABOLIC PANEL: CPT

## 2024-07-24 PROCEDURE — 84100 ASSAY OF PHOSPHORUS: CPT

## 2024-07-24 PROCEDURE — 99999 PR PBB SHADOW E&M-EST. PATIENT-LVL IV: CPT | Mod: PBBFAC,,,

## 2024-07-24 PROCEDURE — 93798 PHYS/QHP OP CAR RHAB W/ECG: CPT | Mod: S$GLB,,, | Performed by: INTERNAL MEDICINE

## 2024-07-24 PROCEDURE — 99214 OFFICE O/P EST MOD 30 MIN: CPT | Mod: S$GLB,,,

## 2024-07-24 NOTE — TELEPHONE ENCOUNTER
[facility]    Spoke with Ms. Polo to inform her of the following.     Can we call and let her know her labs look good. Her BNP has normalized. might be slightly on the drier side today with BUN being a little elevated, but Cr stable.     The pt was informed that her labs look good, her fluid markers have normalized, she may be on the drier side with your BUN levels being slightly elevated, but her creatine is stable. Since Ms. King have you on lasix 20mg as needed that should help you out with your BUN levels.  She was informed of her next weekly call on 7/31/2024 from us will be her last one because we are transitional and monitor you for 31days. The pt verbalized understanding.

## 2024-07-26 ENCOUNTER — CLINICAL SUPPORT (OUTPATIENT)
Dept: CARDIAC REHAB | Facility: CLINIC | Age: 69
End: 2024-07-26
Payer: MEDICARE

## 2024-07-26 DIAGNOSIS — Z95.820 STATUS POST ANGIOPLASTY WITH STENT: Primary | ICD-10-CM

## 2024-07-29 ENCOUNTER — CLINICAL SUPPORT (OUTPATIENT)
Dept: CARDIAC REHAB | Facility: CLINIC | Age: 69
End: 2024-07-29
Payer: MEDICARE

## 2024-07-29 DIAGNOSIS — Z95.820 STATUS POST ANGIOPLASTY WITH STENT: Primary | ICD-10-CM

## 2024-07-29 PROCEDURE — 93798 PHYS/QHP OP CAR RHAB W/ECG: CPT | Mod: S$GLB,,, | Performed by: INTERNAL MEDICINE

## 2024-07-31 ENCOUNTER — DOCUMENTATION ONLY (OUTPATIENT)
Dept: CARDIOLOGY | Facility: CLINIC | Age: 69
End: 2024-07-31
Payer: MEDICARE

## 2024-07-31 ENCOUNTER — CLINICAL SUPPORT (OUTPATIENT)
Dept: CARDIAC REHAB | Facility: CLINIC | Age: 69
End: 2024-07-31
Payer: MEDICARE

## 2024-07-31 DIAGNOSIS — Z95.820 STATUS POST ANGIOPLASTY WITH STENT: Primary | ICD-10-CM

## 2024-07-31 PROCEDURE — 93798 PHYS/QHP OP CAR RHAB W/ECG: CPT | Mod: S$GLB,,, | Performed by: INTERNAL MEDICINE

## 2024-07-31 NOTE — PROGRESS NOTES
"[facility]) DISCHARGE VISIT - PHONE     Called and spoke to Ms. Heidy Polo.    Most Recent Hospital Discharge Date:  6/16/2024  Last admission Diagnosis/chief complaint: SOB    Pt discharge completed by phone related to pt convenience.    WT: 159.6lbs (assigned dry wt is 158-160lbs)  BP: taken yesterday 121/74  HR: taken yesterday 95    Pt did not check her BP or her HR at home because she is on her way to cardiac rehab.     Pt reports the following:  []  Shortness of Breath with activity  []  Shortness of Breath at rest   []  Fatigue  []  Edema   [] Chest pain or tightness  [] Weight Increase since discharge  [x] None of the above    Medications:   Pt reports having all medications available and understands how to take them appropriately. Reminded pt to call prior to making any changes to medications.     Education:   [x] Confirmed pt still has  "[facility] Home Care Guide" .   Reviewed key points as listed below.     Recommend 2 gram sodium restriction and 1500cc fluid restriction.  Encourage physical activity with graded exercise program.  Requested patient to weigh themselves daily, and to notify us if their weight increases by more than 3 lbs in 1 day or 5 lbs in 3 days.     [x] Reviewed completed "Daily Weight and Symptom Tracker".  Reviewed with patient when and how to call [facility] according to "Yellow Zone" and "Red Zone".       Watch for these Signs and Symptoms: If any of these occur, contact [facility] immediately:   Increase in shortness of breath with movement   Increase in swelling in your legs and ankles   Weight gain of more than 3 pounds in a night or 5 pounds in 3 days.   Difficulty breathing when you are lying down   Worsening fatigue or tiredness   Stomach bloating, a full feeling or a loss of appetite   Increased coughing--especially when you are lying down    MyChart and Care Companion:   Patient active on myChart? Yes, but patient " does not use regularly    [facility] Program Plan:  Pt has successfully completed [facility] program.  Pt care to be transferred to [facility] for long term care.     Pt educated on how to call their offices and how to call [facility] On call in the event of an after hour issue.    PT reminded to continue to follow recommendations made during the [facility] program to include monitoring daily weights, taking medications according to list, following up to appointments per provider recommendations, stop smoking/ start exercising and following a heart friendly low salt, low fluid diet.      Pt was able to verbalize back to LPN in their own words correct diet/fluid restrictions, necessity for exercise, warning signs and symptoms, when and how to contact their  Long term care team .      Plan:     The pt will continue long term cardiology care with [facility].    [x]  Discussed upcoming appointments and/or plan for follow-up care with his/her PCP/Cardiology. The pt was informed a message will be sent to the General Cardiology department requesting an appt for her. And someone from that department will be contacting her for scheduling. The pt verbalized understanding.     All notes created by Danielle King PA-C are available to all providers within our system.     Please refer to provider note for additional details and assessment.

## 2024-08-01 ENCOUNTER — DOCUMENTATION ONLY (OUTPATIENT)
Dept: CARDIAC REHAB | Facility: CLINIC | Age: 69
End: 2024-08-01
Payer: MEDICARE

## 2024-08-01 NOTE — PROGRESS NOTES
Miss Moody has completed 9 out of 36 exercise session of Phase II cardiac rehab.  A follow up reassessment will be completed at 12 sessions.    Session: Orientation     Cardiac Rehab Individual Treatment Plan - Initial Assessment      Patient Name: Heidy Polo MRN: [id number]   : 1955   Age: 69 y.o.   Primary Diagnosis: ASCENDING AORTIC REPLACEMENT  Date of Event: 24  EF: 60%  Risk Stratification: high  Referring Physician: RENEE   Exercise Assessment:     CPX/TM Date: 7-3-24 Results   RHR 91   Max    Peak VO2 (CPX only) 15.6   Actual METS (CPX only) 4.5   Estimated METS 7.0     Anthropometrics    Height 64 inches   Weight 163 lbs   BMI 27.9   Abdominal Girth 40.0   Body Composition 27.5%     ST Depression noted on Stress Test?:No  Angina with exercise?: No   Fall Risk: Yes   Assistive Devices:  independent   Currently exercising? No   Heidy stated there were no limitations to exercise.  She had a right knee replaced but doesn't have any limitations because of it.       Exercise Plan:   Goals:  CR Exercise Goals: Attend Cardiac Rehab 3 times/week: In Progress  Home Aerobic Exercise: 2 additional days/week for 30-60 minutes: In Progress  Intensity of 12-15 on the Rate of Perceived Exertion (RPE) scale: In Progress  30% increase in entry estimated METS: 9.1 : In Progress  5 days/week for 30-60 minutes: In Progress  Demonstrate proper pulse taking technique: In Progress    Intervention:   Discussed importance of regular attendance to cardiac rehab class    Exercise Prescription:  THR Range 111-121   Mode: Treadmill  Recumbent Bike  Upright Bike  Nustep  Elliptical   Frequency:  3 days/week   Duration:  30 - 60 minutes   Intensity:  12 - 15 RPE   Resistance Training:  Yes: 5 lb weights with 10-15 reps based on strength and range of motion assessment     Home Prescription:  Mode Aerobic   Frequency: 2- 3 days/week   Duration: 30-60 minutes   Resistance Training: None         Education:  Orientation to Equipment; verbalizes understanding; Date: 24  Exercise Recommendations; verbalizes understanding; Date: 24  Exercise Safety; verbalizes understanding; Date: 24  Class Preparation: verbalizes understanding; Date: 24  Signs and symptoms to report: verbalizes understanding; Date: 24  Caffeine/Hydration: verbalizes understanding; Date: 24  Exercise Terminology: verbalizes understanding; Date: 24  Resistance Training: verbalizes understanding; Date: 24    Comments:  I encouraged Heidy to begin thinking about some type of aerobic exercise she can participate in at least 2 non-rehab days per week for at least 30 minutes in addition to attending Phase II cardiac rehab classes 3 days per week.  She stated understanding.    All consent forms were signed, proper attire and shoes were discussed.       Heidy will begin Cardiac Rehab on Monday, July 15 at 10:00 am.    The exercise prescription will be adjusted based on tolerance of exercise intensity by patient.    Cristal Hernandez, CEP    Orientation   Cardiac Rehab Individual Treatment Plan - Initial Assessment      Patient Name: Heidy Polo MRN: [id number]   : 1955   Age: 69 y.o.   Primary Diagnosis: s/p ascending aortic replacement, HTN, HLD, pre-DM    Nutrition Assessment:     Anthropometrics    Height 64 inches   Weight 163 lbs   BMI 27.9   Abdominal Girth 40   Body Composition 27.5       Drug Allergies and Intolerances:  Review of patient's allergies indicates:  No Known Allergies    Food Allergies and Intolerances:  NA    Past Medical History:  Past Medical History:   Diagnosis Date    Breast cancer     right    Cancer     breast in  -right     Depression 2012    History of breast cancer     Hyperlipidemia     Hypertension     Hypothyroid     Prediabetes 10/19/2020       Past Surgical History:  Past Surgical History:   Procedure Laterality Date    APPLICATION OF WOUND  VACUUM-ASSISTED CLOSURE DEVICE N/A 4/26/2024    Procedure: APPLICATION, WOUND VAC;  Surgeon: Aquilino Ochoa MD;  Location: [facility] OR 2ND FLR;  Service: Cardiovascular;  Laterality: N/A;  prevena 60x5    CAPSULOTOMY OF JOINT Left 06/04/2021    Procedure: CAPSULOTOMY, JOINT 2nd MPJ;  Surgeon: Marnie Mckeon DPM;  Location: [facility] OR;  Service: Podiatry;  Laterality: Left;    CAPSULOTOMY OF JOINT Left 01/07/2022    3rd and 4th    CAPSULOTOMY OF JOINT Left 01/07/2022    Procedure: 2,3,4,5;  Surgeon: Marnie Mckeon DPM;  Location: [facility] OR;  Service: Podiatry;  Laterality: Left;    CHOLECYSTECTOMY      COLONOSCOPY N/A 11/09/2020    Procedure: COLONOSCOPY;  Surgeon: Adelaide Hernández MD;  Location: [facility] ENDO;  Service: Endoscopy;  Laterality: N/A;  with biopsy    CORRECTION OF HAMMER TOE Left 06/04/2021    Procedure: CORRECTION, HAMMER TOE 2nd with K-wire stabilization;  Surgeon: Marnie Mckeon DPM;  Location: [facility] OR;  Service: Podiatry;  Laterality: Left;  COVID VACCINATION CONFIRMED    CORRECTION OF HAMMER TOE Left 01/07/2022    3rd, 4th and 5th    CORRECTION OF HAMMER TOE Left 01/07/2022    Procedure: CORRECTION, HAMMER TOE 2,3,4,5;  Surgeon: Marnie Mckeon DPM;  Location: [facility] OR;  Service: Podiatry;  Laterality: Left;    HERNIA REPAIR      LAPAROSCOPIC SLEEVE GASTRECTOMY      MODIFIED RADICAL MASTECTOMY W/ AXILLARY LYMPH NODE DISSECTION Right 2001    REPLACEMENT OF ASCENDING AORTA N/A 4/26/2024    Procedure: REPLACEMENT, AORTA, ASCENDING;  Surgeon: Aquilino Ochoa MD;  Location: [facility] OR 2ND FLR;  Service: Cardiovascular;  Laterality: N/A;  cut down of right femoral artery, aortic valve repair, hemiarch under circulatory arrest    TOTAL KNEE ARTHROPLASTY Right 02/09/2021    Procedure: ARTHROPLASTY, KNEE, TOTAL;  Surgeon: Ilya Murphy MD;  Location: [facility] OR;  Service: Orthopedics;  Laterality: Right;       Medications:  Current Outpatient Medications   Medication Sig    aspirin (ECOTRIN) 81 MG EC tablet Take 1 tablet (81 mg  total) by mouth once daily.    blood pressure monitor (EAL EASE) LG arm size (OP) by Other route as needed for High Blood Pressure.    chlorthalidone (HYGROTEN) 25 MG Tab Take 1 tablet (25 mg total) by mouth once daily.    furosemide (LASIX) 20 MG tablet Take 1 tablet (20 mg total) by mouth daily as needed (weight gain of >3 lbs in 1 day, shortness of breath and lower leg swelling).    metoprolol tartrate (LOPRESSOR) 25 MG tablet Take 1 tablet (25 mg total) by mouth 2 (two) times daily.    rosuvastatin (CRESTOR) 10 MG tablet Take 1 tablet (10 mg total) by mouth once daily.    telmisartan (MICARDIS) 40 MG Tab Take 1 tablet (40 mg total) by mouth once daily.     No current facility-administered medications for this visit.       Vitamins and Supplements:  Vit D, B-12    Labs:  Patient confirms she is taking Crestor 10mg for cholesterol control.    Lab Results   Component Value Date    CHOL 159 07/03/2024     Lab Results   Component Value Date    HDL 60 07/03/2024     Lab Results   Component Value Date    LDLCALC 82.0 07/03/2024     Lab Results   Component Value Date    TRIG 85 07/03/2024     Lab Results   Component Value Date    CHOLHDL 37.7 07/03/2024         Lab Results   Component Value Date    GLUF 90 07/03/2024     Lab Results   Component Value Date    HGBA1C 4.8 11/22/2023       Nutrition/Diet History:  Patient eats 2 meals daily.    Seasons food with garlic/onion powder, No Salt [employer].  Patient denies use of a salt shaker at the table on prepared foods.   Dines out 1 per week at restaurants.    Chooses fried foods 2 time(s) per month.    Chooses fish rarely  Beverages:  water and sugar-free beverages  Alcohol: social drinker    24 Hour Recall:  Breakfast: skipped  Lunch:chicken salad with crackers  Dinner:gonzalez chicken with rice, vegetable egg roll  Other: 2 oreos    Difficulty Chewing or Swallowing: No  Current Exercise: See Exercise Physiologist Note  Food Safety/Food Preparation:  shares with  daughter  Living Arrangements/Family Support: Lives alone  Cultural/Spiritual/Personal Preferences: not applicable   Barriers to Education: none identified  Stage of Change Related to Diet Habits: Contemplation    Nutrition Diagnosis:  Food and nutrition related knowledge deficit related to the lack of prior nutrition education as evidenced by diet history and 24 hour recall    Nutrition Plan:   Goals:  LDL-C < 70 (for high risk patients)  Hgb A1c < 7%  BMI < 25 and abdominal girth < 40M/<35 F  2 gram sodium, Mediterranean diet  Rehab weight goal: -10  Fish intake (non-fried varieties) to a goal of 2-3 servings per week.   Increase fruit and vegetable intake    Interventions/Recommendations:  Lab results reviewed and discussed  Nutrition Prescription:  Total Energy Estimated Needs: 2584-9698 Kcal/d for weight loss  Method for Estimating Needs: 20-25kcal/kg ABW  Total Protein Estimated Needs: 60-77 g/d  Method for Estimating Needs: 1-1.3 g/Kg ABW  Total Fluid Estimated Needs: 1 mL/Kcal  Dietitian Consult: No  Patient to participate in Cardiac Rehab sessions three times a week  Weekly Dietitian Weight Check  Encouraged patient to complete 3 day food diary  Follow Up Plan for Ongoing Self-Management Support    Education:  Mediterranean Diet; verbalizes understanding; Date: 7/11/24  Person taught: patient  Preferred Learning Method: Verbal, Written  Education Needed/Provided: Nutrition counseling and education related to cardiac rehabilitation  Education Method: Weekly nutrition lectures on the Mediterranean diet, cooking, shopping, and dining out  Written Materials Provided: 3 Day Food Record, Introduction to Mediterranean Diet  Strategies Implemented: Motivational interviewing, Goal setting, Self-Monitoring, and Problem Solving    Comments:   Discussed ways to incorporate healthy snacks, eating on a schedule, and monitoring sodium intake for heart health.  Pt has been making diet changes since event such as reducing  sodium and much more mindful of label reading. She states she will work toward having a small breakfast daily in addition to increasing fish intake.    Diabetes  Is the patient diabetic? No      RD contact information provided.      Destiny Andrews MS, RDN/LDN    Session: Orientation   Cardiac Rehab Individual Treatment Plan - Initial Assessment      Patient Name: Heidy Polo MRN: [id number]   : 1955   Age: 69 y.o.   Date of Event: 2024   Primary Diagnosis: Ascending Aortic Replacement    EF: 60%    Physical Assessment:   There were no vitals taken for this visit.    ASSESSMENT:  Heart Sounds: regular rate and rhythm  Prosthetic Valve: No  Lung Sounds: normal air entry, lungs clear to auscultation  Capillary Refill: normal  Left Radial Pulse: Normal (+2)  Right Radial Pulse: Normal (+2)  Left Pedal Pulse: Normal (+2)  Right Pedal Pulse: Normal (+2)  Right Edema: none  Left Edema none  Strength: normal  Range of Motion: full range of motion  Existing Limitations:      Site   [] Arthritis, bursitis    [] Amputation, atrophy    [x] Other: Right shoulder, right knee replacement   []       Diabetic patient's foot examination comments:  N/A  Incisional site: healing well  Special needs: N/A    Psychosocial Assessment:   Outcome Survey Tools:    EFREN SCORES:     PRE   Anxiety 3   Depression 0   Somatic 8   Hostility 1              SF-36             PHQ-9:      2024     2:14 PM   PHQ-9 Depression Patient Health Questionnaire   Over the last two weeks how often have you been bothered by little interest or pleasure in doing things 0   Over the last two weeks how often have you been bothered by feeling down, depressed or hopeless 0              Living Arrangements: Lives alone  Family Support: family  Self Reported: Effective Coping Skills  Displays: calmness  Medication: not applicable    Psychosocial Plan:   Goals:  Verbalizes coping mechanisms  Maintain positive support system  Maintain positive  outlook  Improve overall quality of life    Interventions/Recommendations:  Discussed Results of Surveys  Patient to Self Report Emotional Changes at Session Check In  Recommend Physical Activity  Recommend Attending Education Lectures  Notify MD: No  Program Referral: No  Pharmaceutical Intervention/Therapy: No  Other Needs: not applicable  Stage of Readiness to Change: Preparation    Education:  Stress Management; verbalizes understanding; Date: 7/11/2024  Stress; verbalizes understanding; Date: 7/11/2024    Comments:  Patient will be working on achieving goals that were set.  She reports to have good support from her family.  She is motivated & ready to start cardiac rehab.  She plans to attend lectures & exercise session on a consistent basis.  Patient has been instructed to notify staff in the event that circumstances change.  Patient verbalizes understanding.    Other Core Components/Risk Factors Assessment:   RISK FACTORS:  hyperlipidemia, hypertension, positive family history, sedentary lifestyle    Learning Barriers: None    Education Level:  High School (9-12) or GED    Pre-test Score: 40%    Medication Compliance: has been compliant with taking medications    Other Core Components/Risk Factors Plan:   Goals:  Increase exercise tolerance: In Progress  Increase knowledge of CAD: In Progress  Decrease blood pressure: In Progress  Weight loss: In Progress  Demonstrate accurate pulse taking: In Progress  Identify and manage personal areas of stress: In Progress  Learn more about healthy eating: In Progress    Interventions/Recommendations:  Recommend regular attendance for Cardiac Rehab: Exercise and Education Lectures  Encourage medication compliance  Individual Education/ Counseling: Yes  Physician Referral: No    Education:    cholesterol, verbalizes understanding; Date: 7/11/2024  fluid overload/CHF, verbalizes understanding; Date: 7/11/2024  hypertension, verbalizes understanding; Date: 7/11/2024  risk  factors, verbalizes understanding; Date: 7/11/2024  sodium, verbalizes understanding; Date: 7/11/2024  stress, verbalizes understanding; Date: 7/11/2024         Education method adapted to patients education level and preferred method of learning.  Method: explanation    Comments:  Patient will be working on achieving goals that were set.  She is currently not exercising & has been waiting to start rehab before beginning exercising.  She states that she has good support from her children, who are at times overly protective of her. Plans include that she will exercise 2 additional days outside of cardiac rehab.  She has a stationary bike at home.     Other Core Components/Hypertension Assessment:   Resting BP: 128/82  BP Readings from Last 1 Encounters:   07/24/24 (!) 126/57         BP Diagnosis: Hypertensive  Patient reported symptoms: none    Medications:  Medication Prescribed? Adherent? Exception   Beta-blocker [x]Yes  []No  []Unknown [x]Yes  []No  []Unknown    ACEI/ARB [x]Yes  []No  []Unknown [x]Yes  []No  []Unknown    Calcium Channel Blocker []Yes  []No  []Unknown []Yes  []No  []Unknown    Diuretic []Yes  []No  []Unknown []Yes  []No  []Unknown        Other Core Components/Hypertension Plan:   Goals:  Blood Pressure <130/80    Interventions/Recommendations:  Med Card Reconciled: Yes  Encourage medication compliance  Encourage sodium reduction  Encourage weight loss  Recommend physical activity  Educate on contributory factors  Reduce stress, anxiety, anger, depression, and/or chronic pain  Encourage home blood pressure monitoring  Recommend daily weights    Education:    Hypertension; verbalizes understanding; Date: 7/11/2024  Coronary Artery Disease; verbalizes understanding; Date: 7/11/2024  Congestive Heart Failure; verbalizes understanding; Date: 7/11/2024  Risk Factors; verbalizes understanding; Date: 7/11/2024  Stress; verbalizes understanding; Date: 7/11/2024         Comments:  Patient plans to follow  recommendations to improve heart health.  She is monitoring sodium intake carefully.  She is currently monitoring BP/weights on a daily basis.  She denies having any overwhelming stress or anxiety.      Does the patient have Heart Failure? No    Other Core Components/Tobacco Cessation Assessment:   Smoking Status: Lifetime Non-smoker  Primary Tobacco Type: N/A  Tobacco Usage: no  Smoking Cessation Barriers:  N/A  Stage of Readiness to Change: Maintenance    Other Core Components/Tobacco Cessation Plan:   Goals:  Maintain non-smoking status    Interventions:  Maintains non-smoking status    Education:    Risk Factors; verbalizes understanding; Date: 7/11/2024         Comments:  Discussed Cardiac Rehab program in depth with patient.  Medication list updated per patient & marked as reviewed.  Patient has been instructed to notify staff of any problems while attending rehab (ie: chest pain, shortness of breath, lightheadedness, dizziness).  Patient has been instructed to monitor blood pressure readings outside of rehab & to keep a daily log of the readings.  Patient verbalizes understanding.    Molly Sorto RN  Cardiac Rehab Nurse

## 2024-08-02 ENCOUNTER — CLINICAL SUPPORT (OUTPATIENT)
Dept: CARDIAC REHAB | Facility: CLINIC | Age: 69
End: 2024-08-02
Payer: MEDICARE

## 2024-08-02 DIAGNOSIS — Z95.820 STATUS POST ANGIOPLASTY WITH STENT: Primary | ICD-10-CM

## 2024-08-02 DIAGNOSIS — I25.10 CORONARY ARTERY DISEASE, UNSPECIFIED VESSEL OR LESION TYPE, UNSPECIFIED WHETHER ANGINA PRESENT, UNSPECIFIED WHETHER NATIVE OR TRANSPLANTED HEART: ICD-10-CM

## 2024-08-05 ENCOUNTER — CLINICAL SUPPORT (OUTPATIENT)
Dept: CARDIAC REHAB | Facility: CLINIC | Age: 69
End: 2024-08-05
Payer: MEDICARE

## 2024-08-05 DIAGNOSIS — I25.10 CORONARY ARTERY DISEASE, UNSPECIFIED VESSEL OR LESION TYPE, UNSPECIFIED WHETHER ANGINA PRESENT, UNSPECIFIED WHETHER NATIVE OR TRANSPLANTED HEART: ICD-10-CM

## 2024-08-05 DIAGNOSIS — Z95.820 STATUS POST ANGIOPLASTY WITH STENT: Primary | ICD-10-CM

## 2024-08-05 PROCEDURE — 93798 PHYS/QHP OP CAR RHAB W/ECG: CPT | Mod: S$GLB,,, | Performed by: INTERNAL MEDICINE

## 2024-08-07 ENCOUNTER — DOCUMENTATION ONLY (OUTPATIENT)
Dept: CARDIAC REHAB | Facility: CLINIC | Age: 69
End: 2024-08-07

## 2024-08-07 ENCOUNTER — CLINICAL SUPPORT (OUTPATIENT)
Dept: CARDIAC REHAB | Facility: CLINIC | Age: 69
End: 2024-08-07
Payer: MEDICARE

## 2024-08-07 DIAGNOSIS — Z95.820 STATUS POST ANGIOPLASTY WITH STENT: Primary | ICD-10-CM

## 2024-08-07 PROCEDURE — 93798 PHYS/QHP OP CAR RHAB W/ECG: CPT | Mod: S$GLB,,, | Performed by: INTERNAL MEDICINE

## 2024-08-08 ENCOUNTER — PATIENT MESSAGE (OUTPATIENT)
Dept: CARDIOLOGY | Facility: CLINIC | Age: 69
End: 2024-08-08
Payer: MEDICARE

## 2024-08-09 ENCOUNTER — CLINICAL SUPPORT (OUTPATIENT)
Dept: CARDIAC REHAB | Facility: CLINIC | Age: 69
End: 2024-08-09
Payer: MEDICARE

## 2024-08-09 DIAGNOSIS — Z95.820 STATUS POST ANGIOPLASTY WITH STENT: Primary | ICD-10-CM

## 2024-08-12 ENCOUNTER — CLINICAL SUPPORT (OUTPATIENT)
Dept: CARDIAC REHAB | Facility: CLINIC | Age: 69
End: 2024-08-12
Payer: MEDICARE

## 2024-08-12 DIAGNOSIS — Z95.820 STATUS POST ANGIOPLASTY WITH STENT: Primary | ICD-10-CM

## 2024-08-12 PROCEDURE — 93798 PHYS/QHP OP CAR RHAB W/ECG: CPT | Mod: S$GLB,,, | Performed by: INTERNAL MEDICINE

## 2024-08-12 RX ORDER — AA/PROT/LYSINE/METHIO/VIT C/B6 50-12.5 MG
10 TABLET ORAL DAILY
Qty: 90 CAPSULE | Refills: 3 | Status: SHIPPED | OUTPATIENT
Start: 2024-08-12 | End: 2025-08-12

## 2024-08-12 RX ORDER — PRAVASTATIN SODIUM 10 MG/1
10 TABLET ORAL DAILY
Qty: 90 TABLET | Refills: 3 | Status: SHIPPED | OUTPATIENT
Start: 2024-08-12 | End: 2025-08-12

## 2024-08-14 ENCOUNTER — CLINICAL SUPPORT (OUTPATIENT)
Dept: CARDIAC REHAB | Facility: CLINIC | Age: 69
End: 2024-08-14
Payer: MEDICARE

## 2024-08-14 DIAGNOSIS — I25.10 ATHEROSCLEROSIS OF NATIVE CORONARY ARTERY OF NATIVE HEART WITHOUT ANGINA PECTORIS: ICD-10-CM

## 2024-08-14 DIAGNOSIS — Z95.820 STATUS POST ANGIOPLASTY WITH STENT: Primary | ICD-10-CM

## 2024-08-14 PROCEDURE — 93798 PHYS/QHP OP CAR RHAB W/ECG: CPT | Mod: S$GLB,,, | Performed by: INTERNAL MEDICINE

## 2024-08-16 ENCOUNTER — CLINICAL SUPPORT (OUTPATIENT)
Dept: CARDIAC REHAB | Facility: CLINIC | Age: 69
End: 2024-08-16
Payer: MEDICARE

## 2024-08-16 DIAGNOSIS — Z95.820 STATUS POST ANGIOPLASTY WITH STENT: Primary | ICD-10-CM

## 2024-08-19 ENCOUNTER — CLINICAL SUPPORT (OUTPATIENT)
Dept: CARDIAC REHAB | Facility: CLINIC | Age: 69
End: 2024-08-19
Payer: MEDICARE

## 2024-08-19 DIAGNOSIS — Z95.820 STATUS POST ANGIOPLASTY WITH STENT: Primary | ICD-10-CM

## 2024-08-19 PROCEDURE — 93798 PHYS/QHP OP CAR RHAB W/ECG: CPT | Mod: S$GLB,,, | Performed by: INTERNAL MEDICINE

## 2024-08-21 ENCOUNTER — CLINICAL SUPPORT (OUTPATIENT)
Dept: CARDIAC REHAB | Facility: CLINIC | Age: 69
End: 2024-08-21
Payer: MEDICARE

## 2024-08-21 DIAGNOSIS — Z95.820 STATUS POST ANGIOPLASTY WITH STENT: Primary | ICD-10-CM

## 2024-08-21 PROCEDURE — 93798 PHYS/QHP OP CAR RHAB W/ECG: CPT | Mod: S$GLB,,, | Performed by: INTERNAL MEDICINE

## 2024-08-22 ENCOUNTER — DOCUMENTATION ONLY (OUTPATIENT)
Dept: CARDIAC REHAB | Facility: CLINIC | Age: 69
End: 2024-08-22
Payer: MEDICARE

## 2024-08-22 NOTE — PROGRESS NOTES
Heidy has completed 18 out of 36 exercise session of Phase II cardiac rehab.  A follow up reassessment will be completed at 24 sessions.    12 Session Follow Up     Cardiac Rehab Individual Treatment Plan - Reassessment      Patient Name: Heidy Polo MRN: [id number]   : 1955   Age: 69 y.o.   Primary Diagnosis: A AORTIC REPLACEMENT Date of Event: 24   EF: 60%  Risk Stratification: high  Referring Physician: RENEE   Exercise Assessment:     Angina with exercise?: No   ST Depression with Exercise?: No  Fall Risk: Yes   Assistive Devices:  independent  Miss Moody stated at orientation there were no limitations to exercise but has had bilateral knee replacements.  Exercise modalities will be modified if needed.  Comments on Progression: Miss Moody is progressing fairly well and her workloads will continue to be increased as she tolerates exercise intensity.    Exercise Plan:   Goals:  CR Exercise Goals: Attend Cardiac Rehab 3 times/week: In Progress  Home Aerobic Exercise: 2 additional days/week for 30-60 minutes: In Progress  Intensity of 12-15 on the Rate of Perceived Exertion (RPE) scale: In Progress  30% increase in entry estimated METS: 9.1 : In Progress  5 days/week for 30-60 minutes: In Progress  Demonstrate proper pulse taking technique: In Progress    Comments on Goal Progression:  Patient Consistency: consistent with attendance  Home exercise? No   Patient reports intensity rate 11-14 on RPE scale  Patient is progressing steadily  Patient is Able to demonstrate proper pulse taking technique with or without a fitness tracker.      Intervention/Recommendations:   Discussed importance of regular attendance to cardiac rehab class    Exercise Prescription:  THR Range 111-121   Mode: Treadmill  Elliptical   Frequency:  3 days/week   Duration:  30-60 minutes   Intensity:  12-15 RPE   Resistance Training:  Yes: 5 lb weights with 10-15 reps based on strength and range of motion and adjusted  accordingly     Home Prescription:  Mode Aerobic exercise   Frequency: 2- 3 days/week   Duration: 30-60 minutes   Resistance Training: None     Education:  Flexibility/Stretching; verbalizes understanding; Date: 7-15-24  After Phase II; verbalizes understanding; Date: 24  Body Composition; verbalizes understanding; Date: 24  Hydration; verbalizes understanding; Date: 24    Comments:  I reviewed exercise recommendations with Miss Moody.  I encouraged her to begin some type of aerobic exercise she can do at least 2 non-rehab days per week for at least 30 minutes.  She stated understanding.     The exercise prescription will continue to be adjusted based on tolerance of exercise intensity by patient.    Cristal Hernandez, [facility] [place]    12 Session Follow Up   Cardiac Rehab Individual Treatment Plan - Reassessment    Patient Name: Heidy Polo MRN: [id number]   : 1955   Age: 69 y.o.   Primary Diagnosis: s/p ascending aortic replacement    Nutrition Assessment:     Anthropometrics    Height 64 inches   Weight 164.9 lbs   BMI 28.3     Patient confirms she is taking Crestor 10mg for cholesterol control.  Difficulty Chewing or Swallowing: No  Current Exercise: See Exercise Physiologist Note  Food Safety/Food Preparation: self, family member  Living Arrangements/Family Support: Lives alone  Cultural/Spiritual/Personal Preferences: not applicable   Barriers to Education: none identified  Stage of Change Related to Diet Habits: Action  Recent Changes to Diet: No  Food Diary: Given      Nutrition Plan:   Goals:  LDL-C < 70 (for high risk patients)  Hgb A1c < 7%  BMI < 25 and abdominal girth < 40M/<35 F  2 gram sodium, Mediterranean diet: In Progress  Rehab weight loss goal of 10 lbs, 1 lb per week: In Progress  Fish intake (non-fried varieties) to a goal of 2-3 servings per week: In Progress  Increase fruit and vegetable intake: In Progress    Comments on Goal Progression:  Pt states she feels well, maintaining  prior diet changes of limiting salt and incorporating more produce. She is working on increasing fish intake     Interventions:  Dietitian Consult: No  Patient to participate in Cardiac Rehab sessions three times a week  Weekly Dietitian Weight Check  Nutrition Recommendations Provided: Verbal, Reviewed  Follow Up Plan for Ongoing Self-Management Support    Education:  Food Labels; verbalizes understanding; Date: 24  Recipe Modication; verbalizes understanding; Date: 24  Seafood; verbalizes understanding; Date: 24  Food & Mood; verbalizes understanding; Date: 24    Comments:   Discussed ways to incorporate healthy snacks, eating on a schedule, and monitoring sodium intake for heart health.    Diabetes  Is the patient diabetic? No      Destiny Andrews MS, RDN/LDN    Session: 12 Session Follow Up   Cardiac Rehab Individual Treatment Plan - Reassessment      Patient Name: Heidy Polo MRN: [id number]   : 1955   Age: 69 y.o.   Primary Diagnosis: S/P Ascending Aorta Replacement (24)      Psychosocial Assessment:   Living Arrangements: Lives alone  Family Support: children  Self Reported: decrease stress, effective coping skills  Displays: calmness  Medication: not applicable    Psychosocial Plan:   Goals:  Verbalizes coping mechanisms: In Progress  Maintain positive support system: In Progress  Maintain positive outlook: In Progress  Improve overall quality of life: In Progress    Comments on Goal Progression:  Patient reports her stress has improved. She is learning stress management techniques which are helping her.     Interventions:  Patient to Self Report Emotional Changes at Session Check In  Recommend Physical Activity  Recommend Attending Education Lectures  Notify MD: No  Program Referral: No  Pharmaceutical Intervention/Therapy: No  Other Needs: not applicable  Stage of Readiness to Change: Preparation    Education:  Signs and Symptoms of Depression; verbalizes understanding;  Date: 7/19/24  Stress Management; verbalizes understanding; Date: 7/11/24  Depression; verbalizes understanding; Date: 7/19/24  Stress; verbalizes understanding; Date: 7/11/24    Patient reports she is feeling better and learning some stress management techniques. Patient has been instructed to notify staff in the event that circumstances worsen.  Patient verbalizes understanding.    Other Core Components/Risk Factors Assessment:   RISK FACTORS:  hyperlipidemia, hypertension, positive family history, sedentary lifestyle    Learning Barriers: None    Medication Compliance: has been compliant with taking medications    Other Core Components/Risk Factors Plan:   Goals:  Increase exercise tolerance: In Progress  Increase knowledge of CAD: In Progress  Decrease blood pressure: In Progress  Weight loss: In Progress  Demonstrate accurate pulse taking: In Progress  Identify and manage personal areas of stress: In Progress  Learn more about healthy eating: In Progress    Comments on Goal Progression:  Patient is no longer using salt, increase in fruits/veggies.     Interventions:  Individual Education/ Counseling: Yes  Physician Referral: No    Education:    cholesterol, verbalizes understanding; Date: 7/11/24  fluid overload/CHF, verbalizes understanding; Date: 7/11/24  hypertension, verbalizes understanding; Date: 7/11/24  nutrition, verbalizes understanding; Date: 7/31/24  physical activity, verbalizes understanding; Date: 7/22/24  risk factors, verbalizes understanding; Date: 7/11/24  sodium, verbalizes understanding; Date: 7/11/24  stress, verbalizes understanding; Date: 7/11/24         Education method adapted to patients education level and preferred method of learning.  Method: explanation  handouts        Other Core Components/Hypertension Assessment:   BP Range: /54-82  BP at Goal: Yes  Patient reported symptoms: none    Medications:  Medication Prescribed? Adherent? Exception   Beta-blocker [x]Yes  []No   []Unknown [x]Yes  []No  []Unknown    ACEI/ARB [x]Yes  []No  []Unknown [x]Yes  []No  []Unknown    Calcium Channel Blocker []Yes  []No  []Unknown []Yes  []No  []Unknown    Diuretic []Yes  []No  []Unknown []Yes  []No  []Unknown        Other Core Components/Hypertension Plan:   Goals:  Blood Pressure <130/80    Comments on Goal Progression:   Patient takes her blood pressure at home occasionally. Discussed importance of taking readings daily and keeping a log. Patient verbalized understanding.     Interventions:  Med Card Reconciled: Yes  Encourage medication compliance  Encourage sodium reduction  Reduce alcohol consumption  Encourage weight loss  Recommend physical activity  Educate on contributory factors  Reduce stress, anxiety, anger, depression, and/or chronic pain  Encourage home blood pressure monitoring  Recommend daily weights    Education:    Hypertension; verbalizes understanding; Date: 7/11/24  Coronary Artery Disease; verbalizes understanding; Date: 7/11/24  Congestive Heart Failure; verbalizes understanding; Date: 7/11/24  Risk Factors; verbalizes understanding; Date: 7/11/24  Stress; verbalizes understanding; Date: 7/11/24         Does the patient have Heart Failure? No      Other Core Components/Tobacco Cessation Assessment:   Smoking Status: Lifetime Non-smoker  Primary Tobacco Type: N/A  Tobacco Usage: no  Smoking Cessation Barriers:  NA  Stage of Readiness to Change: Maintenance    Other Core Components/Tobacco Cessation Plan:   Goals:  Maintain non-smoking status    Comments on Goal Progression:  Maintain nonsmoker status    Interventions:  Maintains non-smoking status    Education:    Coronary Artery Disease; verbalizes understanding; Date: 7/11/24  Risk Factors; verbalizes understanding; Date: 7/11/24  Hypertension; verbalizes understanding; Date: 7/29/24  Stroke; verbalizes understanding; Date: 7/26/24  Benefits of Cardiac Rehab; verbalizes understanding; Date: 8/2/24          Comments:   Patient  is not exercising outside of class. Encouraged patient to exercise 30-60 minutes 2-3 days per week while attending cardiac rehab. Patient verbalized understanding.    Hola Sanford RN  Cardiac Rehab Nurse

## 2024-08-23 ENCOUNTER — CLINICAL SUPPORT (OUTPATIENT)
Dept: CARDIAC REHAB | Facility: CLINIC | Age: 69
End: 2024-08-23
Payer: MEDICARE

## 2024-08-23 DIAGNOSIS — Z95.820 STATUS POST ANGIOPLASTY WITH STENT: Primary | ICD-10-CM

## 2024-08-23 NOTE — PROGRESS NOTES
Patient here for 1st day of Phase II Cardiac rehab session.  Normal sinus rhythm noted on monitor.  Patient tolerated exercise session well with no complaints.  Will continue to monitor patient.

## 2024-08-26 ENCOUNTER — CLINICAL SUPPORT (OUTPATIENT)
Dept: CARDIAC REHAB | Facility: CLINIC | Age: 69
End: 2024-08-26
Payer: MEDICARE

## 2024-08-26 DIAGNOSIS — Z95.820 STATUS POST ANGIOPLASTY WITH STENT: Primary | ICD-10-CM

## 2024-08-26 PROCEDURE — 93798 PHYS/QHP OP CAR RHAB W/ECG: CPT | Mod: S$GLB,,, | Performed by: INTERNAL MEDICINE

## 2024-08-28 ENCOUNTER — CLINICAL SUPPORT (OUTPATIENT)
Dept: CARDIAC REHAB | Facility: CLINIC | Age: 69
End: 2024-08-28
Payer: MEDICARE

## 2024-08-28 DIAGNOSIS — Z95.820 STATUS POST ANGIOPLASTY WITH STENT: Primary | ICD-10-CM

## 2024-08-28 PROCEDURE — 93798 PHYS/QHP OP CAR RHAB W/ECG: CPT | Mod: S$GLB,,, | Performed by: INTERNAL MEDICINE

## 2024-08-30 ENCOUNTER — CLINICAL SUPPORT (OUTPATIENT)
Dept: CARDIAC REHAB | Facility: CLINIC | Age: 69
End: 2024-08-30
Payer: MEDICARE

## 2024-08-30 DIAGNOSIS — Z95.820 STATUS POST ANGIOPLASTY WITH STENT: Primary | ICD-10-CM

## 2024-09-04 ENCOUNTER — CLINICAL SUPPORT (OUTPATIENT)
Dept: CARDIAC REHAB | Facility: CLINIC | Age: 69
End: 2024-09-04
Payer: MEDICARE

## 2024-09-04 DIAGNOSIS — E78.00 PURE HYPERCHOLESTEROLEMIA: ICD-10-CM

## 2024-09-04 DIAGNOSIS — R73.9 BLOOD GLUCOSE ELEVATED: ICD-10-CM

## 2024-09-04 DIAGNOSIS — Z95.820 STATUS POST ANGIOPLASTY WITH STENT: Primary | ICD-10-CM

## 2024-09-04 PROCEDURE — 93798 PHYS/QHP OP CAR RHAB W/ECG: CPT | Mod: S$GLB,,, | Performed by: INTERNAL MEDICINE

## 2024-09-06 ENCOUNTER — DOCUMENTATION ONLY (OUTPATIENT)
Dept: CARDIAC REHAB | Facility: CLINIC | Age: 69
End: 2024-09-06
Payer: MEDICARE

## 2024-09-06 NOTE — PROGRESS NOTES
Heidy has completed 24 out of 36 exercise session of Phase II cardiac rehab.  A follow up reassessment will be completed at exit interview.    24 Session Follow Up     Cardiac Rehab Individual Treatment Plan - Reassessment      Patient Name: Heidy Polo MRN: [id number]   : 1955   Age: 69 y.o.   Primary Diagnosis: ASCENDING AORTIC REPLACEMENT Date of Event: 24   EF: 60%  Risk Stratification: high  Referring Physician: RENEE   Exercise Assessment:     Angina with exercise?: No   ST Depression with Exercise?: No  Fall Risk: Yes   Assistive Devices:  independent  Heidy stated at orientation there were no limitations to exercise even though she has had a right knee replacement.    Comments on Progression: Heidy is progressing fairly well and her workloads will continue to be increased as she tolerates exercise intensity.    Exercise Plan:   Goals:  CR Exercise Goals: Attend Cardiac Rehab 3 times/week: In Progress  Home Aerobic Exercise: 2 additional days/week for 30-60 minutes: In Progress  Intensity of 12-15 on the Rate of Perceived Exertion (RPE) scale: In Progress  30% increase in entry estimated METS: 9.1 : In Progress  5 days/week for 30-60 minutes: In Progress  Demonstrate proper pulse taking technique: In Progress    Comments on Goal Progression:  Patient Consistency: consistent with attendance  Home exercise? No   Patient reports intensity rate 11-13 on RPE scale  Patient is progressing steadily  Patient is Able to demonstrate proper pulse taking technique with or without a fitness tracker.      Intervention/Recommendations:   Discussed importance of regular attendance to cardiac rehab class    Exercise Prescription:  THR Range 111-121   Mode: Treadmill  Elliptical   Frequency:  3 days/week   Duration:  30-60 minutes   Intensity:  12-15 RPE   Resistance Training:  Yes: 3 to 5 lb weights with 10-15 reps based on strength and range of motion and adjusted accordingly     Home Prescription:  Mode  Aerobic exercise   Frequency: 2- 3 days/week   Duration: 30-60 minutes   Resistance Training: None     Education:  Exercise Terminology; verbalizes understanding; Date: 9-9-24  Diabetes; verbalizes understanding; Date: 8-12-24  Arthritis; verbalizes understanding; Date: 8-19-24  Osteoporosis; verbalizes understanding; Date: 8-26-24    Comments:  I reviewed exercise recommendations with Heidy.  I strongly encouraged her to begin some type of aerobic exercise she can do at least 2 non-rehab days per week for at least 30 minutes.  She stated understanding.     The exercise prescription will continue to be adjusted based on tolerance of exercise intensity by patient.    Fay Hernandez., CEP

## 2024-09-09 ENCOUNTER — CLINICAL SUPPORT (OUTPATIENT)
Dept: CARDIAC REHAB | Facility: CLINIC | Age: 69
End: 2024-09-09
Payer: MEDICARE

## 2024-09-09 DIAGNOSIS — I25.10 CORONARY ARTERY DISEASE, UNSPECIFIED VESSEL OR LESION TYPE, UNSPECIFIED WHETHER ANGINA PRESENT, UNSPECIFIED WHETHER NATIVE OR TRANSPLANTED HEART: ICD-10-CM

## 2024-09-09 DIAGNOSIS — Z95.820 STATUS POST ANGIOPLASTY WITH STENT: Primary | ICD-10-CM

## 2024-09-09 PROCEDURE — 93798 PHYS/QHP OP CAR RHAB W/ECG: CPT | Mod: S$GLB,,, | Performed by: INTERNAL MEDICINE

## 2024-09-09 NOTE — PROGRESS NOTES
Session: 24 Session Follow Up   Cardiac Rehab Individual Treatment Plan - Reassessment      Patient Name: Heidy Polo MRN: [id number]   : 1955   Age: 69 y.o.   Primary Diagnosis: Ascending Aortic Replacement 2024      Psychosocial Assessment:   Living Arrangements: Lives with spouse  Family Support: family and spouse  Self Reported: no change Effective Coping Skills  Displays: happiness and calmness  Medication: not applicable    Psychosocial Plan:   Goals:  Verbalizes coping mechanisms: Met  Maintain positive support system: Met  Maintain positive outlook: Met  Improve overall quality of life: In Progress    Comments on Goal Progression:  Pt denies any overwhelming stress or anxiety at this time.    Interventions:  Patient to Self Report Emotional Changes at Session Check In  Recommend Physical Activity  Recommend Attending Education Lectures  Notify MD: No  Program Referral: No  Pharmaceutical Intervention/Therapy: No  Other Needs: not applicable  Stage of Readiness to Change: Action    Education:  Benefits of Cardiac Rehab; verbalizes understanding; Date: 2024      Patient has been instructed to notify staff in the event that circumstances worsen.  Patient verbalizes understanding.    Other Core Components/Risk Factors Assessment:   RISK FACTORS:  hyperlipidemia, hypertension, positive family history, sedentary lifestyle    Learning Barriers: None    Medication Compliance: has been compliant with taking medications    Other Core Components/Risk Factors Plan:   Goals:  Increase exercise tolerance: Met  Increase knowledge of CAD: In Progress  Decrease blood pressure: Met  Weight loss: Not Met: Pt has gained 3-4 lbs since starting cardiac rehab  Demonstrate accurate pulse taking: Met  Identify and manage personal areas of stress: Met  Learn more about healthy eating: In Progress    Comments on Goal Progression:  Pt is more active and feeling stronger. Pt has blood pressure readings at goal and  states she is learning more about eating healthier.     Interventions:  Individual Education/ Counseling: Yes  Physician Referral: No    Education:    cardiac interventions, verbalizes understanding; Date: 8/23/2024  fluid overload/CHF, verbalizes understanding; Date: 8/30/2024  risk factors, verbalizes understanding; Date: 8/09/2024         Education method adapted to patients education level and preferred method of learning.  Method: explanation  handouts        Other Core Components/Hypertension Assessment:   BP Range:  systolic; 60-82 diastolic  BP at Goal: Yes  Patient reported symptoms: none    Medications:  Medication Prescribed? Adherent? Exception   Beta-blocker []Yes  []No  []Unknown []Yes  []No  []Unknown    ACEI/ARB []Yes  []No  []Unknown []Yes  []No  []Unknown    Calcium Channel Blocker []Yes  []No  []Unknown []Yes  []No  []Unknown    Diuretic []Yes  []No  []Unknown []Yes  []No  []Unknown        Other Core Components/Hypertension Plan:   Goals:  Blood Pressure <130/80    Comments on Goal Progression:  Pt has isolated reading of 144/80 X1, Pt is monitoring her blood pressure daily and keeping a log.    Interventions:  Med Card Reconciled: Yes  Encourage medication compliance  Encourage sodium reduction  Reduce alcohol consumption  Encourage weight loss  Recommend physical activity  Educate on contributory factors  Reduce stress, anxiety, anger, depression, and/or chronic pain  Encourage home blood pressure monitoring  Recommend daily weights  MD notified/Physician Referral: No    Education:    Congestive Heart Failure; verbalizes understanding; Date: 8/30/2024  Stroke; verbalizes understanding; Date: 7/26/2024             Does the patient have Heart Failure? No      Other Core Components/Tobacco Cessation Assessment:   Smoking Status: Lifetime Non-smoker  Primary Tobacco Type: N/A  Tobacco Usage: no  Smoking Cessation Barriers:  N/A  Stage of Readiness to Change: Maintenance    Other Core  Components/Tobacco Cessation Plan:   Goals:  Maintain non-smoking status    Comments on Goal Progression:  Pt does not smoke    Interventions:  Maintains non-smoking status    Education:    Heart and Lung Anatomy; verbalizes understanding; Date: 8/16/2024  Coronary Artery Disease; verbalizes understanding; Date: 8/23/2024          Comments:   Pt verbalizes no desire to use tobacco products.    Richard MatiasRN

## 2024-09-09 NOTE — PROGRESS NOTES
24 Session Follow Up   Cardiac Rehab Individual Treatment Plan - Reassessment    Patient Name: Heidy Polo MRN: [id number]   : 1955   Age: 69 y.o.   Primary Diagnosis: s/p ascending aortic replacement    Nutrition Assessment:     Anthropometrics    Height 64 inches   Weight 167.6 lbs   BMI 28.8     Patient confirms she is taking Crestor 10mg for cholesterol control.  Difficulty Chewing or Swallowing: No  Current Exercise: See Exercise Physiologist Note  Food Safety/Food Preparation: self  Living Arrangements/Family Support: Lives alone  Cultural/Spiritual/Personal Preferences: not applicable   Barriers to Education: none identified  Stage of Change Related to Diet Habits: Action  Recent Changes to Diet: No  Food Diary: Completed      Nutrition Plan:   Goals:  LDL-C < 70 (for high risk patients)  Hgb A1c < 7%  BMI < 25 and abdominal girth < 40M/<35 F  2 gram sodium, Mediterranean diet: In Progress  Rehab weight loss goal of 10 lbs, 1 lb per week: In Progress  Fish intake (non-fried varieties) to a goal of 2-3 servings per week: In Progress  Increase fruit and vegetable intake: In Progress    Comments on Goal Progression:  Pt states she is feeling stronger. Currently eating fish once weekly and tryong for 2x. She states she is good with water intake. No progress toward weight loss goal-encouraged kcal tracker as per diet recall pt does seem to make overall balanced/healthy food choices    Interventions:  Dietitian Consult: No  Patient to participate in Cardiac Rehab sessions three times a week  Weekly Dietitian Weight Check  Nutrition Recommendations Provided: Verbal and Written, Reviewed  Follow Up Plan for Ongoing Self-Management Support    Education:  Cholesterol and Fat; verbalizes understanding; Date: 24  Vitamins; verbalizes understanding; Date: 24  Food Additives; verbalizes understanding; Date: 24    Comments:   Discussed ways to incorporate healthy snacks, eating on a schedule, and  monitoring sodium intake for heart health.    Diabetes  Is the patient diabetic? No      Destiny Andrews MS, RDN/LDN

## 2024-09-12 ENCOUNTER — DOCUMENTATION ONLY (OUTPATIENT)
Dept: CARDIAC REHAB | Facility: CLINIC | Age: 69
End: 2024-09-12
Payer: MEDICARE

## 2024-09-12 NOTE — PROGRESS NOTES
Heidy has completed 24 out of 36 exercise session of Phase II cardiac rehab.  A follow up reassessment will be completed at exit interview.    24 Session Follow Up     Cardiac Rehab Individual Treatment Plan - Reassessment      Patient Name: Heidy Polo MRN: [id number]   : 1955   Age: 69 y.o.   Primary Diagnosis: ASCENDING AORTIC REPLACEMENT Date of Event: 24   EF: 60%  Risk Stratification: high  Referring Physician: RENEE   Exercise Assessment:     Angina with exercise?: No   ST Depression with Exercise?: No  Fall Risk: Yes   Assistive Devices:  independent  Heidy stated at orientation there were no limitations to exercise even though she has had a right knee replacement.    Comments on Progression: Heidy is progressing fairly well and her workloads will continue to be increased as she tolerates exercise intensity.    Exercise Plan:   Goals:  CR Exercise Goals: Attend Cardiac Rehab 3 times/week: In Progress  Home Aerobic Exercise: 2 additional days/week for 30-60 minutes: In Progress  Intensity of 12-15 on the Rate of Perceived Exertion (RPE) scale: In Progress  30% increase in entry estimated METS: 9.1 : In Progress  5 days/week for 30-60 minutes: In Progress  Demonstrate proper pulse taking technique: In Progress    Comments on Goal Progression:  Patient Consistency: consistent with attendance  Home exercise? No   Patient reports intensity rate 11-13 on RPE scale  Patient is progressing steadily  Patient is Able to demonstrate proper pulse taking technique with or without a fitness tracker.      Intervention/Recommendations:   Discussed importance of regular attendance to cardiac rehab class    Exercise Prescription:  THR Range 111-121   Mode: Treadmill  Elliptical   Frequency:  3 days/week   Duration:  30-60 minutes   Intensity:  12-15 RPE   Resistance Training:  Yes: 3 to 5 lb weights with 10-15 reps based on strength and range of motion and adjusted accordingly     Home Prescription:  Mode  Aerobic exercise   Frequency: 2- 3 days/week   Duration: 30-60 minutes   Resistance Training: None     Education:  Exercise Terminology; verbalizes understanding; Date: 24  Diabetes; verbalizes understanding; Date: 24  Arthritis; verbalizes understanding; Date: 24  Osteoporosis; verbalizes understanding; Date: 24    Comments:  I reviewed exercise recommendations with Heidy.  I strongly encouraged her to begin some type of aerobic exercise she can do at least 2 non-rehab days per week for at least 30 minutes.  She stated understanding.     The exercise prescription will continue to be adjusted based on tolerance of exercise intensity by patient.    Cristal Hernandez, CEP    24 Session Follow Up   Cardiac Rehab Individual Treatment Plan - Reassessment    Patient Name: Heidy Polo MRN: [id number]   : 1955   Age: 69 y.o.   Primary Diagnosis: s/p ascending aortic replacement    Nutrition Assessment:     Anthropometrics    Height 64 inches   Weight 167.6 lbs   BMI 28.8     Patient confirms she is taking Crestor 10mg for cholesterol control.  Difficulty Chewing or Swallowing: No  Current Exercise: See Exercise Physiologist Note  Food Safety/Food Preparation: self  Living Arrangements/Family Support: Lives alone  Cultural/Spiritual/Personal Preferences: not applicable   Barriers to Education: none identified  Stage of Change Related to Diet Habits: Action  Recent Changes to Diet: No  Food Diary: Completed      Nutrition Plan:   Goals:  LDL-C < 70 (for high risk patients)  Hgb A1c < 7%  BMI < 25 and abdominal girth < 40M/<35 F  2 gram sodium, Mediterranean diet: In Progress  Rehab weight loss goal of 10 lbs, 1 lb per week: In Progress  Fish intake (non-fried varieties) to a goal of 2-3 servings per week: In Progress  Increase fruit and vegetable intake: In Progress    Comments on Goal Progression:  Pt states she is feeling stronger. Currently eating fish once weekly and tryong for 2x. She states  she is good with water intake. No progress toward weight loss goal-encouraged kcal tracker as per diet recall pt does seem to make overall balanced/healthy food choices    Interventions:  Dietitian Consult: No  Patient to participate in Cardiac Rehab sessions three times a week  Weekly Dietitian Weight Check  Nutrition Recommendations Provided: Verbal and Written, Reviewed  Follow Up Plan for Ongoing Self-Management Support    Education:  Cholesterol and Fat; verbalizes understanding; Date: 24  Vitamins; verbalizes understanding; Date: 24  Food Additives; verbalizes understanding; Date: 24    Comments:   Discussed ways to incorporate healthy snacks, eating on a schedule, and monitoring sodium intake for heart health.    Diabetes  Is the patient diabetic? No      Destiny Andrews MS, RDN/LDN    Session: 24 Session Follow Up   Cardiac Rehab Individual Treatment Plan - Reassessment      Patient Name: Heidy Polo MRN: [id number]   : 1955   Age: 69 y.o.   Primary Diagnosis: Ascending Aortic Replacement 2024      Psychosocial Assessment:   Living Arrangements: Lives with spouse  Family Support: family and spouse  Self Reported: no change Effective Coping Skills  Displays: happiness and calmness  Medication: not applicable    Psychosocial Plan:   Goals:  Verbalizes coping mechanisms: Met  Maintain positive support system: Met  Maintain positive outlook: Met  Improve overall quality of life: In Progress    Comments on Goal Progression:  Pt denies any overwhelming stress or anxiety at this time.    Interventions:  Patient to Self Report Emotional Changes at Session Check In  Recommend Physical Activity  Recommend Attending Education Lectures  Notify MD: No  Program Referral: No  Pharmaceutical Intervention/Therapy: No  Other Needs: not applicable  Stage of Readiness to Change: Action    Education:  Benefits of Cardiac Rehab; verbalizes understanding; Date: 2024      Patient has been instructed  to notify staff in the event that circumstances worsen.  Patient verbalizes understanding.    Other Core Components/Risk Factors Assessment:   RISK FACTORS:  hyperlipidemia, hypertension, positive family history, sedentary lifestyle    Learning Barriers: None    Medication Compliance: has been compliant with taking medications    Other Core Components/Risk Factors Plan:   Goals:  Increase exercise tolerance: Met  Increase knowledge of CAD: In Progress  Decrease blood pressure: Met  Weight loss: Not Met: Pt has gained 3-4 lbs since starting cardiac rehab  Demonstrate accurate pulse taking: Met  Identify and manage personal areas of stress: Met  Learn more about healthy eating: In Progress    Comments on Goal Progression:  Pt is more active and feeling stronger. Pt has blood pressure readings at goal and states she is learning more about eating healthier.     Interventions:  Individual Education/ Counseling: Yes  Physician Referral: No    Education:    cardiac interventions, verbalizes understanding; Date: 8/23/2024  fluid overload/CHF, verbalizes understanding; Date: 8/30/2024  risk factors, verbalizes understanding; Date: 8/09/2024         Education method adapted to patients education level and preferred method of learning.  Method: explanation  handouts        Other Core Components/Hypertension Assessment:   BP Range:  systolic; 60-82 diastolic  BP at Goal: Yes  Patient reported symptoms: none    Medications:  Medication Prescribed? Adherent? Exception   Beta-blocker []Yes  []No  []Unknown []Yes  []No  []Unknown    ACEI/ARB []Yes  []No  []Unknown []Yes  []No  []Unknown    Calcium Channel Blocker []Yes  []No  []Unknown []Yes  []No  []Unknown    Diuretic []Yes  []No  []Unknown []Yes  []No  []Unknown        Other Core Components/Hypertension Plan:   Goals:  Blood Pressure <130/80    Comments on Goal Progression:  Pt has isolated reading of 144/80 X1, Pt is monitoring her blood pressure daily and keeping a  log.    Interventions:  Med Card Reconciled: Yes  Encourage medication compliance  Encourage sodium reduction  Reduce alcohol consumption  Encourage weight loss  Recommend physical activity  Educate on contributory factors  Reduce stress, anxiety, anger, depression, and/or chronic pain  Encourage home blood pressure monitoring  Recommend daily weights  MD notified/Physician Referral: No    Education:    Congestive Heart Failure; verbalizes understanding; Date: 8/30/2024  Stroke; verbalizes understanding; Date: 7/26/2024             Does the patient have Heart Failure? No      Other Core Components/Tobacco Cessation Assessment:   Smoking Status: Lifetime Non-smoker  Primary Tobacco Type: N/A  Tobacco Usage: no  Smoking Cessation Barriers:  N/A  Stage of Readiness to Change: Maintenance    Other Core Components/Tobacco Cessation Plan:   Goals:  Maintain non-smoking status    Comments on Goal Progression:  Pt does not smoke    Interventions:  Maintains non-smoking status    Education:    Heart and Lung Anatomy; verbalizes understanding; Date: 8/16/2024  Coronary Artery Disease; verbalizes understanding; Date: 8/23/2024          Comments:   Pt verbalizes no desire to use tobacco products.    Richard MatiasRN

## 2024-09-13 ENCOUNTER — CLINICAL SUPPORT (OUTPATIENT)
Dept: CARDIAC REHAB | Facility: CLINIC | Age: 69
End: 2024-09-13
Payer: MEDICARE

## 2024-09-13 DIAGNOSIS — Z95.820 STATUS POST ANGIOPLASTY WITH STENT: Primary | ICD-10-CM

## 2024-09-16 ENCOUNTER — CLINICAL SUPPORT (OUTPATIENT)
Dept: CARDIAC REHAB | Facility: CLINIC | Age: 69
End: 2024-09-16
Payer: MEDICARE

## 2024-09-16 DIAGNOSIS — Z95.820 STATUS POST ANGIOPLASTY WITH STENT: Primary | ICD-10-CM

## 2024-09-16 PROCEDURE — 93798 PHYS/QHP OP CAR RHAB W/ECG: CPT | Mod: S$GLB,,, | Performed by: INTERNAL MEDICINE

## 2024-09-18 ENCOUNTER — CLINICAL SUPPORT (OUTPATIENT)
Dept: CARDIAC REHAB | Facility: CLINIC | Age: 69
End: 2024-09-18
Payer: MEDICARE

## 2024-09-18 DIAGNOSIS — Z95.820 STATUS POST ANGIOPLASTY WITH STENT: Primary | ICD-10-CM

## 2024-09-18 PROCEDURE — 93798 PHYS/QHP OP CAR RHAB W/ECG: CPT | Mod: S$GLB,,, | Performed by: INTERNAL MEDICINE

## 2024-09-19 ENCOUNTER — PATIENT MESSAGE (OUTPATIENT)
Dept: PRIMARY CARE CLINIC | Facility: CLINIC | Age: 69
End: 2024-09-19
Payer: MEDICARE

## 2024-09-23 ENCOUNTER — CLINICAL SUPPORT (OUTPATIENT)
Dept: CARDIAC REHAB | Facility: CLINIC | Age: 69
End: 2024-09-23
Payer: MEDICARE

## 2024-09-23 DIAGNOSIS — Z95.820 STATUS POST ANGIOPLASTY WITH STENT: Primary | ICD-10-CM

## 2024-09-23 PROCEDURE — 93798 PHYS/QHP OP CAR RHAB W/ECG: CPT | Mod: S$GLB,,, | Performed by: INTERNAL MEDICINE

## 2024-09-25 ENCOUNTER — CLINICAL SUPPORT (OUTPATIENT)
Dept: CARDIAC REHAB | Facility: CLINIC | Age: 69
End: 2024-09-25
Payer: MEDICARE

## 2024-09-25 DIAGNOSIS — Z95.820 STATUS POST ANGIOPLASTY WITH STENT: Primary | ICD-10-CM

## 2024-09-25 PROCEDURE — 93798 PHYS/QHP OP CAR RHAB W/ECG: CPT | Mod: S$GLB,,, | Performed by: INTERNAL MEDICINE

## 2024-09-27 ENCOUNTER — CLINICAL SUPPORT (OUTPATIENT)
Dept: CARDIAC REHAB | Facility: CLINIC | Age: 69
End: 2024-09-27
Payer: MEDICARE

## 2024-09-27 DIAGNOSIS — Z95.820 STATUS POST ANGIOPLASTY WITH STENT: Primary | ICD-10-CM

## 2024-09-30 ENCOUNTER — CLINICAL SUPPORT (OUTPATIENT)
Dept: CARDIAC REHAB | Facility: CLINIC | Age: 69
End: 2024-09-30
Payer: MEDICARE

## 2024-09-30 DIAGNOSIS — Z95.820 STATUS POST ANGIOPLASTY WITH STENT: Primary | ICD-10-CM

## 2024-09-30 PROCEDURE — 93798 PHYS/QHP OP CAR RHAB W/ECG: CPT | Mod: S$GLB,,, | Performed by: INTERNAL MEDICINE

## 2024-10-01 ENCOUNTER — OFFICE VISIT (OUTPATIENT)
Dept: PRIMARY CARE CLINIC | Facility: CLINIC | Age: 69
End: 2024-10-01
Payer: MEDICARE

## 2024-10-01 VITALS
DIASTOLIC BLOOD PRESSURE: 78 MMHG | OXYGEN SATURATION: 98 % | BODY MASS INDEX: 29.28 KG/M2 | RESPIRATION RATE: 13 BRPM | SYSTOLIC BLOOD PRESSURE: 116 MMHG | HEART RATE: 76 BPM | TEMPERATURE: 97 F | WEIGHT: 171.5 LBS | HEIGHT: 64 IN

## 2024-10-01 DIAGNOSIS — Z98.84 STATUS FOLLOWING GASTRIC BANDING SURGERY FOR WEIGHT LOSS: ICD-10-CM

## 2024-10-01 DIAGNOSIS — M17.11 PRIMARY OSTEOARTHRITIS OF RIGHT KNEE: ICD-10-CM

## 2024-10-01 DIAGNOSIS — Z95.820 STATUS POST ANGIOPLASTY WITH STENT: ICD-10-CM

## 2024-10-01 DIAGNOSIS — I71.00 DISSECTING ANEURYSM OF AORTA: ICD-10-CM

## 2024-10-01 DIAGNOSIS — Z23 NEED FOR VACCINATION: Primary | ICD-10-CM

## 2024-10-01 DIAGNOSIS — Z86.39 HISTORY OF HYPOTHYROIDISM: ICD-10-CM

## 2024-10-01 DIAGNOSIS — I10 ESSENTIAL HYPERTENSION: ICD-10-CM

## 2024-10-01 DIAGNOSIS — Z00.00 ENCOUNTER FOR PREVENTIVE HEALTH EXAMINATION: ICD-10-CM

## 2024-10-01 DIAGNOSIS — Z78.0 ASYMPTOMATIC MENOPAUSAL STATE: ICD-10-CM

## 2024-10-01 DIAGNOSIS — I50.32 CHRONIC HEART FAILURE WITH PRESERVED EJECTION FRACTION: ICD-10-CM

## 2024-10-01 DIAGNOSIS — Z95.828 S/P ASCENDING AORTIC REPLACEMENT: ICD-10-CM

## 2024-10-01 DIAGNOSIS — E78.2 MIXED HYPERLIPIDEMIA: ICD-10-CM

## 2024-10-01 DIAGNOSIS — Z85.3 HISTORY OF RIGHT BREAST CANCER: ICD-10-CM

## 2024-10-01 DIAGNOSIS — Z12.31 ENCOUNTER FOR SCREENING MAMMOGRAM FOR BREAST CANCER: ICD-10-CM

## 2024-10-01 PROCEDURE — 4010F ACE/ARB THERAPY RXD/TAKEN: CPT | Mod: CPTII,S$GLB,, | Performed by: NURSE PRACTITIONER

## 2024-10-01 PROCEDURE — 1170F FXNL STATUS ASSESSED: CPT | Mod: CPTII,S$GLB,, | Performed by: NURSE PRACTITIONER

## 2024-10-01 PROCEDURE — 3078F DIAST BP <80 MM HG: CPT | Mod: CPTII,S$GLB,, | Performed by: NURSE PRACTITIONER

## 2024-10-01 PROCEDURE — G0008 ADMIN INFLUENZA VIRUS VAC: HCPCS | Mod: S$GLB,,, | Performed by: NURSE PRACTITIONER

## 2024-10-01 PROCEDURE — 3074F SYST BP LT 130 MM HG: CPT | Mod: CPTII,S$GLB,, | Performed by: NURSE PRACTITIONER

## 2024-10-01 PROCEDURE — 90653 IIV ADJUVANT VACCINE IM: CPT | Mod: S$GLB,,, | Performed by: NURSE PRACTITIONER

## 2024-10-01 PROCEDURE — 99999 PR PBB SHADOW E&M-EST. PATIENT-LVL IV: CPT | Mod: PBBFAC,,, | Performed by: NURSE PRACTITIONER

## 2024-10-01 PROCEDURE — 1158F ADVNC CARE PLAN TLK DOCD: CPT | Mod: CPTII,S$GLB,, | Performed by: NURSE PRACTITIONER

## 2024-10-01 PROCEDURE — 1126F AMNT PAIN NOTED NONE PRSNT: CPT | Mod: CPTII,S$GLB,, | Performed by: NURSE PRACTITIONER

## 2024-10-01 PROCEDURE — 1159F MED LIST DOCD IN RCRD: CPT | Mod: CPTII,S$GLB,, | Performed by: NURSE PRACTITIONER

## 2024-10-01 PROCEDURE — G0439 PPPS, SUBSEQ VISIT: HCPCS | Mod: S$GLB,,, | Performed by: NURSE PRACTITIONER

## 2024-10-01 NOTE — PROGRESS NOTES
"  Heidy Polo presented for a  Medicare AWV and comprehensive Health Risk Assessment today. The following components were reviewed and updated:    Medical history  Family History  Social history  Allergies and Current Medications  Health Risk Assessment  Health Maintenance  Care Team         ** See Completed Assessments for Annual Wellness Visit within the encounter summary.**         The following assessments were completed:  Living Situation  CAGE  Depression Screening  Timed Get Up and Go  Whisper Test  Cognitive Function Screening  Nutrition Screening  ADL Screening  PAQ Screening        Opioid documentation:      Patient does not have a current opioid prescription.        Vitals:    10/01/24 1047   BP: 116/78   BP Location: Left arm   Patient Position: Sitting   Pulse: 76   Resp: 13   Temp: 97.4 °F (36.3 °C)   TempSrc: Oral   SpO2: 98%   Weight: 77.8 kg (171 lb 8.3 oz)   Height: 5' 4" (1.626 m)     Body mass index is 29.44 kg/m².  Physical Exam  Constitutional:       Appearance: She is well-developed.   HENT:      Head: Normocephalic.      Mouth/Throat:      Pharynx: Uvula midline.   Eyes:      Conjunctiva/sclera: Conjunctivae normal.   Cardiovascular:      Rate and Rhythm: Normal rate and regular rhythm.      Pulses: Normal pulses.           Radial pulses are 2+ on the right side and 2+ on the left side.      Heart sounds: Normal heart sounds. No murmur heard.  Pulmonary:      Effort: Pulmonary effort is normal.      Breath sounds: Normal breath sounds. No wheezing.   Lymphadenopathy:      Cervical: No cervical adenopathy.   Skin:     General: Skin is warm and dry.      Findings: No rash.   Neurological:      Mental Status: She is alert and oriented to person, place, and time.               Diagnoses and health risks identified today and associated recommendations/orders:    1. Need for vaccination  Flu vaccine today  - influenza (adjuvanted) (Fluad) 45 mcg/0.5 mL IM vaccine (> or = 64 yo) 0.5 mL    2. " Encounter for screening mammogram for breast cancer  Mammogram ordered.   - Mammo Digital Screening Left; Future    3. Primary osteoarthritis of right knee  Complete dexa. Due at this time.   - DXA Bone Density Axial Skeleton 1 or more sites; Future    4. Asymptomatic menopausal state  Dexa ordered. Continue calcium and vitamin D.   - DXA Bone Density Axial Skeleton 1 or more sites; Future    5. S/P ascending aortic replacement  Stable. Discharged from [facility].     6. History of right breast cancer  Stable.     7. Mixed hyperlipidemia  Continue statin. Continue under the care of cardiology    8. Status following gastric banding surgery for weight loss  Stable. Labs per PCP.     9. Status post angioplasty with stent  Continue under the care of cardiology    10. History of hypothyroidism  Stable. Continue synthoid    11. Essential hypertension  Stable.     12. Chronic heart failure with preserved ejection fraction  Stable.     13. Dissecting aneurysm of aorta  S/p repair, see cardiology for follow up.     14. Encounter for preventive health examination  Repeat annually.       Provided Heidy E with a 5-10 year written screening schedule and personal prevention plan. Recommendations were developed using the USPSTF age appropriate recommendations. Education, counseling, and referrals were provided as needed. After Visit Summary printed and given to patient which includes a list of additional screenings\tests needed.    Follow up in about 1 year (around 10/1/2025).    Destiny Mcintyre NP  I offered to discuss advanced care planning, including how to pick a person who would make decisions for you if you were unable to make them for yourself, called a health care power of , and what kind of decisions you might make such as use of life sustaining treatments such as ventilators and tube feeding when faced with a life limiting illness recorded on a living will that they will need to know. (How you want to be cared for as  you near the end of your natural life)     X Patient is interested in learning more about how to make advanced directives.  I provided them paperwork and offered to discuss this with them.

## 2024-10-01 NOTE — PATIENT INSTRUCTIONS
Counseling and Referral of Other Preventative  (Italic type indicates deductible and co-insurance are waived)    Patient Name: Heidy Polo  Today's Date: 10/1/2024    Health Maintenance       Date Due Completion Date    RSV Vaccine (Age 60+ and Pregnant patients) (1 - Risk 60-74 years 1-dose series) Never done ---    DEXA Scan 12/03/2024 12/3/2021    Mammogram 12/08/2024 12/8/2023    Shingles Vaccine (1 of 2) 07/01/2025 (Originally 7/2/2006) ---    Hemoglobin A1c (Prediabetes) 11/22/2024 11/22/2023    Lipid Panel 07/03/2029 7/3/2024    TETANUS VACCINE 10/19/2030 10/19/2020    Colorectal Cancer Screening 11/09/2030 11/9/2020        Orders Placed This Encounter   Procedures    Mammo Digital Screening Left    DXA Bone Density Axial Skeleton 1 or more sites     The following information is provided to all patients.  This information is to help you find resources for any of the problems found today that may be affecting your health:                  Living healthy guide: www.[facility].[place].gov      Understanding Diabetes: www.diabetes.org      Eating healthy: www.cdc.gov/healthyweight      CDC home safety checklist: www.cdc.gov/steadi/patient.html      Agency on Aging: www.goea.[place].gov      Alcoholics anonymous (AA): www.aa.org      Physical Activity: www.chandra.nih.gov/yf6ozvw      Tobacco use: www.quitwithusla.org

## 2024-10-02 ENCOUNTER — CLINICAL SUPPORT (OUTPATIENT)
Dept: CARDIAC REHAB | Facility: CLINIC | Age: 69
End: 2024-10-02
Payer: MEDICARE

## 2024-10-02 DIAGNOSIS — Z95.820 STATUS POST ANGIOPLASTY WITH STENT: Primary | ICD-10-CM

## 2024-10-02 PROCEDURE — 93798 PHYS/QHP OP CAR RHAB W/ECG: CPT | Mod: S$GLB,,, | Performed by: INTERNAL MEDICINE

## 2024-10-03 ENCOUNTER — DOCUMENTATION ONLY (OUTPATIENT)
Dept: CARDIAC REHAB | Facility: CLINIC | Age: 69
End: 2024-10-03
Payer: MEDICARE

## 2024-10-03 ENCOUNTER — HOSPITAL ENCOUNTER (OUTPATIENT)
Dept: CARDIOLOGY | Facility: HOSPITAL | Age: 69
Discharge: HOME | End: 2024-10-03
Attending: INTERNAL MEDICINE
Payer: MEDICARE

## 2024-10-03 VITALS
SYSTOLIC BLOOD PRESSURE: 105 MMHG | DIASTOLIC BLOOD PRESSURE: 73 MMHG | HEART RATE: 83 BPM | WEIGHT: 170.44 LBS | BODY MASS INDEX: 29.1 KG/M2 | HEIGHT: 64 IN

## 2024-10-03 DIAGNOSIS — Z95.820 STATUS POST ANGIOPLASTY WITH STENT: ICD-10-CM

## 2024-10-03 LAB
CV STRESS BASE HR: 83 BPM
DIASTOLIC BLOOD PRESSURE: 73 MMHG
OHS CV CPX 1 MINUTE RECOVERY HEART RATE: 122 BPM
OHS CV CPX 85 PERCENT MAX PREDICTED HEART RATE MALE: 129
OHS CV CPX ABDOMINAL GIRTH: 39 CM
OHS CV CPX ANAEROBIC THRESHOLD DIASTOLIC BLOOD PRESSURE: 57 MMHG
OHS CV CPX ANAEROBIC THRESHOLD HEART RATE: 107
OHS CV CPX ANAEROBIC THRESHOLD RATE PRESSURE PRODUCT: NORMAL
OHS CV CPX ANAEROBIC THRESHOLD SYSTOLIC BLOOD PRESSURE: 162
OHS CV CPX DATA GRADE - AT: 8.9
OHS CV CPX DATA GRADE - PEAK: 13.8
OHS CV CPX DATA O2 SAT - PEAK: 92
OHS CV CPX DATA O2 SAT - REST: 93
OHS CV CPX DATA SPEED - AT: 2.7
OHS CV CPX DATA SPEED - PEAK: 3.6
OHS CV CPX DATA TIME - AT: 4.4
OHS CV CPX DATA TIME - PEAK: 6.5
OHS CV CPX DATA VE/VCO2 - AT: 38
OHS CV CPX DATA VE/VCO2 - PEAK: 48
OHS CV CPX DATA VE/VO2 - AT: 29
OHS CV CPX DATA VE/VO2 - PEAK: 44
OHS CV CPX DATA VO2 - AT: 11.8
OHS CV CPX DATA VO2 - PEAK: 20.3
OHS CV CPX DATA VO2 - REST: 4
OHS CV CPX ESTIMATED METS: 10
OHS CV CPX FEV1/FVC: 0.72
OHS CV CPX FORCED EXPIRATORY VOLUME: 2.13
OHS CV CPX FORCED VITAL CAPACITY (FVC): 2.95
OHS CV CPX HIGHEST VO: 27.5
OHS CV CPX MAX PREDICTED HEART RATE: 152
OHS CV CPX MAXIMAL VOLUNTARY VENTILATION (MVV) PREDICTED: 85.2
OHS CV CPX MAXIMAL VOLUNTARY VENTILATION (MVV): 59
OHS CV CPX MAXIUMUM EXERCISE VENTILATION (VE MAX): 61.9
OHS CV CPX PATIENT AGE: 68
OHS CV CPX PATIENT HEIGHT IN: 64
OHS CV CPX PATIENT IS FEMALE AGE 11-19: 0
OHS CV CPX PATIENT IS FEMALE AGE GREATER THAN 19: 1
OHS CV CPX PATIENT IS FEMALE AGE LESS THAN 11: 0
OHS CV CPX PATIENT IS FEMALE: 1
OHS CV CPX PATIENT IS MALE AGE 11-25: 0
OHS CV CPX PATIENT IS MALE AGE GREATER THAN 25: 0
OHS CV CPX PATIENT IS MALE AGE LESS THAN 11: 0
OHS CV CPX PATIENT IS MALE GREATER THAN 18: 0
OHS CV CPX PATIENT IS MALE LESS THAN OR EQUAL TO 18: 0
OHS CV CPX PATIENT IS MALE: 0
OHS CV CPX PATIENT WEIGHT RETURNED IN OZ: 2726.65
OHS CV CPX PEAK DIASTOLIC BLOOD PRESSURE: 72 MMHG
OHS CV CPX PEAK HEAR RATE: 136 BPM
OHS CV CPX PEAK RATE PRESSURE PRODUCT: NORMAL
OHS CV CPX PEAK SYSTOLIC BLOOD PRESSURE: 157 MMHG
OHS CV CPX PERCENT BODY FAT: 24.4
OHS CV CPX PERCENT MAX PREDICTED HEART RATE ACHIEVED: 93
OHS CV CPX PREDICTED VO2: 27.5 ML/KG/MIN
OHS CV CPX RATE PRESSURE PRODUCT PRESENTING: 8715
OHS CV CPX REST PET CO2: 26
OHS CV CPX VE/VCO2 SLOPE: 32.6
STRESS ECHO POST EXERCISE DUR MIN: 6 MINUTES
STRESS ECHO POST EXERCISE DUR SEC: 30 SECONDS
SYSTOLIC BLOOD PRESSURE: 105 MMHG

## 2024-10-03 PROCEDURE — 94621 CARDIOPULM EXERCISE TESTING: CPT | Mod: 26,,, | Performed by: INTERNAL MEDICINE

## 2024-10-03 PROCEDURE — 94621 CARDIOPULM EXERCISE TESTING: CPT

## 2024-10-03 NOTE — PROGRESS NOTES
Heidy has completed 32 out of 36 exercise session of Phase II cardiac rehab.  A follow up reassessment will be completed at exit interview.    24 Session Follow Up     Cardiac Rehab Individual Treatment Plan - Reassessment      Patient Name: Heidy Polo MRN: [id number]   : 1955   Age: 69 y.o.   Primary Diagnosis: ASCENDING AORTIC REPLACEMENT Date of Event: 24   EF: 60%  Risk Stratification: high  Referring Physician: RENEE   Exercise Assessment:     Angina with exercise?: No   ST Depression with Exercise?: No  Fall Risk: Yes   Assistive Devices:  independent  Heidy stated at orientation there were no limitations to exercise even though she has had a right knee replacement.    Comments on Progression: Heidy is progressing fairly well and her workloads will continue to be increased as she tolerates exercise intensity.    Exercise Plan:   Goals:  CR Exercise Goals: Attend Cardiac Rehab 3 times/week: In Progress  Home Aerobic Exercise: 2 additional days/week for 30-60 minutes: In Progress  Intensity of 12-15 on the Rate of Perceived Exertion (RPE) scale: In Progress  30% increase in entry estimated METS: 9.1 : In Progress  5 days/week for 30-60 minutes: In Progress  Demonstrate proper pulse taking technique: In Progress    Comments on Goal Progression:  Patient Consistency: consistent with attendance  Home exercise? No   Patient reports intensity rate 11-13 on RPE scale  Patient is progressing steadily  Patient is Able to demonstrate proper pulse taking technique with or without a fitness tracker.      Intervention/Recommendations:   Discussed importance of regular attendance to cardiac rehab class    Exercise Prescription:  THR Range 111-121   Mode: Treadmill  Elliptical   Frequency:  3 days/week   Duration:  30-60 minutes   Intensity:  12-15 RPE   Resistance Training:  Yes: 3 to 5 lb weights with 10-15 reps based on strength and range of motion and adjusted accordingly     Home Prescription:  Mode  Aerobic exercise   Frequency: 2- 3 days/week   Duration: 30-60 minutes   Resistance Training: None     Education:  Exercise Terminology; verbalizes understanding; Date: 24  Diabetes; verbalizes understanding; Date: 24  Arthritis; verbalizes understanding; Date: 24  Osteoporosis; verbalizes understanding; Date: 24    Comments:  I reviewed exercise recommendations with Heidy.  I strongly encouraged her to begin some type of aerobic exercise she can do at least 2 non-rehab days per week for at least 30 minutes.  She stated understanding.     The exercise prescription will continue to be adjusted based on tolerance of exercise intensity by patient.    Cristal Hernandez, CEP    24 Session Follow Up   Cardiac Rehab Individual Treatment Plan - Reassessment    Patient Name: Heidy Polo MRN: [id number]   : 1955   Age: 69 y.o.   Primary Diagnosis: s/p ascending aortic replacement    Nutrition Assessment:     Anthropometrics    Height 64 inches   Weight 167.6 lbs   BMI 28.8     Patient confirms she is taking Crestor 10mg for cholesterol control.  Difficulty Chewing or Swallowing: No  Current Exercise: See Exercise Physiologist Note  Food Safety/Food Preparation: self  Living Arrangements/Family Support: Lives alone  Cultural/Spiritual/Personal Preferences: not applicable   Barriers to Education: none identified  Stage of Change Related to Diet Habits: Action  Recent Changes to Diet: No  Food Diary: Completed      Nutrition Plan:   Goals:  LDL-C < 70 (for high risk patients)  Hgb A1c < 7%  BMI < 25 and abdominal girth < 40M/<35 F  2 gram sodium, Mediterranean diet: In Progress  Rehab weight loss goal of 10 lbs, 1 lb per week: In Progress  Fish intake (non-fried varieties) to a goal of 2-3 servings per week: In Progress  Increase fruit and vegetable intake: In Progress    Comments on Goal Progression:  Pt states she is feeling stronger. Currently eating fish once weekly and tryong for 2x. She states  she is good with water intake. No progress toward weight loss goal-encouraged kcal tracker as per diet recall pt does seem to make overall balanced/healthy food choices    Interventions:  Dietitian Consult: No  Patient to participate in Cardiac Rehab sessions three times a week  Weekly Dietitian Weight Check  Nutrition Recommendations Provided: Verbal and Written, Reviewed  Follow Up Plan for Ongoing Self-Management Support    Education:  Cholesterol and Fat; verbalizes understanding; Date: 24  Vitamins; verbalizes understanding; Date: 24  Food Additives; verbalizes understanding; Date: 24    Comments:   Discussed ways to incorporate healthy snacks, eating on a schedule, and monitoring sodium intake for heart health.    Diabetes  Is the patient diabetic? No      Destiny Andrews MS, RDN/LDN    Session: 24 Session Follow Up   Cardiac Rehab Individual Treatment Plan - Reassessment      Patient Name: Heidy Polo MRN: [id number]   : 1955   Age: 69 y.o.   Primary Diagnosis: Ascending Aortic Replacement 2024      Psychosocial Assessment:   Living Arrangements: Lives with spouse  Family Support: family and spouse  Self Reported: no change Effective Coping Skills  Displays: happiness and calmness  Medication: not applicable    Psychosocial Plan:   Goals:  Verbalizes coping mechanisms: Met  Maintain positive support system: Met  Maintain positive outlook: Met  Improve overall quality of life: In Progress    Comments on Goal Progression:  Pt denies any overwhelming stress or anxiety at this time.    Interventions:  Patient to Self Report Emotional Changes at Session Check In  Recommend Physical Activity  Recommend Attending Education Lectures  Notify MD: No  Program Referral: No  Pharmaceutical Intervention/Therapy: No  Other Needs: not applicable  Stage of Readiness to Change: Action    Education:  Benefits of Cardiac Rehab; verbalizes understanding; Date: 2024      Patient has been instructed  to notify staff in the event that circumstances worsen.  Patient verbalizes understanding.    Other Core Components/Risk Factors Assessment:   RISK FACTORS:  hyperlipidemia, hypertension, positive family history, sedentary lifestyle    Learning Barriers: None    Medication Compliance: has been compliant with taking medications    Other Core Components/Risk Factors Plan:   Goals:  Increase exercise tolerance: Met  Increase knowledge of CAD: In Progress  Decrease blood pressure: Met  Weight loss: Not Met: Pt has gained 3-4 lbs since starting cardiac rehab  Demonstrate accurate pulse taking: Met  Identify and manage personal areas of stress: Met  Learn more about healthy eating: In Progress    Comments on Goal Progression:  Pt is more active and feeling stronger. Pt has blood pressure readings at goal and states she is learning more about eating healthier.     Interventions:  Individual Education/ Counseling: Yes  Physician Referral: No    Education:    cardiac interventions, verbalizes understanding; Date: 8/23/2024  fluid overload/CHF, verbalizes understanding; Date: 8/30/2024  risk factors, verbalizes understanding; Date: 8/09/2024         Education method adapted to patients education level and preferred method of learning.  Method: explanation  handouts        Other Core Components/Hypertension Assessment:   BP Range:  systolic; 60-82 diastolic  BP at Goal: Yes  Patient reported symptoms: none    Medications:  Medication Prescribed? Adherent? Exception   Beta-blocker []Yes  []No  []Unknown []Yes  []No  []Unknown    ACEI/ARB []Yes  []No  []Unknown []Yes  []No  []Unknown    Calcium Channel Blocker []Yes  []No  []Unknown []Yes  []No  []Unknown    Diuretic []Yes  []No  []Unknown []Yes  []No  []Unknown        Other Core Components/Hypertension Plan:   Goals:  Blood Pressure <130/80    Comments on Goal Progression:  Pt has isolated reading of 144/80 X1, Pt is monitoring her blood pressure daily and keeping a  log.    Interventions:  Med Card Reconciled: Yes  Encourage medication compliance  Encourage sodium reduction  Reduce alcohol consumption  Encourage weight loss  Recommend physical activity  Educate on contributory factors  Reduce stress, anxiety, anger, depression, and/or chronic pain  Encourage home blood pressure monitoring  Recommend daily weights  MD notified/Physician Referral: No    Education:    Congestive Heart Failure; verbalizes understanding; Date: 8/30/2024  Stroke; verbalizes understanding; Date: 7/26/2024             Does the patient have Heart Failure? No      Other Core Components/Tobacco Cessation Assessment:   Smoking Status: Lifetime Non-smoker  Primary Tobacco Type: N/A  Tobacco Usage: no  Smoking Cessation Barriers:  N/A  Stage of Readiness to Change: Maintenance    Other Core Components/Tobacco Cessation Plan:   Goals:  Maintain non-smoking status    Comments on Goal Progression:  Pt does not smoke    Interventions:  Maintains non-smoking status    Education:    Heart and Lung Anatomy; verbalizes understanding; Date: 8/16/2024  Coronary Artery Disease; verbalizes understanding; Date: 8/23/2024          Comments:   Pt verbalizes no desire to use tobacco products.    Richard MatiasRN

## 2024-10-04 ENCOUNTER — CLINICAL SUPPORT (OUTPATIENT)
Dept: CARDIAC REHAB | Facility: CLINIC | Age: 69
End: 2024-10-04
Payer: MEDICARE

## 2024-10-04 DIAGNOSIS — Z95.820 STATUS POST ANGIOPLASTY WITH STENT: Primary | ICD-10-CM

## 2024-10-04 NOTE — PROGRESS NOTES
Session: Exit     Cardiac Rehab Individual Treatment Plan - Discharge Assessment      Patient Name: Heidy Polo MRN: [id number]   : 1955   Age: 69 y.o.   Primary Diagnosis: ASCENDING AORTIC REPLACEMENT  Date of Event: 24  EF: 60%  Risk Stratification: moderate  Referring Physician: RENEE   Exercise Assessment:     CPX/TM: Entry Results Exit Results    Date: 7-3-24 Date: 10-3-24   RHR 91 83   Max  136   Peak VO2 (CPX only) 15.6 20.3   Actual METS (CPX only) 4.5 5.8   Estimated METS 7.0 10.0     Anthropometrics Entry Results Exit Results   Height 64 inches 64 inches   Weight 163 lbs 169 lbs   BMI 27.9  29.1   Abdominal Girth 40.0 39.0   Body Composition 27.5% 24.4%     Angina with exercise?: No   ST Depression with Exercise?: No  Currently exercising at home? no     Education:  Muscular Anatomy; verbalizes understanding; Date: 24  Exercise Program; verbalizes understanding; Date: 24  Resistance Training; verbalizes understanding; Date: 24  Resistance Training II; verbalizes understanding; Date: 10-7-24    Rehab Exercise Goals:  Attend Cardiac Rehab 3 times/week: Met  Home Aerobic Exercise: 2 additional days/week for 30-60 minutes: Not Met: pt did not exercise outside of attending rehab class  Intensity of 12-15 on the Rate of Perceived Exertion (RPE) scale: Met  30% increase in entry estimated METS: 9.1 : Met  Demonstrate proper pulse taking technique: Met    Comments: Miss Moody attended rehab class regularly.  She had significant improvement in aerobic capacity even though she did not exercise on non-rehab days during the program.        Exercise Discharge Plan:     Intervention/Recommendations:       Recommended Home Prescription:  THR Range: 102-128   Mode: Aerobic   Frequency: 5-6 times per week   Duration: 30-60 minutes each day   Other Recs: 3-5 minute warm up and cool down/stretches   Resistance Trainin-3 days per week on non-consecutive days       Comments:    I met  with Miss Moody for an exit interview from the Phase II cardiac rehab program.  The entry and exit stress testing results were discussed as well as current and future exercise programs.     Patient's plan: Currently Miss Moody doesn't have a plan to continue exercising but says she knows what she needs to do.  She has some options but hasn't cemented a route.     I reviewed exercise recommendations with Miss Moody and strongly advised her to begin planning some type of aerobic exercise whether or not it's riding her bike, using her son's elliptical, retrieving her treadmill from her daughter, or walking in her neighborhood.  We also discussed the importance of making an exercise schedule that would fit into her day.  I asked her to call if she has any questions in the future.  She stated understanding.    Fay Hernandez., CEP

## 2024-10-04 NOTE — PROGRESS NOTES
HISTORY: S/P ASCENDING AORTA REPLACEMENT (4-16-24), HTN, HLP, PRE-DIABETES, EF=60%(5-27-24)    ANTHROPOMETRICS:     PRE POST   Abdominal girth (in) 40.0 39.0   Height (in) 64 64   Weight (lbs) 163 169   BMI 27.9 29.1   % Body Fat 27.5% 24.4%     EXERCISE RESULTS:     PRE POST   Peak VO2 (CPX only) 15.6 20.3   Actual METS (CPX only) 4.5 5.8   Estimated METS 7.0 10.0       HOME PRESCRIPTION: Heidy Howe  You completed Cardiac Rehab.  Aerobic exercise frequency is recommended 5 to 6 times per week with a duration of 30 to 60 minutes.  Intensity should keep you in your target heart range of 102 to 128 beats per minute.  Include a 3 to 5 minute warm up as well as cool down with stretches.  Resistance training is recommended 2 to 3 days per week on non-consecutive days.  Good luck to you.  Keep up the hard work, and it has been a pleasure working with you.      LAB RESULTS:    Lab Results   Component Value Date    HGB 12.1 10/03/2024    HGB 12.2 07/03/2024    HGB 11.3 (L) 06/16/2024     Lab Results   Component Value Date    HCT 37.0 10/03/2024    HCT 38.4 07/03/2024    HCT 33.4 (L) 06/16/2024     Lab Results   Component Value Date    MPV 9.9 10/03/2024    MPV 9.6 07/03/2024    MPV 9.4 06/16/2024       Lab Results   Component Value Date    CHOL 162 10/03/2024    CHOL 159 07/03/2024    CHOL 167 11/22/2023     Lab Results   Component Value Date    HDL 62 10/03/2024    HDL 60 07/03/2024    HDL 62 11/22/2023     Lab Results   Component Value Date    LDLCALC 76.2 10/03/2024    LDLCALC 82.0 07/03/2024    LDLCALC 87.0 11/22/2023     Lab Results   Component Value Date    TRIG 119 10/03/2024    TRIG 85 07/03/2024    TRIG 90 11/22/2023     Lab Results   Component Value Date    CHOLHDL 38.3 10/03/2024    CHOLHDL 37.7 07/03/2024    CHOLHDL 37.1 11/22/2023       Lab Results   Component Value Date    GLUF 102 10/03/2024    GLUF 90 07/03/2024     Lab Results   Component Value Date    HGBA1C 4.8 11/22/2023    HGBA1C 5.2  11/04/2022    HGBA1C 5.7 (H) 11/22/2021        Lab Results   Component Value Date    HSCRP 1.41 10/03/2024    HSCRP 11.42 (H) 07/03/2024         EFREN SCORES:        7/11/2024 10/4/2024   EFREN SYMPTOM QUESTIONNAIRE   SUBSCALE SCORE TOTAL     Well-being subscale: Friendliness 0 0   Well-being subscale: Physical Well Being 4 3   Well-being subscale: Contentment 0 0   Well-being subscale: Relaxation 0 0   Symptom subscale: Hostility 1 0   Symptom subscale: Somatic Symptoms 4 2   Symptom subscale: Depression 0 0   Symptom subscale: Anxiety 3 0   --     OVERALL SCORE TOTAL     Total Score: Anxiety 3 0   Total Score: Depression 0 0   Total Score: Somatization 8 5   Total Score: Hostility 1 0   ---     QUESTIONNAIRE ANSWERS     Nervous no no   Weary no no   Irritable no no   Cheerful yes yes   Tense, tensed up yes no   Sad, blue no no   Happy yes yes   Frightened no no   Feeling clam yes yes   Feeling healthy yes yes   Loosing temper easily yes no   Feeling of not enough air no no   Feeling kind towards people yes yes   Feeling fit no yes   Heavy arms or legs yes no   Feeling confident yes yes   Feeling warm toward people yes yes   Shaky no no   No pain anywhere no no   Angry no no   Arms and legs feel strong no yes   Appetite poor yes no   Feeling peaceful yes yes   Feeling unworthy no no   Annoyed no no   Feeling of rage no no   Cannot enjoy yourself no no   Tight head or neck no no   Relaxed yes yes   Restless no no   Feeling friendly yes yes   Feeling of hate no no   Choking feeling no no   Afraid no no   Patient yes yes   Scared no no   Furious no no   Feeling charitable, forgiving yes yes   Feeling guilty no no   Feeling well yes yes   Feeling of pressure in head or body no no   Worried no no   Contented yes yes   Weak arms or legs no no   Feeling desperate, terrible no no   No aches anywhere no no   Thinking of death or dying no no   Hot tempered no no   Terrified no no   Feeling of courage yes yes   Enjoying  yourself yes yes   Breathing difficult no no   Parts of the body feel numb or tingling no no   Takes a long time to fall asleep yes no   Feeling hostile no no   Infuriated no no   Heart beating fast or pounding yes no   Depressed no no   Jumpy yes no   Feeling a failure no no   Not interested in things no no   Highly strung no no   Cannot relax no no   Panicky no no   Pressure on head no no   Blaming yourself no no   Thoughts of ending your life no no   Frightening thoughts no no   Enraged no no   Irritated by other people no no   Looking forward toward the future yes yes   Nauseated, sick to stomach no no   Feeling that life is bad no no   Upset bowels or stomach no no   Feeling inferior to others no no   Feeling useless no no   Muscle pains yes yes   No unpleasant feelings in head or body yes no   Headaches no no   Feel like attacking people no no   Shaking with anger no no   Mad no no   Feeling goodwill yes yes   Feel like crying no no   Cramps no yes   Feeling that something bad will happen no no   Wound up, uptight no no   Get angry quickly no no   Self-confident yes yes   Resentful no no   Feeling of hopelessness no no   Head pains no no              SF-36 SCORES:        7/11/2024 10/4/2024   SF 36 HEALTH QUESTIONNAIRE   OVERALL TOTAL SCORE     Physical Functioning 65 70   Role limitations due to physical health 25 100   Role limitations due to emotional problems 100 100   Energy/fatigue 45 65   Emotional well-being 84 76   Social functioning 62.5 100   Pain 67.5 67.5   General health 60 70   --     QUESTIONNAIRE ANSWERS     In general, how would you say your health is? good good   Compared to one year ago, how would you rate your health in general now? somewhat worse now than one year ago somewhat worse now than one year ago   VIGOROUS ACTIVITIES (running, lifting heavy objects, strenuous sport) yes, limited a little yes, limited a lot   MODERATE ACTIVITES (moving a table, vacuuming, bowling or golf) yes,  limited a little no, not limited at all   Lifting or carrying groceries yes, limited a little Yes limited a little   Climbing SEVERAL flights of stairs Yes limited a little Yes limited a little   Climbing ONE flight of stairs no, not limited at all no, not limited at all   Bending or kneeling yes, limited a lot yes, limited a little   Walking more than a mile yes, limited a little yes, limited a little   Walking 100 yards (150-200 paces) no, not limited at all no, not limited at all   Walking 100 yards (150-200 paces) No not limited No not limited   Bathing and dressing yourself no, not limited at all no, not limited at all   Cut down on the amount of time you spent on work or other activities yes no   Accomplished less than you would have liked yes no   Were limited in the kind of work or other activities yes no   Had difficulty performing the work or other activities (i.e. took extra effort) no no   Cut down on the amount of time you spent on work or other activities no no   Accomplished less than you would like no no   Did not do work or other activities as carefully as usual no no   During the past four weeks, to what extent has your physical health or emotional problems interfered with your normal social activities with family, friends, neighbors or groups? slightly not at all   How much bodily pain have you had in the last four weeks? mild mild   During the past four weeks, how much did pain interfere with your normal work (including work both outside the home and housework)? slightly slightly   Did you feel full of pep? some of the time good bit of the time   Have you been a very nervous person? a little bit of the time none of the time   Have you felt so down in the dumps that nothing could cheer you up? none of the time none of the time   Have you felt calm and peaceful? most of the time most of the time   Did you have a lot of energy? some of the time good bit of the time   Have you felt downhearted and  blue? a little bit of the time none of the time   Did you feel worn out? good bit of the time a little bit of the time   Have you been a happy person? most of the time none of the time   Did you feel tired? some of the time some of the time   Has your health limited your social activities such as visiting relatives or friends? some of the time none of the time   I seem to get ill more easily than other people. mostly false mostly false   I am as healthy as anybody I know. not sure mostly true   I expect my health to get worse. mostly false mostly false   My health is excellent. not sure mostly true               PHQ-9:        10/1/2024    10:52 AM 10/1/2024    10:56 AM 10/4/2024     1:45 PM   PHQ-9 Depression Patient Health Questionnaire   Over the last two weeks how often have you been bothered by little interest or pleasure in doing things 0 0 0   Over the last two weeks how often have you been bothered by feeling down, depressed or hopeless 0 0 0   Over the last two weeks how often have you been bothered by trouble falling or staying asleep, or sleeping too much   0   Over the last two weeks how often have you been bothered by feeling tired or having little energy   0   Over the last two weeks how often have you been bothered by a poor appetite or overeating   1   Over the last two weeks how often have you been bothered by feeling bad about yourself - or that you are a failure or have let yourself or your family down   0   Over the last two weeks how often have you been bothered by trouble concentrating on things, such as reading the newspaper or watching television   0   Over the last two weeks how often have you been bothered by moving or speaking so slowly that other people could have noticed.   0   Over the last two weeks how often have you been bothered by thoughts that you would be better off dead, or of hurting yourself   0   If you checked off any problems, how difficult have these problems made it for  you to do your work, take care of things at home or get along with other people?   Not difficult at all   PHQ-9 Score   1                  EDUCATION SCORES:     PRE POST   Education Score 40 90

## 2024-10-07 ENCOUNTER — CLINICAL SUPPORT (OUTPATIENT)
Dept: CARDIAC REHAB | Facility: CLINIC | Age: 69
End: 2024-10-07
Payer: MEDICARE

## 2024-10-07 DIAGNOSIS — Z95.820 STATUS POST ANGIOPLASTY WITH STENT: Primary | ICD-10-CM

## 2024-10-07 PROCEDURE — 93798 PHYS/QHP OP CAR RHAB W/ECG: CPT | Mod: S$GLB,,, | Performed by: INTERNAL MEDICINE

## 2024-10-08 ENCOUNTER — OFFICE VISIT (OUTPATIENT)
Dept: CARDIOLOGY | Facility: CLINIC | Age: 69
End: 2024-10-08
Payer: MEDICARE

## 2024-10-08 ENCOUNTER — CLINICAL SUPPORT (OUTPATIENT)
Dept: CARDIAC REHAB | Facility: CLINIC | Age: 69
End: 2024-10-08
Payer: MEDICARE

## 2024-10-08 VITALS
OXYGEN SATURATION: 97 % | BODY MASS INDEX: 29.32 KG/M2 | HEIGHT: 64 IN | WEIGHT: 171.75 LBS | SYSTOLIC BLOOD PRESSURE: 110 MMHG | HEART RATE: 86 BPM | DIASTOLIC BLOOD PRESSURE: 53 MMHG

## 2024-10-08 DIAGNOSIS — Z95.828 S/P ASCENDING AORTIC REPLACEMENT: Primary | ICD-10-CM

## 2024-10-08 DIAGNOSIS — I10 ESSENTIAL HYPERTENSION: ICD-10-CM

## 2024-10-08 DIAGNOSIS — E78.2 MIXED HYPERLIPIDEMIA: ICD-10-CM

## 2024-10-08 DIAGNOSIS — I50.32 CHRONIC HEART FAILURE WITH PRESERVED EJECTION FRACTION: Primary | ICD-10-CM

## 2024-10-08 PROCEDURE — 93798 PHYS/QHP OP CAR RHAB W/ECG: CPT | Mod: S$GLB,,, | Performed by: INTERNAL MEDICINE

## 2024-10-08 PROCEDURE — 99999 PR PBB SHADOW E&M-EST. PATIENT-LVL III: CPT | Mod: PBBFAC,,, | Performed by: INTERNAL MEDICINE

## 2024-10-08 PROCEDURE — 99999 PR PBB SHADOW E&M-EST. PATIENT-LVL II: CPT | Mod: PBBFAC,,,

## 2024-10-08 RX ORDER — FUROSEMIDE 20 MG/1
20 TABLET ORAL DAILY
Qty: 90 TABLET | Refills: 3 | Status: SHIPPED | OUTPATIENT
Start: 2024-10-08

## 2024-10-08 NOTE — PROGRESS NOTES
Exit   Cardiac Rehab Individual Treatment Plan - Discharge Assessment      Patient Name: Heidy Polo MRN: [id number]   : 1955   Age: 69 y.o.   Primary Diagnosis: s/p ascending aortic replacement    Nutrition Assessment:     Anthropometrics Pre Post   Height 64 inches 64 inches   Weight 163 lbs 169 lbs   BMI 27.9 29.1   Abdominal Girth 40 39   Body Composition 27.9 24.4       Labs:  Patient confirms she is taking Pravastatin 10mg for cholesterol control.    Lab Results   Component Value Date    CHOL 162 10/03/2024    CHOL 159 2024    CHOL 167 2023     Lab Results   Component Value Date    HDL 62 10/03/2024    HDL 60 2024    HDL 62 2023     Lab Results   Component Value Date    LDLCALC 76.2 10/03/2024    LDLCALC 82.0 2024    LDLCALC 87.0 2023     Lab Results   Component Value Date    TRIG 119 10/03/2024    TRIG 85 2024    TRIG 90 2023     Lab Results   Component Value Date    CHOLHDL 38.3 10/03/2024    CHOLHDL 37.7 2024    CHOLHDL 37.1 2023       Lab Results   Component Value Date    GLUF 102 10/03/2024     Lab Results   Component Value Date    HGBA1C 4.8 2023       Nutrition/Diet History:  Patient eats 3 meals daily.    Seasons food with Salt Free Rodrigo Chachere's/garlic/onion powder.  Patient denies use of a salt shaker at the table on prepared foods.   Dines out 1 per week.  Chooses fried foods 1 time(s) per month.    Chooses fish 1 time(s) per month.   Beverages:  water and sugar-free beverages  Alcohol: social drinker  Current Exercise: See Exercise Physiologist Note  Food Safety/Food Preparation: self, family member  Living Arrangements/Family Support: Lives alone but shares meals with daughter and family who are neighbors  Stage of Change Related to Diet Habits: Maintenance  Recent Changes to Diet: No  Food Diary: Completed      Nutrition Plan:   Goals:  2 gram sodium, Mediterranean diet: In Progress  Rehab weight loss goal of 10 lbs, 1 lb  per week: Not Met: pt lost 1in off abdominal girth, 3% body fat  Increase fish intake (non-fried varieties) to a goal of 2-3 servings per week: Not Met: pt takes fish oil supplement  Increase fruit and vegetable intake: Met    Comments of Goal Progression:  Pt states she feels much better since completing rehab-she feels stronger and has more energy. Her primary diet changes include increasing vegetable intake and significantly reducing salt intake. She disliked eating breakfast and no longer eats breakfast since finishing rehab.     Interventions/Recommendations:  Reviewed Anthropometric Progress  Reviewed Pre and Post Labs  Dietitian Consult: No  Nutrition Recommendations Provided: Verbal and Written, Reviewed  Discussed follow up plan for ongoing self-management support    Education:  Dining Out; verbalizes understanding; Date: 10/2/24  Sodium; verbalizes understanding; Date: 9/25/24  Pre/Probiotics; verbalizes understanding; Date: 9/18/24    Comments:   Discussed ways to continue to incorporate healthy snacks, eating on a schedule, and monitoring sodium intake for heart health.    Diabetes  Is the patient diabetic? No      Destiny Andrews MS, RDN/LDN

## 2024-10-08 NOTE — PROGRESS NOTES
PCP - Chas Angela MD    Subjective:   Heidy Polo is a 68 y.o. y.o. female with PMH significant for HTN, Type A aortic dissection in 4/2024 s/p surgical repair with Dr Ochoa who presents for a f/up visit.      Aortic dissection type 1 background  On 4/25/24, had sudden onset of L sided chest pain radiating to L side of neck and back b/w shoulder blades. She then got LH. Also got blurred vision. Then started getting SOB. Drove to [place]'s ED. CTA showed [place] type A aortic dissection with dissection flap extending from the aortic root up to the level of the distal arch. Associated aneurysmal dilatation measuring up to 5 cm in maximum dimension. Coronary arteries, right brachiocephalic, left common carotid and left subclavian arteries arise from the true lumen and demonstrate appropriate contrast opacification. No mediastinal hematoma.     Today, she says she is having to use lasix almost every day. If she does not use it, she gets legs swelling and says the step after that is SOB but she doesn't let it get to that point as she got ED visit for SOB in the past with it. She says dry weight was about 161-162 and her current weight is 169. She didn't get a formal HFpEF diagnosis so far as we had been thinking it was short term post-op fluid overload.       Social History     Tobacco Use    Smoking status: Never    Smokeless tobacco: Never   Substance Use Topics    Alcohol use: No     Comment: rare     Family History   Problem Relation Name Age of Onset    Cancer Sister          breast ca    Breast cancer Sister      Diabetes Brother      Hypertension Brother      Cancer Maternal Aunt          breast ca    Breast cancer Maternal Aunt      Cancer Maternal Grandmother          ovarien ca    Heart disease Maternal Grandfather      Hypertension Brother         Meds:   Review of patient's allergies indicates:  No Known Allergies    Current Outpatient Medications:     aspirin (ECOTRIN) 81 MG EC tablet,  "Take 1 tablet (81 mg total) by mouth once daily., Disp: 90 tablet, Rfl: 3    blood pressure monitor ([employer] EASE) LG arm size (OP), by Other route as needed for High Blood Pressure., Disp: 1 each, Rfl: 0    chlorthalidone (HYGROTEN) 25 MG Tab, Take 1 tablet (25 mg total) by mouth once daily., Disp: 90 tablet, Rfl: 3    metoprolol tartrate (LOPRESSOR) 25 MG tablet, Take 1 tablet (25 mg total) by mouth 2 (two) times daily., Disp: 180 tablet, Rfl: 3    pravastatin (PRAVACHOL) 10 MG tablet, Take 1 tablet (10 mg total) by mouth once daily., Disp: 90 tablet, Rfl: 3    telmisartan (MICARDIS) 40 MG Tab, Take 1 tablet (40 mg total) by mouth once daily., Disp: 90 tablet, Rfl: 3    empagliflozin (JARDIANCE) 10 mg tablet, Take 1 tablet (10 mg total) by mouth once daily., Disp: 90 tablet, Rfl: 3    furosemide (LASIX) 20 MG tablet, Take 1 tablet (20 mg total) by mouth once daily., Disp: 90 tablet, Rfl: 3    Review of Systems   Respiratory:  Positive for shortness of breath.    Cardiovascular:  Positive for leg swelling. Negative for chest pain, palpitations, orthopnea and claudication.       Objective:   BP (!) 110/53 (Patient Position: Sitting)   Pulse 86   Ht 5' 4" (1.626 m)   Wt 77.9 kg (171 lb 11.8 oz)   SpO2 97%   BMI 29.48 kg/m²   Physical Exam  Constitutional:       Appearance: Normal appearance.   HENT:      Nose: Nose normal.      Mouth/Throat:      Mouth: Mucous membranes are dry.   Eyes:      Pupils: Pupils are equal, round, and reactive to light.   Cardiovascular:      Rate and Rhythm: Normal rate and regular rhythm.      Comments: B/L lung crackles, basal  Wearing stockings today, no edema at this time  JVP not assessible   Pulmonary:      Effort: Pulmonary effort is normal.   Abdominal:      General: Abdomen is flat.      Palpations: Abdomen is soft.   Skin:     General: Skin is warm and dry.   Neurological:      General: No focal deficit present.      Mental Status: She is alert and oriented to person, place, " "and time.   Psychiatric:         Mood and Affect: Mood normal.       Labs:     Lab Results   Component Value Date     (L) 07/24/2024    K 3.7 07/24/2024    CL 98 07/24/2024    CO2 27 07/24/2024    BUN 33 (H) 07/24/2024    CREATININE 0.9 07/24/2024    ANIONGAP 9 07/24/2024     Lab Results   Component Value Date    HGBA1C 4.8 11/22/2023     Lab Results   Component Value Date    BNP 72 07/24/2024     (H) 07/03/2024     (H) 06/15/2024       Lab Results   Component Value Date    WBC 5.08 10/03/2024    HGB 12.1 10/03/2024    HCT 37.0 10/03/2024    HCT 24 (L) 04/27/2024     10/03/2024    GRAN 3.0 10/03/2024    GRAN 58.6 10/03/2024     Lab Results   Component Value Date    CHOL 162 10/03/2024    HDL 62 10/03/2024    LDLCALC 76.2 10/03/2024    TRIG 119 10/03/2024       Lab Results   Component Value Date     (L) 07/24/2024    K 3.7 07/24/2024    CL 98 07/24/2024    CO2 27 07/24/2024    BUN 33 (H) 07/24/2024    CREATININE 0.9 07/24/2024    ANIONGAP 9 07/24/2024     Lab Results   Component Value Date    HGBA1C 4.8 11/22/2023     Lab Results   Component Value Date    BNP 72 07/24/2024     (H) 07/03/2024     (H) 06/15/2024       Vital Signs:   BP (!) 110/53 (Patient Position: Sitting)   Pulse 86   Ht 5' 4" (1.626 m)   Wt 77.9 kg (171 lb 11.8 oz)   SpO2 97%   BMI 29.48 kg/m²   Lab Results   Component Value Date    WBC 5.08 10/03/2024    HGB 12.1 10/03/2024    HCT 37.0 10/03/2024    HCT 24 (L) 04/27/2024     10/03/2024    GRAN 3.0 10/03/2024    GRAN 58.6 10/03/2024     Lab Results   Component Value Date    CHOL 162 10/03/2024    HDL 62 10/03/2024    LDLCALC 76.2 10/03/2024    TRIG 119 10/03/2024            Assessment & Plan:     1. Chronic heart failure with preserved ejection fraction    2. Essential hypertension    3. Mixed hyperlipidemia      #HFpEF, chronic   -start SGLT2i, sent jardianec today to specialty pharmacy, if not approved, will try farxiga  -BMP and Mg " today  -when SGLT2i is approved she will let me know and I'll cut back on her BP regimen as /53  -she will do daily weight log and send to me on secure chat     #HTN  -for now, cont telmisartan 40 mg, continue lopressor 25 mg bid and chlorthalidone 25 qd  -check BP at home, goal SBP <130     #ASCVD risk  -10.4% 10 years ASCVD risk  -crestor 10 mg     #s/p ascending aortic arch replacement  -ASA 81 mg EC daily tablet  -need to control BP well going forward  -doesn't smoke, no DM    F/up in 2-3 months. Send me weight logs. Also tell me when SGLT2i approved so I cut back on BP regimen if needed       Signed:  Paul Gaytan M.D.  Cardiovascular Fellow PGY-6  [facility] [address] [place], [place]

## 2024-10-08 NOTE — PROGRESS NOTES
Session: Exit   Cardiac Rehab Individual Treatment Plan - Discharge Assessment      Patient Name: Heidy Polo MRN: [id number]   : 1955   Age: 69 y.o.   Diagnosis: Ascending Aortic Replacement    Psychosocial Assessment:   Outcome Survey Tools:    EFREN SCORES:        2024 10/4/2024   EFREN SYMPTOM QUESTIONNAIRE   SUBSCALE SCORE TOTAL     Well-being subscale: Friendliness 0 0   Well-being subscale: Physical Well Being 4 3   Well-being subscale: Contentment 0 0   Well-being subscale: Relaxation 0 0   Symptom subscale: Hostility 1 0   Symptom subscale: Somatic Symptoms 4 2   Symptom subscale: Depression 0 0   Symptom subscale: Anxiety 3 0   --     OVERALL SCORE TOTAL     Total Score: Anxiety 3 0   Total Score: Depression 0 0   Total Score: Somatization 8 5   Total Score: Hostility 1 0   ---     QUESTIONNAIRE ANSWERS     Nervous no no   Weary no no   Irritable no no   Cheerful yes yes   Tense, tensed up yes no   Sad, blue no no   Happy yes yes   Frightened no no   Feeling clam yes yes   Feeling healthy yes yes   Loosing temper easily yes no   Feeling of not enough air no no   Feeling kind towards people yes yes   Feeling fit no yes   Heavy arms or legs yes no   Feeling confident yes yes   Feeling warm toward people yes yes   Shaky no no   No pain anywhere no no   Angry no no   Arms and legs feel strong no yes   Appetite poor yes no   Feeling peaceful yes yes   Feeling unworthy no no   Annoyed no no   Feeling of rage no no   Cannot enjoy yourself no no   Tight head or neck no no   Relaxed yes yes   Restless no no   Feeling friendly yes yes   Feeling of hate no no   Choking feeling no no   Afraid no no   Patient yes yes   Scared no no   Furious no no   Feeling charitable, forgiving yes yes   Feeling guilty no no   Feeling well yes yes   Feeling of pressure in head or body no no   Worried no no   Contented yes yes   Weak arms or legs no no   Feeling desperate, terrible no no   No aches anywhere no no    Thinking of death or dying no no   Hot tempered no no   Terrified no no   Feeling of courage yes yes   Enjoying yourself yes yes   Breathing difficult no no   Parts of the body feel numb or tingling no no   Takes a long time to fall asleep yes no   Feeling hostile no no   Infuriated no no   Heart beating fast or pounding yes no   Depressed no no   Jumpy yes no   Feeling a failure no no   Not interested in things no no   Highly strung no no   Cannot relax no no   Panicky no no   Pressure on head no no   Blaming yourself no no   Thoughts of ending your life no no   Frightening thoughts no no   Enraged no no   Irritated by other people no no   Looking forward toward the future yes yes   Nauseated, sick to stomach no no   Feeling that life is bad no no   Upset bowels or stomach no no   Feeling inferior to others no no   Feeling useless no no   Muscle pains yes yes   No unpleasant feelings in head or body yes no   Headaches no no   Feel like attacking people no no   Shaking with anger no no   Mad no no   Feeling goodwill yes yes   Feel like crying no no   Cramps no yes   Feeling that something bad will happen no no   Wound up, uptight no no   Get angry quickly no no   Self-confident yes yes   Resentful no no   Feeling of hopelessness no no   Head pains no no              SF-36 SCORES:        7/11/2024 10/4/2024   SF 36 HEALTH QUESTIONNAIRE   OVERALL TOTAL SCORE     Physical Functioning 65 70   Role limitations due to physical health 25 100   Role limitations due to emotional problems 100 100   Energy/fatigue 45 65   Emotional well-being 84 76   Social functioning 62.5 100   Pain 67.5 67.5   General health 60 70   --     QUESTIONNAIRE ANSWERS     In general, how would you say your health is? good good   Compared to one year ago, how would you rate your health in general now? somewhat worse now than one year ago somewhat worse now than one year ago   VIGOROUS ACTIVITIES (running, lifting heavy objects, strenuous sport)  yes, limited a little yes, limited a lot   MODERATE ACTIVITES (moving a table, vacuuming, bowling or golf) yes, limited a little no, not limited at all   Lifting or carrying groceries yes, limited a little Yes limited a little   Climbing SEVERAL flights of stairs Yes limited a little Yes limited a little   Climbing ONE flight of stairs no, not limited at all no, not limited at all   Bending or kneeling yes, limited a lot yes, limited a little   Walking more than a mile yes, limited a little yes, limited a little   Walking 100 yards (150-200 paces) no, not limited at all no, not limited at all   Walking 100 yards (150-200 paces) No not limited No not limited   Bathing and dressing yourself no, not limited at all no, not limited at all   Cut down on the amount of time you spent on work or other activities yes no   Accomplished less than you would have liked yes no   Were limited in the kind of work or other activities yes no   Had difficulty performing the work or other activities (i.e. took extra effort) no no   Cut down on the amount of time you spent on work or other activities no no   Accomplished less than you would like no no   Did not do work or other activities as carefully as usual no no   During the past four weeks, to what extent has your physical health or emotional problems interfered with your normal social activities with family, friends, neighbors or groups? slightly not at all   How much bodily pain have you had in the last four weeks? mild mild   During the past four weeks, how much did pain interfere with your normal work (including work both outside the home and housework)? slightly slightly   Did you feel full of pep? some of the time good bit of the time   Have you been a very nervous person? a little bit of the time none of the time   Have you felt so down in the dumps that nothing could cheer you up? none of the time none of the time   Have you felt calm and peaceful? most of the time most of  the time   Did you have a lot of energy? some of the time good bit of the time   Have you felt downhearted and blue? a little bit of the time none of the time   Did you feel worn out? good bit of the time a little bit of the time   Have you been a happy person? most of the time none of the time   Did you feel tired? some of the time some of the time   Has your health limited your social activities such as visiting relatives or friends? some of the time none of the time   I seem to get ill more easily than other people. mostly false mostly false   I am as healthy as anybody I know. not sure mostly true   I expect my health to get worse. mostly false mostly false   My health is excellent. not sure mostly true                 PHQ 9:      10/1/2024    10:52 AM 10/1/2024    10:56 AM 10/4/2024     1:45 PM   PHQ-9 Depression Patient Health Questionnaire   Over the last two weeks how often have you been bothered by little interest or pleasure in doing things 0 0 0   Over the last two weeks how often have you been bothered by feeling down, depressed or hopeless 0 0 0   Over the last two weeks how often have you been bothered by trouble falling or staying asleep, or sleeping too much   0   Over the last two weeks how often have you been bothered by feeling tired or having little energy   0   Over the last two weeks how often have you been bothered by a poor appetite or overeating   1   Over the last two weeks how often have you been bothered by feeling bad about yourself - or that you are a failure or have let yourself or your family down   0   Over the last two weeks how often have you been bothered by trouble concentrating on things, such as reading the newspaper or watching television   0   Over the last two weeks how often have you been bothered by moving or speaking so slowly that other people could have noticed.   0   Over the last two weeks how often have you been bothered by thoughts that you would be better off dead,  or of hurting yourself   0   If you checked off any problems, how difficult have these problems made it for you to do your work, take care of things at home or get along with other people?   Not difficult at all   PHQ-9 Score   1            Family Support: children, family, and grandchildren  Self Reported: Effective Coping Skills  Displays: happiness and calmness    Psychosocial Plan:   Goals:  Verbalizes coping mechanisms: Met  Maintain positive support system: Met  Maintain positive outlook: Met  Improve overall quality of life: Met    Comments on Goal Progression:  Patient has met all goals that were set.  She will continue to work towards keeping her heart healthy & plans to join [employer] in the near future.  She has not been exercising the additional 2 days outside of rehab.  Encouraged for her to do so.  After reviewing results, patient realizes importance of keeping exercise a priority.  She reports to have good support from her family & children.  She is accompanied today by her daughter & granddaughter who live in [place], [place].  All other family members live within close vicinity of each other-all on the same street.  She report that she is feeling much better physically & emotionally since beginning cardiac rehab.       Interventions/Recommendations:  Discussed Results of Surveys: Yes  Notify MD: No  Program Referral: No  Pharmaceutical Intervention/Therapy: No  Physical Activity: Yes  Continue Patient Education: Yes  Other Needs: not applicable  Stage of Readiness to Change: Maintenance    Education:  Stress Management; verbalizes understanding; Date: 10/8/2024  Stress; verbalizes understanding; Date: 10/8/2024    She has been instructed to seek attention via PCP/Psychiatry in the event that circumstances change. Patient verbalizes understanding.     Other Core Components/Risk Factors Assessment:   RISK FACTORS:  hyperlipidemia, hypertension, positive family history, sedentary lifestyle    Learning  Barriers: None    Education Level:  High School (9-12) or GED    Pre-Test Score Post-Test Score   40 90     Medication Compliance: has been compliant with taking medications    Other Core Components/Risk Factors Plan:   Goals:  Increase exercise tolerance: Met  Increase knowledge of CAD: Met  Decrease blood pressure: Met  Weight loss: Not Met: noted 6 lb weight gain  Demonstrate accurate pulse taking: Met  Identify and manage personal areas of stress: Met  Learn more about healthy eating: Met    Comments on Goal Progression:  Patient has met majority of goals that were set except for weight loss.  She had a 6 lb gain, but did note 1 inch loss in abdominal girth & body fat % decrease from 27.5 to 24.4.  BP is at goal at less than 130/80.  She is monitoring BP/weight on a daily basis.  She is able to accurately monitor heart rate.  She wears a watching for monitoring.  She denies any overwhelming stress or anxiety.  Educational scores improved from 40% to 90%.      Interventions/Recommendations:  Individual Education/Counseling: Yes  Physician Referral: No    Education:    cholesterol, verbalizes understanding; Date: 9/4/2024 & 10/8/2024  fluid overload/CHF, verbalizes understanding; Date: 8/30/2024  hypertension, verbalizes understanding; Date: 9/13/2024  risk factors, verbalizes understanding; Date: 10/8/2024  sodium, verbalizes understanding; Date: 9/25/2024  stress, verbalizes understanding; Date: 10/8/2024           Other Core Components/Hypertension Assessment:   BP Range: 132-100/80-60  Patient reported symptoms: none    Medications:  Medication Prescribed? Adherent? Exception   Beta-blocker [x]Yes  []No  []Unknown [x]Yes  []No  []Unknown    ACEI/ARB [x]Yes  []No  []Unknown [x]Yes  []No  []Unknown    Calcium Channel Blocker []Yes  [x]No  []Unknown []Yes  []No  []Unknown    Diuretic []Yes  [x]No  []Unknown []Yes  []No  []Unknown        Other Core Components/Hypertension Plan:   Goals:  Blood Pressure <130/80:  Met    Comments on Goal Progression:  Only noted 1 BP reading above 130/80, which was 132/80.  She plans to continue to monitor BP/weights on a daily basis & does keep records for her appointments.  She does monitor her sodium intake carefully.  Encouraged for her to exercise 5-6 days per week.  She plans to join [employer], has a stationary bike & a swimming pool & may buy an elliptical.      Interventions/Recommendations:  Med Card Reconciled: Yes  Encourage medication compliance  Encourage sodium reduction  Reduce alcohol consumption  Encourage weight loss  Recommend physical activity  Educate on contributory factors  Reduce stress, anxiety, anger, depression, and/or chronic pain  Encourage home blood pressure monitoring  Recommend daily weights    Comments on Goal Progression:  See monthly report in Epic for blood pressure trends  Information given to patient for [facility]: Yes  Patient plans to join [employer] that is near her home    Education:    Hypertension; verbalizes understanding; Date: 7/29/2024  Coronary Artery Disease; verbalizes understanding; Date: 8/23/2024  Congestive Heart Failure; verbalizes understanding; Date: 8/30/2024  Medication I; verbalizes understanding; Date: 9/27/2024  Medication II; verbalizes understanding; Date: 10/4/2024  Stroke; verbalizes understanding; Date: 7/26/2024         Does the patient have Heart Failure? No    Other Core Components/Tobacco Cessation Assessment:   Smoking Status: Lifetime Non-smoker  Primary Tobacco Type: N/A  Tobacco Usage: no  Smoking Cessation Barriers:  N/A  Stage of Readiness to Change: Maintenance    Other Core Components/Tobacco Cessation Plan:   Goals:  Maintain non-smoking status: Met    Comments on Goal Progression:  Nonsmoker.    Interventions/Recommendations:  Maintains non-smoking status    Education:    Risk Factors; verbalizes understanding; Date: 10/8/2024           Comments:   Patient has been instructed to  follow up with Cardiologist for any further cardiac issues. Information on [facility] given to patient.      Molly Sorto RN  Cardiac Rehab Nurse

## 2024-10-09 ENCOUNTER — CLINICAL SUPPORT (OUTPATIENT)
Dept: CARDIAC REHAB | Facility: CLINIC | Age: 69
End: 2024-10-09
Payer: MEDICARE

## 2024-10-09 DIAGNOSIS — Z95.820 STATUS POST ANGIOPLASTY WITH STENT: Primary | ICD-10-CM

## 2024-10-09 PROCEDURE — 93798 PHYS/QHP OP CAR RHAB W/ECG: CPT | Mod: S$GLB,,, | Performed by: STUDENT IN AN ORGANIZED HEALTH CARE EDUCATION/TRAINING PROGRAM

## 2024-10-14 ENCOUNTER — HOSPITAL ENCOUNTER (OUTPATIENT)
Dept: CARDIOLOGY | Facility: HOSPITAL | Age: 69
Discharge: HOME | End: 2024-10-14
Attending: INTERNAL MEDICINE
Payer: MEDICARE

## 2024-10-14 VITALS
SYSTOLIC BLOOD PRESSURE: 131 MMHG | WEIGHT: 171 LBS | BODY MASS INDEX: 29.19 KG/M2 | HEIGHT: 64 IN | DIASTOLIC BLOOD PRESSURE: 78 MMHG

## 2024-10-14 DIAGNOSIS — I50.32 CHRONIC HEART FAILURE WITH PRESERVED EJECTION FRACTION: ICD-10-CM

## 2024-10-14 LAB
ASCENDING AORTA: 3.07 CM
AV AREA BY CONTINUOUS VTI: 2.8 CM2
AV INDEX (PROSTH): 0.69
AV LVOT MEAN GRADIENT: 1 MMHG
AV LVOT PEAK GRADIENT: 2 MMHG
AV MEAN GRADIENT: 2.1 MMHG
AV PEAK GRADIENT: 4 MMHG
AV VALVE AREA BY VELOCITY RATIO: 2.9 CM²
AV VALVE AREA: 2.9 CM2
AV VELOCITY RATIO: 0.7
BSA FOR ECHO PROCEDURE: 1.87 M2
CV ECHO LV RWT: 0.39 CM
DOP CALC AO PEAK VEL: 1 M/S
DOP CALC AO VTI: 23.8 CM
DOP CALC LVOT AREA: 4.2 CM2
DOP CALC LVOT DIAMETER: 2.3 CM
DOP CALC LVOT PEAK VEL: 0.7 M/S
DOP CALC LVOT STROKE VOLUME: 68.5 CM3
DOP CALCLVOT PEAK VEL VTI: 16.5 CM
E WAVE DECELERATION TIME: 393.49 MS
E/A RATIO: 1.08
E/E' RATIO: 8.38 M/S
ECHO EF ESTIMATED: 52 %
ECHO LV POSTERIOR WALL: 0.7 CM (ref 0.6–1.1)
FRACTIONAL SHORTENING: 25 % (ref 28–44)
INTERVENTRICULAR SEPTUM: 1 CM (ref 0.6–1.1)
IVC DIAMETER: 0.76 CM
LA MAJOR: 5 CM
LA MINOR: 5 CM
LA WIDTH: 3.85 CM
LEFT ATRIUM SIZE: 4 CM
LEFT ATRIUM VOLUME INDEX MOD: 25.7 ML/M2
LEFT ATRIUM VOLUME INDEX: 35.8 ML/M2
LEFT ATRIUM VOLUME MOD: 46.94 ML
LEFT ATRIUM VOLUME: 65.45 CM3
LEFT INTERNAL DIMENSION IN SYSTOLE: 2.7 CM (ref 2.1–4)
LEFT VENTRICLE DIASTOLIC VOLUME INDEX: 30.28 ML/M2
LEFT VENTRICLE DIASTOLIC VOLUME: 55.42 ML
LEFT VENTRICLE MASS INDEX: 46.8 G/M2
LEFT VENTRICLE SYSTOLIC VOLUME INDEX: 14.5 ML/M2
LEFT VENTRICLE SYSTOLIC VOLUME: 26.57 ML
LEFT VENTRICULAR INTERNAL DIMENSION IN DIASTOLE: 3.6 CM (ref 3.5–6)
LEFT VENTRICULAR MASS: 85.6 G
LV LATERAL E/E' RATIO: 6.7
LV SEPTAL E/E' RATIO: 11.17
MV PEAK A VEL: 0.62 M/S
MV PEAK E VEL: 0.67 M/S
OHS CV RV/LV RATIO: 0.86 CM
PISA TR MAX VEL: 2.05 M/S
RA MAJOR: 4.59 CM
RA PRESSURE ESTIMATED: 3 MMHG
RA WIDTH: 2.79 CM
RIGHT ATRIAL AREA: 12.5 CM2
RIGHT VENTRICLE DIASTOLIC BASEL DIMENSION: 3.1 CM
RV TB RVSP: 5 MMHG
RV TISSUE DOPPLER FREE WALL SYSTOLIC VELOCITY 1 (APICAL 4 CHAMBER VIEW): 8.37 CM/S
SINUS: 4.04 CM
STJ: 3.08 CM
TDI LATERAL: 0.1 M/S
TDI SEPTAL: 0.06 M/S
TDI: 0.08 M/S
TR MAX PG: 17 MMHG
TRICUSPID ANNULAR PLANE SYSTOLIC EXCURSION: 1.32 CM
TV PEAK GRADIENT: 17 MMHG
TV REST PULMONARY ARTERY PRESSURE: 20 MMHG
Z-SCORE OF LEFT VENTRICULAR DIMENSION IN END DIASTOLE: -3.43
Z-SCORE OF LEFT VENTRICULAR DIMENSION IN END SYSTOLE: -1.17

## 2024-10-14 PROCEDURE — 93306 TTE W/DOPPLER COMPLETE: CPT

## 2024-10-14 PROCEDURE — 93306 TTE W/DOPPLER COMPLETE: CPT | Mod: 26,,, | Performed by: INTERNAL MEDICINE

## 2024-10-31 ENCOUNTER — PATIENT MESSAGE (OUTPATIENT)
Dept: CARDIOLOGY | Facility: CLINIC | Age: 69
End: 2024-10-31
Payer: MEDICARE

## 2024-11-01 DIAGNOSIS — R35.89 DIURESIS: ICD-10-CM

## 2024-11-01 DIAGNOSIS — I50.32 CHRONIC HEART FAILURE WITH PRESERVED EJECTION FRACTION: Primary | ICD-10-CM

## 2024-11-01 DIAGNOSIS — R60.9 EDEMA, UNSPECIFIED TYPE: ICD-10-CM

## 2024-11-01 RX ORDER — FUROSEMIDE 40 MG/1
40 TABLET ORAL DAILY
Qty: 90 TABLET | Refills: 3 | Status: SHIPPED | OUTPATIENT
Start: 2024-11-01 | End: 2025-11-01

## 2024-11-01 RX ORDER — POTASSIUM CHLORIDE 750 MG/1
10 TABLET, EXTENDED RELEASE ORAL DAILY
Qty: 90 TABLET | Refills: 3 | Status: SHIPPED | OUTPATIENT
Start: 2024-11-01

## 2024-11-01 NOTE — TELEPHONE ENCOUNTER
Dr Gaytan was updated per secure messaging and responded:  Hello I'm on vacation for 2 weeks so not checking epic daily.   So please secure chat me for things that need my attention.     For her, please make her lasix dose 40 po daily. Also start on po KCl 10 mEq daily and tell her to take 1 banana daily for K replenishing.     Thank you    RS  Repeat BMP after 1 week of being on 40 po lasix qd    -----  Pt was updated on dr Gaytan's response and verbalized understanding. Prescription sent to provider and labs scheduled for 11/8. Pt verbalized understanding and agreed to date/time of appointment(s).    -----  He also asked about BP and I told him that it has been running 102-145.

## 2024-11-01 NOTE — TELEPHONE ENCOUNTER
Spoke to pt who confirmed the lasix dose. She denies SOB. Wt increased progressively from 169 to 172 since 10/29 (see attachment).    Please advise

## 2024-11-11 ENCOUNTER — PATIENT MESSAGE (OUTPATIENT)
Dept: CARDIOLOGY | Facility: CLINIC | Age: 69
End: 2024-11-11
Payer: MEDICARE

## 2024-11-12 ENCOUNTER — TELEPHONE (OUTPATIENT)
Dept: CARDIOLOGY | Facility: CLINIC | Age: 69
End: 2024-11-12
Payer: MEDICARE

## 2024-11-12 DIAGNOSIS — I50.32 CHRONIC HEART FAILURE WITH PRESERVED EJECTION FRACTION: Primary | ICD-10-CM

## 2024-11-12 DIAGNOSIS — R35.89 DIURESIS: ICD-10-CM

## 2024-11-12 NOTE — TELEPHONE ENCOUNTER
Pt scheduled for labs for 11/26 as per dr Gaytan secure message to me about scheduling pt 2-3 weeks after he talked to her. Pt  agreed to date/time of appointment(s).

## 2024-11-26 ENCOUNTER — TELEPHONE (OUTPATIENT)
Dept: CARDIOLOGY | Facility: HOSPITAL | Age: 69
End: 2024-11-26
Payer: MEDICARE

## 2024-11-26 DIAGNOSIS — I50.32 CHRONIC HEART FAILURE WITH PRESERVED EJECTION FRACTION: ICD-10-CM

## 2024-11-26 DIAGNOSIS — R35.89 DIURESIS: ICD-10-CM

## 2024-11-26 DIAGNOSIS — R60.9 EDEMA, UNSPECIFIED TYPE: ICD-10-CM

## 2024-11-26 RX ORDER — FUROSEMIDE 20 MG/1
20 TABLET ORAL DAILY
Qty: 90 TABLET | Refills: 3 | Status: SHIPPED | OUTPATIENT
Start: 2024-11-26

## 2024-11-26 NOTE — TELEPHONE ENCOUNTER
BMP reviewed on lasix 40 daily  Called patient, she feels much better however GFR now 49, Na and Cl also mildly low    Plan  -hold lasix for two days then resume at half dose at 20 daily  -Obtain another BMP in 1 weeks

## 2024-12-09 ENCOUNTER — HOSPITAL ENCOUNTER (OUTPATIENT)
Dept: RADIOLOGY | Facility: HOSPITAL | Age: 69
Discharge: HOME | End: 2024-12-09
Attending: NURSE PRACTITIONER
Payer: MEDICARE

## 2024-12-09 DIAGNOSIS — Z12.31 ENCOUNTER FOR SCREENING MAMMOGRAM FOR BREAST CANCER: ICD-10-CM

## 2024-12-09 PROCEDURE — 77067 SCR MAMMO BI INCL CAD: CPT | Mod: 26,52,, | Performed by: RADIOLOGY

## 2024-12-09 PROCEDURE — 77067 SCR MAMMO BI INCL CAD: CPT | Mod: TC,52

## 2024-12-09 PROCEDURE — 77063 BREAST TOMOSYNTHESIS BI: CPT | Mod: 26,52,, | Performed by: RADIOLOGY

## 2025-01-06 ENCOUNTER — OFFICE VISIT (OUTPATIENT)
Dept: PRIMARY CARE CLINIC | Facility: CLINIC | Age: 70
End: 2025-01-06
Payer: MEDICARE

## 2025-01-06 VITALS
HEIGHT: 64 IN | HEART RATE: 77 BPM | TEMPERATURE: 98 F | SYSTOLIC BLOOD PRESSURE: 122 MMHG | WEIGHT: 177.5 LBS | RESPIRATION RATE: 18 BRPM | DIASTOLIC BLOOD PRESSURE: 80 MMHG | OXYGEN SATURATION: 99 % | BODY MASS INDEX: 30.3 KG/M2

## 2025-01-06 DIAGNOSIS — Z23 NEED FOR VACCINATION: ICD-10-CM

## 2025-01-06 DIAGNOSIS — I10 ESSENTIAL HYPERTENSION: ICD-10-CM

## 2025-01-06 DIAGNOSIS — Z12.31 ENCOUNTER FOR SCREENING MAMMOGRAM FOR BREAST CANCER: ICD-10-CM

## 2025-01-06 DIAGNOSIS — I50.32 CHRONIC HEART FAILURE WITH PRESERVED EJECTION FRACTION: ICD-10-CM

## 2025-01-06 DIAGNOSIS — Z00.00 ANNUAL PHYSICAL EXAM: Primary | ICD-10-CM

## 2025-01-06 DIAGNOSIS — Z95.828 S/P ASCENDING AORTIC REPLACEMENT: ICD-10-CM

## 2025-01-06 PROCEDURE — 1101F PT FALLS ASSESS-DOCD LE1/YR: CPT | Mod: CPTII,S$GLB,, | Performed by: FAMILY MEDICINE

## 2025-01-06 PROCEDURE — 3288F FALL RISK ASSESSMENT DOCD: CPT | Mod: CPTII,S$GLB,, | Performed by: FAMILY MEDICINE

## 2025-01-06 PROCEDURE — 1159F MED LIST DOCD IN RCRD: CPT | Mod: CPTII,S$GLB,, | Performed by: FAMILY MEDICINE

## 2025-01-06 PROCEDURE — 99397 PER PM REEVAL EST PAT 65+ YR: CPT | Mod: S$GLB,,, | Performed by: FAMILY MEDICINE

## 2025-01-06 PROCEDURE — 99999 PR PBB SHADOW E&M-EST. PATIENT-LVL IV: CPT | Mod: PBBFAC,,, | Performed by: FAMILY MEDICINE

## 2025-01-06 PROCEDURE — 3074F SYST BP LT 130 MM HG: CPT | Mod: CPTII,S$GLB,, | Performed by: FAMILY MEDICINE

## 2025-01-06 PROCEDURE — 1160F RVW MEDS BY RX/DR IN RCRD: CPT | Mod: CPTII,S$GLB,, | Performed by: FAMILY MEDICINE

## 2025-01-06 PROCEDURE — 3008F BODY MASS INDEX DOCD: CPT | Mod: CPTII,S$GLB,, | Performed by: FAMILY MEDICINE

## 2025-01-06 PROCEDURE — 3079F DIAST BP 80-89 MM HG: CPT | Mod: CPTII,S$GLB,, | Performed by: FAMILY MEDICINE

## 2025-01-06 PROCEDURE — 1126F AMNT PAIN NOTED NONE PRSNT: CPT | Mod: CPTII,S$GLB,, | Performed by: FAMILY MEDICINE

## 2025-01-06 RX ORDER — ZOSTER VACCINE RECOMBINANT, ADJUVANTED 50 MCG/0.5
0.5 KIT INTRAMUSCULAR ONCE
Qty: 1 EACH | Refills: 1 | Status: SHIPPED | OUTPATIENT
Start: 2025-01-06 | End: 2025-01-06

## 2025-01-06 NOTE — PROGRESS NOTES
Assessment:       1. Annual physical exam    2. Chronic heart failure with preserved ejection fraction    3. Need for vaccination    4. Essential hypertension    5. S/P ascending aortic replacement    6. Encounter for screening mammogram for breast cancer         Plan:       Annual physical exam    Chronic heart failure with preserved ejection fraction    Need for vaccination  -     varicella-zoster gE-AS01B, PF, (SHINGRIX, PF,) 50 mcg/0.5 mL injection; Inject 0.5 mLs into the muscle once. for 1 dose  Dispense: 1 each; Refill: 1    Essential hypertension    S/P ascending aortic replacement    Encounter for screening mammogram for breast cancer  -     Mammo Digital Screening Left with Mohinder; Future; Expected date: 12/09/2025      Assessment & Plan    - Reviewed cardiac status, including aortic dissection repair history and current heart failure management with Jardiance  - Assessed medication regimen, including Lasix dosage (20mg daily) after previous adjustment from 40mg due to electrolyte/kidney function concerns  - Evaluated blood pressure control, noting consistent readings in 120s/80s  - Confirmed recent mammogram completion and up-to-date labs  - Determined no immediate need for bone density scan or colonoscopy based on current guidelines    HEART FAILURE:   Continued Jardiance for heart failure management.   Maintained Lasix 20mg daily for fluid management.   Advised the patient to continue monitoring salt intake.   Noted that previous attempt with 40mg Lasix affected labs results negatively.   Continued Jardiance and Lasix for heart failure management.   Advised the patient to maintain vigilance on salt intake.   Instructed the patient to continue monitoring blood pressure at home.   Noted that the patient reports blood pressure readings in the 120s/80s range.    DIZZINESS:   Noted that the patient reports experiencing occasional dizziness.   Will continue to monitor this symptom.    PREVENTIVE CARE:    Performed a physical exam during this routine checkup.   Reviewed recent mammogram and labs results.   Assessed the patient's preventive care needs.   Ordered a mammogram for next December.    RSV VACCINATION:   Educated the patient about the RSV vaccine, explaining it is for RSV affecting young children and older adults.    SHINGLES VACCINATION:   Discussed the shingles vaccine, describing it as a 2-shot series with doses 2 months apart.   Explained that the shingles vaccine may cause mild achiness for a few days post-administration.   Recommend the shingles vaccine for the patient.    FOLLOW UP:   Acknowledged that cardiology is managing most of the patient's care.   Instructed the patient to continue follow-ups with cardiologist Dr. Guerra every 3-4 months.   Scheduled the next annual checkup in 1 year, unless changes occur.   Instructed the patient to continue regular cardiology follow-ups (currently every 3-4 months).   Patient to continue monitoring salt intake.       Medication List with Changes/Refills   New Medications    VARICELLA-ZOSTER GE-AS01B, PF, (SHINGRIX, PF,) 50 MCG/0.5 ML INJECTION    Inject 0.5 mLs into the muscle once. for 1 dose   Current Medications    ASPIRIN (ECOTRIN) 81 MG EC TABLET    Take 1 tablet (81 mg total) by mouth once daily.    BLOOD PRESSURE MONITOR (IHEALTH EASE) LG ARM SIZE (OP)    by Other route as needed for High Blood Pressure.    CHLORTHALIDONE (HYGROTEN) 25 MG TAB    Take 1 tablet (25 mg total) by mouth once daily.    EMPAGLIFLOZIN (JARDIANCE) 10 MG TABLET    Take 1 tablet (10 mg total) by mouth once daily.    FUROSEMIDE (LASIX) 20 MG TABLET    Take 1 tablet (20 mg total) by mouth once daily.    METOPROLOL TARTRATE (LOPRESSOR) 25 MG TABLET    Take 1 tablet (25 mg total) by mouth 2 (two) times daily.    POTASSIUM CHLORIDE SA (K-DUR,KLOR-CON M) 10 MEQ TABLET    Take 1 tablet (10 mEq total) by mouth once daily.    PRAVASTATIN (PRAVACHOL) 10 MG TABLET    Take 1 tablet (10 mg  "total) by mouth once daily.    TELMISARTAN (MICARDIS) 40 MG TAB    Take 1 tablet (40 mg total) by mouth once daily.         Subjective:    Patient ID: Heidy Polo is a 69 y.o. female.  Chief Complaint: Follow-up (6 month/reg check up)    HPI  History of Present Illness    CHIEF COMPLAINT:  Patient presents today for a checkup.    CARDIOVASCULAR:  She has heart failure managed with Jardiance and follows with cardiologist Dr. Guerra every 3-4 months, with next appointment scheduled for the end of this month. She reports no complications since aortic dissection repair. Her blood pressure consistently runs in the 120s/80s. She experiences occasional dizziness.    MEDICATIONS:  She takes Lasix 20 mg daily without issues. A previous higher dose of 40 mg affected her electrolytes and kidney function.    IMAGING:  Mammogram was completed two weeks ago.      ROS:  Constitutional: +dizziness       Review of Systems    Objective:      Vitals:    01/06/25 1010   BP: 122/80   BP Location: Left arm   Patient Position: Sitting   Pulse: 77   Resp: 18   Temp: 97.5 °F (36.4 °C)   TempSrc: Temporal   SpO2: 99%   Weight: 80.5 kg (177 lb 7.5 oz)   Height: 5' 4" (1.626 m)     BP Readings from Last 5 Encounters:   01/06/25 122/80   10/14/24 131/78   10/08/24 (!) 110/53   10/03/24 105/73   10/01/24 116/78     Wt Readings from Last 5 Encounters:   01/06/25 80.5 kg (177 lb 7.5 oz)   10/14/24 77.6 kg (171 lb)   10/08/24 77.9 kg (171 lb 11.8 oz)   10/03/24 77.3 kg (170 lb 6.7 oz)   10/01/24 77.8 kg (171 lb 8.3 oz)     Physical Exam  Physical Exam    Vitals: Blood pressure: 120s/80s.  General: Well-developed. Well-nourished. No acute distress.  Eyes: EOMI. Sclerae anicteric.  HENT: Normocephalic. Atraumatic. Nares patent. Moist oral mucosa.  Cardiovascular: Regular rate. Regular rhythm. No murmurs. No rubs. No gallops. Normal S1, S2.  Respiratory: Normal respiratory effort. Clear to auscultation bilaterally. No rales. No rhonchi. No " wheezing.  Musculoskeletal: No  obvious deformity.  Extremities: No lower extremity edema.  Neurological: Alert & oriented x3. No slurred speech. Normal gait.  Psychiatric: Normal mood. Normal affect. Good insight. Good judgment.  Skin: Warm. Dry. No rash.         Lab Results   Component Value Date    WBC 5.08 10/03/2024    HGB 12.1 10/03/2024    HCT 37.0 10/03/2024     10/03/2024    CHOL 162 10/03/2024    TRIG 119 10/03/2024    HDL 62 10/03/2024    ALT 22 07/24/2024    AST 18 07/24/2024     12/09/2024    K 4.0 12/09/2024     12/09/2024    CREATININE 1.0 12/09/2024    BUN 25 (H) 12/09/2024    CO2 24 12/09/2024    TSH 2.684 04/25/2024    INR 1.0 04/29/2024    GLUF 102 10/03/2024    HGBA1C 4.8 11/22/2023      This note was generated with the assistance of ambient listening technology. Verbal consent was obtained by the patient and accompanying visitor(s) for the recording of patient appointment to facilitate this note. I attest to having reviewed and edited the generated note for accuracy, though some syntax or spelling errors may persist. Please contact the author of this note for any clarification.

## 2025-01-28 ENCOUNTER — TELEPHONE (OUTPATIENT)
Dept: CARDIOLOGY | Facility: HOSPITAL | Age: 70
End: 2025-01-28
Payer: MEDICARE

## 2025-01-28 DIAGNOSIS — R35.89 DIURESIS: ICD-10-CM

## 2025-01-28 DIAGNOSIS — R60.9 EDEMA, UNSPECIFIED TYPE: ICD-10-CM

## 2025-01-28 DIAGNOSIS — I50.32 CHRONIC HEART FAILURE WITH PRESERVED EJECTION FRACTION: Primary | ICD-10-CM

## 2025-01-28 RX ORDER — FUROSEMIDE 20 MG/1
30 TABLET ORAL DAILY
Start: 2025-01-28
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
## 2025-02-07 ENCOUNTER — TELEPHONE (OUTPATIENT)
Dept: CARDIOLOGY | Facility: CLINIC | Age: 70
End: 2025-02-07
Payer: MEDICARE

## 2025-02-11 ENCOUNTER — OFFICE VISIT (OUTPATIENT)
Dept: CARDIOLOGY | Facility: CLINIC | Age: 70
End: 2025-02-11
Payer: MEDICARE

## 2025-02-11 VITALS
WEIGHT: 173.94 LBS | BODY MASS INDEX: 29.7 KG/M2 | SYSTOLIC BLOOD PRESSURE: 109 MMHG | OXYGEN SATURATION: 99 % | HEART RATE: 80 BPM | HEIGHT: 64 IN | DIASTOLIC BLOOD PRESSURE: 65 MMHG

## 2025-02-11 DIAGNOSIS — Z95.828 S/P ASCENDING AORTIC REPLACEMENT: ICD-10-CM

## 2025-02-11 DIAGNOSIS — E78.2 MIXED HYPERLIPIDEMIA: ICD-10-CM

## 2025-02-11 DIAGNOSIS — I10 ESSENTIAL HYPERTENSION: ICD-10-CM

## 2025-02-11 DIAGNOSIS — I50.32 CHRONIC HEART FAILURE WITH PRESERVED EJECTION FRACTION: Primary | ICD-10-CM

## 2025-02-11 PROCEDURE — 1101F PT FALLS ASSESS-DOCD LE1/YR: CPT | Mod: CPTII,S$GLB,, | Performed by: INTERNAL MEDICINE

## 2025-02-11 PROCEDURE — 1160F RVW MEDS BY RX/DR IN RCRD: CPT | Mod: CPTII,S$GLB,, | Performed by: INTERNAL MEDICINE

## 2025-02-11 PROCEDURE — 3078F DIAST BP <80 MM HG: CPT | Mod: CPTII,S$GLB,, | Performed by: INTERNAL MEDICINE

## 2025-02-11 PROCEDURE — 3074F SYST BP LT 130 MM HG: CPT | Mod: CPTII,S$GLB,, | Performed by: INTERNAL MEDICINE

## 2025-02-11 PROCEDURE — 3008F BODY MASS INDEX DOCD: CPT | Mod: CPTII,S$GLB,, | Performed by: INTERNAL MEDICINE

## 2025-02-11 PROCEDURE — 3288F FALL RISK ASSESSMENT DOCD: CPT | Mod: CPTII,S$GLB,, | Performed by: INTERNAL MEDICINE

## 2025-02-11 PROCEDURE — 99999 PR PBB SHADOW E&M-EST. PATIENT-LVL III: CPT | Mod: PBBFAC,,, | Performed by: INTERNAL MEDICINE

## 2025-02-11 PROCEDURE — 99214 OFFICE O/P EST MOD 30 MIN: CPT | Mod: S$GLB,,, | Performed by: INTERNAL MEDICINE

## 2025-02-11 PROCEDURE — 1159F MED LIST DOCD IN RCRD: CPT | Mod: CPTII,S$GLB,, | Performed by: INTERNAL MEDICINE

## 2025-02-11 RX ORDER — SACUBITRIL AND VALSARTAN 24; 26 MG/1; MG/1
1 TABLET, FILM COATED ORAL 2 TIMES DAILY
Qty: 180 TABLET | Refills: 3 | Status: SHIPPED | OUTPATIENT
Start: 2025-02-11

## 2025-02-11 NOTE — PROGRESS NOTES
PCP - Chas Angela MD    Subjective:   Heidy Polo is a 68 y.o. y.o. female with PMH significant for HFpEF (diagnosed mid 2024 after aortic surgery), HTN, Type A aortic dissection in 4/2024 s/p surgical repair with Dr Ochoa who presents for a f/up visit.      Aortic dissection type 1 background  On 4/25/24, had sudden onset of L sided chest pain radiating to L side of neck and back b/w shoulder blades. She then got LH. Also got blurred vision. Then started getting SOB. Drove to [facility] ED. CTA showed David type A aortic dissection with dissection flap extending from the aortic root up to the level of the distal arch. Associated aneurysmal dilatation measuring up to 5 cm in maximum dimension. Coronary arteries, right brachiocephalic, left common carotid and left subclavian arteries arise from the true lumen and demonstrate appropriate contrast opacification. No mediastinal hematoma.     Patient got BASILIO with 20 bid lasix previously so cut it back down to 20 daily with which she did ok. Was symptomatic, so upped her lasix from 20 daily to 20 + 10 and BMP showed normal kidney function a week later. She still feels fluid built up in her abdomen. Leg swelling still present but improved since going from 20 total to 30 total a day on lasix. She still has orthopnea and sleep in an adjustible bed that she has been using for years. Reports consuming about 64 ounces/day of total fluid. She has dropped her salt intake drastically as she reports. She reports her energy level is not as good as it used to be a year ago and CHF is an interval diagnosis. She can do ADLs w/o any SOB, limitation is upper b/l back pain that she gets when fatigued. Her CPX study associated treadmill test didn't show any EKG evidence of ischemia.       Social History     Tobacco Use    Smoking status: Never    Smokeless tobacco: Never   Substance Use Topics    Alcohol use: No     Comment: rare     Family History   Problem Relation  "Name Age of Onset    Cancer Sister          breast ca    Breast cancer Sister      Diabetes Brother      Hypertension Brother      Cancer Maternal Aunt          breast ca    Breast cancer Maternal Aunt      Cancer Maternal Grandmother          ovarien ca    Heart disease Maternal Grandfather      Hypertension Brother         Meds:   Review of patient's allergies indicates:  No Known Allergies    Current Outpatient Medications:     aspirin (ECOTRIN) 81 MG EC tablet, Take 1 tablet (81 mg total) by mouth once daily., Disp: 90 tablet, Rfl: 3    blood pressure monitor ([employer]HE[employer] EASE) LG arm size (OP), by Other route as needed for High Blood Pressure., Disp: 1 each, Rfl: 0    empagliflozin (JARDIANCE) 10 mg tablet, Take 1 tablet (10 mg total) by mouth once daily., Disp: 90 tablet, Rfl: 3    furosemide (LASIX) 20 MG tablet, Take 1.5 tablets (30 mg total) by mouth once daily., Disp: , Rfl:     metoprolol tartrate (LOPRESSOR) 25 MG tablet, Take 1 tablet (25 mg total) by mouth 2 (two) times daily., Disp: 180 tablet, Rfl: 3    potassium chloride SA (K-DUR,KLOR-CON M) 10 MEQ tablet, Take 1 tablet (10 mEq total) by mouth once daily., Disp: 90 tablet, Rfl: 3    pravastatin (PRAVACHOL) 10 MG tablet, Take 1 tablet (10 mg total) by mouth once daily., Disp: 90 tablet, Rfl: 3    sacubitriL-valsartan (ENTRESTO) 24-26 mg per tablet, Take 1 tablet by mouth 2 (two) times daily., Disp: 180 tablet, Rfl: 3    Review of Systems   Constitutional:  Positive for malaise/fatigue.   Cardiovascular:  Positive for chest pain (scar related), orthopnea and leg swelling. Negative for palpitations and PND.   Musculoskeletal:  Negative for falls.   Neurological:  Negative for dizziness.       Objective:   /65 (Patient Position: Sitting)   Pulse 80   Ht 5' 4" (1.626 m)   Wt 78.9 kg (173 lb 15.1 oz)   SpO2 99%   BMI 29.86 kg/m²   Physical Exam  Cardiovascular:      Rate and Rhythm: Normal rate and regular rhythm.   Pulmonary:      Effort: " "Pulmonary effort is normal.      Comments: B/L Crackles L>R side   Musculoskeletal:      Comments: Wearing stocking, no edema at this time       Labs:     Lab Results   Component Value Date     02/03/2025    K 3.6 02/03/2025    CL 97 02/03/2025    CO2 28 02/03/2025    BUN 28 (H) 02/03/2025    CREATININE 1.0 02/03/2025    ANIONGAP 11 02/03/2025     Lab Results   Component Value Date    HGBA1C 4.8 11/22/2023     Lab Results   Component Value Date    BNP 72 07/24/2024     (H) 07/03/2024     (H) 06/15/2024       Lab Results   Component Value Date    WBC 5.08 10/03/2024    HGB 12.1 10/03/2024    HCT 37.0 10/03/2024    HCT 24 (L) 04/27/2024     10/03/2024    GRAN 3.0 10/03/2024    GRAN 58.6 10/03/2024     Lab Results   Component Value Date    CHOL 162 10/03/2024    HDL 62 10/03/2024    LDLCALC 76.2 10/03/2024    TRIG 119 10/03/2024       Lab Results   Component Value Date     02/03/2025    K 3.6 02/03/2025    CL 97 02/03/2025    CO2 28 02/03/2025    BUN 28 (H) 02/03/2025    CREATININE 1.0 02/03/2025    ANIONGAP 11 02/03/2025     Lab Results   Component Value Date    HGBA1C 4.8 11/22/2023     Lab Results   Component Value Date    BNP 72 07/24/2024     (H) 07/03/2024     (H) 06/15/2024       Vital Signs:   /65 (Patient Position: Sitting)   Pulse 80   Ht 5' 4" (1.626 m)   Wt 78.9 kg (173 lb 15.1 oz)   SpO2 99%   BMI 29.86 kg/m²   Lab Results   Component Value Date    WBC 5.08 10/03/2024    HGB 12.1 10/03/2024    HCT 37.0 10/03/2024    HCT 24 (L) 04/27/2024     10/03/2024    GRAN 3.0 10/03/2024    GRAN 58.6 10/03/2024     Lab Results   Component Value Date    CHOL 162 10/03/2024    HDL 62 10/03/2024    LDLCALC 76.2 10/03/2024    TRIG 119 10/03/2024            Assessment & Plan:     1. Chronic heart failure with preserved ejection fraction    2. Essential hypertension    3. Mixed hyperlipidemia    4. S/P ascending aortic replacement      #HFrEF  -unfortunately " still symptomatic with objective evidence of hypervolemia on exam (while on jardiance and lasix 20+10; on higher lasix dose got BASILIO)  -will start entresto 24/26 bid, even though it is a 2b indication for HFpEF at this time, conventional therapy has not worked well for her HFpEF  -meanwhile, will d/c her telmisartan 40 daily and hold chlorthalidone 25 daily while we start entresto to avoid hypotension  -patient will need to check BP regularly at home, told her to keep taking entresto as long as SBP > 95 and she is asymptomatic  -she is already watching her diet, salt intake, fluid intake and is wearing stocking as well    #HTN  -well controlled    #ASCVD risk  -10.4% 10 years ASCVD risk  -continue statin    #s/p ascending aortic arch replacement  -ASA 81 mg EC daily tablet  -need to control BP well going forward and she has excellent BP control   -doesn't smoke, no DM     F/up in 3 months. Send me weight logs on the portal    Signed:  Paul Gaytna M.D.  Cardiovascular Fellow PGY-6  [facility] [address] [place], [place]

## 2025-04-24 RX ORDER — ROSUVASTATIN CALCIUM 10 MG/1
10 TABLET, COATED ORAL
Qty: 90 TABLET | Refills: 3 | Status: SHIPPED | OUTPATIENT
Start: 2025-04-24

## 2025-05-02 DIAGNOSIS — I10 ESSENTIAL HYPERTENSION: ICD-10-CM

## 2025-05-03 NOTE — TELEPHONE ENCOUNTER
No care due was identified.  [facility] Embedded Care Due Messages. Reference number: [id number].   5/02/2025 9:33:56 PM CDT

## 2025-05-03 NOTE — TELEPHONE ENCOUNTER
Refill Routing Note   Medication(s) are not appropriate for processing by [facility] for the following reason(s):        No active prescription written by provider    [facility] action(s):  Defer               Appointments  past 12m or future 3m with PCP    Date Provider   Last Visit   1/6/2025 Chas Angela MD   Next Visit   Visit date not found Chas Angela MD   ED visits in past 90 days: 0        Note composed:10:40 PM 05/02/2025

## 2025-05-05 RX ORDER — CHLORTHALIDONE 25 MG/1
25 TABLET ORAL
Qty: 90 TABLET | Refills: 3 | Status: SHIPPED | OUTPATIENT
Start: 2025-05-05

## 2025-05-05 RX ORDER — TELMISARTAN 40 MG/1
40 TABLET ORAL
Qty: 90 TABLET | Refills: 3 | Status: SHIPPED | OUTPATIENT
Start: 2025-05-05

## 2025-05-12 RX ORDER — METOPROLOL TARTRATE 25 MG/1
25 TABLET, FILM COATED ORAL 2 TIMES DAILY
Qty: 180 TABLET | Refills: 3 | Status: SHIPPED | OUTPATIENT
Start: 2025-05-12

## 2025-05-13 ENCOUNTER — OFFICE VISIT (OUTPATIENT)
Dept: CARDIOLOGY | Facility: CLINIC | Age: 70
End: 2025-05-13
Payer: MEDICARE

## 2025-05-13 VITALS
DIASTOLIC BLOOD PRESSURE: 71 MMHG | HEART RATE: 76 BPM | WEIGHT: 179.25 LBS | BODY MASS INDEX: 30.6 KG/M2 | HEIGHT: 64 IN | SYSTOLIC BLOOD PRESSURE: 118 MMHG | OXYGEN SATURATION: 99 %

## 2025-05-13 DIAGNOSIS — E78.2 MIXED HYPERLIPIDEMIA: ICD-10-CM

## 2025-05-13 DIAGNOSIS — I50.32 CHRONIC HEART FAILURE WITH PRESERVED EJECTION FRACTION: Primary | ICD-10-CM

## 2025-05-13 DIAGNOSIS — I50.32 CHRONIC HEART FAILURE WITH PRESERVED EJECTION FRACTION: ICD-10-CM

## 2025-05-13 DIAGNOSIS — R60.9 EDEMA, UNSPECIFIED TYPE: Primary | ICD-10-CM

## 2025-05-13 DIAGNOSIS — Z95.828 S/P ASCENDING AORTIC REPLACEMENT: ICD-10-CM

## 2025-05-13 DIAGNOSIS — R60.9 EDEMA, UNSPECIFIED TYPE: ICD-10-CM

## 2025-05-13 DIAGNOSIS — I10 ESSENTIAL HYPERTENSION: ICD-10-CM

## 2025-05-13 DIAGNOSIS — Z95.820 STATUS POST ANGIOPLASTY WITH STENT: ICD-10-CM

## 2025-05-13 DIAGNOSIS — R35.89 DIURESIS: ICD-10-CM

## 2025-05-13 PROCEDURE — 1159F MED LIST DOCD IN RCRD: CPT | Mod: CPTII,S$GLB,, | Performed by: INTERNAL MEDICINE

## 2025-05-13 PROCEDURE — 3288F FALL RISK ASSESSMENT DOCD: CPT | Mod: CPTII,S$GLB,, | Performed by: INTERNAL MEDICINE

## 2025-05-13 PROCEDURE — 3008F BODY MASS INDEX DOCD: CPT | Mod: CPTII,S$GLB,, | Performed by: INTERNAL MEDICINE

## 2025-05-13 PROCEDURE — 1126F AMNT PAIN NOTED NONE PRSNT: CPT | Mod: CPTII,S$GLB,, | Performed by: INTERNAL MEDICINE

## 2025-05-13 PROCEDURE — 3078F DIAST BP <80 MM HG: CPT | Mod: CPTII,S$GLB,, | Performed by: INTERNAL MEDICINE

## 2025-05-13 PROCEDURE — 1160F RVW MEDS BY RX/DR IN RCRD: CPT | Mod: CPTII,S$GLB,, | Performed by: INTERNAL MEDICINE

## 2025-05-13 PROCEDURE — 1101F PT FALLS ASSESS-DOCD LE1/YR: CPT | Mod: CPTII,S$GLB,, | Performed by: INTERNAL MEDICINE

## 2025-05-13 PROCEDURE — 4010F ACE/ARB THERAPY RXD/TAKEN: CPT | Mod: CPTII,S$GLB,, | Performed by: INTERNAL MEDICINE

## 2025-05-13 PROCEDURE — 3074F SYST BP LT 130 MM HG: CPT | Mod: CPTII,S$GLB,, | Performed by: INTERNAL MEDICINE

## 2025-05-13 PROCEDURE — 99999 PR PBB SHADOW E&M-EST. PATIENT-LVL III: CPT | Mod: PBBFAC,,, | Performed by: INTERNAL MEDICINE

## 2025-05-13 PROCEDURE — 99214 OFFICE O/P EST MOD 30 MIN: CPT | Mod: S$GLB,,, | Performed by: INTERNAL MEDICINE

## 2025-05-13 RX ORDER — FUROSEMIDE 20 MG/1
40 TABLET ORAL DAILY
Qty: 180 TABLET | Refills: 1
Start: 2025-05-13 | End: 2025-05-16 | Stop reason: SDUPTHER

## 2025-05-13 NOTE — PROGRESS NOTES
PCP - Chas Angela MD    Subjective:   Heidy Polo is a 68 y.o. y.o. female with PMH significant for HFpEF (diagnosed mid 2024 after aortic surgery), HTN, Type A aortic dissection in 4/2024 s/p surgical repair with Dr Ochoa who presents for a f/up visit.      Aortic dissection type 1 background  On 4/25/24, had sudden onset of L sided chest pain radiating to L side of neck and back b/w shoulder blades. She then got LH. Also got blurred vision. Then started getting SOB. Drove to [place] ED. CTA showed David type A aortic dissection with dissection flap extending from the aortic root up to the level of the distal arch. Associated aneurysmal dilatation measuring up to 5 cm in maximum dimension. Coronary arteries, right brachiocephalic, left common carotid and left subclavian arteries arise from the true lumen and demonstrate appropriate contrast opacification. No mediastinal hematoma.     Today, she comes for a f/up visit. She says she increased her lasix to 40 mg daily as was swelling up on 30 mg daily. The last time we checked her kidney function was on 30 daily and it was wnl. Of note, previously had BASILIO on 40 dialy of lasix. Also been taking entresto low dose now. Says does have ongoing fatigue. Reviewed last CPX study with the patient which showed functional impairment and she says not doing much physical activity but does go to work everyday, does embroidery. Also hops on treadmill couple times a week but plans to increase her exercise.       Social History     Tobacco Use    Smoking status: Never    Smokeless tobacco: Never   Substance Use Topics    Alcohol use: No     Comment: rare     Family History   Problem Relation Name Age of Onset    Cancer Sister          breast ca    Breast cancer Sister      Diabetes Brother      Hypertension Brother      Cancer Maternal Aunt          breast ca    Breast cancer Maternal Aunt      Cancer Maternal Grandmother          ovarien ca    Heart disease  "Maternal Grandfather      Hypertension Brother         Meds:   Review of patient's allergies indicates:  No Known Allergies  Current Medications[1]    Review of Systems   Constitutional:  Positive for malaise/fatigue.   Respiratory:  Positive for shortness of breath (can walk up to a mile w/o stopping but has to pace down).    Cardiovascular:  Negative for chest pain.   Musculoskeletal:  Negative for falls.   Neurological:  Negative for dizziness.       Objective:   /71 (BP Location: Right arm, Patient Position: Sitting)   Pulse 76   Ht 5' 4" (1.626 m)   Wt 81.3 kg (179 lb 3.7 oz)   SpO2 99%   BMI 30.77 kg/m²   Physical Exam  Constitutional:       General: She is not in acute distress.  Cardiovascular:      Rate and Rhythm: Normal rate and regular rhythm.      Comments: No JVD  Crackles present B/L  Compression stockings, couldn't assess leg edema  No S3  Pulmonary:      Effort: Pulmonary effort is normal. No respiratory distress.   Abdominal:      General: Abdomen is flat.   Neurological:      General: No focal deficit present.      Mental Status: She is alert.   Psychiatric:         Mood and Affect: Mood normal.         Labs:     Lab Results   Component Value Date     02/28/2025    K 4.1 02/28/2025     02/28/2025    CO2 22 (L) 02/28/2025    BUN 19 02/28/2025    CREATININE 0.9 02/28/2025    ANIONGAP 13 02/28/2025     Lab Results   Component Value Date    HGBA1C 4.8 11/22/2023     Lab Results   Component Value Date     (H) 02/28/2025    BNP 72 07/24/2024     (H) 07/03/2024       Lab Results   Component Value Date    WBC 5.08 10/03/2024    HGB 12.1 10/03/2024    HCT 37.0 10/03/2024    HCT 24 (L) 04/27/2024     10/03/2024    GRAN 3.0 10/03/2024    GRAN 58.6 10/03/2024     Lab Results   Component Value Date    CHOL 162 10/03/2024    HDL 62 10/03/2024    LDLCALC 76.2 10/03/2024    TRIG 119 10/03/2024       Lab Results   Component Value Date     02/28/2025    K 4.1 " "02/28/2025     02/28/2025    CO2 22 (L) 02/28/2025    BUN 19 02/28/2025    CREATININE 0.9 02/28/2025    ANIONGAP 13 02/28/2025     Lab Results   Component Value Date    HGBA1C 4.8 11/22/2023     Lab Results   Component Value Date     (H) 02/28/2025    BNP 72 07/24/2024     (H) 07/03/2024       Vital Signs:   /71 (BP Location: Right arm, Patient Position: Sitting)   Pulse 76   Ht 5' 4" (1.626 m)   Wt 81.3 kg (179 lb 3.7 oz)   SpO2 99%   BMI 30.77 kg/m²   Lab Results   Component Value Date    WBC 5.08 10/03/2024    HGB 12.1 10/03/2024    HCT 37.0 10/03/2024    HCT 24 (L) 04/27/2024     10/03/2024    GRAN 3.0 10/03/2024    GRAN 58.6 10/03/2024     Lab Results   Component Value Date    CHOL 162 10/03/2024    HDL 62 10/03/2024    LDLCALC 76.2 10/03/2024    TRIG 119 10/03/2024            Assessment & Plan:     1. Edema, unspecified type    2. Chronic heart failure with preserved ejection fraction    3. Diuresis    4. Essential hypertension    5. Mixed hyperlipidemia    6. S/P ascending aortic replacement    7. Status post angioplasty with stent      #HFrEF  -unfortunately still symptomatic with objective evidence of hypervolemia on exam with 40 daily of lasix  -started entresto 24/26 bid, even though it is a 2b indication for HFpEF at this time, conventional therapy has not worked well for her HFpEF  -keep taking entresto as long as SBP > 95 and she is asymptomatic  -she is already watching her diet, salt intake, fluid intake and is wearing stocking as well  -BMP with normal kidney function, will increase lasix from 40 daily to 40+20, repeat kidney function in one week     #HTN  -well controlled     #ASCVD risk  -10.4% 10 years ASCVD risk  -continue statin     #s/p ascending aortic arch replacement  -ASA 81 mg EC daily tablet  -need to control BP well going forward and she has excellent BP control   -doesn't smoke, no DM      Signed:  Paul Gaytan M.D.  Cardiovascular Fellow " PGY-6  [facility] [address] [place], [place]                  [1]   Current Outpatient Medications:     aspirin (ECOTRIN) 81 MG EC tablet, Take 1 tablet (81 mg total) by mouth once daily., Disp: 90 tablet, Rfl: 3    blood pressure monitor ([employer]HE[employer] EASE) LG arm size (OP), by Other route as needed for High Blood Pressure., Disp: 1 each, Rfl: 0    chlorthalidone (HYGROTEN) 25 MG Tab, TAKE 1 TABLET BY MOUTH ONCE  DAILY, Disp: 90 tablet, Rfl: 3    empagliflozin (JARDIANCE) 10 mg tablet, Take 1 tablet (10 mg total) by mouth once daily., Disp: 90 tablet, Rfl: 3    metoprolol tartrate (LOPRESSOR) 25 MG tablet, TAKE 1 TABLET BY MOUTH TWICE  DAILY, Disp: 180 tablet, Rfl: 3    potassium chloride SA (K-DUR,KLOR-CON M) 10 MEQ tablet, Take 1 tablet (10 mEq total) by mouth once daily., Disp: 90 tablet, Rfl: 3    pravastatin (PRAVACHOL) 10 MG tablet, Take 1 tablet (10 mg total) by mouth once daily., Disp: 90 tablet, Rfl: 3    rosuvastatin (CRESTOR) 10 MG tablet, TAKE 1 TABLET BY MOUTH ONCE  DAILY, Disp: 90 tablet, Rfl: 3    sacubitriL-valsartan (ENTRESTO) 24-26 mg per tablet, Take 1 tablet by mouth 2 (two) times daily., Disp: 180 tablet, Rfl: 3    telmisartan (MICARDIS) 40 MG Tab, TAKE 1 TABLET BY MOUTH ONCE  DAILY, Disp: 90 tablet, Rfl: 3    furosemide (LASIX) 20 MG tablet, Take 2 tablets (40 mg total) by mouth once daily., Disp: 180 tablet, Rfl: 1

## 2025-05-15 ENCOUNTER — PATIENT MESSAGE (OUTPATIENT)
Dept: CARDIOLOGY | Facility: CLINIC | Age: 70
End: 2025-05-15
Payer: MEDICARE

## 2025-05-16 DIAGNOSIS — R35.89 DIURESIS: ICD-10-CM

## 2025-05-16 DIAGNOSIS — R60.9 EDEMA, UNSPECIFIED TYPE: ICD-10-CM

## 2025-05-16 DIAGNOSIS — I50.32 CHRONIC HEART FAILURE WITH PRESERVED EJECTION FRACTION: ICD-10-CM

## 2025-05-16 RX ORDER — FUROSEMIDE 20 MG/1
40 TABLET ORAL DAILY
Qty: 180 TABLET | Refills: 3 | Status: SHIPPED | OUTPATIENT
Start: 2025-05-16

## 2025-05-23 DIAGNOSIS — I50.32 CHRONIC HEART FAILURE WITH PRESERVED EJECTION FRACTION: Primary | ICD-10-CM

## 2025-06-02 ENCOUNTER — TELEPHONE (OUTPATIENT)
Dept: CARDIOLOGY | Facility: HOSPITAL | Age: 70
End: 2025-06-02
Payer: MEDICARE

## 2025-06-02 RX ORDER — FUROSEMIDE 20 MG/1
20 TABLET ORAL
Start: 2025-06-02

## 2025-08-07 RX ORDER — PRAVASTATIN SODIUM 10 MG/1
10 TABLET ORAL
Qty: 90 TABLET | Refills: 0 | Status: SHIPPED | OUTPATIENT
Start: 2025-08-07

## 2025-08-07 NOTE — TELEPHONE ENCOUNTER
Refill Decision Note   Heidy Polo  is requesting a refill authorization.  Brief Assessment and Rationale for Refill:  Approve     Medication Therapy Plan:         Comments:     Note composed:7:00 AM 08/07/2025

## (undated) DEVICE — SUT PROLENE 5-0 BL C-1 4-24

## (undated) DEVICE — SUT PROLENE 4-0 SH BLU 36IN

## (undated) DEVICE — PLEDGET SUT SOFT 3/8X3/16X1/16

## (undated) DEVICE — KIT SAHARA DRAPE DRAW/LIFT

## (undated) DEVICE — DRAPE CVMAX SPLIT ANES SCRN

## (undated) DEVICE — DRAIN CHANNEL ROUND 19FR

## (undated) DEVICE — SUT 2/0 36IN COATED VICRYL

## (undated) DEVICE — FOGERTY SOFT JAW DISP 2/PK

## (undated) DEVICE — KIT URINARY CATH URINE METER

## (undated) DEVICE — SPONGE COTTON TRAY 4X4IN

## (undated) DEVICE — SUT SILK 2-0 SH 18IN BLACK

## (undated) DEVICE — SUT 6 18IN STEEL MONO CCS

## (undated) DEVICE — DRESSING AQUACEL SACRAL 9 X 9

## (undated) DEVICE — BLADE 4 INCH EDGE UN-INS

## (undated) DEVICE — DRAPE SLUSH WARMER WITH DISC

## (undated) DEVICE — DRAIN CHEST DRY SUCTION

## (undated) DEVICE — CANNULA 29/29FR VACUUM ASSIST

## (undated) DEVICE — BLADE STERN 65.8MM

## (undated) DEVICE — COVER SET UP STRL 54X54 20/BX

## (undated) DEVICE — TOWEL OR XRAY WHITE 17X26IN

## (undated) DEVICE — SOL NS 1000CC

## (undated) DEVICE — SUT 2/0 30IN ETHIBOND

## (undated) DEVICE — INSERTS STEALTH FIBRA SIZE 5

## (undated) DEVICE — STRIP MEDI WND CLSR 1/2X4IN

## (undated) DEVICE — HEMOSTAT SURGICEL PWD 3G

## (undated) DEVICE — RETRACTOR OCTOBASE INSERT HOLD

## (undated) DEVICE — TRAY HEART OMC

## (undated) DEVICE — Device

## (undated) DEVICE — BOWL STERILE LARGE 32OZ

## (undated) DEVICE — BLADE SAW STERNAL 5/BX

## (undated) DEVICE — DRESSING ADH ISLAND 3.6 X 14

## (undated) DEVICE — SUT 3-0 CTD VICRYL 27IN PS

## (undated) DEVICE — SPONGE GAUZE 16PLY 4X4

## (undated) DEVICE — KIT PREVENA PLUS

## (undated) DEVICE — SOL NACL 0.9% INJ 500ML BG

## (undated) DEVICE — ELECTRODE PAD DEFIB STERILE

## (undated) DEVICE — GLOVE BIOGEL PI MICRO SZ 7.5

## (undated) DEVICE — PATCH SEALANT EVARREST 2X4

## (undated) DEVICE — SOL 9P NACL IRR PIC IL

## (undated) DEVICE — CANNULA AORTIC ROOT 12 GAUGE 9

## (undated) DEVICE — ELECTRODE REM PLYHSV RETURN 9

## (undated) DEVICE — SUT PROLENE 4-0 RB-1 BL MO

## (undated) DEVICE — SUT ETHIBOND EXCEL 2-0 SH-2

## (undated) DEVICE — SUT SILK BLK BR. 2 2-60

## (undated) DEVICE — DECANTER FLUID TRNSF WHITE 9IN

## (undated) DEVICE — SUT VICRYL BR 1 GEN 27 CT-1